# Patient Record
Sex: FEMALE | Race: WHITE | NOT HISPANIC OR LATINO | Employment: UNEMPLOYED | URBAN - METROPOLITAN AREA
[De-identification: names, ages, dates, MRNs, and addresses within clinical notes are randomized per-mention and may not be internally consistent; named-entity substitution may affect disease eponyms.]

---

## 2018-01-03 ENCOUNTER — TRANSCRIBE ORDERS (OUTPATIENT)
Dept: ADMINISTRATIVE | Facility: HOSPITAL | Age: 48
End: 2018-01-03

## 2018-01-03 DIAGNOSIS — E21.3 HYPERPARATHYROIDISM (HCC): Primary | ICD-10-CM

## 2018-01-29 ENCOUNTER — HOSPITAL ENCOUNTER (OUTPATIENT)
Dept: ULTRASOUND IMAGING | Facility: HOSPITAL | Age: 48
Discharge: HOME | End: 2018-01-29
Attending: OTOLARYNGOLOGY
Payer: COMMERCIAL

## 2018-01-29 ENCOUNTER — TRANSCRIBE ORDERS (OUTPATIENT)
Dept: ADMINISTRATIVE | Facility: HOSPITAL | Age: 48
End: 2018-01-29

## 2018-01-29 ENCOUNTER — APPOINTMENT (OUTPATIENT)
Dept: LAB | Facility: HOSPITAL | Age: 48
End: 2018-01-29
Payer: COMMERCIAL

## 2018-01-29 ENCOUNTER — HOSPITAL ENCOUNTER (OUTPATIENT)
Dept: NUCLEAR MEDICINE | Facility: HOSPITAL | Age: 48
Discharge: HOME | End: 2018-01-29
Attending: OTOLARYNGOLOGY
Payer: COMMERCIAL

## 2018-01-29 DIAGNOSIS — E21.3 HYPERPARATHYROIDISM (HCC): ICD-10-CM

## 2018-01-29 DIAGNOSIS — R53.83 FATIGUE, UNSPECIFIED TYPE: ICD-10-CM

## 2018-01-29 DIAGNOSIS — R53.81 MALAISE: ICD-10-CM

## 2018-01-29 DIAGNOSIS — R53.83 FATIGUE, UNSPECIFIED TYPE: Primary | ICD-10-CM

## 2018-01-29 LAB
BASOPHILS # BLD AUTO: 0.03 THOUSANDS/ΜL (ref 0–0.1)
BASOPHILS NFR BLD AUTO: 0 % (ref 0–1)
CORTIS SERPL-MCNC: 10.3 UG/DL
EOSINOPHIL # BLD AUTO: 0.18 THOUSAND/ΜL (ref 0–0.61)
EOSINOPHIL NFR BLD AUTO: 2 % (ref 0–6)
ERYTHROCYTE [DISTWIDTH] IN BLOOD BY AUTOMATED COUNT: 14 % (ref 11.6–15.1)
FSH SERPL-ACNC: 5.1 MIU/ML
HCT VFR BLD AUTO: 41.5 % (ref 34.8–46.1)
HGB BLD-MCNC: 13.8 G/DL (ref 11.5–15.4)
LH SERPL-ACNC: 3 MIU/ML
LYMPHOCYTES # BLD AUTO: 2.05 THOUSANDS/ΜL (ref 0.6–4.47)
LYMPHOCYTES NFR BLD AUTO: 24 % (ref 14–44)
MCH RBC QN AUTO: 30 PG (ref 26.8–34.3)
MCHC RBC AUTO-ENTMCNC: 33.3 G/DL (ref 31.4–37.4)
MCV RBC AUTO: 90 FL (ref 82–98)
MONOCYTES # BLD AUTO: 0.39 THOUSAND/ΜL (ref 0.17–1.22)
MONOCYTES NFR BLD AUTO: 5 % (ref 4–12)
NEUTROPHILS # BLD AUTO: 5.95 THOUSANDS/ΜL (ref 1.85–7.62)
NEUTS SEG NFR BLD AUTO: 69 % (ref 43–75)
PLATELET # BLD AUTO: 310 THOUSANDS/UL (ref 149–390)
PMV BLD AUTO: 9.1 FL (ref 8.9–12.7)
RBC # BLD AUTO: 4.6 MILLION/UL (ref 3.81–5.12)
T3 SERPL-MCNC: 1 NG/ML (ref 0.6–1.8)
T4 FREE SERPL-MCNC: 0.64 NG/DL (ref 0.76–1.46)
T4 SERPL-MCNC: 8.7 UG/DL (ref 4.7–13.3)
TSH SERPL DL<=0.05 MIU/L-ACNC: 2.1 UIU/ML (ref 0.36–3.74)
WBC # BLD AUTO: 8.6 THOUSAND/UL (ref 4.31–10.16)

## 2018-01-29 PROCEDURE — 82533 TOTAL CORTISOL: CPT

## 2018-01-29 PROCEDURE — A9500 TC99M SESTAMIBI: HCPCS

## 2018-01-29 PROCEDURE — 84305 ASSAY OF SOMATOMEDIN: CPT

## 2018-01-29 PROCEDURE — 78071 PARATHYRD PLANAR W/WO SUBTRJ: CPT

## 2018-01-29 PROCEDURE — 83001 ASSAY OF GONADOTROPIN (FSH): CPT

## 2018-01-29 PROCEDURE — 76536 US EXAM OF HEAD AND NECK: CPT

## 2018-01-29 PROCEDURE — 85025 COMPLETE CBC W/AUTO DIFF WBC: CPT

## 2018-01-29 PROCEDURE — 84443 ASSAY THYROID STIM HORMONE: CPT

## 2018-01-29 PROCEDURE — 84480 ASSAY TRIIODOTHYRONINE (T3): CPT

## 2018-01-29 PROCEDURE — 36415 COLL VENOUS BLD VENIPUNCTURE: CPT

## 2018-01-29 PROCEDURE — 83002 ASSAY OF GONADOTROPIN (LH): CPT

## 2018-01-29 PROCEDURE — 84439 ASSAY OF FREE THYROXINE: CPT

## 2018-01-30 LAB — IGF-I SERPL-MCNC: 123 NG/ML (ref 57–195)

## 2018-03-25 ENCOUNTER — OFFICE VISIT (OUTPATIENT)
Dept: URGENT CARE | Facility: CLINIC | Age: 48
End: 2018-03-25
Payer: COMMERCIAL

## 2018-03-25 VITALS
DIASTOLIC BLOOD PRESSURE: 100 MMHG | RESPIRATION RATE: 18 BRPM | SYSTOLIC BLOOD PRESSURE: 139 MMHG | OXYGEN SATURATION: 98 % | TEMPERATURE: 97.1 F | HEART RATE: 76 BPM

## 2018-03-25 DIAGNOSIS — J20.9 ACUTE BRONCHITIS, UNSPECIFIED ORGANISM: Primary | ICD-10-CM

## 2018-03-25 PROCEDURE — 99213 OFFICE O/P EST LOW 20 MIN: CPT | Performed by: NURSE PRACTITIONER

## 2018-03-25 RX ORDER — AMOXICILLIN AND CLAVULANATE POTASSIUM 875; 125 MG/1; MG/1
1 TABLET, FILM COATED ORAL 2 TIMES DAILY
Qty: 14 TABLET | Refills: 0 | Status: SHIPPED | OUTPATIENT
Start: 2018-03-25 | End: 2018-04-01

## 2018-03-25 NOTE — PATIENT INSTRUCTIONS
Acute Bronchitis   WHAT YOU NEED TO KNOW:   Acute bronchitis is swelling and irritation in the air passages of your lungs  This irritation may cause you to cough or have other breathing problems  Acute bronchitis often starts because of another illness, such as a cold or the flu  The illness spreads from your nose and throat to your windpipe and airways  Bronchitis is often called a chest cold  Acute bronchitis lasts about 3 to 6 weeks and is usually not a serious illness  Your cough can last for several weeks  DISCHARGE INSTRUCTIONS:   Return to the emergency department if:   · You cough up blood  · Your lips or fingernails turn blue  · You feel like you are not getting enough air when you breathe  Contact your healthcare provider if:   · You have a fever  · Your breathing problems do not go away or get worse  · Your cough does not get better within 4 weeks  · You have questions or concerns about your condition or care  Self-care:   · Get more rest   Rest helps your body to heal  Slowly start to do more each day  Rest when you feel it is needed  · Avoid irritants in the air  Avoid chemicals, fumes, and dust  Wear a face mask if you must work around dust or fumes  Stay inside on days when air pollution levels are high  If you have allergies, stay inside when pollen counts are high  Do not use aerosol products, such as spray-on deodorant, bug spray, and hair spray  · Do not smoke or be around others who smoke  Nicotine and other chemicals in cigarettes and cigars damages the cilia that move mucus out of your lungs  Ask your healthcare provider for information if you currently smoke and need help to quit  E-cigarettes or smokeless tobacco still contain nicotine  Talk to your healthcare provider before you use these products  · Drink liquids as directed  Liquids help keep your air passages moist and help you cough up mucus   You may need to drink more liquids when you have acute bronchitis  Ask how much liquid to drink each day and which liquids are best for you  · Use a humidifier or vaporizer  Use a cool mist humidifier or a vaporizer to increase air moisture in your home  This may make it easier for you to breathe and help decrease your cough  Decrease risk for acute bronchitis:   · Get the vaccinations you need  Ask your healthcare provider if you should get vaccinated against the flu or pneumonia  · Prevent the spread of germs  You can decrease your risk of acute bronchitis and other illnesses by doing the following:     [facility] your hands often with soap and water  Carry germ-killing hand lotion or gel with you  You can use the lotion or gel to clean your hands when soap and water are not available  ¨ Do not touch your eyes, nose, or mouth unless you have washed your hands first     ¨ Always cover your mouth when you cough to prevent the spread of germs  It is best to cough into a tissue or your shirt sleeve instead of into your hand  Ask those around you cover their mouths when they cough  ¨ Try to avoid people who have a cold or the flu  If you are sick, stay away from others as much as possible  Medicines: Your healthcare provider may  give you any of the following:  · Ibuprofen or acetaminophen  are medicines that help lower your fever  They are available without a doctor's order  Ask your healthcare provider which medicine is right for you  Ask how much to take and how often to take it  Follow directions  These medicines can cause stomach bleeding if not taken correctly  Ibuprofen can cause kidney damage  Do not take ibuprofen if you have kidney disease, an ulcer, or allergies to aspirin  Acetaminophen can cause liver damage  Do not take more than 4,000 milligrams in 24 hours  · Decongestants  help loosen mucus in your lungs and make it easier to cough up  This can help you breathe easier  · Cough suppressants  decrease your urge to cough   If your cough produces mucus, do not take a cough suppressant unless your healthcare provider tells you to  Your healthcare provider may suggest that you take a cough suppressant at night so you can rest     · Inhalers  may be given  Your healthcare provider may give you one or more inhalers to help you breathe easier and cough less  An inhaler gives your medicine to open your airways  Ask your healthcare provider to show you how to use your inhaler correctly  · Take your medicine as directed  Contact your healthcare provider if you think your medicine is not helping or if you have side effects  Tell him of her if you are allergic to any medicine  Keep a list of the medicines, vitamins, and herbs you take  Include the amounts, and when and why you take them  Bring the list or the pill bottles to follow-up visits  Carry your medicine list with you in case of an emergency  Follow up with your healthcare provider as directed:  Write down questions you have so you will remember to ask them during your follow-up visits  © 2017 [employer] Information is for End User's use only and may not be sold, redistributed or otherwise used for commercial purposes  All illustrations and images included in CareNotes® are the copyrighted property of [employer]  or Nehemiah Brown  The above information is an  only  It is not intended as medical advice for individual conditions or treatments  Talk to your doctor, nurse or pharmacist before following any medical regimen to see if it is safe and effective for you

## 2018-03-25 NOTE — PROGRESS NOTES
[id number]        NAME: Terri Mcleod is a 52 y o  female  : 1970    MRN: [id number]  DATE: 2018  TIME: 10:39 AM    Assessment and Plan   Acute bronchitis, unspecified organism [J20 9]  1  Acute bronchitis, unspecified organism  amoxicillin-clavulanate (AUGMENTIN) 875-125 mg per tablet         Patient Instructions       Follow up with PCP in 3-5 days  Proceed to  ER if symptoms worsen  Chief Complaint     Chief Complaint   Patient presents with    Cold Like Symptoms    Cough     x 3days  History of Present Illness       URI    Episode onset: 3 days  The problem has been gradually worsening  There has been no fever  Associated symptoms include congestion, coughing (dry, non productive), a plugged ear sensation, swollen glands and wheezing  She has tried decongestant for the symptoms  The treatment provided mild relief  H/O asthma  Review of Systems   Review of Systems   Constitutional: Positive for chills and fatigue  HENT: Positive for congestion and sinus pressure  Eyes: Negative  Respiratory: Positive for cough (dry, non productive) and wheezing  Gastrointestinal: Negative  Endocrine: Negative  Genitourinary: Negative  Neurological: Negative  Current Medications       Current Outpatient Prescriptions:     ALPRAZolam (XANAX) 0 5 mg tablet, Take 0 5 mg by mouth daily at bedtime as needed for anxiety  , Disp: , Rfl:     amoxicillin-clavulanate (AUGMENTIN) 875-125 mg per tablet, Take 1 tablet by mouth 2 (two) times a day for 7 days, Disp: 14 tablet, Rfl: 0    Azelastine-Fluticasone (DYMISTA NA), 2 puffs into each nostril 2 (two) times a day , Disp: , Rfl:     budesonide-formoterol (SYMBICORT) 160-4 5 mcg/act inhaler, Inhale 2 puffs 2 (two) times a day , Disp: , Rfl:     buPROPion (WELLBUTRIN) 100 mg tablet, Take 100 mg by mouth 2 (two) times a day , Disp: , Rfl:     Cyanocobalamin (B-12) 1000 MCG/ML KIT, Inject as directed every 30 (thirty) days  , Disp: , Rfl:     cyclobenzaprine (FLEXERIL) 10 mg tablet, Take 10 mg by mouth daily at bedtime  , Disp: , Rfl:     DULoxetine (CYMBALTA) 60 mg delayed release capsule, Take 60 mg by mouth daily  , Disp: , Rfl:     fexofenadine (ALLEGRA) 60 MG tablet, Take 60 mg by mouth daily  , Disp: , Rfl:     ISOtretinoin (ZENATANE) 40 MG capsule, Take 40 mg by mouth 2 (two) times a day, Disp: , Rfl:     losartan (COZAAR) 50 mg tablet, Take 50 mg by mouth 2 (two) times a day , Disp: , Rfl:     lubiprostone (AMITIZA) 24 mcg capsule, Take 24 mcg by mouth 2 (two) times a day with meals, Disp: , Rfl:     montelukast (SINGULAIR) 5 mg chewable tablet, Chew 5 mg 2 (two) times a day , Disp: , Rfl:     traMADol (ULTRAM) 50 mg tablet, Take 50 mg by mouth 2 (two) times a day , Disp: , Rfl:     UNKNOWN TO PATIENT, Allergy shots 2x/week, Disp: , Rfl:     Current Allergies     Allergies as of 2018 - Reviewed 2018   Allergen Reaction Noted    Codeine GI Intolerance and Chest Pain 2015            The following portions of the patient's history were reviewed and updated as appropriate: allergies, current medications, past family history, past medical history, past social history, past surgical history and problem list      Past Medical History:   Diagnosis Date    Anemia     recurrent    Asthma     allergy induced    Depression     Environmental allergies     Fibromyalgia     Hiatal hernia     History of transfusion     Hypertension     [facility]ogammaglobulinemia ([facility] [address] )     Hypoglycemia after GI (gastrointestinal) surgery     PONV (postoperative nausea and vomiting)     severe    Seasonal allergies        Past Surgical History:   Procedure Laterality Date    ABDOMINAL SURGERY      adhesions    ABDOMINOPLASTY      APPENDECTOMY       SECTION      CHOLECYSTECTOMY      COSMETIC SURGERY Bilateral     breast augmentation 2004    GASTRIC BYPASS      2009    HERNIA REPAIR      HYSTERECTOMY  KNEE ARTHROSCOPY Left     [place] CORRJ HALLUX VALGUS W/SESMDC W/RESCJ PROX PHAL Left 9/9/2016    Procedure: SURGICAL MODIFIED WATKINS BUNIONECTOMY FIRST MTPJ;  Surgeon: Andre Torre DPM;  [facility];  Service: Podiatry    [place] FUSION FOOT BONE,MIDTARSAL,1 JT Left 9/9/2016    Procedure: FRIST TMJ FUSION ;  Surgeon: Andre Torre DPM;  [facility];  Service: Podiatry   [address] (NOT 1ST) Left 9/9/2016    Procedure: FOOT SHORTENING OSTEOTOMIES 2ND AND 3RD METATARSAL WITH EXTENSER TENDON RELEASE 3RD AND 4TH TOE;  Surgeon: Andre Torre DPM;  [facility];  Service: Podiatry    [place] REMOVAL DEEP IMPLANT Left 12/7/2016    Procedure: REMOVAL SYMPTOMATIC  HARDWARE FIRST RAY,RELATED CONTRACTURE SECOND AND THIRD TOES WITH SUSPENSION FOURTH TOE;  Surgeon: Andre Torre DPM;  [facility];  Service: Podiatry    TONSILLECTOMY      WISDOM TOOTH EXTRACTION         No family history on file  Medications have been verified  Objective   /100 (BP Location: Left arm, Patient Position: Sitting)   Pulse 76   Temp (!) 97 1 °F (36 2 °C) (Tympanic)   Resp 18   SpO2 98%        Physical Exam     Physical Exam   Constitutional: She is oriented to person, place, and time  She appears well-developed and well-nourished  No distress  HENT:   Head: Normocephalic and atraumatic  Right Ear: External ear normal    Left Ear: External ear normal    Nose: Mucosal edema and rhinorrhea present  Mouth/Throat: Posterior oropharyngeal erythema present  No oropharyngeal exudate or posterior oropharyngeal edema  Eyes: Conjunctivae and EOM are normal  Pupils are equal, round, and reactive to light  Neck: Normal range of motion  Neck supple  Cardiovascular: Normal rate and regular rhythm  Pulmonary/Chest: Effort normal  She has wheezes (scattered wheezing throughout lung fields  )  Abdominal: Soft   Bowel sounds are normal    Lymphadenopathy:     She has cervical adenopathy  Neurological: She is alert and oriented to person, place, and time  She has normal reflexes  Skin: Skin is warm and dry  No rash noted  She is not diaphoretic  Psychiatric: She has a normal mood and affect  Nursing note and vitals reviewed

## 2018-03-28 LAB
BASOPHILS # BLD AUTO: 0 X3/UL (ref 0–0.3)
BASOPHILS # BLD AUTO: 0.4 % (ref 0–2)
DEPRECATED RDW RBC AUTO: 13.5 % (ref 11.5–14.5)
EOSINOPHIL # BLD AUTO: 0.2 X3/UL (ref 0–0.5)
EOSINOPHIL NFR BLD AUTO: 2.7 % (ref 0–5)
HCT VFR BLD AUTO: 39.9 % (ref 37–47)
HGB BLD-MCNC: 13 G/DL (ref 12–16)
LYMPHOCYTES # BLD AUTO: 1.9 X3/UL (ref 1.2–4.2)
LYMPHOCYTES NFR BLD AUTO: 24.4 % (ref 20.5–51.1)
MCH RBC QN AUTO: 29 PG (ref 26–34)
MCHC RBC AUTO-ENTMCNC: 32.7 G/DL (ref 31–36)
MCV RBC AUTO: 88.7 FL (ref 81–99)
MONOCYTES # BLD AUTO: 0.5 X3/UL (ref 0–1)
MONOCYTES NFR BLD AUTO: 6.3 % (ref 1.7–12)
NEUTROPHILS # BLD AUTO: 5.1 X3/UL (ref 1.4–6.5)
NEUTS SEG NFR BLD AUTO: 66.2 % (ref 42.2–75.2)
PLATELET # BLD AUTO: 303 X3/UL (ref 130–400)
PMV BLD AUTO: 7.5 FL (ref 8.6–11.7)
RBC # BLD AUTO: 4.5 X6/UL (ref 3.9–5.2)
WBC # BLD AUTO: 7.7 X3/UL (ref 4.8–10.8)

## 2018-05-08 LAB
BASOPHILS # BLD AUTO: 0 X3/UL (ref 0–0.3)
BASOPHILS # BLD AUTO: 0.4 % (ref 0–2)
DEPRECATED RDW RBC AUTO: 13.4 % (ref 11.5–14.5)
EOSINOPHIL # BLD AUTO: 0.3 X3/UL (ref 0–0.5)
EOSINOPHIL NFR BLD AUTO: 3.4 % (ref 0–5)
FERRITIN SERPL-MCNC: 36.4 NG/ML (ref 22–322)
HCT VFR BLD AUTO: 37.6 % (ref 37–47)
HGB BLD-MCNC: 12.2 G/DL (ref 12–16)
LYMPHOCYTES # BLD AUTO: 1.7 X3/UL (ref 1.2–4.2)
LYMPHOCYTES NFR BLD AUTO: 22 % (ref 20.5–51.1)
MCH RBC QN AUTO: 28.7 PG (ref 26–34)
MCHC RBC AUTO-ENTMCNC: 32.5 G/DL (ref 31–36)
MCV RBC AUTO: 88.3 FL (ref 81–99)
MONOCYTES # BLD AUTO: 0.4 X3/UL (ref 0–1)
MONOCYTES NFR BLD AUTO: 4.7 % (ref 1.7–12)
NEUTROPHILS # BLD AUTO: 5.3 X3/UL (ref 1.4–6.5)
NEUTS SEG NFR BLD AUTO: 69.5 % (ref 42.2–75.2)
PLATELET # BLD AUTO: 273 X3/UL (ref 130–400)
PMV BLD AUTO: 7.1 FL (ref 8.6–11.7)
RBC # BLD AUTO: 4.26 X6/UL (ref 3.9–5.2)
WBC # BLD AUTO: 7.7 X3/UL (ref 4.8–10.8)

## 2018-07-03 ENCOUNTER — TRANSCRIBE ORDERS (OUTPATIENT)
Dept: ADMINISTRATIVE | Facility: HOSPITAL | Age: 48
End: 2018-07-03

## 2018-07-03 ENCOUNTER — APPOINTMENT (OUTPATIENT)
Dept: LAB | Facility: HOSPITAL | Age: 48
End: 2018-07-03
Payer: COMMERCIAL

## 2018-07-03 DIAGNOSIS — D50.9 IRON DEFICIENCY ANEMIA, UNSPECIFIED IRON DEFICIENCY ANEMIA TYPE: ICD-10-CM

## 2018-07-03 DIAGNOSIS — E55.9 VITAMIN D DEFICIENCY: Primary | ICD-10-CM

## 2018-07-03 DIAGNOSIS — R53.83 OTHER FATIGUE: ICD-10-CM

## 2018-07-03 DIAGNOSIS — D80.1 HYPOGAMMAGLOBULINEMIA (HCC): ICD-10-CM

## 2018-07-03 DIAGNOSIS — E55.9 VITAMIN D DEFICIENCY: ICD-10-CM

## 2018-07-03 DIAGNOSIS — E21.1 HYPERPARATHYROIDISM , SECONDARY, NON-RENAL (HCC): ICD-10-CM

## 2018-07-03 DIAGNOSIS — D50.9 IRON DEFICIENCY ANEMIA, UNSPECIFIED IRON DEFICIENCY ANEMIA TYPE: Primary | ICD-10-CM

## 2018-07-03 LAB
BASOPHILS # BLD AUTO: 0 THOUSANDS/ΜL (ref 0–0.1)
BASOPHILS NFR BLD AUTO: 0 % (ref 0–2)
CALCIUM SERPL-MCNC: 9.4 MG/DL (ref 8.6–10.5)
EOSINOPHIL # BLD AUTO: 0.2 THOUSAND/ΜL (ref 0–0.61)
EOSINOPHIL NFR BLD AUTO: 3 % (ref 0–5)
ERYTHROCYTE [DISTWIDTH] IN BLOOD BY AUTOMATED COUNT: 13.6 % (ref 11.5–14.5)
FERRITIN SERPL-MCNC: 40 NG/ML (ref 8–388)
HCT VFR BLD AUTO: 39.9 % (ref 34.8–46.1)
HGB BLD-MCNC: 13.5 G/DL (ref 12–16)
LYMPHOCYTES # BLD AUTO: 1.6 THOUSANDS/ΜL (ref 0.6–4.47)
LYMPHOCYTES NFR BLD AUTO: 25 % (ref 21–51)
MCH RBC QN AUTO: 29.9 PG (ref 26–34)
MCHC RBC AUTO-ENTMCNC: 33.8 G/DL (ref 31–37)
MCV RBC AUTO: 89 FL (ref 81–99)
MONOCYTES # BLD AUTO: 0.5 THOUSAND/ΜL (ref 0.17–1.22)
MONOCYTES NFR BLD AUTO: 7 % (ref 2–12)
NEUTROPHILS # BLD AUTO: 4.2 THOUSANDS/ΜL (ref 1.4–6.5)
NEUTS SEG NFR BLD AUTO: 65 % (ref 42–75)
NRBC BLD AUTO-RTO: 0 /100 WBCS
PLATELET # BLD AUTO: 297 THOUSANDS/UL (ref 149–390)
PMV BLD AUTO: 7.4 FL (ref 8.6–11.7)
PTH-INTACT SERPL-MCNC: 100.7 PG/ML (ref 18.4–80.1)
RBC # BLD AUTO: 4.51 MILLION/UL (ref 3.9–5.2)
TSH SERPL DL<=0.05 MIU/L-ACNC: 1.77 UIU/ML (ref 0.45–5.33)
WBC # BLD AUTO: 6.5 THOUSAND/UL (ref 4.8–10.8)

## 2018-07-03 PROCEDURE — 82728 ASSAY OF FERRITIN: CPT

## 2018-07-03 PROCEDURE — 84443 ASSAY THYROID STIM HORMONE: CPT

## 2018-07-03 PROCEDURE — 83970 ASSAY OF PARATHORMONE: CPT

## 2018-07-03 PROCEDURE — 85025 COMPLETE CBC W/AUTO DIFF WBC: CPT

## 2018-07-03 PROCEDURE — 36415 COLL VENOUS BLD VENIPUNCTURE: CPT

## 2018-07-03 PROCEDURE — 82310 ASSAY OF CALCIUM: CPT

## 2018-07-05 LAB — 1,25(OH)2D3 SERPL-MCNC: 130 PG/ML (ref 19.9–79.3)

## 2018-07-09 ENCOUNTER — LAB (OUTPATIENT)
Dept: LAB | Facility: HOSPITAL | Age: 48
End: 2018-07-09
Payer: COMMERCIAL

## 2018-07-09 DIAGNOSIS — E55.9 VITAMIN D DEFICIENCY: ICD-10-CM

## 2018-07-09 LAB — 25(OH)D3 SERPL-MCNC: 28.6 NG/ML (ref 30–100)

## 2018-07-09 PROCEDURE — 82306 VITAMIN D 25 HYDROXY: CPT

## 2018-07-09 PROCEDURE — 36415 COLL VENOUS BLD VENIPUNCTURE: CPT

## 2018-08-25 ENCOUNTER — OFFICE VISIT (OUTPATIENT)
Dept: URGENT CARE | Facility: CLINIC | Age: 48
End: 2018-08-25
Payer: COMMERCIAL

## 2018-08-25 VITALS
HEART RATE: 89 BPM | SYSTOLIC BLOOD PRESSURE: 142 MMHG | OXYGEN SATURATION: 98 % | TEMPERATURE: 98 F | RESPIRATION RATE: 16 BRPM | DIASTOLIC BLOOD PRESSURE: 79 MMHG

## 2018-08-25 DIAGNOSIS — L03.211 CELLULITIS OF FACE: Primary | ICD-10-CM

## 2018-08-25 DIAGNOSIS — B00.9 HSV (HERPES SIMPLEX VIRUS) INFECTION: ICD-10-CM

## 2018-08-25 PROCEDURE — 99213 OFFICE O/P EST LOW 20 MIN: CPT | Performed by: PHYSICIAN ASSISTANT

## 2018-08-25 RX ORDER — TRAMADOL HYDROCHLORIDE 50 MG/1
TABLET ORAL
COMMUNITY
End: 2019-09-12 | Stop reason: SDUPTHER

## 2018-08-25 RX ORDER — CEPHALEXIN 500 MG/1
500 CAPSULE ORAL EVERY 8 HOURS SCHEDULED
Qty: 15 CAPSULE | Refills: 0 | Status: SHIPPED | OUTPATIENT
Start: 2018-08-25 | End: 2018-08-30

## 2018-08-25 RX ORDER — ALPRAZOLAM 2 MG/1
0.5 TABLET ORAL
COMMUNITY

## 2018-08-25 RX ORDER — FEXOFENADINE HCL 180 MG/1
TABLET ORAL
COMMUNITY
End: 2019-03-11 | Stop reason: SDUPTHER

## 2018-08-25 RX ORDER — DULOXETIN HYDROCHLORIDE 60 MG/1
CAPSULE, DELAYED RELEASE ORAL
COMMUNITY
End: 2018-08-25 | Stop reason: ALTCHOICE

## 2018-08-25 RX ORDER — VALACYCLOVIR HYDROCHLORIDE 1 G/1
1000 TABLET, FILM COATED ORAL 2 TIMES DAILY
Qty: 10 TABLET | Refills: 0 | Status: SHIPPED | OUTPATIENT
Start: 2018-08-25 | End: 2019-06-10

## 2018-08-25 RX ORDER — ALBUTEROL SULFATE 90 UG/1
1 AEROSOL, METERED RESPIRATORY (INHALATION) EVERY 6 HOURS PRN
COMMUNITY

## 2018-08-25 NOTE — PROGRESS NOTES
[facility]    NAME: Kathleen Singh is a 52 y o  female  : 1970    MRN: [id number]  DATE: 2018  TIME: 12:32 PM    Assessment and Plan   Cellulitis of face [L03 211]  1  Cellulitis of face  cephalexin (KEFLEX) 500 mg capsule   2  HSV (herpes simplex virus) infection  valACYclovir (VALTREX) 1,000 mg tablet       Patient Instructions     Patient Instructions   Start antibiotic  Take as directed  Can take Valtrex to help with cold sore  Follow up with PCP in the next week if not improving  Chief Complaint     Chief Complaint   Patient presents with    Mouth Lesions     following microblading on eyebrows and "procedure" on lips Monday  History of Present Illness   51-year-old female here after having micro bleeding done  She is concerned that they are getting infected  She developed a cold sore on her lip  Noticed that she has a lot of enlarged lymph nodes in her neck that are tender and painful  Review of Systems   Review of Systems   Constitutional: Negative for activity change, appetite change, chills, diaphoresis, fatigue, fever and unexpected weight change  HENT: Positive for facial swelling (Some swelling over micro bleed areas with slight erythema  )  Negative for congestion, dental problem, hearing loss, sinus pressure, sneezing, sore throat, tinnitus, trouble swallowing and voice change  Cold sore right lip   Eyes: Negative for photophobia, redness and visual disturbance  Respiratory: Negative for apnea, cough, chest tightness, shortness of breath, wheezing and stridor  Cardiovascular: Negative for chest pain, palpitations and leg swelling  Gastrointestinal: Negative for abdominal distention, abdominal pain, blood in stool, constipation, diarrhea, nausea and vomiting  Endocrine: Negative for cold intolerance, heat intolerance, polydipsia, polyphagia and polyuria     Genitourinary: Negative for difficulty urinating, dysuria, flank pain, frequency, hematuria and urgency  Musculoskeletal: Negative for arthralgias, back pain, gait problem, joint swelling, myalgias, neck pain and neck stiffness  Skin: Negative for pallor, rash and wound  Neurological: Negative for dizziness, tremors, seizures, speech difficulty, weakness and headaches  Hematological: Negative for adenopathy  Does not bruise/bleed easily  Psychiatric/Behavioral: Negative for agitation, confusion, dysphoric mood and sleep disturbance  The patient is not nervous/anxious  All other systems reviewed and are negative  Current Medications     Current Outpatient Prescriptions:     Botulinum Toxin Type A 200 units SOLR, Inject 155 units into face and neck IM every 90 days, Disp: , Rfl:     albuterol (PROVENTIL HFA,VENTOLIN HFA) 90 mcg/act inhaler, Inhale 1 puff every 6 (six) hours, Disp: , Rfl:     ALPRAZolam (XANAX) 0 5 mg tablet, Take 0 5 mg by mouth daily at bedtime as needed for anxiety  , Disp: , Rfl:     ALPRAZolam (XANAX) 2 MG tablet, Take by mouth, Disp: , Rfl:     Azelastine-Fluticasone (DYMISTA NA), 2 puffs into each nostril 2 (two) times a day , Disp: , Rfl:     budesonide-formoterol (SYMBICORT) 160-4 5 mcg/act inhaler, Inhale 2 puffs 2 (two) times a day , Disp: , Rfl:     buPROPion (WELLBUTRIN) 100 mg tablet, Take 100 mg by mouth 2 (two) times a day , Disp: , Rfl:     cephalexin (KEFLEX) 500 mg capsule, Take 1 capsule (500 mg total) by mouth every 8 (eight) hours for 5 days, Disp: 15 capsule, Rfl: 0    Cyanocobalamin (B-12) 1000 MCG/ML KIT, Inject as directed every 30 (thirty) days  , Disp: , Rfl:     cyanocobalamin ([employer] VITAMIN B-12) 1000 MCG tablet, Take by mouth, Disp: , Rfl:     cyclobenzaprine (FLEXERIL) 10 mg tablet, Take 10 mg by mouth daily at bedtime  , Disp: , Rfl:     DULoxetine (CYMBALTA) 60 mg delayed release capsule, Take 60 mg by mouth daily  , Disp: , Rfl:     fexofenadine (ALLEGRA ALLERGY) 180 MG tablet, Take by mouth, Disp: , Rfl:    fexofenadine (ALLEGRA) 60 MG tablet, Take 60 mg by mouth daily  , Disp: , Rfl:     ISOtretinoin (ZENATANE) 40 MG capsule, Take 40 mg by mouth 2 (two) times a day, Disp: , Rfl:     losartan (COZAAR) 50 mg tablet, Take 50 mg by mouth 2 (two) times a day , Disp: , Rfl:     lubiprostone (AMITIZA) 24 mcg capsule, Take 24 mcg by mouth 2 (two) times a day with meals, Disp: , Rfl:     montelukast (SINGULAIR) 5 mg chewable tablet, Chew 5 mg 2 (two) times a day , Disp: , Rfl:     traMADol (ULTRAM) 50 mg tablet, Take 50 mg by mouth 2 (two) times a day , Disp: , Rfl:     traMADol (ULTRAM) 50 mg tablet, Take by mouth, Disp: , Rfl:     UNKNOWN TO PATIENT, Allergy shots 2x/week, Disp: , Rfl:     valACYclovir (VALTREX) 1,000 mg tablet, Take 1 tablet (1,000 mg total) by mouth 2 (two) times a day for 5 days, Disp: 10 tablet, Rfl: 0    Current Allergies     Allergies as of 2018 - Reviewed 2018   Allergen Reaction Noted    Codeine GI Intolerance and Chest Pain 11/15/2012          The following portions of the patient's history were reviewed and updated as appropriate: allergies, current medications, past family history, past medical history, past social history, past surgical history and problem list    Past Medical History:   Diagnosis Date    Anemia     recurrent    Asthma     allergy induced    Depression     Environmental allergies     Fibromyalgia     Hiatal hernia     History of transfusion     Hypertension     [facility] ([facility] [address] [facility]     Hypoglycemia after GI (gastrointestinal) surgery     PONV (postoperative nausea and vomiting)     severe    Seasonal allergies      Past Surgical History:   Procedure Laterality Date    ABDOMINAL SURGERY      adhesions    ABDOMINOPLASTY      APPENDECTOMY       SECTION      CHOLECYSTECTOMY      COSMETIC SURGERY Bilateral     breast augmentation 2004    GASTRIC BYPASS      2009    HERNIA REPAIR      HYSTERECTOMY      KNEE ARTHROSCOPY Left     [place] CORRJ HALLUX VALGUS W/SESMDC W/RESCJ PROX PHAL Left 9/9/2016    Procedure: SURGICAL MODIFIED WATKINS BUNIONECTOMY FIRST MTPJ;  Surgeon: Alyssa Gaytan DPM;  [facility];  Service: Podiatry    [place] FUSION FOOT BONE,MIDTARSAL,1 JT Left 9/9/2016    Procedure: FRIST TMJ FUSION ;  Surgeon: Alyssa Gaytan DPM;  [facility];  Service: Podiatry   [address] (NOT 1ST) Left 9/9/2016    Procedure: FOOT SHORTENING OSTEOTOMIES 2ND AND 3RD METATARSAL WITH EXTENSER TENDON RELEASE 3RD AND 4TH TOE;  Surgeon: Alyssa Gaytan DPM;  [facility];  Service: Podiatry    [place] REMOVAL DEEP IMPLANT Left 12/7/2016    Procedure: REMOVAL SYMPTOMATIC  HARDWARE FIRST RAY,RELATED CONTRACTURE SECOND AND THIRD TOES WITH SUSPENSION FOURTH TOE;  Surgeon: Alyssa Gaytan DPM;  [facility];  Service: Podiatry    TONSILLECTOMY      WISDOM TOOTH EXTRACTION       No family history on file  Social History     Social History    Marital status: /Civil Union     Spouse name: N/A    Number of children: N/A    Years of education: N/A     Occupational History    Not on file  Social History Main Topics    Smoking status: Never Smoker    Smokeless tobacco: Not on file    Alcohol use No    Drug use: No    Sexual activity: Not on file     Other Topics Concern    Not on file     Social History Narrative    No narrative on file     Medications have been verified  Objective   /79   Pulse 89   Temp 98 °F (36 7 °C) (Tympanic)   Resp 16   SpO2 98%      Physical Exam   Physical Exam   Constitutional: She appears well-developed and well-nourished  No distress  HENT:   Head: Normocephalic  Right Ear: External ear normal    Left Ear: External ear normal    Nose: Nose normal    Mouth/Throat: Oropharynx is clear and moist  No oropharyngeal exudate  Neck: Normal range of motion  Neck supple     Tender anterior cervical lymph nodes   Cardiovascular: Normal rate, regular rhythm and normal heart sounds  No murmur heard  Pulmonary/Chest: Effort normal and breath sounds normal  No respiratory distress  She has no wheezes  She has no rales  Abdominal: Soft  Bowel sounds are normal  There is no tenderness  Musculoskeletal: Normal range of motion  Lymphadenopathy:     She has no cervical adenopathy  Skin: Skin is warm  No rash noted  Vitals reviewed

## 2018-08-25 NOTE — PATIENT INSTRUCTIONS
Start antibiotic  Take as directed  Can take Valtrex to help with cold sore  Follow up with PCP in the next week if not improving

## 2018-09-05 ENCOUNTER — TRANSCRIBE ORDERS (OUTPATIENT)
Dept: ADMINISTRATIVE | Facility: HOSPITAL | Age: 48
End: 2018-09-05

## 2018-09-05 ENCOUNTER — APPOINTMENT (OUTPATIENT)
Dept: LAB | Facility: HOSPITAL | Age: 48
End: 2018-09-05
Payer: COMMERCIAL

## 2018-09-05 DIAGNOSIS — M79.7 FIBROMYALGIA: Primary | ICD-10-CM

## 2018-09-05 DIAGNOSIS — M79.7 FIBROMYALGIA: ICD-10-CM

## 2018-09-05 DIAGNOSIS — M25.541 ARTHRALGIA OF RIGHT HAND: ICD-10-CM

## 2018-09-05 DIAGNOSIS — Z79.899 LONG TERM USE OF DRUG: ICD-10-CM

## 2018-09-05 DIAGNOSIS — E55.9 VITAMIN D DEFICIENCY: ICD-10-CM

## 2018-09-05 LAB
25(OH)D3 SERPL-MCNC: 21.5 NG/ML (ref 30–100)
ALBUMIN SERPL BCP-MCNC: 4.2 G/DL (ref 3.5–5.7)
ALP SERPL-CCNC: 85 U/L (ref 40–150)
ALT SERPL W P-5'-P-CCNC: 19 U/L (ref 7–52)
ANION GAP SERPL CALCULATED.3IONS-SCNC: 7 MMOL/L (ref 4–13)
AST SERPL W P-5'-P-CCNC: 15 U/L (ref 13–39)
BASOPHILS # BLD AUTO: 0 THOUSANDS/ΜL (ref 0–0.1)
BASOPHILS NFR BLD AUTO: 0 % (ref 0–2)
BILIRUB SERPL-MCNC: 0.7 MG/DL (ref 0.2–1)
BUN SERPL-MCNC: 10 MG/DL (ref 7–25)
CALCIUM SERPL-MCNC: 9.8 MG/DL (ref 8.6–10.5)
CHLORIDE SERPL-SCNC: 104 MMOL/L (ref 98–107)
CO2 SERPL-SCNC: 27 MMOL/L (ref 21–31)
CREAT SERPL-MCNC: 0.63 MG/DL (ref 0.6–1.2)
CRP SERPL QL: 9.7 MG/L
EOSINOPHIL # BLD AUTO: 0.1 THOUSAND/ΜL (ref 0–0.61)
EOSINOPHIL NFR BLD AUTO: 1 % (ref 0–5)
ERYTHROCYTE [DISTWIDTH] IN BLOOD BY AUTOMATED COUNT: 13.9 % (ref 11.5–14.5)
ERYTHROCYTE [SEDIMENTATION RATE] IN BLOOD: 5 MM/HOUR (ref 0–20)
GFR SERPL CREATININE-BSD FRML MDRD: 107 ML/MIN/1.73SQ M
GLUCOSE SERPL-MCNC: 67 MG/DL (ref 65–99)
HCT VFR BLD AUTO: 39.9 % (ref 34.8–46.1)
HGB BLD-MCNC: 13.2 G/DL (ref 12–16)
LYMPHOCYTES # BLD AUTO: 2.3 THOUSANDS/ΜL (ref 0.6–4.47)
LYMPHOCYTES NFR BLD AUTO: 19 % (ref 21–51)
MCH RBC QN AUTO: 30 PG (ref 26–34)
MCHC RBC AUTO-ENTMCNC: 33.1 G/DL (ref 31–37)
MCV RBC AUTO: 91 FL (ref 81–99)
MONOCYTES # BLD AUTO: 0.6 THOUSAND/ΜL (ref 0.17–1.22)
MONOCYTES NFR BLD AUTO: 5 % (ref 2–12)
NEUTROPHILS # BLD AUTO: 9.3 THOUSANDS/ΜL (ref 1.4–6.5)
NEUTS SEG NFR BLD AUTO: 76 % (ref 42–75)
NRBC BLD AUTO-RTO: 0 /100 WBCS
PLATELET # BLD AUTO: 335 THOUSANDS/UL (ref 149–390)
PMV BLD AUTO: 6.7 FL (ref 8.6–11.7)
POTASSIUM SERPL-SCNC: 4 MMOL/L (ref 3.5–5.5)
PROT SERPL-MCNC: 6.6 G/DL (ref 6.4–8.9)
RBC # BLD AUTO: 4.41 MILLION/UL (ref 3.9–5.2)
SODIUM SERPL-SCNC: 138 MMOL/L (ref 134–143)
WBC # BLD AUTO: 12.4 THOUSAND/UL (ref 4.8–10.8)

## 2018-09-05 PROCEDURE — 36415 COLL VENOUS BLD VENIPUNCTURE: CPT

## 2018-09-05 PROCEDURE — 85025 COMPLETE CBC W/AUTO DIFF WBC: CPT

## 2018-09-05 PROCEDURE — 82306 VITAMIN D 25 HYDROXY: CPT

## 2018-09-05 PROCEDURE — 86140 C-REACTIVE PROTEIN: CPT

## 2018-09-05 PROCEDURE — 86430 RHEUMATOID FACTOR TEST QUAL: CPT

## 2018-09-05 PROCEDURE — 86200 CCP ANTIBODY: CPT

## 2018-09-05 PROCEDURE — 85652 RBC SED RATE AUTOMATED: CPT

## 2018-09-05 PROCEDURE — 80053 COMPREHEN METABOLIC PANEL: CPT

## 2018-09-06 LAB — RHEUMATOID FACT SER QL LA: NEGATIVE

## 2018-09-07 ENCOUNTER — HOSPITAL ENCOUNTER (OUTPATIENT)
Dept: RADIOLOGY | Facility: HOSPITAL | Age: 48
Discharge: HOME | End: 2018-09-07
Payer: COMMERCIAL

## 2018-09-07 ENCOUNTER — APPOINTMENT (OUTPATIENT)
Dept: LAB | Facility: HOSPITAL | Age: 48
End: 2018-09-07
Payer: COMMERCIAL

## 2018-09-07 ENCOUNTER — TRANSCRIBE ORDERS (OUTPATIENT)
Dept: ADMINISTRATIVE | Facility: HOSPITAL | Age: 48
End: 2018-09-07

## 2018-09-07 DIAGNOSIS — R09.81 NASAL CONGESTION: ICD-10-CM

## 2018-09-07 DIAGNOSIS — D80.1 COMMON VARIABLE AGAMMAGLOBULINEMIA (HCC): ICD-10-CM

## 2018-09-07 DIAGNOSIS — R09.89 ABNORMAL CHEST SOUNDS: ICD-10-CM

## 2018-09-07 DIAGNOSIS — R09.89 ABNORMAL CHEST SOUNDS: Primary | ICD-10-CM

## 2018-09-07 LAB
CCP IGA+IGG SERPL IA-ACNC: 5 UNITS (ref 0–19)
IGA SERPL-MCNC: 133 MG/DL (ref 70–400)
IGG SERPL-MCNC: 541 MG/DL (ref 700–1600)
IGM SERPL-MCNC: 38 MG/DL (ref 40–230)
PTH-INTACT SERPL-MCNC: 91.9 PG/ML (ref 18.4–80.1)

## 2018-09-07 PROCEDURE — 84165 PROTEIN E-PHORESIS SERUM: CPT | Performed by: PATHOLOGY

## 2018-09-07 PROCEDURE — 36415 COLL VENOUS BLD VENIPUNCTURE: CPT

## 2018-09-07 PROCEDURE — 71046 X-RAY EXAM CHEST 2 VIEWS: CPT

## 2018-09-07 PROCEDURE — 82784 ASSAY IGA/IGD/IGG/IGM EACH: CPT

## 2018-09-07 PROCEDURE — 83970 ASSAY OF PARATHORMONE: CPT

## 2018-09-07 PROCEDURE — 82787 IGG 1 2 3 OR 4 EACH: CPT

## 2018-09-07 PROCEDURE — 84165 PROTEIN E-PHORESIS SERUM: CPT

## 2018-09-07 PROCEDURE — 70210 X-RAY EXAM OF SINUSES: CPT

## 2018-09-10 LAB
ALBUMIN SERPL ELPH-MCNC: 3.94 G/DL (ref 3.5–5)
ALBUMIN SERPL ELPH-MCNC: 62.5 % (ref 52–65)
ALPHA1 GLOB SERPL ELPH-MCNC: 0.35 G/DL (ref 0.1–0.4)
ALPHA1 GLOB SERPL ELPH-MCNC: 5.5 % (ref 2.5–5)
ALPHA2 GLOB SERPL ELPH-MCNC: 0.75 G/DL (ref 0.4–1.2)
ALPHA2 GLOB SERPL ELPH-MCNC: 11.9 % (ref 7–13)
BETA GLOB ABNORMAL SERPL ELPH-MCNC: 0.48 G/DL (ref 0.4–0.8)
BETA1 GLOB SERPL ELPH-MCNC: 7.6 % (ref 5–13)
BETA2 GLOB SERPL ELPH-MCNC: 4.1 % (ref 2–8)
BETA2+GAMMA GLOB SERPL ELPH-MCNC: 0.26 G/DL (ref 0.2–0.5)
GAMMA GLOB ABNORMAL SERPL ELPH-MCNC: 0.53 G/DL (ref 0.5–1.6)
GAMMA GLOB SERPL ELPH-MCNC: 8.4 % (ref 12–22)
IGG SERPL-MCNC: 513 MG/DL (ref 700–1600)
IGG/ALB SER: 1.67 {RATIO} (ref 1.1–1.8)
IGG1 SER-MCNC: 284 MG/DL (ref 248–810)
IGG2 SER-MCNC: 197 MG/DL (ref 130–555)
IGG3 SER-MCNC: 22 MG/DL (ref 15–102)
IGG4 SER-MCNC: 6 MG/DL (ref 2–96)
PROT PATTERN SERPL ELPH-IMP: ABNORMAL
PROT SERPL-MCNC: 6.3 G/DL (ref 6.4–8.2)

## 2018-09-27 ENCOUNTER — LAB REQUISITION (OUTPATIENT)
Dept: LAB | Facility: HOSPITAL | Age: 48
End: 2018-09-27
Payer: COMMERCIAL

## 2018-09-27 DIAGNOSIS — J31.0 CHRONIC RHINITIS: ICD-10-CM

## 2018-09-27 DIAGNOSIS — J01.00 ACUTE MAXILLARY SINUSITIS: ICD-10-CM

## 2018-09-27 PROCEDURE — 87205 SMEAR GRAM STAIN: CPT | Performed by: OTOLARYNGOLOGY

## 2018-09-27 PROCEDURE — 87070 CULTURE OTHR SPECIMN AEROBIC: CPT | Performed by: OTOLARYNGOLOGY

## 2018-09-30 LAB
BACTERIA WND AEROBE CULT: ABNORMAL
GRAM STN SPEC: ABNORMAL
GRAM STN SPEC: ABNORMAL

## 2018-10-03 ENCOUNTER — TRANSCRIBE ORDERS (OUTPATIENT)
Dept: ADMINISTRATIVE | Facility: HOSPITAL | Age: 48
End: 2018-10-03

## 2018-10-03 ENCOUNTER — APPOINTMENT (OUTPATIENT)
Dept: LAB | Facility: HOSPITAL | Age: 48
End: 2018-10-03
Payer: COMMERCIAL

## 2018-10-03 DIAGNOSIS — E21.3 HYPERPARATHYROIDISM, UNSPECIFIED (HCC): Primary | ICD-10-CM

## 2018-10-03 LAB
CALCIUM 5H P 500 MG CA PO UR-SCNC: 368 MG/PERIOD
PERIOD: 24 HOURS
SPECIMEN VOL UR: 4000 ML

## 2018-10-03 PROCEDURE — 82340 ASSAY OF CALCIUM IN URINE: CPT | Performed by: INTERNAL MEDICINE

## 2018-10-17 ENCOUNTER — TRANSCRIBE ORDERS (OUTPATIENT)
Dept: ADMINISTRATIVE | Facility: HOSPITAL | Age: 48
End: 2018-10-17

## 2018-10-17 DIAGNOSIS — R52 PAIN: Primary | ICD-10-CM

## 2018-10-19 ENCOUNTER — HOSPITAL ENCOUNTER (OUTPATIENT)
Dept: ULTRASOUND IMAGING | Facility: HOSPITAL | Age: 48
Discharge: HOME | End: 2018-10-19
Attending: SPECIALIST
Payer: COMMERCIAL

## 2018-10-19 DIAGNOSIS — R52 PAIN: ICD-10-CM

## 2018-10-19 PROCEDURE — 76642 ULTRASOUND BREAST LIMITED: CPT

## 2018-11-27 ENCOUNTER — TRANSCRIBE ORDERS (OUTPATIENT)
Dept: ADMINISTRATIVE | Facility: HOSPITAL | Age: 48
End: 2018-11-27

## 2018-11-27 DIAGNOSIS — N64.4 PAINFUL BREASTS: Primary | ICD-10-CM

## 2018-11-30 ENCOUNTER — TRANSCRIBE ORDERS (OUTPATIENT)
Dept: ADMINISTRATIVE | Facility: HOSPITAL | Age: 48
End: 2018-11-30

## 2018-11-30 ENCOUNTER — APPOINTMENT (OUTPATIENT)
Dept: LAB | Facility: HOSPITAL | Age: 48
End: 2018-11-30
Payer: COMMERCIAL

## 2018-11-30 DIAGNOSIS — I10 HYPERTENSION, UNSPECIFIED TYPE: Primary | ICD-10-CM

## 2018-11-30 DIAGNOSIS — E78.5 HYPERLIPIDEMIA, UNSPECIFIED HYPERLIPIDEMIA TYPE: ICD-10-CM

## 2018-11-30 DIAGNOSIS — R73.9 HYPERGLYCEMIA: ICD-10-CM

## 2018-11-30 DIAGNOSIS — M79.7 FIBROMYALGIA: ICD-10-CM

## 2018-11-30 DIAGNOSIS — I10 HYPERTENSION, UNSPECIFIED TYPE: ICD-10-CM

## 2018-11-30 LAB
25(OH)D3 SERPL-MCNC: 37.2 NG/ML (ref 30–100)
ALBUMIN SERPL BCP-MCNC: 4.1 G/DL (ref 3.5–5.7)
ALP SERPL-CCNC: 91 U/L (ref 40–150)
ALT SERPL W P-5'-P-CCNC: 12 U/L (ref 7–52)
ANION GAP SERPL CALCULATED.3IONS-SCNC: 7 MMOL/L (ref 4–13)
AST SERPL W P-5'-P-CCNC: 15 U/L (ref 13–39)
BACTERIA UR QL AUTO: ABNORMAL /HPF
BASOPHILS # BLD AUTO: 0 THOUSANDS/ΜL (ref 0–0.1)
BASOPHILS NFR BLD AUTO: 1 % (ref 0–2)
BILIRUB SERPL-MCNC: 0.6 MG/DL (ref 0.2–1)
BILIRUB UR QL STRIP: NEGATIVE
BUN SERPL-MCNC: 10 MG/DL (ref 7–25)
CALCIUM SERPL-MCNC: 9.2 MG/DL (ref 8.6–10.5)
CHLORIDE SERPL-SCNC: 102 MMOL/L (ref 98–107)
CHOLEST SERPL-MCNC: 247 MG/DL (ref 0–200)
CK SERPL-CCNC: 95 U/L (ref 30–192)
CLARITY UR: CLEAR
CO2 SERPL-SCNC: 28 MMOL/L (ref 21–31)
COLOR UR: YELLOW
CORTIS AM PEAK SERPL-MCNC: 8.3 UG/DL (ref 4.2–22.4)
CREAT SERPL-MCNC: 0.77 MG/DL (ref 0.6–1.2)
EOSINOPHIL # BLD AUTO: 0.3 THOUSAND/ΜL (ref 0–0.61)
EOSINOPHIL NFR BLD AUTO: 4 % (ref 0–5)
ERYTHROCYTE [DISTWIDTH] IN BLOOD BY AUTOMATED COUNT: 13.5 % (ref 11.5–14.5)
ERYTHROCYTE [SEDIMENTATION RATE] IN BLOOD: 8 MM/HOUR (ref 0–20)
EST. AVERAGE GLUCOSE BLD GHB EST-MCNC: 111 MG/DL
GFR SERPL CREATININE-BSD FRML MDRD: 92 ML/MIN/1.73SQ M
GLUCOSE P FAST SERPL-MCNC: 89 MG/DL (ref 65–99)
GLUCOSE UR STRIP-MCNC: NEGATIVE MG/DL
HBA1C MFR BLD: 5.5 % (ref 4.2–6.3)
HCT VFR BLD AUTO: 41.4 % (ref 34.8–46.1)
HDLC SERPL-MCNC: 91 MG/DL (ref 40–60)
HGB BLD-MCNC: 13.5 G/DL (ref 12–16)
HGB UR QL STRIP.AUTO: ABNORMAL
INSULIN SERPL-ACNC: 2.9 MU/L (ref 3–25)
KETONES UR STRIP-MCNC: NEGATIVE MG/DL
LDLC SERPL CALC-MCNC: 132 MG/DL (ref 75–193)
LEUKOCYTE ESTERASE UR QL STRIP: NEGATIVE
LYMPHOCYTES # BLD AUTO: 1.8 THOUSANDS/ΜL (ref 0.6–4.47)
LYMPHOCYTES NFR BLD AUTO: 23 % (ref 21–51)
MAGNESIUM SERPL-MCNC: 2 MG/DL (ref 1.9–2.7)
MCH RBC QN AUTO: 29.2 PG (ref 26–34)
MCHC RBC AUTO-ENTMCNC: 32.7 G/DL (ref 31–37)
MCV RBC AUTO: 89 FL (ref 81–99)
MONOCYTES # BLD AUTO: 0.4 THOUSAND/ΜL (ref 0.17–1.22)
MONOCYTES NFR BLD AUTO: 6 % (ref 2–12)
NEUTROPHILS # BLD AUTO: 5.1 THOUSANDS/ΜL (ref 1.4–6.5)
NEUTS SEG NFR BLD AUTO: 67 % (ref 42–75)
NITRITE UR QL STRIP: NEGATIVE
NON-SQ EPI CELLS URNS QL MICRO: ABNORMAL /HPF
NONHDLC SERPL-MCNC: 156 MG/DL
NRBC BLD AUTO-RTO: 0 /100 WBCS
PH UR STRIP.AUTO: 7 [PH] (ref 5–8)
PLATELET # BLD AUTO: 316 THOUSANDS/UL (ref 149–390)
PMV BLD AUTO: 7.2 FL (ref 8.6–11.7)
POTASSIUM SERPL-SCNC: 4.2 MMOL/L (ref 3.5–5.5)
PROT SERPL-MCNC: 6.8 G/DL (ref 6.4–8.9)
PROT UR STRIP-MCNC: NEGATIVE MG/DL
RBC # BLD AUTO: 4.63 MILLION/UL (ref 3.9–5.2)
RBC #/AREA URNS AUTO: ABNORMAL /HPF
RHEUMATOID FACT SER QL LA: NEGATIVE
SODIUM SERPL-SCNC: 137 MMOL/L (ref 134–143)
SP GR UR STRIP.AUTO: 1.02 (ref 1–1.03)
T4 FREE SERPL-MCNC: 0.67 NG/DL (ref 0.76–1.46)
TRIGL SERPL-MCNC: 118 MG/DL (ref 44–166)
TSH SERPL DL<=0.05 MIU/L-ACNC: 3.52 UIU/ML (ref 0.45–5.33)
UROBILINOGEN UR QL STRIP.AUTO: 0.2 E.U./DL
WBC # BLD AUTO: 7.7 THOUSAND/UL (ref 4.8–10.8)
WBC #/AREA URNS AUTO: ABNORMAL /HPF

## 2018-11-30 PROCEDURE — 82533 TOTAL CORTISOL: CPT

## 2018-11-30 PROCEDURE — 36415 COLL VENOUS BLD VENIPUNCTURE: CPT

## 2018-11-30 PROCEDURE — 82306 VITAMIN D 25 HYDROXY: CPT

## 2018-11-30 PROCEDURE — 84443 ASSAY THYROID STIM HORMONE: CPT

## 2018-11-30 PROCEDURE — 80061 LIPID PANEL: CPT

## 2018-11-30 PROCEDURE — 86618 LYME DISEASE ANTIBODY: CPT

## 2018-11-30 PROCEDURE — 86038 ANTINUCLEAR ANTIBODIES: CPT

## 2018-11-30 PROCEDURE — 85652 RBC SED RATE AUTOMATED: CPT

## 2018-11-30 PROCEDURE — 80053 COMPREHEN METABOLIC PANEL: CPT

## 2018-11-30 PROCEDURE — 86430 RHEUMATOID FACTOR TEST QUAL: CPT

## 2018-11-30 PROCEDURE — 83525 ASSAY OF INSULIN: CPT

## 2018-11-30 PROCEDURE — 83036 HEMOGLOBIN GLYCOSYLATED A1C: CPT

## 2018-11-30 PROCEDURE — 83735 ASSAY OF MAGNESIUM: CPT

## 2018-11-30 PROCEDURE — 81001 URINALYSIS AUTO W/SCOPE: CPT

## 2018-11-30 PROCEDURE — 84439 ASSAY OF FREE THYROXINE: CPT

## 2018-11-30 PROCEDURE — 82550 ASSAY OF CK (CPK): CPT

## 2018-11-30 PROCEDURE — 85025 COMPLETE CBC W/AUTO DIFF WBC: CPT

## 2018-12-02 LAB
B BURGDOR IGG SER IA-ACNC: 0.13
B BURGDOR IGM SER IA-ACNC: 0.27

## 2018-12-03 LAB — RYE IGE QN: NEGATIVE

## 2018-12-13 ENCOUNTER — OFFICE VISIT (OUTPATIENT)
Dept: URGENT CARE | Facility: CLINIC | Age: 48
End: 2018-12-13
Payer: COMMERCIAL

## 2018-12-13 ENCOUNTER — APPOINTMENT (OUTPATIENT)
Dept: RADIOLOGY | Facility: CLINIC | Age: 48
End: 2018-12-13
Payer: COMMERCIAL

## 2018-12-13 VITALS
SYSTOLIC BLOOD PRESSURE: 156 MMHG | DIASTOLIC BLOOD PRESSURE: 90 MMHG | BODY MASS INDEX: 31.28 KG/M2 | OXYGEN SATURATION: 100 % | HEIGHT: 62 IN | WEIGHT: 170 LBS | RESPIRATION RATE: 16 BRPM | HEART RATE: 84 BPM

## 2018-12-13 DIAGNOSIS — M25.571 ACUTE RIGHT ANKLE PAIN: ICD-10-CM

## 2018-12-13 DIAGNOSIS — M25.562 ACUTE PAIN OF LEFT KNEE: ICD-10-CM

## 2018-12-13 DIAGNOSIS — S93.401A SPRAIN OF RIGHT ANKLE, UNSPECIFIED LIGAMENT, INITIAL ENCOUNTER: Primary | ICD-10-CM

## 2018-12-13 DIAGNOSIS — S83.92XA SPRAIN OF LEFT KNEE, UNSPECIFIED LIGAMENT, INITIAL ENCOUNTER: ICD-10-CM

## 2018-12-13 PROCEDURE — 73564 X-RAY EXAM KNEE 4 OR MORE: CPT

## 2018-12-13 PROCEDURE — 99213 OFFICE O/P EST LOW 20 MIN: CPT | Performed by: PHYSICIAN ASSISTANT

## 2018-12-13 PROCEDURE — 73610 X-RAY EXAM OF ANKLE: CPT

## 2018-12-13 RX ORDER — FEXOFENADINE HYDROCHLORIDE 60 MG/1
TABLET, FILM COATED ORAL
COMMUNITY
End: 2019-09-12 | Stop reason: ALTCHOICE

## 2018-12-13 RX ORDER — BUPROPION HYDROCHLORIDE 150 MG/1
TABLET ORAL
COMMUNITY
End: 2019-03-11 | Stop reason: SDUPTHER

## 2018-12-13 RX ORDER — BUDESONIDE AND FORMOTEROL FUMARATE DIHYDRATE 160; 4.5 UG/1; UG/1
2 AEROSOL RESPIRATORY (INHALATION) 2 TIMES DAILY
COMMUNITY
Start: 2018-10-11

## 2018-12-13 RX ORDER — GABAPENTIN 100 MG/1
CAPSULE ORAL DAILY
COMMUNITY
End: 2019-09-12 | Stop reason: ALTCHOICE

## 2018-12-13 RX ORDER — TOPIRAMATE 100 MG/1
TABLET, FILM COATED ORAL
COMMUNITY
End: 2019-10-05 | Stop reason: ALTCHOICE

## 2018-12-13 RX ORDER — METOPROLOL TARTRATE 100 MG/1
TABLET ORAL
COMMUNITY
End: 2019-09-12 | Stop reason: ALTCHOICE

## 2018-12-13 RX ORDER — IPRATROPIUM BROMIDE 42 UG/1
1-2 SPRAY, METERED NASAL 4 TIMES DAILY PRN
COMMUNITY
End: 2019-10-05 | Stop reason: ALTCHOICE

## 2018-12-13 NOTE — PROGRESS NOTES
[facility]        NAME: Zachary Pimentel is a 50 y o  female  : 1970    MRN: [id number]  DATE: 2018  TIME: 5:10 PM    Assessment and Plan   Sprain of right ankle, unspecified ligament, initial encounter [S93 401A]  1  Sprain of right ankle, unspecified ligament, initial encounter     2  Sprain of left knee, unspecified ligament, initial encounter     3  Acute right ankle pain  XR ankle 3+ vw right   4  Acute pain of left knee  XR knee 4+ vw left injury         Patient Instructions     The extremity elevated apply ice 20 minutes at a time every 2 hours do not apply ice to bear skin to prevent frostbite  May take an anti-inflammatory of your choice with food if he develops any stomach distress  The anti-inflammatory immediately  There is no improvement over the next 5 days follow up with Orthopedics  Follow up with PCP in 3-5 days  Proceed to  ER if symptoms worsen  Chief Complaint     Chief Complaint   Patient presents with    Ankle Pain     right    Knee Pain     left    Fall     today         History of Present Illness       Patient rolled her ankle earlier today which then caused her to fall landing on her left knee  She is currently having right ankle and left knee pain  Review of Systems   Review of Systems   Constitutional: Negative for fever  HENT: Negative for sore throat  Eyes: Negative for redness  Respiratory: Negative for cough  Cardiovascular: Negative for chest pain  Gastrointestinal: Negative for abdominal pain  Musculoskeletal: Positive for arthralgias (Right ankle and left knee)  Negative for neck pain  Skin: Negative for rash  Neurological: Negative for dizziness and headaches  Hematological: Negative for adenopathy           Current Medications       Current Outpatient Prescriptions:     albuterol (PROVENTIL HFA,VENTOLIN HFA) 90 mcg/act inhaler, Inhale 1 puff every 6 (six) hours, Disp: , Rfl:     ALPRAZolam (XANAX) 2 MG tablet, Take by mouth, Disp: , Rfl:     budesonide-formoterol (SYMBICORT) 160-4 5 mcg/act inhaler, Inhale 2 puffs, Disp: , Rfl:     buPROPion (WELLBUTRIN XL) 150 mg 24 hr tablet, 100mg per day , 2 am and 1 pm, Disp: , Rfl:     celecoxib (CELEBREX) 200 mg capsule, Daily, Disp: , Rfl:     cholecalciferol (VITAMIN D3) 22811 units capsule, Take 10,000 Units by mouth, Disp: , Rfl:     Cyanocobalamin (B-12) 1000 MCG/ML KIT, Inject as directed every 30 (thirty) days  , Disp: , Rfl:     cyanocobalamin (CVS VITAMIN B-12) 1000 MCG tablet, Take by mouth, Disp: , Rfl:     cyclobenzaprine (FLEXERIL) 10 mg tablet, Take 10 mg by mouth daily at bedtime  , Disp: , Rfl:     DULoxetine (CYMBALTA) 60 mg delayed release capsule, Take 60 mg by mouth daily  , Disp: , Rfl:     fexofenadine (ALLEGRA ALLERGY) 180 MG tablet, Take by mouth, Disp: , Rfl:     fluticasone-salmeterol (ADVAIR DISKUS) 250-50 mcg/dose inhaler, Every 12 hours, Disp: , Rfl:     gabapentin (NEURONTIN) 100 mg capsule, Daily, Disp: , Rfl:     ipratropium (ATROVENT) 0 06 % nasal spray, 1-2 sprays into each nostril 4 (four) times a day as needed, Disp: , Rfl:     ISOtretinoin (ZENATANE) 40 MG capsule, Take 40 mg by mouth 2 (two) times a day, Disp: , Rfl:     Levonorg-Eth Estrad Triphasic (TRIPHASIL, 21, PO), Triphasil (21), Disp: , Rfl:     losartan (COZAAR) 50 mg tablet, Take 50 mg by mouth 2 (two) times a day , Disp: , Rfl:     lubiprostone (AMITIZA) 24 mcg capsule, Take 24 mcg by mouth 2 (two) times a day with meals, Disp: , Rfl:     metoprolol tartrate (LOPRESSOR) 100 mg tablet, , Disp: , Rfl:     montelukast (SINGULAIR) 5 mg chewable tablet, Chew 5 mg 2 (two) times a day , Disp: , Rfl:     topiramate (TOPAMAX) 100 mg tablet, , Disp: , Rfl:     traMADol (ULTRAM) 50 mg tablet, Take by mouth, Disp: , Rfl:     ALPRAZolam (XANAX) 0 5 mg tablet, Take 0 5 mg by mouth daily at bedtime as needed for anxiety  , Disp: , Rfl:     Azelastine-Fluticasone (DYMISTA NA), 2 puffs into each nostril 2 (two) times a day , Disp: , Rfl:     Botulinum Toxin Type A 200 units SOLR, Inject 155 units into face and neck IM every 90 days, Disp: , Rfl:     budesonide-formoterol (SYMBICORT) 160-4 5 mcg/act inhaler, Inhale 2 puffs 2 (two) times a day , Disp: , Rfl:     buPROPion (WELLBUTRIN) 100 mg tablet, Take 100 mg by mouth 2 (two) times a day , Disp: , Rfl:     fexofenadine (ALLEGRA ALLERGY) 180 MG tablet, Take by mouth, Disp: , Rfl:     fexofenadine (ALLEGRA) 60 MG tablet, Take 60 mg by mouth daily  , Disp: , Rfl:     traMADol (ULTRAM) 50 mg tablet, Take 50 mg by mouth 2 (two) times a day , Disp: , Rfl:     UNKNOWN TO PATIENT, Allergy shots 2x/week, Disp: , Rfl:     valACYclovir (VALTREX) 1,000 mg tablet, Take 1 tablet (1,000 mg total) by mouth 2 (two) times a day for 5 days, Disp: 10 tablet, Rfl: 0    Current Allergies     Allergies as of 12/13/2018 - Reviewed 12/13/2018   Allergen Reaction Noted    Codeine GI Intolerance and Chest Pain 11/15/2012    Hydrocodone Abdominal Pain     Pregabalin Dizziness     Acetaminophen-codeine  10/09/2018    Amoxicillin  10/09/2018            The following portions of the patient's history were reviewed and updated as appropriate: allergies, current medications, past family history, past medical history, past social history, past surgical history and problem list      Past Medical History:   Diagnosis Date    Allergic     Anemia     recurrent    Anxiety     Asthma     allergy induced    Depression     Environmental allergies     Fibromyalgia     GERD (gastroesophageal reflux disease)     Hiatal hernia     History of transfusion     Hypertension     Hypogammaglobulinemia ([facility] [address] )     Hypoglycemia after GI (gastrointestinal) surgery     PONV (postoperative nausea and vomiting)     severe    Seasonal allergies        Past Surgical History:   Procedure Laterality Date    ABDOMINAL SURGERY      adhesions    ABDOMINOPLASTY      APPENDECTOMY   SECTION      CHOLECYSTECTOMY      COSMETIC SURGERY Bilateral     breast augmentation 2004    GASTRIC BYPASS      2009    HERNIA REPAIR      HYSTERECTOMY      KNEE ARTHROSCOPY Left     FL CORRJ HALLUX VALGUS W/SESMDC W/RESCJ PROX PHAL Left 2016    Procedure: SURGICAL MODIFIED WATKINS BUNIONECTOMY FIRST MTPJ;  Surgeon: Sherif Ruvalcaba DPM;  [facility];  Service: Podiatry    FL FUSION FOOT BONE,MIDTARSAL,1 JT Left 2016    Procedure: FRIST TMJ FUSION ;  Surgeon: Sherif Ruvalcaba DPM;  [facility];  Service: Podiatry    [address] [place] (NOT 1ST) Left 2016    Procedure: FOOT SHORTENING OSTEOTOMIES 2ND AND 3RD METATARSAL WITH EXTENSER TENDON RELEASE 3RD AND 4TH TOE;  Surgeon: Sherif Ruvalcaba DPM;  [facility];  Service: Podiatry    FL REMOVAL DEEP IMPLANT Left 2016    Procedure: REMOVAL SYMPTOMATIC  HARDWARE FIRST RAY,RELATED CONTRACTURE SECOND AND THIRD TOES WITH SUSPENSION FOURTH TOE;  Surgeon: Sherif Ruvalcaba DPM;  [facility];  Service: Podiatry    TONSILLECTOMY      WISDOM TOOTH EXTRACTION         History reviewed  No pertinent family history  Medications have been verified  Objective   /90   Pulse 84   Resp 16   Ht 5' 2" (1 575 m)   Wt 77 1 kg (170 lb)   SpO2 100%   BMI 31 09 kg/m²          Physical Exam     Physical Exam   Constitutional: She is oriented to person, place, and time  She appears well-developed and well-nourished  HENT:   Head: Normocephalic and atraumatic  Neck: Normal range of motion  Cardiovascular: Normal rate and regular rhythm  Musculoskeletal:   Swelling over the right lateral malleolus with tenderness of the distal fibula  He has decreased range of motion secondary to pain  Neurovascular status is intact  Right knee has a small abrasion over the patellar tendon  There is tenderness of the proximal tibia  No swelling or bruising    Neurovascular status is intact  Neurological: She is alert and oriented to person, place, and time  Skin: Skin is warm and dry  No rash noted  Psychiatric: She has a normal mood and affect  Her behavior is normal  Judgment and thought content normal    Nursing note and vitals reviewed  Right ankle x-ray viewed by me and independently reviewed by me contemporaneously as no acute fracture bony abnormality  Left knee x-ray viewed by me and independently reviewed by me contemporaneously as no acute fracture or bony abnormality  Splint application  Date/Time: 12/13/2018 5:08 PM  Performed by: Bree Bean by: Dipak Hernandez     Consent:     Consent obtained:  Verbal    Consent given by:  Patient  Pre-procedure details:     Sensation:  Normal    Skin color:  Pink  Procedure details:     Laterality:  Right    Location:  Ankle    Ankle:  R ankle    Strapping: no      Splint type: air cast     Supplies used: Air cast   Post-procedure details:     Pain:  Unchanged    Sensation:  Normal    Skin color:  Pink    Patient tolerance of procedure:   Tolerated well, no immediate complications

## 2018-12-13 NOTE — PATIENT INSTRUCTIONS
Ankle Sprain   AMBULATORY CARE:   An ankle sprain  happens when 1 or more ligaments in your ankle joint stretch or tear  Ligaments are tough tissues that connect bones  Ligaments support your joints and keep your bones in place  Common symptoms include the following:   · Trouble moving your ankle or foot    · Pain when you touch or put weight on your ankle    · Bruised, swollen, or misshapen ankle  Seek care immediately if:   · You have severe pain in your ankle  · Your foot or toes are cold or numb  · Your ankle becomes more weak or unstable (wobbly)  · You are unable to put any weight on your ankle or foot  · Your swelling has increased or returned  Contact your healthcare provider if:   · Your pain does not go away, even after treatment  · You have questions or concerns about your condition or care  Treatment:   · Medicines      ¨ NSAIDs , such as ibuprofen, help decrease swelling, pain, and fever  This medicine is available with or without a doctor's order  NSAIDs can cause stomach bleeding or kidney problems in certain people  If you take blood thinner medicine, always ask your healthcare provider if NSAIDs are safe for you  Always read the medicine label and follow directions  ¨ Acetaminophen  decreases pain  It is available without a doctor's order  Ask how much to take and how often to take it  Follow directions  Acetaminophen can cause liver damage if not taken correctly  ¨ Prescription pain medicine  may be given  Ask how to take this medicine safely  · Surgery  may be needed to repair or replace a torn ligament if your sprain does not heal with other treatments  Your healthcare provider may use screws to attach the bones in your ankle together  The screws may help support your ankle and make it stable  Ask your healthcare provider for more information about surgery to treat your ankle sprain    Self care:   · Use support devices,  such as a brace, cast, or splint, may be needed to limit your movement and protect your joint  You may need to use crutches to decrease your pain as you move around  · Go to physical therapy as directed  A physical therapist teaches you exercises to help improve movement and strength, and to decrease pain  · Rest  your ankle so that it can heal  Return to normal activities as directed  · Apply ice on your ankle for 15 to 20 minutes every hour or as directed  Use an ice pack, or put crushed ice in a plastic bag  Cover it with a towel  Ice helps prevent tissue damage and decreases swelling and pain  · Compress  your ankle  Ask if you should wrap an elastic bandage around your injured ligament  An elastic bandage provides support and helps decrease swelling and movement so your joint can heal  Wear as long as directed  · Elevate  your ankle above the level of your heart as often as you can  This will help decrease swelling and pain  Prop your ankle on pillows or blankets to keep it elevated comfortably  Prevent another ankle sprain:   · Let your ankle heal   Find out how long your ligament needs to heal  Do not do any physical activity until your healthcare provider says it is okay  If you start activity too soon, you may develop a more serious injury  · Always warm up and stretch  before you exercise or play sports  · Use the right equipment  Always wear shoes that fit well and are made for the activity that you are doing  You may also need ankle supports, elbow and knee pads, or braces  Follow up with your healthcare provider as directed:  Write down your questions so you remember to ask them during your visits  © 2017 [address] [employer] Information is for End User's use only and may not be sold, redistributed or otherwise used for commercial purposes  All illustrations and images included in CareNotes® are the copyrighted property of [employer]  or [employer]hem[employer] Brown    The above information is an  only  It is not intended as medical advice for individual conditions or treatments  Talk to your doctor, nurse or pharmacist before following any medical regimen to see if it is safe and effective for you

## 2018-12-21 ENCOUNTER — LAB REQUISITION (OUTPATIENT)
Dept: LAB | Facility: HOSPITAL | Age: 48
End: 2018-12-21
Payer: COMMERCIAL

## 2018-12-21 DIAGNOSIS — J01.80 OTHER ACUTE SINUSITIS: ICD-10-CM

## 2018-12-21 PROCEDURE — 87070 CULTURE OTHR SPECIMN AEROBIC: CPT | Performed by: SPECIALIST

## 2018-12-21 PROCEDURE — 87205 SMEAR GRAM STAIN: CPT | Performed by: SPECIALIST

## 2018-12-23 LAB
BACTERIA WND AEROBE CULT: ABNORMAL
GRAM STN SPEC: ABNORMAL
GRAM STN SPEC: ABNORMAL

## 2018-12-28 ENCOUNTER — APPOINTMENT (OUTPATIENT)
Dept: LAB | Facility: HOSPITAL | Age: 48
End: 2018-12-28
Payer: COMMERCIAL

## 2018-12-28 ENCOUNTER — TRANSCRIBE ORDERS (OUTPATIENT)
Dept: ADMINISTRATIVE | Facility: HOSPITAL | Age: 48
End: 2018-12-28

## 2018-12-28 DIAGNOSIS — E55.9 VITAMIN D DEFICIENCY: ICD-10-CM

## 2018-12-28 DIAGNOSIS — E21.3 HYPERPARATHYROIDISM, UNSPECIFIED (HCC): ICD-10-CM

## 2018-12-28 DIAGNOSIS — E21.3 HYPERPARATHYROIDISM, UNSPECIFIED (HCC): Primary | ICD-10-CM

## 2018-12-28 LAB
25(OH)D3 SERPL-MCNC: 41.7 NG/ML (ref 30–100)
CALCIUM SERPL-MCNC: 9.5 MG/DL (ref 8.6–10.5)
PTH-INTACT SERPL-MCNC: 62.7 PG/ML (ref 18.4–80.1)

## 2018-12-28 PROCEDURE — 36415 COLL VENOUS BLD VENIPUNCTURE: CPT

## 2018-12-28 PROCEDURE — 82310 ASSAY OF CALCIUM: CPT

## 2018-12-28 PROCEDURE — 82306 VITAMIN D 25 HYDROXY: CPT

## 2018-12-28 PROCEDURE — 83970 ASSAY OF PARATHORMONE: CPT

## 2018-12-29 ENCOUNTER — TRANSCRIBE ORDERS (OUTPATIENT)
Dept: ADMINISTRATIVE | Facility: HOSPITAL | Age: 48
End: 2018-12-29

## 2018-12-29 ENCOUNTER — APPOINTMENT (OUTPATIENT)
Dept: LAB | Facility: HOSPITAL | Age: 48
End: 2018-12-29
Payer: COMMERCIAL

## 2018-12-29 DIAGNOSIS — E21.3 HYPERPARATHYROIDISM, UNSPECIFIED (HCC): Primary | ICD-10-CM

## 2018-12-29 PROCEDURE — 82340 ASSAY OF CALCIUM IN URINE: CPT | Performed by: INTERNAL MEDICINE

## 2018-12-30 LAB
CALCIUM 5H P 500 MG CA PO UR-SCNC: 344.4 MG/PERIOD
PERIOD: 24 HOURS
SPECIMEN VOL UR: 2100 ML

## 2019-01-26 ENCOUNTER — TRANSCRIBE ORDERS (OUTPATIENT)
Dept: ADMINISTRATIVE | Facility: HOSPITAL | Age: 49
End: 2019-01-26

## 2019-01-26 ENCOUNTER — APPOINTMENT (OUTPATIENT)
Dept: LAB | Facility: HOSPITAL | Age: 49
End: 2019-01-26
Payer: COMMERCIAL

## 2019-01-26 DIAGNOSIS — E63.8 INADEQUATE DIETARY INTAKE OF PROTEIN: ICD-10-CM

## 2019-01-26 DIAGNOSIS — E55.9 VITAMIN D DEFICIENCY: ICD-10-CM

## 2019-01-26 DIAGNOSIS — K91.2 POSTSURGICAL MALABSORPTION: ICD-10-CM

## 2019-01-26 DIAGNOSIS — Z98.84 BARIATRIC SURGERY STATUS: ICD-10-CM

## 2019-01-26 DIAGNOSIS — Z09 RADIOTHERAPY FOLLOW-UP EXAMINATION: ICD-10-CM

## 2019-01-26 DIAGNOSIS — E46 PROTEIN-CALORIE MALNUTRITION, UNSPECIFIED SEVERITY (HCC): ICD-10-CM

## 2019-01-26 DIAGNOSIS — Z09 RADIOTHERAPY FOLLOW-UP EXAMINATION: Primary | ICD-10-CM

## 2019-01-26 LAB
25(OH)D3 SERPL-MCNC: 52.6 NG/ML (ref 30–100)
ALBUMIN SERPL BCP-MCNC: 4.2 G/DL (ref 3.5–5.7)
ALP SERPL-CCNC: 66 U/L (ref 40–150)
ALT SERPL W P-5'-P-CCNC: 10 U/L (ref 7–52)
ANION GAP SERPL CALCULATED.3IONS-SCNC: 10 MMOL/L (ref 4–13)
AST SERPL W P-5'-P-CCNC: 15 U/L (ref 13–39)
BASOPHILS # BLD AUTO: 0 THOUSANDS/ΜL (ref 0–0.1)
BASOPHILS NFR BLD AUTO: 1 % (ref 0–2)
BILIRUB SERPL-MCNC: 0.6 MG/DL (ref 0.2–1)
BUN SERPL-MCNC: 10 MG/DL (ref 7–25)
CALCIUM SERPL-MCNC: 9.4 MG/DL (ref 8.6–10.5)
CHLORIDE SERPL-SCNC: 100 MMOL/L (ref 98–107)
CO2 SERPL-SCNC: 27 MMOL/L (ref 21–31)
CREAT SERPL-MCNC: 0.7 MG/DL (ref 0.6–1.2)
EOSINOPHIL # BLD AUTO: 0.3 THOUSAND/ΜL (ref 0–0.61)
EOSINOPHIL NFR BLD AUTO: 5 % (ref 0–5)
ERYTHROCYTE [DISTWIDTH] IN BLOOD BY AUTOMATED COUNT: 14.1 % (ref 11.5–14.5)
FERRITIN SERPL-MCNC: 26 NG/ML (ref 8–388)
FOLATE SERPL-MCNC: >20 NG/ML (ref 3.1–17.5)
GFR SERPL CREATININE-BSD FRML MDRD: 103 ML/MIN/1.73SQ M
GLUCOSE SERPL-MCNC: 82 MG/DL (ref 65–99)
HCT VFR BLD AUTO: 40.4 % (ref 34.8–46.1)
HGB BLD-MCNC: 13.2 G/DL (ref 12–16)
IRON SERPL-MCNC: 84 UG/DL (ref 50–170)
LYMPHOCYTES # BLD AUTO: 1.9 THOUSANDS/ΜL (ref 0.6–4.47)
LYMPHOCYTES NFR BLD AUTO: 26 % (ref 21–51)
MCH RBC QN AUTO: 29.1 PG (ref 26–34)
MCHC RBC AUTO-ENTMCNC: 32.7 G/DL (ref 31–37)
MCV RBC AUTO: 89 FL (ref 81–99)
MONOCYTES # BLD AUTO: 0.5 THOUSAND/ΜL (ref 0.17–1.22)
MONOCYTES NFR BLD AUTO: 7 % (ref 2–12)
NEUTROPHILS # BLD AUTO: 4.5 THOUSANDS/ΜL (ref 1.4–6.5)
NEUTS SEG NFR BLD AUTO: 62 % (ref 42–75)
NRBC BLD AUTO-RTO: 0 /100 WBCS
PLATELET # BLD AUTO: 299 THOUSANDS/UL (ref 149–390)
PMV BLD AUTO: 8 FL (ref 8.6–11.7)
POTASSIUM SERPL-SCNC: 3.9 MMOL/L (ref 3.5–5.5)
PROT SERPL-MCNC: 6.5 G/DL (ref 6.4–8.9)
PTH-INTACT SERPL-MCNC: 48.3 PG/ML (ref 18.4–80.1)
RBC # BLD AUTO: 4.55 MILLION/UL (ref 3.9–5.2)
SODIUM SERPL-SCNC: 137 MMOL/L (ref 134–143)
VIT B12 SERPL-MCNC: 524 PG/ML (ref 100–900)
WBC # BLD AUTO: 7.2 THOUSAND/UL (ref 4.8–10.8)

## 2019-01-26 PROCEDURE — 85025 COMPLETE CBC W/AUTO DIFF WBC: CPT

## 2019-01-26 PROCEDURE — 80053 COMPREHEN METABOLIC PANEL: CPT

## 2019-01-26 PROCEDURE — 84630 ASSAY OF ZINC: CPT

## 2019-01-26 PROCEDURE — 82728 ASSAY OF FERRITIN: CPT

## 2019-01-26 PROCEDURE — 83970 ASSAY OF PARATHORMONE: CPT

## 2019-01-26 PROCEDURE — 36415 COLL VENOUS BLD VENIPUNCTURE: CPT

## 2019-01-26 PROCEDURE — 84425 ASSAY OF VITAMIN B-1: CPT

## 2019-01-26 PROCEDURE — 82746 ASSAY OF FOLIC ACID SERUM: CPT

## 2019-01-26 PROCEDURE — 83540 ASSAY OF IRON: CPT

## 2019-01-26 PROCEDURE — 82306 VITAMIN D 25 HYDROXY: CPT

## 2019-01-26 PROCEDURE — 84590 ASSAY OF VITAMIN A: CPT

## 2019-01-26 PROCEDURE — 82607 VITAMIN B-12: CPT

## 2019-01-29 LAB
VIT B1 BLD-SCNC: 155 NMOL/L (ref 66.5–200)
ZINC SERPL-MCNC: 66 UG/DL (ref 56–134)

## 2019-01-31 LAB — VIT A SERPL-MCNC: 55.9 UG/DL (ref 20.1–62)

## 2019-02-13 ENCOUNTER — TRANSCRIBE ORDERS (OUTPATIENT)
Dept: ADMINISTRATIVE | Facility: HOSPITAL | Age: 49
End: 2019-02-13

## 2019-02-13 DIAGNOSIS — Z12.31 VISIT FOR SCREENING MAMMOGRAM: Primary | ICD-10-CM

## 2019-03-06 ENCOUNTER — TRANSCRIBE ORDERS (OUTPATIENT)
Dept: ADMINISTRATIVE | Facility: HOSPITAL | Age: 49
End: 2019-03-06

## 2019-03-06 ENCOUNTER — APPOINTMENT (OUTPATIENT)
Dept: LAB | Facility: HOSPITAL | Age: 49
End: 2019-03-06
Payer: COMMERCIAL

## 2019-03-06 DIAGNOSIS — M25.50 ARTHRALGIA, UNSPECIFIED JOINT: Primary | ICD-10-CM

## 2019-03-06 DIAGNOSIS — R52 PAIN: ICD-10-CM

## 2019-03-06 DIAGNOSIS — M25.50 ARTHRALGIA, UNSPECIFIED JOINT: ICD-10-CM

## 2019-03-06 DIAGNOSIS — Z12.39 SCREENING BREAST EXAMINATION: ICD-10-CM

## 2019-03-06 LAB — CRP SERPL QL: 6.8 MG/L

## 2019-03-06 PROCEDURE — 86140 C-REACTIVE PROTEIN: CPT

## 2019-03-06 PROCEDURE — 86200 CCP ANTIBODY: CPT

## 2019-03-06 PROCEDURE — 86430 RHEUMATOID FACTOR TEST QUAL: CPT

## 2019-03-06 PROCEDURE — 36415 COLL VENOUS BLD VENIPUNCTURE: CPT

## 2019-03-06 PROCEDURE — 86038 ANTINUCLEAR ANTIBODIES: CPT

## 2019-03-07 LAB
CCP IGA+IGG SERPL IA-ACNC: 4 UNITS (ref 0–19)
RHEUMATOID FACT SER QL LA: NEGATIVE

## 2019-03-08 LAB — RYE IGE QN: NEGATIVE

## 2019-03-11 ENCOUNTER — APPOINTMENT (OUTPATIENT)
Dept: RADIOLOGY | Facility: CLINIC | Age: 49
End: 2019-03-11
Payer: COMMERCIAL

## 2019-03-11 ENCOUNTER — OFFICE VISIT (OUTPATIENT)
Dept: URGENT CARE | Facility: CLINIC | Age: 49
End: 2019-03-11
Payer: COMMERCIAL

## 2019-03-11 VITALS
WEIGHT: 170 LBS | DIASTOLIC BLOOD PRESSURE: 88 MMHG | RESPIRATION RATE: 16 BRPM | HEIGHT: 62 IN | OXYGEN SATURATION: 99 % | BODY MASS INDEX: 31.28 KG/M2 | SYSTOLIC BLOOD PRESSURE: 128 MMHG | HEART RATE: 84 BPM | TEMPERATURE: 98.6 F

## 2019-03-11 DIAGNOSIS — S63.502A SPRAIN OF LEFT WRIST, INITIAL ENCOUNTER: Primary | ICD-10-CM

## 2019-03-11 DIAGNOSIS — M25.532 LEFT WRIST PAIN: ICD-10-CM

## 2019-03-11 DIAGNOSIS — M25.531 RIGHT WRIST PAIN: ICD-10-CM

## 2019-03-11 PROCEDURE — 73110 X-RAY EXAM OF WRIST: CPT

## 2019-03-11 PROCEDURE — 29125 APPL SHORT ARM SPLINT STATIC: CPT | Performed by: PHYSICIAN ASSISTANT

## 2019-03-11 PROCEDURE — 99213 OFFICE O/P EST LOW 20 MIN: CPT | Performed by: PHYSICIAN ASSISTANT

## 2019-03-11 NOTE — PROGRESS NOTES
[facility]        NAME: Jerry Brasher is a 50 y o  female  : 1970    MRN: [id number]  DATE: 2019  TIME: 5:37 PM    Assessment and Plan   Sprain of left wrist, initial encounter [S63 502A]  1  Sprain of left wrist, initial encounter     2  Left wrist pain  XR wrist 3+ vw left    Ambulatory referral to Hand Surgery    CANCELED: XR wrist 3+ vw right         Patient Instructions     Apply face several times a day 20 minutes at a time 48 hours from the time of injury  If numbness and tingling lasts longer than 20 for 48 hours follow up with Orthopedics  Follow up with PCP in 3-5 days  Proceed to  ER if symptoms worsen  Chief Complaint     Chief Complaint   Patient presents with    Fall     today    Wrist Pain     left         History of Present Illness       Patient's right ankle gave out which caused her to fall to the ground in the process she injured her left wrist and hand  She is complaining of pain in her left wrist and hand with some numbness fingers  She does have some swelling in the hand but no open wounds  Review of Systems   Review of Systems   Constitutional: Negative for fever  HENT: Negative for sore throat  Eyes: Negative for redness  Respiratory: Negative for cough  Gastrointestinal: Negative for nausea and vomiting  Musculoskeletal: Positive for arthralgias (Left hand and wrist)  Skin: Negative for rash  Neurological: Positive for numbness  Negative for dizziness and weakness           Current Medications       Current Outpatient Medications:     albuterol (PROVENTIL HFA,VENTOLIN HFA) 90 mcg/act inhaler, Inhale 1 puff every 6 (six) hours, Disp: , Rfl:     ALPRAZolam (XANAX) 2 MG tablet, Take by mouth, Disp: , Rfl:     Azelastine-Fluticasone (DYMISTA NA), 2 puffs into each nostril 2 (two) times a day , Disp: , Rfl:     Botulinum Toxin Type A 200 units SOLR, Inject 155 units into face and neck IM every 90 days, Disp: , Rfl:    budesonide-formoterol (SYMBICORT) 160-4 5 mcg/act inhaler, Inhale 2 puffs, Disp: , Rfl:     buPROPion (WELLBUTRIN) 100 mg tablet, Take 100 mg by mouth 2 (two) times a day , Disp: , Rfl:     celecoxib (CELEBREX) 200 mg capsule, Daily, Disp: , Rfl:     cholecalciferol (VITAMIN D3) 62833 units capsule, Take 10,000 Units by mouth, Disp: , Rfl:     Cyanocobalamin (B-12) 1000 MCG/ML KIT, Inject as directed every 30 (thirty) days  , Disp: , Rfl:     cyanocobalamin (CVS VITAMIN B-12) 1000 MCG tablet, Take by mouth, Disp: , Rfl:     cyclobenzaprine (FLEXERIL) 10 mg tablet, Take 10 mg by mouth daily at bedtime  , Disp: , Rfl:     DULoxetine (CYMBALTA) 60 mg delayed release capsule, Take 60 mg by mouth daily  , Disp: , Rfl:     fexofenadine (ALLEGRA ALLERGY) 180 MG tablet, Take by mouth, Disp: , Rfl:     fexofenadine (ALLEGRA) 60 MG tablet, Take 60 mg by mouth daily  , Disp: , Rfl:     fluticasone-salmeterol (ADVAIR DISKUS) 250-50 mcg/dose inhaler, Every 12 hours, Disp: , Rfl:     gabapentin (NEURONTIN) 100 mg capsule, Daily, Disp: , Rfl:     ipratropium (ATROVENT) 0 06 % nasal spray, 1-2 sprays into each nostril 4 (four) times a day as needed, Disp: , Rfl:     ISOtretinoin (ZENATANE) 40 MG capsule, Take 40 mg by mouth 2 (two) times a day, Disp: , Rfl:     Levonorg-Eth Estrad Triphasic (TRIPHASIL, 21, PO), Triphasil (21), Disp: , Rfl:     losartan (COZAAR) 50 mg tablet, Take 50 mg by mouth 2 (two) times a day , Disp: , Rfl:     lubiprostone (AMITIZA) 24 mcg capsule, Take 24 mcg by mouth 2 (two) times a day with meals, Disp: , Rfl:     metoprolol tartrate (LOPRESSOR) 100 mg tablet, , Disp: , Rfl:     montelukast (SINGULAIR) 5 mg chewable tablet, Chew 5 mg 2 (two) times a day , Disp: , Rfl:     topiramate (TOPAMAX) 100 mg tablet, , Disp: , Rfl:     traMADol (ULTRAM) 50 mg tablet, Take 50 mg by mouth 2 (two) times a day , Disp: , Rfl:     traMADol (ULTRAM) 50 mg tablet, Take by mouth, Disp: , Rfl:    valACYclovir (VALTREX) 1,000 mg tablet, Take 1 tablet (1,000 mg total) by mouth 2 (two) times a day for 5 days, Disp: 10 tablet, Rfl: 0    Current Allergies     Allergies as of 2019 - Reviewed 2019   Allergen Reaction Noted    Codeine GI Intolerance and Chest Pain 11/15/2012    Hydrocodone Abdominal Pain and Swelling 2019    Pregabalin Dizziness     Acetaminophen-codeine  10/09/2018    Amoxicillin  10/09/2018            The following portions of the patient's history were reviewed and updated as appropriate: allergies, current medications, past family history, past medical history, past social history, past surgical history and problem list      Past Medical History:   Diagnosis Date    Allergic     Anemia     recurrent    Anxiety     Asthma     allergy induced    Depression     Environmental allergies     Fibromyalgia     GERD (gastroesophageal reflux disease)     Hiatal hernia     History of transfusion     Hypertension     Hypogammaglobulinemia ([address] )     Hypoglycemia after GI (gastrointestinal) surgery     PONV (postoperative nausea and vomiting)     severe    Seasonal allergies        Past Surgical History:   Procedure Laterality Date    ABDOMINAL SURGERY      adhesions    ABDOMINOPLASTY      APPENDECTOMY       SECTION      CHOLECYSTECTOMY      COSMETIC SURGERY Bilateral     breast augmentation 2004    GASTRIC BYPASS      2009    HERNIA REPAIR      HYSTERECTOMY      KNEE ARTHROSCOPY Left     OK CORRJ HALLUX VALGUS W/SESMDC W/RESCJ PROX PHAL Left 2016    Procedure: SURGICAL MODIFIED WATKINS BUNIONECTOMY FIRST MTPJ;  Surgeon: Janay Dyer DPM;  [facility];  Service: Podiatry    [place] FUSION FOOT BONE,MIDTARSAL,1 JT Left 2016    Procedure: FRIST TMJ FUSION ;  Surgeon: Janay Dyer DPM;  [facility];  Service: Podiatry    OK OSTEOTOMY METATARSAL (NOT 1ST) Left 2016    Procedure: FOOT SHORTENING OSTEOTOMIES 2ND AND 3RD METATARSAL WITH EXTENSER TENDON RELEASE 3RD AND 4TH TOE;  Surgeon: Shahnaz Whittington DPM;  [facility];  Service: Podiatry    AL REMOVAL DEEP IMPLANT Left 12/7/2016    Procedure: REMOVAL SYMPTOMATIC  HARDWARE FIRST RAY,RELATED CONTRACTURE SECOND AND THIRD TOES WITH SUSPENSION FOURTH TOE;  Surgeon: Shahnaz Whittington DPM;  [facility];  Service: Podiatry    TONSILLECTOMY      WISDOM TOOTH EXTRACTION         History reviewed  No pertinent family history  Medications have been verified  Objective   /88   Pulse 84   Temp 98 6 °F (37 °C)   Resp 16   Ht 5' 2" (1 575 m)   Wt 77 1 kg (170 lb)   SpO2 99%   BMI 31 09 kg/m²          Physical Exam     Physical Exam   Constitutional: She is oriented to person, place, and time  She appears well-developed and well-nourished  HENT:   Head: Normocephalic and atraumatic  Neck: Normal range of motion  Cardiovascular: Normal rate and regular rhythm  Pulmonary/Chest: Effort normal    Musculoskeletal:        Hands:  Neurological: She is alert and oriented to person, place, and time  Skin: Skin is warm and dry  Nursing note and vitals reviewed  Left wrist x-ray viewed by me and independently reviewed by me contemporaneously as no acute bony abnormality  Splint application  Date/Time: 3/11/2019 5:34 PM  Performed by: Taco Zayas PA-C  Authorized by: Taco Zayas PA-C     Consent:     Consent obtained:  Verbal    Consent given by:  Patient  Pre-procedure details:     Sensation:  Normal  Procedure details:     Laterality:  Left    Location:  Wrist    Wrist:  L wrist    Strapping: no      Splint type:  Thumb spica    Supplies:  Cotton padding, elastic bandage and Ortho-Glass  Post-procedure details:     Pain:  Unchanged    Sensation:  Normal    Skin color:  Pink    Patient tolerance of procedure:   Tolerated well, no immediate complications

## 2019-03-11 NOTE — PATIENT INSTRUCTIONS
Wrist Sprain   AMBULATORY CARE:   A wrist sprain  happens when one or more ligaments in your wrist stretch or tear  Ligaments are tough tissues that connect bones and keep them in place, and support your joints  A fall onto your outstretched hand can cause a wrist sprain  An injury that causes your wrist to twist can also cause a sprain  Common symptoms include the following:   · Bruising or changes in skin color    · Pain and stiffness     · Popping sound in your wrist when you move it    · Swelling and tenderness  Seek care immediately if:   · You have severe pain or swelling  · Your injured wrist is red or has red streaks spreading from the injured area  · You have new trouble moving your hands, fingers, or wrist     · Your wrist, hand, or fingers feel cold or numb  · Your fingernails turn blue or gray  Contact your healthcare provider if:   · Your symptoms get worse  · Your sprain does not get better within 2 weeks  · You have questions or concerns about your condition or care  Treatment for a wrist sprain  depends on how severe your sprain is  You may need any of the following:  · NSAIDs , such as ibuprofen, help decrease swelling, pain, and fever  NSAIDs can cause stomach bleeding or kidney problems in certain people  If you take blood thinner medicine, always ask your healthcare provider if NSAIDs are safe for you  Always read the medicine label and follow directions  · Acetaminophen  decreases pain and fever  It is available without a doctor's order  Ask how much to take and how often to take it  Follow directions  Read the labels of all other medicines you are using to see if they also contain acetaminophen, or ask your doctor or pharmacist  Acetaminophen can cause liver damage if not taken correctly  Do not use more than 4 grams (4,000 milligrams) total of acetaminophen in one day  · A splint or cast  helps support your wrist and prevent more damage   Have your child wear his or her splint as directed  Ask for instructions on how your child should bathe while wearing a splint or cast     · Surgery  may be needed if you have a severe sprain  Arthroscopy may be done to examine the inside of your wrist joint and repair ligament damage  Arthroscopy uses a scope that is inserted through a small incision  You may need open surgery to reconnect torn ligaments to the bone  · Physical therapy  may be recommended  A physical therapist teaches you exercises to help improve movement and strength, and to decrease pain  Care for a wrist sprain:   · Rest  your wrist for at least 48 hours  Avoid activities that cause pain  · Ice  your wrist for 15 to 20 minutes every hour or as directed  Use an ice pack, or put crushed ice in a plastic bag  Cover it with a towel before you put it on your wrist  Ice helps prevent tissue damage and decreases swelling and pain  · Compress  your wrist with an elastic bandage  This will help decrease swelling, support your wrist, and help it heal  Wear your wrist wrap as directed  The elastic bandage should be snug but not tight  · Elevate  your wrist above the level of your heart as often as you can  This will help decrease swelling and pain  Prop your wrist on pillows or blankets to keep it elevated comfortably  Follow up with your healthcare provider as directed:  Write down your questions so you remember to ask them during your visits  © 2017 [employer] Information is for End User's use only and may not be sold, redistributed or otherwise used for commercial purposes  All illustrations and images included in CareNotes® are the copyrighted property of [employer]  or Nehemiah Brown  The above information is an  only  It is not intended as medical advice for individual conditions or treatments   Talk to your doctor, nurse or pharmacist before following any medical regimen to see if it is safe and effective for you

## 2019-03-27 ENCOUNTER — HOSPITAL ENCOUNTER (OUTPATIENT)
Dept: MAMMOGRAPHY | Facility: HOSPITAL | Age: 49
Discharge: HOME | End: 2019-03-27
Payer: COMMERCIAL

## 2019-03-27 VITALS — HEIGHT: 62 IN | WEIGHT: 175 LBS | BODY MASS INDEX: 32.2 KG/M2

## 2019-03-27 DIAGNOSIS — R52 PAIN: ICD-10-CM

## 2019-03-27 DIAGNOSIS — Z12.39 SCREENING BREAST EXAMINATION: ICD-10-CM

## 2019-03-27 PROCEDURE — 77067 SCR MAMMO BI INCL CAD: CPT

## 2019-03-27 PROCEDURE — 77063 BREAST TOMOSYNTHESIS BI: CPT

## 2019-05-10 ENCOUNTER — APPOINTMENT (OUTPATIENT)
Dept: LAB | Facility: HOSPITAL | Age: 49
End: 2019-05-10
Payer: COMMERCIAL

## 2019-05-10 ENCOUNTER — TRANSCRIBE ORDERS (OUTPATIENT)
Dept: LAB | Facility: HOSPITAL | Age: 49
End: 2019-05-10

## 2019-05-10 DIAGNOSIS — M79.7 SCAPULOHUMERAL FIBROSITIS: ICD-10-CM

## 2019-05-10 DIAGNOSIS — D50.9 IRON DEFICIENCY ANEMIA, UNSPECIFIED IRON DEFICIENCY ANEMIA TYPE: Primary | ICD-10-CM

## 2019-05-10 DIAGNOSIS — E55.9 VITAMIN D DEFICIENCY: ICD-10-CM

## 2019-05-10 DIAGNOSIS — M79.7 SCAPULOHUMERAL FIBROSITIS: Primary | ICD-10-CM

## 2019-05-10 DIAGNOSIS — D50.9 IRON DEFICIENCY ANEMIA, UNSPECIFIED IRON DEFICIENCY ANEMIA TYPE: ICD-10-CM

## 2019-05-10 LAB
25(OH)D3 SERPL-MCNC: 59 NG/ML (ref 30–100)
ALBUMIN SERPL BCP-MCNC: 4 G/DL (ref 3.5–5.7)
ALP SERPL-CCNC: 69 U/L (ref 40–150)
ALT SERPL W P-5'-P-CCNC: 8 U/L (ref 7–52)
ANION GAP SERPL CALCULATED.3IONS-SCNC: 8 MMOL/L (ref 4–13)
AST SERPL W P-5'-P-CCNC: 16 U/L (ref 13–39)
BASOPHILS # BLD AUTO: 0 THOUSANDS/ΜL (ref 0–0.1)
BASOPHILS NFR BLD AUTO: 1 % (ref 0–2)
BILIRUB SERPL-MCNC: 0.5 MG/DL (ref 0.2–1)
BUN SERPL-MCNC: 8 MG/DL (ref 7–25)
CALCIUM SERPL-MCNC: 9.1 MG/DL (ref 8.6–10.5)
CHLORIDE SERPL-SCNC: 103 MMOL/L (ref 98–107)
CO2 SERPL-SCNC: 27 MMOL/L (ref 21–31)
CREAT SERPL-MCNC: 0.6 MG/DL (ref 0.6–1.2)
CRP SERPL QL: 10.7 MG/L
EOSINOPHIL # BLD AUTO: 0.1 THOUSAND/ΜL (ref 0–0.61)
EOSINOPHIL NFR BLD AUTO: 1 % (ref 0–5)
ERYTHROCYTE [DISTWIDTH] IN BLOOD BY AUTOMATED COUNT: 14 % (ref 11.5–14.5)
ERYTHROCYTE [SEDIMENTATION RATE] IN BLOOD: 12 MM/HOUR (ref 0–20)
FERRITIN SERPL-MCNC: 10 NG/ML (ref 8–388)
GFR SERPL CREATININE-BSD FRML MDRD: 108 ML/MIN/1.73SQ M
GLUCOSE SERPL-MCNC: 80 MG/DL (ref 65–99)
HCT VFR BLD AUTO: 38.4 % (ref 42–47)
HGB BLD-MCNC: 12.8 G/DL (ref 12–16)
LYMPHOCYTES # BLD AUTO: 1.4 THOUSANDS/ΜL (ref 0.6–4.47)
LYMPHOCYTES NFR BLD AUTO: 21 % (ref 21–51)
MCH RBC QN AUTO: 29.6 PG (ref 26–34)
MCHC RBC AUTO-ENTMCNC: 33.3 G/DL (ref 31–37)
MCV RBC AUTO: 89 FL (ref 81–99)
MONOCYTES # BLD AUTO: 0.4 THOUSAND/ΜL (ref 0.17–1.22)
MONOCYTES NFR BLD AUTO: 6 % (ref 2–12)
NEUTROPHILS # BLD AUTO: 4.9 THOUSANDS/ΜL (ref 1.4–6.5)
NEUTS SEG NFR BLD AUTO: 71 % (ref 42–75)
PLATELET # BLD AUTO: 315 THOUSANDS/UL (ref 149–390)
PMV BLD AUTO: 7.7 FL (ref 8.6–11.7)
POTASSIUM SERPL-SCNC: 4.1 MMOL/L (ref 3.5–5.5)
PROT SERPL-MCNC: 6.7 G/DL (ref 6.4–8.9)
RBC # BLD AUTO: 4.33 MILLION/UL (ref 3.9–5.2)
SODIUM SERPL-SCNC: 138 MMOL/L (ref 134–143)
WBC # BLD AUTO: 6.9 THOUSAND/UL (ref 4.8–10.8)

## 2019-05-10 PROCEDURE — 85652 RBC SED RATE AUTOMATED: CPT

## 2019-05-10 PROCEDURE — 86140 C-REACTIVE PROTEIN: CPT

## 2019-05-10 PROCEDURE — 82728 ASSAY OF FERRITIN: CPT

## 2019-05-10 PROCEDURE — 36415 COLL VENOUS BLD VENIPUNCTURE: CPT

## 2019-05-10 PROCEDURE — 85025 COMPLETE CBC W/AUTO DIFF WBC: CPT

## 2019-05-10 PROCEDURE — 80053 COMPREHEN METABOLIC PANEL: CPT

## 2019-05-10 PROCEDURE — 82306 VITAMIN D 25 HYDROXY: CPT

## 2019-06-05 ENCOUNTER — HOSPITAL ENCOUNTER (EMERGENCY)
Facility: HOSPITAL | Age: 49
Discharge: HOME | End: 2019-06-05
Payer: COMMERCIAL

## 2019-06-05 VITALS
WEIGHT: 180 LBS | OXYGEN SATURATION: 98 % | SYSTOLIC BLOOD PRESSURE: 112 MMHG | TEMPERATURE: 98.2 F | BODY MASS INDEX: 33.13 KG/M2 | RESPIRATION RATE: 16 BRPM | HEART RATE: 87 BPM | HEIGHT: 62 IN | DIASTOLIC BLOOD PRESSURE: 75 MMHG

## 2019-06-05 DIAGNOSIS — G43.101 MIGRAINE WITH AURA AND WITH STATUS MIGRAINOSUS, NOT INTRACTABLE: Primary | ICD-10-CM

## 2019-06-05 PROCEDURE — 99283 EMERGENCY DEPT VISIT LOW MDM: CPT

## 2019-06-05 PROCEDURE — 96374 THER/PROPH/DIAG INJ IV PUSH: CPT

## 2019-06-05 PROCEDURE — 96361 HYDRATE IV INFUSION ADD-ON: CPT

## 2019-06-05 PROCEDURE — 96375 TX/PRO/DX INJ NEW DRUG ADDON: CPT

## 2019-06-05 RX ORDER — SUMATRIPTAN 100 MG/1
100 TABLET, FILM COATED ORAL 2 TIMES DAILY PRN
COMMUNITY

## 2019-06-05 RX ORDER — DEXAMETHASONE SODIUM PHOSPHATE 4 MG/ML
10 INJECTION, SOLUTION INTRA-ARTICULAR; INTRALESIONAL; INTRAMUSCULAR; INTRAVENOUS; SOFT TISSUE ONCE
Status: COMPLETED | OUTPATIENT
Start: 2019-06-05 | End: 2019-06-05

## 2019-06-05 RX ORDER — KETOROLAC TROMETHAMINE 30 MG/ML
30 INJECTION, SOLUTION INTRAMUSCULAR; INTRAVENOUS ONCE
Status: COMPLETED | OUTPATIENT
Start: 2019-06-05 | End: 2019-06-05

## 2019-06-05 RX ORDER — METOCLOPRAMIDE HYDROCHLORIDE 5 MG/ML
10 INJECTION INTRAMUSCULAR; INTRAVENOUS ONCE
Status: COMPLETED | OUTPATIENT
Start: 2019-06-05 | End: 2019-06-05

## 2019-06-05 RX ORDER — DIPHENHYDRAMINE HYDROCHLORIDE 50 MG/ML
25 INJECTION INTRAMUSCULAR; INTRAVENOUS ONCE
Status: COMPLETED | OUTPATIENT
Start: 2019-06-05 | End: 2019-06-05

## 2019-06-05 RX ADMIN — DEXAMETHASONE SODIUM PHOSPHATE 10 MG: 4 INJECTION, SOLUTION INTRAMUSCULAR; INTRAVENOUS at 11:41

## 2019-06-05 RX ADMIN — METOCLOPRAMIDE 10 MG: 5 INJECTION, SOLUTION INTRAMUSCULAR; INTRAVENOUS at 11:32

## 2019-06-05 RX ADMIN — KETOROLAC TROMETHAMINE 30 MG: 30 INJECTION, SOLUTION INTRAMUSCULAR; INTRAVENOUS at 11:31

## 2019-06-05 RX ADMIN — SODIUM CHLORIDE 1000 ML: 0.9 INJECTION, SOLUTION INTRAVENOUS at 11:30

## 2019-06-05 RX ADMIN — DIPHENHYDRAMINE HYDROCHLORIDE 25 MG: 50 INJECTION, SOLUTION INTRAMUSCULAR; INTRAVENOUS at 11:36

## 2019-06-10 ENCOUNTER — HOSPITAL ENCOUNTER (EMERGENCY)
Facility: HOSPITAL | Age: 49
Discharge: HOME | End: 2019-06-10
Attending: EMERGENCY MEDICINE | Admitting: EMERGENCY MEDICINE
Payer: COMMERCIAL

## 2019-06-10 VITALS
DIASTOLIC BLOOD PRESSURE: 80 MMHG | BODY MASS INDEX: 33.1 KG/M2 | OXYGEN SATURATION: 100 % | WEIGHT: 179.9 LBS | SYSTOLIC BLOOD PRESSURE: 138 MMHG | RESPIRATION RATE: 18 BRPM | HEIGHT: 62 IN | HEART RATE: 80 BPM | TEMPERATURE: 97.8 F

## 2019-06-10 DIAGNOSIS — R31.29 MICROSCOPIC HEMATURIA: ICD-10-CM

## 2019-06-10 DIAGNOSIS — T50.905A ADVERSE EFFECT OF DRUG/MEDICINAL, INITIAL ENCOUNTER: ICD-10-CM

## 2019-06-10 DIAGNOSIS — G43.111 INTRACTABLE MIGRAINE WITH AURA WITH STATUS MIGRAINOSUS: Primary | ICD-10-CM

## 2019-06-10 LAB
ALBUMIN SERPL BCP-MCNC: 4.2 G/DL (ref 3.5–5.7)
ALP SERPL-CCNC: 78 U/L (ref 40–150)
ALT SERPL W P-5'-P-CCNC: 14 U/L (ref 7–52)
ANION GAP SERPL CALCULATED.3IONS-SCNC: 5 MMOL/L (ref 4–13)
AST SERPL W P-5'-P-CCNC: 20 U/L (ref 13–39)
BACTERIA UR QL AUTO: NORMAL /HPF
BASOPHILS # BLD AUTO: 0.1 THOUSANDS/ΜL (ref 0–0.1)
BASOPHILS NFR BLD AUTO: 1 % (ref 0–2)
BILIRUB SERPL-MCNC: 0.6 MG/DL (ref 0.2–1)
BILIRUB UR QL STRIP: NEGATIVE
BUN SERPL-MCNC: 9 MG/DL (ref 7–25)
CALCIUM SERPL-MCNC: 9 MG/DL (ref 8.6–10.5)
CHLORIDE SERPL-SCNC: 105 MMOL/L (ref 98–107)
CLARITY UR: CLEAR
CO2 SERPL-SCNC: 27 MMOL/L (ref 21–31)
COLOR UR: YELLOW
CREAT SERPL-MCNC: 0.64 MG/DL (ref 0.6–1.2)
EOSINOPHIL # BLD AUTO: 0.4 THOUSAND/ΜL (ref 0–0.61)
EOSINOPHIL NFR BLD AUTO: 4 % (ref 0–5)
ERYTHROCYTE [DISTWIDTH] IN BLOOD BY AUTOMATED COUNT: 15.9 % (ref 11.5–14.5)
GFR SERPL CREATININE-BSD FRML MDRD: 106 ML/MIN/1.73SQ M
GLUCOSE SERPL-MCNC: 87 MG/DL (ref 65–99)
GLUCOSE UR STRIP-MCNC: NEGATIVE MG/DL
HCT VFR BLD AUTO: 41.9 % (ref 42–47)
HGB BLD-MCNC: 14.2 G/DL (ref 12–16)
HGB UR QL STRIP.AUTO: ABNORMAL
KETONES UR STRIP-MCNC: NEGATIVE MG/DL
LEUKOCYTE ESTERASE UR QL STRIP: NEGATIVE
LIPASE SERPL-CCNC: 25 U/L (ref 11–82)
LYMPHOCYTES # BLD AUTO: 2.4 THOUSANDS/ΜL (ref 0.6–4.47)
LYMPHOCYTES NFR BLD AUTO: 21 % (ref 21–51)
MCH RBC QN AUTO: 30.4 PG (ref 26–34)
MCHC RBC AUTO-ENTMCNC: 34 G/DL (ref 31–37)
MCV RBC AUTO: 90 FL (ref 81–99)
MONOCYTES # BLD AUTO: 0.8 THOUSAND/ΜL (ref 0.17–1.22)
MONOCYTES NFR BLD AUTO: 8 % (ref 2–12)
NEUTROPHILS # BLD AUTO: 7.4 THOUSANDS/ΜL (ref 1.4–6.5)
NEUTS SEG NFR BLD AUTO: 67 % (ref 42–75)
NITRITE UR QL STRIP: NEGATIVE
NON-SQ EPI CELLS URNS QL MICRO: NORMAL /HPF
PH UR STRIP.AUTO: 8 [PH]
PLATELET # BLD AUTO: 330 THOUSANDS/UL (ref 149–390)
PMV BLD AUTO: 7.1 FL (ref 8.6–11.7)
POTASSIUM SERPL-SCNC: 4.2 MMOL/L (ref 3.5–5.5)
PROT SERPL-MCNC: 6.7 G/DL (ref 6.4–8.9)
PROT UR STRIP-MCNC: NEGATIVE MG/DL
RBC # BLD AUTO: 4.68 MILLION/UL (ref 3.9–5.2)
RBC #/AREA URNS AUTO: NORMAL /HPF
SODIUM SERPL-SCNC: 137 MMOL/L (ref 134–143)
SP GR UR STRIP.AUTO: 1.01 (ref 1–1.03)
UROBILINOGEN UR QL STRIP.AUTO: 0.2 E.U./DL
WBC # BLD AUTO: 11 THOUSAND/UL (ref 4.8–10.8)
WBC #/AREA URNS AUTO: NORMAL /HPF

## 2019-06-10 PROCEDURE — 80053 COMPREHEN METABOLIC PANEL: CPT | Performed by: PHYSICIAN ASSISTANT

## 2019-06-10 PROCEDURE — 96375 TX/PRO/DX INJ NEW DRUG ADDON: CPT

## 2019-06-10 PROCEDURE — 85025 COMPLETE CBC W/AUTO DIFF WBC: CPT | Performed by: PHYSICIAN ASSISTANT

## 2019-06-10 PROCEDURE — 96374 THER/PROPH/DIAG INJ IV PUSH: CPT

## 2019-06-10 PROCEDURE — 96361 HYDRATE IV INFUSION ADD-ON: CPT

## 2019-06-10 PROCEDURE — 99284 EMERGENCY DEPT VISIT MOD MDM: CPT

## 2019-06-10 PROCEDURE — 81001 URINALYSIS AUTO W/SCOPE: CPT | Performed by: PHYSICIAN ASSISTANT

## 2019-06-10 PROCEDURE — 36415 COLL VENOUS BLD VENIPUNCTURE: CPT | Performed by: PHYSICIAN ASSISTANT

## 2019-06-10 PROCEDURE — 83690 ASSAY OF LIPASE: CPT | Performed by: PHYSICIAN ASSISTANT

## 2019-06-10 RX ORDER — DIPHENHYDRAMINE HYDROCHLORIDE 50 MG/ML
25 INJECTION INTRAMUSCULAR; INTRAVENOUS ONCE
Status: COMPLETED | OUTPATIENT
Start: 2019-06-10 | End: 2019-06-10

## 2019-06-10 RX ORDER — KETOROLAC TROMETHAMINE 30 MG/ML
30 INJECTION, SOLUTION INTRAMUSCULAR; INTRAVENOUS ONCE
Status: COMPLETED | OUTPATIENT
Start: 2019-06-10 | End: 2019-06-10

## 2019-06-10 RX ORDER — DEXAMETHASONE SODIUM PHOSPHATE 4 MG/ML
10 INJECTION, SOLUTION INTRA-ARTICULAR; INTRALESIONAL; INTRAMUSCULAR; INTRAVENOUS; SOFT TISSUE ONCE
Status: COMPLETED | OUTPATIENT
Start: 2019-06-10 | End: 2019-06-10

## 2019-06-10 RX ORDER — METOCLOPRAMIDE HYDROCHLORIDE 5 MG/ML
10 INJECTION INTRAMUSCULAR; INTRAVENOUS ONCE
Status: COMPLETED | OUTPATIENT
Start: 2019-06-10 | End: 2019-06-10

## 2019-06-10 RX ADMIN — DIPHENHYDRAMINE HYDROCHLORIDE 25 MG: 50 INJECTION, SOLUTION INTRAMUSCULAR; INTRAVENOUS at 10:52

## 2019-06-10 RX ADMIN — SODIUM CHLORIDE 1000 ML: 0.9 INJECTION, SOLUTION INTRAVENOUS at 11:05

## 2019-06-10 RX ADMIN — KETOROLAC TROMETHAMINE 30 MG: 30 INJECTION, SOLUTION INTRAMUSCULAR; INTRAVENOUS at 10:53

## 2019-06-10 RX ADMIN — DEXAMETHASONE SODIUM PHOSPHATE 10 MG: 4 INJECTION, SOLUTION INTRA-ARTICULAR; INTRALESIONAL; INTRAMUSCULAR; INTRAVENOUS; SOFT TISSUE at 10:55

## 2019-06-10 RX ADMIN — METOCLOPRAMIDE 10 MG: 5 INJECTION, SOLUTION INTRAMUSCULAR; INTRAVENOUS at 10:54

## 2019-06-19 ENCOUNTER — APPOINTMENT (OUTPATIENT)
Dept: LAB | Facility: HOSPITAL | Age: 49
End: 2019-06-19
Attending: PHYSICIAN ASSISTANT
Payer: COMMERCIAL

## 2019-06-19 ENCOUNTER — TRANSCRIBE ORDERS (OUTPATIENT)
Dept: ADMINISTRATIVE | Facility: HOSPITAL | Age: 49
End: 2019-06-19

## 2019-06-19 DIAGNOSIS — R11.0 NAUSEA: ICD-10-CM

## 2019-06-19 DIAGNOSIS — D50.9 IRON DEFICIENCY ANEMIA, UNSPECIFIED IRON DEFICIENCY ANEMIA TYPE: ICD-10-CM

## 2019-06-19 DIAGNOSIS — R19.7 DIARRHEA, UNSPECIFIED TYPE: ICD-10-CM

## 2019-06-19 DIAGNOSIS — R14.0 GASEOUS ABDOMINAL DISTENTION: ICD-10-CM

## 2019-06-19 DIAGNOSIS — R14.0 ABDOMINAL DISTENSION (GASEOUS): ICD-10-CM

## 2019-06-19 DIAGNOSIS — R11.0 NAUSEA: Primary | ICD-10-CM

## 2019-06-19 LAB
C DIFF TOX GENS STL QL NAA+PROBE: NORMAL
CAMPYLOBACTER DNA SPEC NAA+PROBE: NORMAL
SALMONELLA DNA SPEC QL NAA+PROBE: NORMAL
SHIGA TOXIN STX GENE SPEC NAA+PROBE: NORMAL
SHIGELLA DNA SPEC QL NAA+PROBE: NORMAL

## 2019-06-19 PROCEDURE — 87505 NFCT AGENT DETECTION GI: CPT

## 2019-06-22 ENCOUNTER — HOSPITAL ENCOUNTER (OUTPATIENT)
Dept: CT IMAGING | Facility: HOSPITAL | Age: 49
Discharge: HOME | End: 2019-06-22
Attending: PHYSICIAN ASSISTANT
Payer: COMMERCIAL

## 2019-06-22 DIAGNOSIS — R19.7 DIARRHEA, UNSPECIFIED TYPE: ICD-10-CM

## 2019-06-22 DIAGNOSIS — R11.0 NAUSEA: ICD-10-CM

## 2019-06-22 DIAGNOSIS — R14.0 ABDOMINAL DISTENSION (GASEOUS): ICD-10-CM

## 2019-06-22 DIAGNOSIS — D50.9 IRON DEFICIENCY ANEMIA, UNSPECIFIED IRON DEFICIENCY ANEMIA TYPE: ICD-10-CM

## 2019-06-22 PROCEDURE — 74177 CT ABD & PELVIS W/CONTRAST: CPT

## 2019-06-22 RX ADMIN — IOHEXOL 100 ML: 350 INJECTION, SOLUTION INTRAVENOUS at 10:58

## 2019-08-12 ENCOUNTER — TRANSCRIBE ORDERS (OUTPATIENT)
Dept: ADMINISTRATIVE | Facility: HOSPITAL | Age: 49
End: 2019-08-12

## 2019-08-12 DIAGNOSIS — M65.9 SAPHO SYNDROME (HCC): Primary | ICD-10-CM

## 2019-08-12 DIAGNOSIS — L40.3 SAPHO SYNDROME (HCC): Primary | ICD-10-CM

## 2019-08-12 DIAGNOSIS — M86.9 SAPHO SYNDROME (HCC): Primary | ICD-10-CM

## 2019-08-12 DIAGNOSIS — L70.9 SAPHO SYNDROME (HCC): Primary | ICD-10-CM

## 2019-08-12 DIAGNOSIS — M85.80 SAPHO SYNDROME (HCC): Primary | ICD-10-CM

## 2019-08-17 ENCOUNTER — TRANSCRIBE ORDERS (OUTPATIENT)
Dept: LAB | Facility: HOSPITAL | Age: 49
End: 2019-08-17

## 2019-08-17 ENCOUNTER — APPOINTMENT (OUTPATIENT)
Dept: LAB | Facility: HOSPITAL | Age: 49
End: 2019-08-17
Payer: COMMERCIAL

## 2019-08-17 DIAGNOSIS — E55.9 VITAMIN D DEFICIENCY: ICD-10-CM

## 2019-08-17 DIAGNOSIS — N25.81 SECONDARY HYPERPARATHYROIDISM OF RENAL ORIGIN (HCC): ICD-10-CM

## 2019-08-17 DIAGNOSIS — E78.5 HYPERLIPIDEMIA, UNSPECIFIED HYPERLIPIDEMIA TYPE: ICD-10-CM

## 2019-08-17 DIAGNOSIS — E55.9 VITAMIN D DEFICIENCY: Primary | ICD-10-CM

## 2019-08-17 DIAGNOSIS — D50.9 IRON DEFICIENCY ANEMIA, UNSPECIFIED IRON DEFICIENCY ANEMIA TYPE: ICD-10-CM

## 2019-08-17 DIAGNOSIS — M25.50 PAIN IN JOINT, MULTIPLE SITES: ICD-10-CM

## 2019-08-17 DIAGNOSIS — R10.84 ABDOMINAL PAIN, GENERALIZED: ICD-10-CM

## 2019-08-17 DIAGNOSIS — I10 ESSENTIAL HYPERTENSION, MALIGNANT: ICD-10-CM

## 2019-08-17 LAB
25(OH)D3 SERPL-MCNC: 40.7 NG/ML (ref 30–100)
ALBUMIN SERPL BCP-MCNC: 4.2 G/DL (ref 3.5–5.7)
ALP SERPL-CCNC: 78 U/L (ref 40–150)
ALT SERPL W P-5'-P-CCNC: 9 U/L (ref 7–52)
ANION GAP SERPL CALCULATED.3IONS-SCNC: 7 MMOL/L (ref 4–13)
AST SERPL W P-5'-P-CCNC: 14 U/L (ref 13–39)
BACTERIA UR QL AUTO: ABNORMAL /HPF
BASOPHILS # BLD AUTO: 0 THOUSANDS/ΜL (ref 0–0.1)
BASOPHILS NFR BLD AUTO: 1 % (ref 0–2)
BILIRUB SERPL-MCNC: 0.6 MG/DL (ref 0.2–1)
BILIRUB UR QL STRIP: NEGATIVE
BUN SERPL-MCNC: 8 MG/DL (ref 7–25)
CALCIUM SERPL-MCNC: 9.2 MG/DL (ref 8.6–10.5)
CHLORIDE SERPL-SCNC: 104 MMOL/L (ref 98–107)
CHOLEST SERPL-MCNC: 171 MG/DL (ref 0–200)
CLARITY UR: CLEAR
CO2 SERPL-SCNC: 26 MMOL/L (ref 21–31)
COLOR UR: YELLOW
CREAT SERPL-MCNC: 0.79 MG/DL (ref 0.6–1.2)
CRP SERPL QL: 6.8 MG/L
EOSINOPHIL # BLD AUTO: 0.2 THOUSAND/ΜL (ref 0–0.61)
EOSINOPHIL NFR BLD AUTO: 3 % (ref 0–5)
ERYTHROCYTE [DISTWIDTH] IN BLOOD BY AUTOMATED COUNT: 14.5 % (ref 11.5–14.5)
ERYTHROCYTE [SEDIMENTATION RATE] IN BLOOD: 8 MM/HOUR (ref 0–20)
GFR SERPL CREATININE-BSD FRML MDRD: 89 ML/MIN/1.73SQ M
GLUCOSE P FAST SERPL-MCNC: 87 MG/DL (ref 65–99)
GLUCOSE UR STRIP-MCNC: NEGATIVE MG/DL
HCT VFR BLD AUTO: 42.5 % (ref 42–47)
HDLC SERPL-MCNC: 65 MG/DL (ref 40–60)
HGB BLD-MCNC: 13.9 G/DL (ref 12–16)
HGB UR QL STRIP.AUTO: ABNORMAL
KETONES UR STRIP-MCNC: NEGATIVE MG/DL
LDLC SERPL CALC-MCNC: 78 MG/DL (ref 0–100)
LEUKOCYTE ESTERASE UR QL STRIP: NEGATIVE
LYMPHOCYTES # BLD AUTO: 2.1 THOUSANDS/ΜL (ref 0.6–4.47)
LYMPHOCYTES NFR BLD AUTO: 29 % (ref 21–51)
MAGNESIUM SERPL-MCNC: 2 MG/DL (ref 1.9–2.7)
MCH RBC QN AUTO: 30.2 PG (ref 26–34)
MCHC RBC AUTO-ENTMCNC: 32.7 G/DL (ref 31–37)
MCV RBC AUTO: 92 FL (ref 81–99)
MONOCYTES # BLD AUTO: 0.4 THOUSAND/ΜL (ref 0.17–1.22)
MONOCYTES NFR BLD AUTO: 6 % (ref 2–12)
NEUTROPHILS # BLD AUTO: 4.6 THOUSANDS/ΜL (ref 1.4–6.5)
NEUTS SEG NFR BLD AUTO: 62 % (ref 42–75)
NITRITE UR QL STRIP: NEGATIVE
NON-SQ EPI CELLS URNS QL MICRO: ABNORMAL /HPF
NONHDLC SERPL-MCNC: 106 MG/DL
PH UR STRIP.AUTO: 6 [PH]
PHOSPHATE SERPL-MCNC: 2.9 MG/DL (ref 3–5.5)
PLATELET # BLD AUTO: 290 THOUSANDS/UL (ref 149–390)
PMV BLD AUTO: 7.6 FL (ref 8.6–11.7)
POTASSIUM SERPL-SCNC: 4.3 MMOL/L (ref 3.5–5.5)
PROT SERPL-MCNC: 6.4 G/DL (ref 6.4–8.9)
PROT UR STRIP-MCNC: NEGATIVE MG/DL
PTH-INTACT SERPL-MCNC: 88.5 PG/ML (ref 18.4–80.1)
RBC # BLD AUTO: 4.6 MILLION/UL (ref 3.9–5.2)
RBC #/AREA URNS AUTO: ABNORMAL /HPF
SODIUM SERPL-SCNC: 137 MMOL/L (ref 134–143)
SP GR UR STRIP.AUTO: >=1.03 (ref 1–1.03)
TRIGL SERPL-MCNC: 140 MG/DL (ref 44–166)
TSH SERPL DL<=0.05 MIU/L-ACNC: 2.73 UIU/ML (ref 0.45–5.33)
UROBILINOGEN UR QL STRIP.AUTO: 0.2 E.U./DL
VIT B12 SERPL-MCNC: 345 PG/ML (ref 100–900)
WBC # BLD AUTO: 7.4 THOUSAND/UL (ref 4.8–10.8)
WBC #/AREA URNS AUTO: ABNORMAL /HPF

## 2019-08-17 PROCEDURE — 84100 ASSAY OF PHOSPHORUS: CPT

## 2019-08-17 PROCEDURE — 83735 ASSAY OF MAGNESIUM: CPT

## 2019-08-17 PROCEDURE — 85025 COMPLETE CBC W/AUTO DIFF WBC: CPT

## 2019-08-17 PROCEDURE — 80053 COMPREHEN METABOLIC PANEL: CPT

## 2019-08-17 PROCEDURE — 83970 ASSAY OF PARATHORMONE: CPT

## 2019-08-17 PROCEDURE — 83516 IMMUNOASSAY NONANTIBODY: CPT

## 2019-08-17 PROCEDURE — 86140 C-REACTIVE PROTEIN: CPT

## 2019-08-17 PROCEDURE — 82784 ASSAY IGA/IGD/IGG/IGM EACH: CPT

## 2019-08-17 PROCEDURE — 81001 URINALYSIS AUTO W/SCOPE: CPT

## 2019-08-17 PROCEDURE — 84165 PROTEIN E-PHORESIS SERUM: CPT | Performed by: PATHOLOGY

## 2019-08-17 PROCEDURE — 36415 COLL VENOUS BLD VENIPUNCTURE: CPT

## 2019-08-17 PROCEDURE — 80061 LIPID PANEL: CPT

## 2019-08-17 PROCEDURE — 82607 VITAMIN B-12: CPT

## 2019-08-17 PROCEDURE — 86038 ANTINUCLEAR ANTIBODIES: CPT

## 2019-08-17 PROCEDURE — 86255 FLUORESCENT ANTIBODY SCREEN: CPT

## 2019-08-17 PROCEDURE — 82306 VITAMIN D 25 HYDROXY: CPT

## 2019-08-17 PROCEDURE — 85652 RBC SED RATE AUTOMATED: CPT

## 2019-08-17 PROCEDURE — 84443 ASSAY THYROID STIM HORMONE: CPT

## 2019-08-17 PROCEDURE — 84165 PROTEIN E-PHORESIS SERUM: CPT

## 2019-08-17 PROCEDURE — 86618 LYME DISEASE ANTIBODY: CPT

## 2019-08-19 LAB
ALBUMIN SERPL ELPH-MCNC: 4.35 G/DL (ref 3.5–5)
ALBUMIN SERPL ELPH-MCNC: 65.9 % (ref 52–65)
ALPHA1 GLOB SERPL ELPH-MCNC: 0.32 G/DL (ref 0.1–0.4)
ALPHA1 GLOB SERPL ELPH-MCNC: 4.8 % (ref 2.5–5)
ALPHA2 GLOB SERPL ELPH-MCNC: 0.75 G/DL (ref 0.4–1.2)
ALPHA2 GLOB SERPL ELPH-MCNC: 11.3 % (ref 7–13)
BETA GLOB ABNORMAL SERPL ELPH-MCNC: 0.44 G/DL (ref 0.4–0.8)
BETA1 GLOB SERPL ELPH-MCNC: 6.7 % (ref 5–13)
BETA2 GLOB SERPL ELPH-MCNC: 3.8 % (ref 2–8)
BETA2+GAMMA GLOB SERPL ELPH-MCNC: 0.25 G/DL (ref 0.2–0.5)
ENDOMYSIUM IGA SER QL: NEGATIVE
GAMMA GLOB ABNORMAL SERPL ELPH-MCNC: 0.5 G/DL (ref 0.5–1.6)
GAMMA GLOB SERPL ELPH-MCNC: 7.5 % (ref 12–22)
GLIADIN PEPTIDE IGA SER-ACNC: 3 UNITS (ref 0–19)
GLIADIN PEPTIDE IGG SER-ACNC: 2 UNITS (ref 0–19)
IGA SERPL-MCNC: 125 MG/DL (ref 87–352)
IGG/ALB SER: 1.93 {RATIO} (ref 1.1–1.8)
PROT PATTERN SERPL ELPH-IMP: ABNORMAL
PROT SERPL-MCNC: 6.6 G/DL (ref 6.4–8.2)
RYE IGE QN: NEGATIVE
TTG IGA SER-ACNC: <2 U/ML (ref 0–3)
TTG IGG SER-ACNC: <2 U/ML (ref 0–5)

## 2019-08-21 ENCOUNTER — HOSPITAL ENCOUNTER (OUTPATIENT)
Dept: BONE DENSITY | Facility: HOSPITAL | Age: 49
Discharge: HOME | End: 2019-08-21
Payer: COMMERCIAL

## 2019-08-21 DIAGNOSIS — M65.9 SAPHO SYNDROME (HCC): ICD-10-CM

## 2019-08-21 DIAGNOSIS — M85.80 SAPHO SYNDROME (HCC): ICD-10-CM

## 2019-08-21 DIAGNOSIS — L70.9 SAPHO SYNDROME (HCC): ICD-10-CM

## 2019-08-21 DIAGNOSIS — M86.9 SAPHO SYNDROME (HCC): ICD-10-CM

## 2019-08-21 DIAGNOSIS — L40.3 SAPHO SYNDROME (HCC): ICD-10-CM

## 2019-08-21 LAB — MISCELLANEOUS LAB TEST RESULT: NORMAL

## 2019-08-21 PROCEDURE — 77080 DXA BONE DENSITY AXIAL: CPT

## 2019-09-12 ENCOUNTER — PATIENT MESSAGE (OUTPATIENT)
Dept: BARIATRICS | Facility: CLINIC | Age: 49
End: 2019-09-12

## 2019-09-12 ENCOUNTER — OFFICE VISIT (OUTPATIENT)
Dept: BARIATRICS | Facility: CLINIC | Age: 49
End: 2019-09-12
Payer: COMMERCIAL

## 2019-09-12 VITALS
WEIGHT: 179 LBS | HEIGHT: 61 IN | HEART RATE: 94 BPM | BODY MASS INDEX: 33.79 KG/M2 | TEMPERATURE: 98.3 F | DIASTOLIC BLOOD PRESSURE: 80 MMHG | SYSTOLIC BLOOD PRESSURE: 140 MMHG

## 2019-09-12 DIAGNOSIS — K90.2 POSTOPERATIVE BLIND LOOP SYNDROME: Primary | ICD-10-CM

## 2019-09-12 PROCEDURE — 99203 OFFICE O/P NEW LOW 30 MIN: CPT | Performed by: SURGERY

## 2019-09-12 RX ORDER — FAMOTIDINE 40 MG/1
40 TABLET, FILM COATED ORAL DAILY
COMMUNITY
End: 2019-10-05 | Stop reason: ALTCHOICE

## 2019-09-12 NOTE — PROGRESS NOTES
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  Per pt, she has struggled with severe midline upper abdominal pain, nausea, reflux, and diarrhea ever since her bypass in 2006  At times, she also has episodes of a "twisting" sensation in her midline abdomen  She has had daily episodes of diarrhea and associated electrolyte abnormalities  Pt had EGD with Dr Tay Stinson on 8/19/19 showing Eveline-en-Y gastric bypass with a very long blind loop  Pt was then referred to this Office today to discuss surgical revision options  Of note, pt has PMH of hypoparathyroidism, hypogammaglobinemia, PSH of hiatal hernia repair (2016-Dr Alfredo Robert, [facility]), appendectomy, cholecystectomy, and multiple lysis of adhesions for her chronic abdominal pain  Pt explains that the blind loop was visualized during her hiatal hernia repair in 2016, but it was deemed to not be long enough for intervention at that time  Review of Systems   Constitutional: Negative for chills, diaphoresis, fatigue and fever  HENT: Negative for congestion, postnasal drip, rhinorrhea, sinus pressure, sinus pain, sore throat and trouble swallowing  Eyes: Negative for visual disturbance  Respiratory: Negative for cough, chest tightness, shortness of breath, wheezing and stridor  Cardiovascular: Negative for chest pain, palpitations and leg swelling  Gastrointestinal: Positive for abdominal pain, diarrhea and nausea  Negative for abdominal distention, constipation and vomiting         +reflux   Endocrine: Negative for polyphagia  Genitourinary: Negative for flank pain and urgency     Musculoskeletal: Negative for arthralgias, back pain, gait problem, joint swelling, myalgias, neck pain and neck stiffness  Skin: Negative for color change, pallor, rash and wound  Allergic/Immunologic: Negative for environmental allergies, food allergies and immunocompromised state  Neurological: Negative for dizziness, facial asymmetry, light-headedness and headaches  Psychiatric/Behavioral: Negative for agitation, behavioral problems and confusion         Historical Information   Past Medical History:   Diagnosis Date    Allergic     Anemia     recurrent    Anxiety     Asthma     allergy induced    Depression     Environmental allergies     Fibromyalgia     GERD (gastroesophageal reflux disease)     Hiatal hernia     History of transfusion     [facility]parathyroidism ([facility]     Hypertension     Hypogammaglobulinemia ([facility]     Hypogammaglobulinemia ([facility]     Hypoglycemia after GI (gastrointestinal) surgery     Immune deficiency disorder (HCC)     IgG deficiency    Iron (Fe) deficiency anemia     PONV (postoperative nausea and vomiting)     severe    Seasonal allergies      Past Surgical History:   Procedure Laterality Date    ABDOMINAL SURGERY      adhesions    ABDOMINOPLASTY      APPENDECTOMY      AUGMENTATION MAMMAPLASTY  2003     SECTION      CHOLECYSTECTOMY      COSMETIC SURGERY Bilateral     breast augmentation 2004    GASTRIC BYPASS      2009    HERNIA REPAIR      HYSTERECTOMY  2015    KNEE ARTHROSCOPY Left     LYMPH NODE BIOPSY  200-    benign    DE CORRJ HALLUX VALGUS W/SESMDC W/RESCJ PROX PHAL Left 2016    Procedure: SURGICAL MODIFIED WATKINS BUNIONECTOMY FIRST MTPJ;  Surgeon: Hank Anne DPM;  [facility];  Service: Podiatry    DE FUSION FOOT BONE,MIDTARSAL,1 JT Left 2016    Procedure: FRIST TMJ FUSION ;  Surgeon: Hank Anne DPM;  [facility];  Service: Podiatry    DE OSTEOTOMY METATARSAL (NOT 1ST) Left 2016    Procedure: FOOT SHORTENING OSTEOTOMIES 2ND AND 3RD METATARSAL WITH EXTENSER TENDON RELEASE 3RD AND 4TH TOE; Surgeon: Jamil Martinez DPM;  [facility];  Service: Podiatry    DC REMOVAL DEEP IMPLANT Left 12/7/2016    Procedure: REMOVAL SYMPTOMATIC  HARDWARE FIRST RAY,RELATED CONTRACTURE SECOND AND THIRD TOES WITH SUSPENSION FOURTH TOE;  Surgeon: Jamil Martinez DPM;  [facility];  Service: Podiatry    SALPINGOOPHORECTOMY Left 2016    with removal of cervix    TONSILLECTOMY      WISDOM TOOTH EXTRACTION       Social History   Social History     Substance and Sexual Activity   Alcohol Use Yes    Binge frequency: Less than monthly    Comment: rarely     Social History     Substance and Sexual Activity   Drug Use No     Social History     Tobacco Use   Smoking Status Never Smoker   Smokeless Tobacco Never Used     Family History:   Family History   Problem Relation Age of Onset    Lymphoma Mother     COPD Mother     Arthritis Mother     Heart disease Father     Breast cancer Maternal Aunt     Breast cancer Cousin        Meds/Allergies   PTA meds:   Cannot display prior to admission medications because the patient has not been admitted in this contact  Allergies   Allergen Reactions    Codeine GI Intolerance and Chest Pain     ABDOMINAL PAIN    Hydrocodone Abdominal Pain and Swelling    Pregabalin Dizziness    Acetaminophen-Codeine     Amoxicillin        Objective     Current Vitals:   Blood Pressure: 140/80 (09/12/19 1431)  Pulse: 94 (09/12/19 1431)  Temperature: 98 3 °F (36 8 °C) (09/12/19 1431)  Temp Source: Tympanic (09/12/19 1431)  Height: 5' 1" (154 9 cm) (09/12/19 1431)  Weight - Scale: 81 2 kg (179 lb) (09/12/19 1431)  bowel movement    Invasive Devices     None                 Physical Exam   Constitutional: She is oriented to person, place, and time  She appears well-developed and well-nourished  No distress  HENT:   Head: Normocephalic and atraumatic  Eyes: Pupils are equal, round, and reactive to light  Neck: Normal range of motion  Neck supple     Cardiovascular: Normal rate, regular rhythm and normal heart sounds  Exam reveals no gallop and no friction rub  No murmur heard  Pulmonary/Chest: Effort normal and breath sounds normal  No respiratory distress  She has no wheezes  She has no rales  She exhibits no tenderness  Abdominal: Soft  Bowel sounds are normal  She exhibits no distension and no mass  There is no tenderness  There is no rebound and no guarding  Musculoskeletal: Normal range of motion  Neurological: She is alert and oriented to person, place, and time  Skin: Skin is warm and dry  No rash noted  She is not diaphoretic  No erythema  Psychiatric: She has a normal mood and affect  Her behavior is normal  Judgment and thought content normal        Lab Results: CBC: No results found for: WBC, HGB, HCT, MCV, PLT, ADJUSTEDWBC, MCH, MCHC, RDW, MPV, NRBC, CMP: No results found for: SODIUM, K, CL, CO2, ANIONGAP, BUN, CREATININE, GLUCOSE, CALCIUM, AST, ALT, ALKPHOS, PROT, BILITOT, EGFR, Coagulation: No results found for: PT, INR, APTT, Urinalysis: No results found for: Mearl Viviane, SPECGRAV, PHUR, LEUKOCYTESUR, NITRITE, PROTEINUA, GLUCOSEU, KETONESU, BILIRUBINUR, BLOODU, Amylase: No results found for: AMYLASE, Lipase: No results found for: LIPASE  Imaging:     FL UPPER GI UGI    (Results Pending)       ASSESSMENT  50 y o  female,  s/p Eveline-en-Y gastric bypass in 2006 [facility]- Dr Summer Coats), who now p/w w/ finding of a long blind loop on EGD    PLAN  --Schedule pt for UGI, then follow-up in Office after imaging is obtained  --Rifaximin 550mg TID for 14 days   --Will discuss planning for resection of blind loop on next visit  --Had extensive discussion with pt regarding that, upon visualization of her bypass during her future surgery, she may require more extensive bypass revision, lysis of adhesions, or repair of any torsed segments of bowel   Risks, benefits, and alternatives of all of the potential procedures were thoroughly explained, and pt expressed understanding        Escobar Lucero MD  PGY-2, General Surgery

## 2019-09-13 DIAGNOSIS — K90.2 BLIND LOOP SYNDROME: Primary | ICD-10-CM

## 2019-09-18 ENCOUNTER — HOSPITAL ENCOUNTER (OUTPATIENT)
Dept: RADIOLOGY | Facility: HOSPITAL | Age: 49
Discharge: HOME | End: 2019-09-18
Payer: COMMERCIAL

## 2019-09-18 DIAGNOSIS — K90.2 POSTOPERATIVE BLIND LOOP SYNDROME: ICD-10-CM

## 2019-09-18 PROCEDURE — 74240 X-RAY XM UPR GI TRC 1CNTRST: CPT

## 2019-09-20 RX ORDER — AMOXICILLIN AND CLAVULANATE POTASSIUM 875; 125 MG/1; MG/1
1 TABLET, FILM COATED ORAL EVERY 12 HOURS SCHEDULED
Status: CANCELLED | OUTPATIENT
Start: 2019-09-20

## 2019-10-03 ENCOUNTER — TRANSCRIBE ORDERS (OUTPATIENT)
Dept: ADMINISTRATIVE | Facility: HOSPITAL | Age: 49
End: 2019-10-03

## 2019-10-03 ENCOUNTER — APPOINTMENT (OUTPATIENT)
Dept: LAB | Facility: HOSPITAL | Age: 49
End: 2019-10-03
Payer: COMMERCIAL

## 2019-10-03 ENCOUNTER — HOSPITAL ENCOUNTER (OUTPATIENT)
Dept: RADIOLOGY | Facility: HOSPITAL | Age: 49
Discharge: HOME | End: 2019-10-03
Payer: COMMERCIAL

## 2019-10-03 DIAGNOSIS — I10 ESSENTIAL HYPERTENSION: Primary | ICD-10-CM

## 2019-10-03 DIAGNOSIS — I10 ESSENTIAL HYPERTENSION: ICD-10-CM

## 2019-10-03 DIAGNOSIS — R22.9 LOCALIZED SUPERFICIAL SWELLING, MASS, OR LUMP: Primary | ICD-10-CM

## 2019-10-03 DIAGNOSIS — E55.9 AVITAMINOSIS D: ICD-10-CM

## 2019-10-03 DIAGNOSIS — E21.1 SECONDARY HYPERPARATHYROIDISM, NON-RENAL (HCC): Primary | ICD-10-CM

## 2019-10-03 DIAGNOSIS — E21.1 SECONDARY HYPERPARATHYROIDISM, NON-RENAL (HCC): ICD-10-CM

## 2019-10-03 LAB
25(OH)D3 SERPL-MCNC: 58.8 NG/ML (ref 30–100)
ANION GAP SERPL CALCULATED.3IONS-SCNC: 9 MMOL/L (ref 4–13)
BUN SERPL-MCNC: 8 MG/DL (ref 7–25)
CALCIUM SERPL-MCNC: 9.4 MG/DL (ref 8.6–10.5)
CHLORIDE SERPL-SCNC: 103 MMOL/L (ref 98–107)
CO2 SERPL-SCNC: 27 MMOL/L (ref 21–31)
CREAT SERPL-MCNC: 0.63 MG/DL (ref 0.6–1.2)
GFR SERPL CREATININE-BSD FRML MDRD: 106 ML/MIN/1.73SQ M
GLUCOSE SERPL-MCNC: 82 MG/DL (ref 65–99)
POTASSIUM SERPL-SCNC: 4.1 MMOL/L (ref 3.5–5.5)
PTH-INTACT SERPL-MCNC: 70.3 PG/ML (ref 18.4–80.1)
SODIUM SERPL-SCNC: 139 MMOL/L (ref 134–143)

## 2019-10-03 PROCEDURE — 80048 BASIC METABOLIC PNL TOTAL CA: CPT

## 2019-10-03 PROCEDURE — 36415 COLL VENOUS BLD VENIPUNCTURE: CPT

## 2019-10-03 PROCEDURE — 83970 ASSAY OF PARATHORMONE: CPT

## 2019-10-03 PROCEDURE — 82306 VITAMIN D 25 HYDROXY: CPT

## 2019-10-03 PROCEDURE — 71046 X-RAY EXAM CHEST 2 VIEWS: CPT

## 2019-10-04 ENCOUNTER — TRANSCRIBE ORDERS (OUTPATIENT)
Dept: ADMINISTRATIVE | Facility: HOSPITAL | Age: 49
End: 2019-10-04

## 2019-10-04 ENCOUNTER — HOSPITAL ENCOUNTER (OUTPATIENT)
Dept: ULTRASOUND IMAGING | Facility: HOSPITAL | Age: 49
Discharge: HOME | End: 2019-10-04
Payer: COMMERCIAL

## 2019-10-04 ENCOUNTER — APPOINTMENT (OUTPATIENT)
Dept: LAB | Facility: HOSPITAL | Age: 49
End: 2019-10-04
Payer: COMMERCIAL

## 2019-10-04 DIAGNOSIS — R10.13 EPIGASTRIC PAIN: ICD-10-CM

## 2019-10-04 DIAGNOSIS — R22.9 LOCALIZED SUPERFICIAL SWELLING, MASS, OR LUMP: ICD-10-CM

## 2019-10-04 DIAGNOSIS — R10.13 EPIGASTRIC PAIN: Primary | ICD-10-CM

## 2019-10-04 PROCEDURE — 76642 ULTRASOUND BREAST LIMITED: CPT

## 2019-10-04 PROCEDURE — 82705 FATS/LIPIDS FECES QUAL: CPT

## 2019-10-05 ENCOUNTER — APPOINTMENT (EMERGENCY)
Dept: CT IMAGING | Facility: HOSPITAL | Age: 49
End: 2019-10-05
Payer: COMMERCIAL

## 2019-10-05 ENCOUNTER — HOSPITAL ENCOUNTER (EMERGENCY)
Facility: HOSPITAL | Age: 49
Discharge: HOME | End: 2019-10-05
Payer: COMMERCIAL

## 2019-10-05 ENCOUNTER — APPOINTMENT (EMERGENCY)
Dept: RADIOLOGY | Facility: HOSPITAL | Age: 49
End: 2019-10-05
Payer: COMMERCIAL

## 2019-10-05 VITALS
BODY MASS INDEX: 31.28 KG/M2 | RESPIRATION RATE: 14 BRPM | HEIGHT: 62 IN | HEART RATE: 80 BPM | OXYGEN SATURATION: 98 % | TEMPERATURE: 98.6 F | DIASTOLIC BLOOD PRESSURE: 86 MMHG | WEIGHT: 170 LBS | SYSTOLIC BLOOD PRESSURE: 139 MMHG

## 2019-10-05 DIAGNOSIS — R42 DIZZINESS: Primary | ICD-10-CM

## 2019-10-05 LAB
ALBUMIN SERPL BCP-MCNC: 4.6 G/DL (ref 3.5–5.7)
ALP SERPL-CCNC: 77 U/L (ref 40–150)
ALT SERPL W P-5'-P-CCNC: 8 U/L (ref 7–52)
ANION GAP SERPL CALCULATED.3IONS-SCNC: 10 MMOL/L (ref 4–13)
APTT PPP: 37 SECONDS (ref 23–37)
AST SERPL W P-5'-P-CCNC: 12 U/L (ref 13–39)
BASOPHILS # BLD AUTO: 0.1 THOUSANDS/ΜL (ref 0–0.1)
BASOPHILS NFR BLD AUTO: 1 % (ref 0–2)
BILIRUB SERPL-MCNC: 0.7 MG/DL (ref 0.2–1)
BUN SERPL-MCNC: 7 MG/DL (ref 7–25)
CALCIUM SERPL-MCNC: 10.2 MG/DL (ref 8.6–10.5)
CHLORIDE SERPL-SCNC: 102 MMOL/L (ref 98–107)
CO2 SERPL-SCNC: 27 MMOL/L (ref 21–31)
CREAT SERPL-MCNC: 0.73 MG/DL (ref 0.6–1.2)
EOSINOPHIL # BLD AUTO: 0.1 THOUSAND/ΜL (ref 0–0.61)
EOSINOPHIL NFR BLD AUTO: 2 % (ref 0–5)
ERYTHROCYTE [DISTWIDTH] IN BLOOD BY AUTOMATED COUNT: 12.5 % (ref 11.5–14.5)
GFR SERPL CREATININE-BSD FRML MDRD: 98 ML/MIN/1.73SQ M
GLUCOSE SERPL-MCNC: 85 MG/DL (ref 65–140)
GLUCOSE SERPL-MCNC: 90 MG/DL (ref 65–99)
HCG SERPL QL: NEGATIVE
HCT VFR BLD AUTO: 42.6 % (ref 42–47)
HGB BLD-MCNC: 14.3 G/DL (ref 12–16)
INR PPP: 0.99 (ref 0.9–1.5)
LYMPHOCYTES # BLD AUTO: 2.4 THOUSANDS/ΜL (ref 0.6–4.47)
LYMPHOCYTES NFR BLD AUTO: 28 % (ref 21–51)
MCH RBC QN AUTO: 30.8 PG (ref 26–34)
MCHC RBC AUTO-ENTMCNC: 33.6 G/DL (ref 31–37)
MCV RBC AUTO: 92 FL (ref 81–99)
MONOCYTES # BLD AUTO: 0.6 THOUSAND/ΜL (ref 0.17–1.22)
MONOCYTES NFR BLD AUTO: 7 % (ref 2–12)
NEUTROPHILS # BLD AUTO: 5.4 THOUSANDS/ΜL (ref 1.4–6.5)
NEUTS SEG NFR BLD AUTO: 63 % (ref 42–75)
PLATELET # BLD AUTO: 298 THOUSANDS/UL (ref 149–390)
PMV BLD AUTO: 7.8 FL (ref 8.6–11.7)
POTASSIUM SERPL-SCNC: 3.9 MMOL/L (ref 3.5–5.5)
PROT SERPL-MCNC: 7.3 G/DL (ref 6.4–8.9)
PROTHROMBIN TIME: 11.5 SECONDS (ref 10.2–13)
RBC # BLD AUTO: 4.65 MILLION/UL (ref 3.9–5.2)
SODIUM SERPL-SCNC: 139 MMOL/L (ref 134–143)
TROPONIN I SERPL-MCNC: <0.03 NG/ML
WBC # BLD AUTO: 8.5 THOUSAND/UL (ref 4.8–10.8)

## 2019-10-05 PROCEDURE — 85730 THROMBOPLASTIN TIME PARTIAL: CPT

## 2019-10-05 PROCEDURE — 80053 COMPREHEN METABOLIC PANEL: CPT

## 2019-10-05 PROCEDURE — 85610 PROTHROMBIN TIME: CPT

## 2019-10-05 PROCEDURE — 84484 ASSAY OF TROPONIN QUANT: CPT

## 2019-10-05 PROCEDURE — 36415 COLL VENOUS BLD VENIPUNCTURE: CPT

## 2019-10-05 PROCEDURE — 84703 CHORIONIC GONADOTROPIN ASSAY: CPT

## 2019-10-05 PROCEDURE — 82948 REAGENT STRIP/BLOOD GLUCOSE: CPT

## 2019-10-05 PROCEDURE — 93005 ELECTROCARDIOGRAM TRACING: CPT

## 2019-10-05 PROCEDURE — 96375 TX/PRO/DX INJ NEW DRUG ADDON: CPT

## 2019-10-05 PROCEDURE — 70450 CT HEAD/BRAIN W/O DYE: CPT

## 2019-10-05 PROCEDURE — 85025 COMPLETE CBC W/AUTO DIFF WBC: CPT

## 2019-10-05 PROCEDURE — 99285 EMERGENCY DEPT VISIT HI MDM: CPT

## 2019-10-05 PROCEDURE — 96374 THER/PROPH/DIAG INJ IV PUSH: CPT

## 2019-10-05 PROCEDURE — 71045 X-RAY EXAM CHEST 1 VIEW: CPT

## 2019-10-05 RX ORDER — ONDANSETRON 2 MG/ML
4 INJECTION INTRAMUSCULAR; INTRAVENOUS ONCE
Status: COMPLETED | OUTPATIENT
Start: 2019-10-05 | End: 2019-10-05

## 2019-10-05 RX ORDER — MECLIZINE HCL 12.5 MG/1
25 TABLET ORAL ONCE
Status: COMPLETED | OUTPATIENT
Start: 2019-10-05 | End: 2019-10-05

## 2019-10-05 RX ORDER — MECLIZINE HYDROCHLORIDE 25 MG/1
25 TABLET ORAL 3 TIMES DAILY PRN
Qty: 12 TABLET | Refills: 0 | Status: SHIPPED | OUTPATIENT
Start: 2019-10-05

## 2019-10-05 RX ORDER — LORAZEPAM 2 MG/ML
0.5 INJECTION INTRAMUSCULAR ONCE
Status: COMPLETED | OUTPATIENT
Start: 2019-10-05 | End: 2019-10-05

## 2019-10-05 RX ADMIN — ONDANSETRON 4 MG: 2 INJECTION INTRAMUSCULAR; INTRAVENOUS at 15:08

## 2019-10-05 RX ADMIN — MECLIZINE 25 MG: 12.5 TABLET ORAL at 15:07

## 2019-10-05 RX ADMIN — LORAZEPAM 0.5 MG: 2 INJECTION INTRAMUSCULAR; INTRAVENOUS at 15:07

## 2019-10-05 NOTE — ED PROVIDER NOTES
History  Chief Complaint   Patient presents with    Dizziness     pt had sudden onset dizziness just PTA while driving " My vision went sideways for a few seconds"denies passing out  Kane Almonte is a 51-year-old female who came to the emergency department due to sudden onset dizziness while driving today  Patient denies loss of consciousness  She denies headache, vomiting or chest pain  Patient denies fever or chills  History provided by:  Patient   used: No    Dizziness   Quality:  Lightheadedness  Severity:  Mild  Onset quality:  Sudden  Duration: Today  Timing:  Constant  Progression:  Improving  Chronicity:  New  Context: not when bending over, not with bowel movement, not with ear pain, not with eye movement, not with head movement, not with inactivity, not with loss of consciousness, not with medication, not with physical activity, not when standing up and not when urinating    Relieved by:  Nothing  Worsened by:  Nothing  Ineffective treatments:  None tried  Associated symptoms: no blood in stool, no chest pain, no diarrhea, no headaches, no hearing loss, no nausea, no palpitations, no shortness of breath, no syncope, no tinnitus, no vision changes, no vomiting and no weakness        Prior to Admission Medications   Prescriptions Last Dose Informant Patient Reported? Taking? ALPRAZolam (XANAX) 2 MG tablet 10/4/2019 at Unknown time Self Yes Yes   Sig: Take by mouth   Azelastine-Fluticasone (DYMISTA NA) 10/5/2019 at Unknown time Self Yes Yes   Si puffs into each nostril 2 (two) times a day  Botulinum Toxin Type A 200 units SOLR Past Month at Unknown time Self Yes Yes   Sig: Inject 155 units into face and neck IM every 90 days   Cyanocobalamin (B-12) 1000 MCG/ML KIT Past Month at Unknown time Self Yes Yes   Sig: Inject as directed every 30 (thirty) days     Potassium (POTASSIMIN PO) 10/5/2019 at Unknown time Self Yes Yes   Sig: Take by mouth   SUMAtriptan (IMITREX) 100 mg tablet More than a month at Unknown time Self Yes No   Sig: Take 100 mg by mouth 2 (two) times a day as needed for migraine   albuterol (PROVENTIL HFA,VENTOLIN HFA) 90 mcg/act inhaler More than a month at Unknown time Self Yes No   Sig: Inhale 1 puff every 6 (six) hours   buPROPion (WELLBUTRIN) 100 mg tablet 10/5/2019 at Unknown time Self Yes Yes   Sig: Take 300 mg by mouth daily    budesonide-formoterol (SYMBICORT) 160-4 5 mcg/act inhaler 10/5/2019 at Unknown time Self Yes Yes   Sig: Inhale 2 puffs   celecoxib (CELEBREX) 200 mg capsule More than a month at Unknown time Self Yes No   Sig: Daily   cholecalciferol (VITAMIN D3) 15463 units capsule 10/5/2019 at Unknown time Self Yes Yes   Sig: Take 10,000 Units by mouth   cyclobenzaprine (FLEXERIL) 10 mg tablet 10/4/2019 at Unknown time Self Yes Yes   Sig: Take 10 mg by mouth daily at bedtime  losartan (COZAAR) 50 mg tablet 10/5/2019 at Unknown time Self Yes Yes   Sig: Take 50 mg by mouth 2 (two) times a day  montelukast (SINGULAIR) 5 mg chewable tablet 10/5/2019 at Unknown time Self Yes Yes   Sig: Chew 5 mg 2 (two) times a day  potassium-sodium phosphateS (K-PHOS,PHOSPHA 250) 155-852-130 mg tablet 10/5/2019 at Unknown time Self Yes Yes   Sig: Take 1 tablet by mouth 2 (two) times a day   traMADol (ULTRAM) 50 mg tablet 10/4/2019 at Unknown time Self Yes Yes   Sig: Take 50 mg by mouth 2 (two) times a day        Facility-Administered Medications: None       Past Medical History:   Diagnosis Date    Allergic     Anemia     recurrent    Anxiety     Asthma     allergy induced    Depression     Environmental allergies     Fibromyalgia     GERD (gastroesophageal reflux disease)     Hiatal hernia     History of transfusion     [facility] [address]     Hypertension     Hypogammaglobulinemia ([facility] [address]     Hypogammaglobulinemia ([facility] [address] [facility]     Hypoglycemia after GI (gastrointestinal) surgery     Immune deficiency disorder (HCC)     IgG deficiency    Iron (Fe) deficiency anemia     PONV (postoperative nausea and vomiting)     severe    Seasonal allergies        Past Surgical History:   Procedure Laterality Date    ABDOMINAL SURGERY      adhesions    ABDOMINOPLASTY      APPENDECTOMY      AUGMENTATION MAMMAPLASTY  2003    BREAST IMPLANT REMOVAL Bilateral 2019     SECTION      CHOLECYSTECTOMY      COSMETIC SURGERY Bilateral     breast augmentation 2004    GASTRIC BYPASS      2009    HERNIA REPAIR      HYSTERECTOMY  2015    KNEE ARTHROSCOPY Left     LYMPH NODE BIOPSY  200-    benign    DE CORRJ HALLUX VALGUS W/SESMDC W/RESCJ PROX PHAL Left 2016    Procedure: SURGICAL MODIFIED WATKINS BUNIONECTOMY FIRST MTPJ;  Surgeon: Rudy Ledezma DPM;  [facility];  Service: Podiatry    DE FUSION FOOT BONE,MIDTARSAL,1 JT Left 2016    Procedure: FRIST TMJ FUSION ;  Surgeon: Rudy Ledezma DPM;  [facility];  Service: [facility] [address] [facility]NOT [facility]) Left 2016    Procedure: FOOT SHORTENING OSTEOTOMIES 2ND AND 3RD METATARSAL WITH EXTENSER TENDON RELEASE 3RD AND 4TH TOE;  Surgeon: Rudy Ledezma DPM;  [facility];  Service: Podiatry    DE REMOVAL DEEP IMPLANT Left 2016    Procedure: REMOVAL SYMPTOMATIC  HARDWARE FIRST RAY,RELATED CONTRACTURE SECOND AND THIRD TOES WITH SUSPENSION FOURTH TOE;  Surgeon: Rudy Ledezma DPM;  [facility];  Service: Podiatry    SALPINGOOPHORECTOMY Left     with removal of cervix    TONSILLECTOMY      WISDOM TOOTH EXTRACTION         Family History   Problem Relation Age of Onset    Lymphoma Mother     COPD Mother     Arthritis Mother     Heart disease Father     Breast cancer Maternal Aunt     Breast cancer Cousin      I have reviewed and agree with the history as documented      Social History     Tobacco Use    Smoking status: Never Smoker    Smokeless tobacco: Never Used   Substance Use Topics    Alcohol use: Yes     Binge frequency: Less than monthly     Comment: rarely    Drug use: No        Review of Systems   Constitutional: Negative  HENT: Negative for hearing loss and tinnitus  Eyes: Negative  Respiratory: Negative for shortness of breath  Cardiovascular: Negative for chest pain, palpitations and syncope  Gastrointestinal: Negative for blood in stool, diarrhea, nausea and vomiting  Endocrine: Negative  Genitourinary: Negative  Musculoskeletal: Negative  Skin: Negative  Allergic/Immunologic: Negative  Neurological: Positive for dizziness  Negative for weakness and headaches  Hematological: Negative  Psychiatric/Behavioral: Negative  Physical Exam  Physical Exam   Constitutional: She is oriented to person, place, and time  She appears well-developed and well-nourished  No distress  HENT:   Head: Normocephalic and atraumatic  Right Ear: External ear normal    Left Ear: External ear normal    Nose: Nose normal    Mouth/Throat: Oropharynx is clear and moist  No oropharyngeal exudate  Eyes: Pupils are equal, round, and reactive to light  Conjunctivae and EOM are normal  Right eye exhibits no discharge  Left eye exhibits no discharge  No scleral icterus  Neck: Normal range of motion  Neck supple  No tracheal deviation present  No thyromegaly present  Cardiovascular: Normal rate and regular rhythm  Pulmonary/Chest: Effort normal and breath sounds normal  No respiratory distress  Abdominal: Soft  Bowel sounds are normal  She exhibits no distension  There is no tenderness  Musculoskeletal: Normal range of motion  She exhibits no edema, tenderness or deformity  Lymphadenopathy:     She has no cervical adenopathy  Neurological: She is alert and oriented to person, place, and time  No cranial nerve deficit or sensory deficit  She exhibits normal muscle tone  Coordination normal    Skin: Skin is warm and dry  No rash noted  She is not diaphoretic  No erythema  No pallor  Psychiatric: Her behavior is normal  Judgment and thought content normal  Her mood appears anxious  Nursing note and vitals reviewed        Vital Signs  ED Triage Vitals   Temperature Pulse Respirations Blood Pressure SpO2   10/05/19 1420 10/05/19 1420 10/05/19 1420 10/05/19 1420 10/05/19 1420   99 4 °F (37 4 °C) 85 18 152/86 100 %      Temp Source Heart Rate Source Patient Position - Orthostatic VS BP Location FiO2 (%)   10/05/19 1420 10/05/19 1420 10/05/19 1500 10/05/19 1500 --   Temporal Monitor Lying Left arm       Pain Score       10/05/19 1420       4           Vitals:    10/05/19 1420 10/05/19 1500 10/05/19 1600   BP: 152/86 150/96 143/82   Pulse: 85 78 82   Patient Position - Orthostatic VS:  Lying Lying         Visual Acuity      ED Medications  Medications   meclizine (ANTIVERT) tablet 25 mg (25 mg Oral Given 10/5/19 1507)   LORazepam (ATIVAN) 2 mg/mL injection 0 5 mg (0 5 mg Intravenous Given 10/5/19 1507)   ondansetron (ZOFRAN) injection 4 mg (4 mg Intravenous Given 10/5/19 1508)       Diagnostic Studies  Results Reviewed     Procedure Component Value Units Date/Time    Comprehensive metabolic panel [[id number]]  (Abnormal) Collected:  10/05/19 1447    Lab Status:  Final result Specimen:  Blood from Arm, Left Updated:  10/05/19 1532     Sodium 139 mmol/L      Potassium 3 9 mmol/L      Chloride 102 mmol/L      CO2 27 mmol/L      ANION GAP 10 mmol/L      BUN 7 mg/dL      Creatinine 0 73 mg/dL      Glucose 90 mg/dL      Calcium 10 2 mg/dL      AST 12 U/L      ALT 8 U/L      Alkaline Phosphatase 77 U/L      Total Protein 7 3 g/dL      Albumin 4 6 g/dL      Total Bilirubin 0 70 mg/dL      eGFR 98 ml/min/1 73sq m     Narrative:       [place] guidelines for Chronic Kidney Disease (CKD):     Stage 1 with normal or high GFR (GFR > 90 mL/min/1 73 square meters)    Stage 2 Mild CKD (GFR = 60-89 mL/min/1 73 square meters)    Stage 3A Moderate CKD (GFR = 45-59 mL/min/1 73 square meters)    Stage 3B Moderate CKD (GFR = 30-44 mL/min/1 73 square meters)    Stage 4 Severe CKD (GFR = 15-29 mL/min/1 73 square meters)    Stage 5 End Stage CKD (GFR <15 mL/min/1 73 square meters)  Note: GFR calculation is accurate only with a steady state creatinine    hCG, qualitative pregnancy [[id number]]  (Normal) Collected:  10/05/19 1447    Lab Status:  Final result Specimen:  Blood from Arm, Left Updated:  10/05/19 1532     Preg, Serum Negative    Troponin I [[id number]]  (Normal) Collected:  10/05/19 1447    Lab Status:  Final result Specimen:  Blood from Arm, Left Updated:  10/05/19 1532     Troponin I <0 03 ng/mL     Troponin I [[id number]]     Lab Status:  No result Specimen:  Blood     Protime-INR [[id number]]  (Normal) Collected:  10/05/19 1447    Lab Status:  Final result Specimen:  Blood from Arm, Left Updated:  10/05/19 1512     Protime 11 5 seconds      INR 0 99    APTT [[id number]]  (Normal) Collected:  10/05/19 1447    Lab Status:  Final result Specimen:  Blood from Arm, Left Updated:  10/05/19 1512     PTT 37 seconds     CBC and differential [[id number]]  (Abnormal) Collected:  10/05/19 1447    Lab Status:  Final result Specimen:  Blood from Arm, Left Updated:  10/05/19 1503     WBC 8 50 Thousand/uL      RBC 4 65 Million/uL      Hemoglobin 14 3 g/dL      Hematocrit 42 6 %      MCV 92 fL      MCH 30 8 pg      MCHC 33 6 g/dL      RDW 12 5 %      MPV 7 8 fL      Platelets 490 Thousands/uL      Neutrophils Relative 63 %      Lymphocytes Relative 28 %      Monocytes Relative 7 %      Eosinophils Relative 2 %      Basophils Relative 1 %      Neutrophils Absolute 5 40 Thousands/µL      Lymphocytes Absolute 2 40 Thousands/µL      Monocytes Absolute 0 60 Thousand/µL      Eosinophils Absolute 0 10 Thousand/µL      Basophils Absolute 0 10 Thousands/µL     Fingerstick Glucose (POCT) [[id number]]  (Normal) Collected:  10/05/19 1437    Lab Status:  Final result Updated:  10/05/19 1439     POC Glucose 85 mg/dl XR chest 1 view   ED Interpretation by Melody Hernández MD (10/05 7734)   No acute disease      CT head without contrast   Final Result by Zaida Holt MD (10/05 1040)      No acute intracranial abnormality  Workstation performed: [id number]                    Procedures  ECG 12 Lead Documentation Only  Date/Time: 10/5/2019 2:32 PM  Performed by: Melody Hernández MD  Authorized by: Melody Hernández MD     Indications / Diagnosis:   dizziness  ECG reviewed by me, the ED Provider: yes    Patient location:  ED  Previous ECG:     Previous ECG:  Unavailable    Comparison to cardiac monitor: Yes    Interpretation:     Interpretation: normal    Rate:     ECG rate:   83 beats per minute    ECG rate assessment: normal    Rhythm:     Rhythm: sinus rhythm    Ectopy:     Ectopy: none    QRS:     QRS axis:  Normal    QRS intervals:  Normal  Conduction:     Conduction: normal    ST segments:     ST segments:  Normal  T waves:     T waves: normal             ED Course  ED Course as of Oct 05 1621   Sat Oct 05, 2019   1619  Patient is resting comfortably on the stretcher  Mother is at the bedside  I discussed with the patient her mother the results of her blood work, EKG, chest x-ray and CT scan head without contrast   Patient states he feels better                                    MDM  Number of Diagnoses or Management Options  Dizziness: new and requires workup     Amount and/or Complexity of Data Reviewed  Clinical lab tests: ordered and reviewed  Tests in the radiology section of CPT®: ordered and reviewed  Tests in the medicine section of CPT®: ordered and reviewed  Decide to obtain previous medical records or to obtain history from someone other than the patient: yes  Obtain history from someone other than the patient: yes  Review and summarize past medical records: yes  Independent visualization of images, tracings, or specimens: yes    Risk of Complications, Morbidity, and/or Mortality  Presenting problems: moderate  Diagnostic procedures: moderate  Management options: moderate    Patient Progress  Patient progress: improved      Disposition  Final diagnoses:   Dizziness     Time reflects when diagnosis was documented in both MDM as applicable and the Disposition within this note     Time User Action Codes Description Comment    10/5/2019  4:20 PM Anderson Villa [R42] Dizziness       ED Disposition     ED Disposition Condition Date/Time Comment    Discharge Stable Sat Oct 5, 2019  4:20 PM Arleen Hawkins discharge to home/self care  Follow-up Information     Follow up With Specialties Details Why [facility], MD Internal Medicine   [address] [place] [id number]            Patient's Medications   Discharge Prescriptions    MECLIZINE (ANTIVERT) 25 MG TABLET    Take 1 tablet (25 mg total) by mouth 3 (three) times a day as needed for dizziness for up to 12 doses       Start Date: 10/5/2019 End Date: --       Order Dose: 25 mg       Quantity: 12 tablet    Refills: 0     No discharge procedures on file      ED Provider  Electronically Signed by           Lisa Shaw MD  10/05/19 4150

## 2019-10-07 LAB
ATRIAL RATE: 82 BPM
FAT STL QL: NORMAL
NEUTRAL FAT STL QL: NORMAL
P AXIS: 47 DEGREES
PR INTERVAL: 140 MS
QRS AXIS: 2 DEGREES
QRSD INTERVAL: 86 MS
QT INTERVAL: 400 MS
QTC INTERVAL: 467 MS
T WAVE AXIS: 66 DEGREES
VENTRICULAR RATE: 82 BPM

## 2019-10-07 PROCEDURE — 93010 ELECTROCARDIOGRAM REPORT: CPT | Performed by: INTERNAL MEDICINE

## 2019-10-09 ENCOUNTER — OFFICE VISIT (OUTPATIENT)
Dept: BARIATRICS | Facility: CLINIC | Age: 49
End: 2019-10-09
Payer: COMMERCIAL

## 2019-10-09 VITALS
HEIGHT: 61 IN | WEIGHT: 178 LBS | HEART RATE: 93 BPM | TEMPERATURE: 98.7 F | BODY MASS INDEX: 33.61 KG/M2 | DIASTOLIC BLOOD PRESSURE: 78 MMHG | SYSTOLIC BLOOD PRESSURE: 122 MMHG

## 2019-10-09 DIAGNOSIS — R10.9 ABDOMINAL PAIN: Primary | ICD-10-CM

## 2019-10-09 DIAGNOSIS — Z98.84 BARIATRIC SURGERY STATUS: ICD-10-CM

## 2019-10-09 DIAGNOSIS — R11.2 NAUSEA AND VOMITING, INTRACTABILITY OF VOMITING NOT SPECIFIED, UNSPECIFIED VOMITING TYPE: Primary | ICD-10-CM

## 2019-10-09 PROCEDURE — 99214 OFFICE O/P EST MOD 30 MIN: CPT | Performed by: SURGERY

## 2019-10-09 NOTE — PROGRESS NOTES
Jair Carmona 50 y o  female MRN: [id number]  Unit/Bed#:  Encounter: [id number]      HPI: Jair Carmona is a 50 y o  female s/p Eveline-en-Y gastric bypass in 2006 [facility]- Dr Trung Rene), who now p/w w/ finding of a long blind loop on EGD  Per pt, she has struggled with severe midline upper abdominal pain, nausea, reflux, and diarrhea ever since her bypass in 2006  At times, she also has episodes of a "twisting" sensation in her midline abdomen  She has had daily episodes of diarrhea and associated electrolyte abnormalities  Pt had EGD with Dr Destinee Stanford on 8/19/19 showing Eveline-en-Y gastric bypass with a very long blind loop  Pt was then referred to this Office today to discuss surgical revision options  Of note, pt has PMH of hypoparathyroidism, hypogammaglobinemia, PSH of hiatal hernia repair (2016-Dr Porfirio Lewis, [facility] ([place], [place])), appendectomy, cholecystectomy, and multiple lysis of adhesions for her chronic abdominal pain  Pt explains that the blind loop was visualized during her hiatal hernia repair in 2016, but it was deemed to not be long enough for intervention at that time  Since that time the patient has had resolution in her symptoms of diarrhea  She never and taking the antibiotics were prescribed due to insurance issues  She states she is still has persistent epigastric abdominal pain is overall unchanged  She is no longer taking her omeprazole  She underwent an upper GI that demonstrated normal anatomy status post Eveline-en-Y gastric bypass  Review of Systems   Constitutional: Negative  HENT: Negative  Eyes: Negative  Respiratory: Negative  Cardiovascular: Negative  Gastrointestinal: Negative  Complains of persistent epigastric abdominal pain that feels like a twisting  Reports associated nausea without vomiting  Endocrine: Negative  Genitourinary: Negative  Musculoskeletal: Negative  Skin: Negative  Allergic/Immunologic: Negative  Neurological: Negative  Hematological: Negative  Psychiatric/Behavioral: Negative  All other systems reviewed and are negative        Historical Information   Past Medical History:   Diagnosis Date    Allergic     Anemia     recurrent    Anxiety     Asthma     allergy induced    Depression     Environmental allergies     Fibromyalgia     GERD (gastroesophageal reflux disease)     Hiatal hernia     History of transfusion     [facility]par[facility] ([facility] [address]     Hypertension     Hypogammaglobulinemia ([facility] [address] [facility]     Hypogammaglobulinemia ([facility] [address] [facility]     Hypoglycemia after GI (gastrointestinal) surgery     Immune deficiency disorder (HCC)     IgG deficiency    Iron (Fe) deficiency anemia     PONV (postoperative nausea and vomiting)     severe    Seasonal allergies      Past Surgical History:   Procedure Laterality Date    ABDOMINAL SURGERY      adhesions    ABDOMINOPLASTY      APPENDECTOMY      AUGMENTATION MAMMAPLASTY  2003    BREAST IMPLANT REMOVAL Bilateral 2019     SECTION      CHOLECYSTECTOMY      COSMETIC SURGERY Bilateral     breast augmentation 2004    GASTRIC BYPASS      2009    HERNIA REPAIR      HYSTERECTOMY  2015    KNEE ARTHROSCOPY Left     LYMPH NODE BIOPSY  200-    benign    ND CORRJ HALLUX VALGUS W/SESMDC W/RESCJ PROX PHAL Left 2016    Procedure: SURGICAL MODIFIED WATKINS BUNIONECTOMY FIRST MTPJ;  Surgeon: Malika Ledezma DPM;  [facility];  Service: Podiatry    ND FUSION FOOT BONE,MIDTARSAL,1 JT Left 2016    Procedure: FRIST TMJ FUSION ;  Surgeon: Malika Ledezma DPM;  [facility];  Service: Podiatry    [address] (NOT [address]) Left 2016    Procedure: FOOT SHORTENING OSTEOTOMIES 2ND AND 3RD METATARSAL WITH [address] AND 4TH TOE;  Surgeon: Malika Ledezma DPM;  [facility];  Service: Podiatry    ND REMOVAL DEEP IMPLANT Left 2016    Procedure: REMOVAL SYMPTOMATIC  HARDWARE FIRST RAY,RELATED CONTRACTURE SECOND AND THIRD TOES WITH SUSPENSION FOURTH TOE;  Surgeon: Malika Ledezma DPM;  [facility];  Service: Podiatry    SALPINGOOPHORECTOMY Left 2016    with removal of cervix    TONSILLECTOMY      WISDOM TOOTH EXTRACTION       Social History   Social History     Substance and Sexual Activity   Alcohol Use Yes    Binge frequency: Less than monthly    Comment: rarely     Social History     Substance and Sexual Activity   Drug Use No     Social History     Tobacco Use   Smoking Status Never Smoker   Smokeless Tobacco Never Used     Family History: non-contributory    Meds/Allergies   all medications and allergies reviewed  Allergies   Allergen Reactions    Codeine GI Intolerance and Chest Pain     ABDOMINAL PAIN    Hydrocodone Abdominal Pain and Swelling    Pregabalin Dizziness    Acetaminophen-Codeine     Amoxicillin        Objective     Current Vitals:   Blood Pressure: 122/78 (10/09/19 1044)  Pulse: 93 (10/09/19 1044)  Temperature: 98 7 °F (37 1 °C) (10/09/19 1044)  Temp Source: Tympanic (10/09/19 1044)  Height: 5' 1" (154 9 cm) (10/09/19 1044)  Weight - Scale: 80 7 kg (178 lb) (10/09/19 1044)    [unfilled]    Invasive Devices     None                 Physical Exam   Constitutional: She is oriented to person, place, and time  She appears well-developed and well-nourished  Eyes: Pupils are equal, round, and reactive to light  Conjunctivae and EOM are normal    Neck: Normal range of motion  Neck supple  Cardiovascular: Normal rate, regular rhythm and normal heart sounds  Pulmonary/Chest: Effort normal and breath sounds normal    Abdominal:   Abdomen soft, nondistended, tender in epigastrium without peritoneal signs  Multiple laparoscopic scars well healed  Musculoskeletal: Normal range of motion  Neurological: She is alert and oriented to person, place, and time  Skin: Skin is warm and dry  Psychiatric: She has a normal mood and affect   Her behavior is normal  Judgment and thought content normal        Imaging:   LIMITED UPPER GI SERIES     INDICATION:  Status post gastric bypass      COMPARISON:  None     IMAGES:  13     FLUOROSCOPY TIME:  0 6 minutes     FINDINGS:     A limited single contrast upper GI study was performed with both thick and thin barium      There is a normal postoperative appearance of the gastric pouch      There is no evidence of leak      Contrast passes freely from the stomach through the gastrojejunostomy site without evidence of obstruction      The visualized distal esophagus is unremarkable      IMPRESSION:     No evidence of leak  No evidence of long blind-ending loop as demonstrated on previous EGD        Code Status: Full Code      Assessment/Plan:  50 y o  female,  s/p Eveline-en-Y gastric bypass in 2006 [facility]- Dr Arely Castro), and subsequent hiatal hernia repair  who now p/w  persistent epigastric abdominal pain with finding of a long blind loop on EGD    Upper GI reviewed  There is 1 image that is suggestive of a long blind loop consistent with EGD findings  Previous CT scans and upper GI is reviewed  A 1 previous upper GI study demonstrates concern for intussusception  An additional CT scan demonstrates dilated loops of bowel without definitive evidence of obstruction  The patient is obtain her operative report from his [facility]   This report was reviewed  The anastomosis was performed with a 21 EEA stapler  Extensive discussion was had with the patient regarding her options for revisional surgery  Discussion included the increased risk of revisional surgery  In addition, the patient was made aware that even if we are successfully able to fix anatomical abnormality, there is a chance that her symptoms may persist     A plan to have the patient scheduled for revision of her Eveline-en-Y gastric bypass with resection of her blind loop      The patient will meet with a registered dietitian  prior to her surgery  Examination and consultation performed with Dr Clement Reynolds

## 2019-11-11 ENCOUNTER — CLINICAL SUPPORT (OUTPATIENT)
Dept: BARIATRICS | Facility: CLINIC | Age: 49
End: 2019-11-11

## 2019-11-11 VITALS — HEIGHT: 61 IN | BODY MASS INDEX: 33.06 KG/M2 | WEIGHT: 175.1 LBS

## 2019-11-11 DIAGNOSIS — Z98.84 S/P GASTRIC BYPASS: ICD-10-CM

## 2019-11-11 DIAGNOSIS — K90.2 POSTOPERATIVE BLIND LOOP SYNDROME: ICD-10-CM

## 2019-11-11 DIAGNOSIS — K90.2 BLIND LOOP SYNDROME: ICD-10-CM

## 2019-11-11 DIAGNOSIS — Z86.39 HISTORY OF NON ANEMIC VITAMIN B12 DEFICIENCY: ICD-10-CM

## 2019-11-11 DIAGNOSIS — R11.2 NAUSEA AND VOMITING, INTRACTABILITY OF VOMITING NOT SPECIFIED, UNSPECIFIED VOMITING TYPE: Primary | ICD-10-CM

## 2019-11-11 DIAGNOSIS — Z98.84 BARIATRIC SURGERY STATUS: Primary | ICD-10-CM

## 2019-11-11 PROCEDURE — RECHECK

## 2019-11-11 NOTE — PROGRESS NOTES
Bariatric Follow Up Nutrition Note    Preop  None- revision due to anatomical issues     Type of surgery  Gastric bypass: laparoscopic  Surgery Date:    13 years  post-op  Surgeon: Dr Pati Hughes  50 y o   female   Ht 5' 1" (1 549 m)   Wt 79 4 kg (175 lb 1 6 oz)   BMI 33 08 kg/m²     No calculations performed for this visit    Weight on Day of Weight Loss Surgery: 237#  Weight in (lb) to have BMI = 25: 132 4#  Pre-Op Excess Wt: 104 6#  Post-Op Wt Loss: 62#/ 60% EBWL in 13 year(s)  Ajy 120 pounds one year post op     Review of History and Medications   Past Medical History:   Diagnosis Date    Allergic     Anemia     recurrent    Anxiety     Asthma     allergy induced    Depression     Environmental allergies     Fibromyalgia     GERD (gastroesophageal reflux disease)     Hiatal hernia     History of transfusion     Hyperparathyroidism ([address]     Hypertension     Hypogammaglobulinemia ([address]     Hypogammaglobulinemia ([address]     Hypoglycemia after GI (gastrointestinal) surgery     Immune deficiency disorder (HCC)     IgG deficiency    Iron (Fe) deficiency anemia     PONV (postoperative nausea and vomiting)     severe    Seasonal allergies      Past Surgical History:   Procedure Laterality Date    ABDOMINAL SURGERY      adhesions    ABDOMINOPLASTY      APPENDECTOMY      AUGMENTATION MAMMAPLASTY  2003    BREAST IMPLANT REMOVAL Bilateral 2019     SECTION      CHOLECYSTECTOMY      COSMETIC SURGERY Bilateral     breast augmentation 2004    GASTRIC BYPASS      2009    HERNIA REPAIR      HYSTERECTOMY  2015    KNEE ARTHROSCOPY Left     LYMPH NODE BIOPSY  -    benign    ID CORRJ HALLUX VALGUS W/SESMDC W/RESCJ PROX PHAL Left 2016    Procedure: SURGICAL MODIFIED WATKINS BUNIONECTOMY FIRST MTPJ;  Surgeon: Harriett Boothe DPM;  [facility];  Service: Podiatry    ID FUSION FOOT BONE,MIDTARSAL,1 JT Left 9/9/2016    Procedure: FRIST TMJ FUSION ;  Surgeon: Elisa Johnston DPM;  [facility];  Service: Podiatry    MI OSTEOTOMY METATARSAL (NOT 1ST) Left 9/9/2016    Procedure: FOOT SHORTENING OSTEOTOMIES 2ND AND 3RD METATARSAL WITH EXTENSER TENDON RELEASE 3RD AND 4TH TOE;  Surgeon: Elisa Johnston DPM;  [facility];  Service: Podiatry    MI REMOVAL DEEP IMPLANT Left 12/7/2016    Procedure: REMOVAL SYMPTOMATIC  HARDWARE FIRST RAY,RELATED CONTRACTURE SECOND AND THIRD TOES WITH SUSPENSION FOURTH TOE;  Surgeon: Elisa Johnston DPM;  [facility];  Service: Podiatry    SALPINGOOPHORECTOMY Left 2016    with removal of cervix    TONSILLECTOMY      WISDOM TOOTH EXTRACTION       Social History     Socioeconomic History    Marital status: /Civil Union     Spouse name: Not on file    Number of children: Not on file    Years of education: Not on file    Highest education level: Not on file   Occupational History    Not on file   Social Needs    Financial resource strain: Not on file    Food insecurity:     Worry: Not on file     Inability: Not on file    Transportation needs:     Medical: Not on file     Non-medical: Not on file   Tobacco Use    Smoking status: Never Smoker    Smokeless tobacco: Never Used   Substance and Sexual Activity    Alcohol use: Yes     Binge frequency: Less than monthly     Comment: rarely    Drug use: No    Sexual activity: Not on file   Lifestyle    Physical activity:     Days per week: Not on file     Minutes per session: Not on file    Stress: Not on file   Relationships    Social connections:     Talks on phone: Not on file     Gets together: Not on file     Attends Mandaen service: Not on file     Active member of club or organization: Not on file     Attends meetings of clubs or organizations: Not on file     Relationship status: Not on file    Intimate partner violence:     Fear of current or ex partner: Not on file     Emotionally abused: Not on file     Physically abused: Not on file     Forced sexual activity: Not on file   Other Topics Concern    Not on file   Social History Narrative    Consumes on average 3 cups of coffee per day       Current Outpatient Medications:     albuterol (PROVENTIL HFA,VENTOLIN HFA) 90 mcg/act inhaler, Inhale 1 puff every 6 (six) hours, Disp: , Rfl:     ALPRAZolam (XANAX) 2 MG tablet, Take by mouth, Disp: , Rfl:     Azelastine-Fluticasone (DYMISTA NA), 2 puffs into each nostril 2 (two) times a day , Disp: , Rfl:     Botulinum Toxin Type A 200 units SOLR, Inject 155 units into face and neck IM every 90 days, Disp: , Rfl:     budesonide-formoterol (SYMBICORT) 160-4 5 mcg/act inhaler, Inhale 2 puffs, Disp: , Rfl:     buPROPion (WELLBUTRIN) 100 mg tablet, Take 300 mg by mouth daily , Disp: , Rfl:     celecoxib (CELEBREX) 200 mg capsule, Daily, Disp: , Rfl:     cholecalciferol (VITAMIN D3) 73397 units capsule, Take 10,000 Units by mouth, Disp: , Rfl:     Cyanocobalamin (B-12) 1000 MCG/ML KIT, Inject as directed every 30 (thirty) days  , Disp: , Rfl:     cyclobenzaprine (FLEXERIL) 10 mg tablet, Take 10 mg by mouth daily at bedtime  , Disp: , Rfl:     losartan (COZAAR) 50 mg tablet, Take 50 mg by mouth 2 (two) times a day , Disp: , Rfl:     meclizine (ANTIVERT) 25 mg tablet, Take 1 tablet (25 mg total) by mouth 3 (three) times a day as needed for dizziness for up to 12 doses, Disp: 12 tablet, Rfl: 0    montelukast (SINGULAIR) 5 mg chewable tablet, Chew 5 mg 2 (two) times a day , Disp: , Rfl:     Potassium (POTASSIMIN PO), Take by mouth, Disp: , Rfl:     potassium-sodium phosphateS (K-PHOS,PHOSPHA 250) 155-852-130 mg tablet, Take 1 tablet by mouth 2 (two) times a day, Disp: , Rfl:     SUMAtriptan (IMITREX) 100 mg tablet, Take 100 mg by mouth 2 (two) times a day as needed for migraine, Disp: , Rfl:     traMADol (ULTRAM) 50 mg tablet, Take 50 mg by mouth 2 (two) times a day , Disp: , Rfl:     Food Intake and Lifestyle Assessment   Food Intake Assessment completed via 24 hour recall  6:30 am   Breakfast: 10 am - banana - [employer] yogurt   Snack: one pear   Lunch: 12/1 salad with cheese, sometimes chicken , mostly vegetables   Snack: 3:30 pm fruit   Dinner: 4:30/ 5 00 pm   Chicken marsala with pasta and mushrooms - usually salad   Snack: popcorn - cheddar popcorn - 1 5 cups  Gives some to the dog and cat   Beverage intake: coffee/tea and diet decaf tea , water   2 16 ounces cups of coffee with cream and splenda   Diet texture/stage: regular  Protein supplement: not any more   Estimated protein intake per day: 60 grams   Estimated fluid intake per day: 6 8 ounces of diet iced tea, 1 16 ounces of water   Meals eaten away from home: rarely   Typical meal pattern: 3 meals per day and 2-3 snacks per day  Eating Behaviors: Appropriate diet advancement, Appropriate portion sizes, Does not drink with meals and waits 30-minutes after meal before resuming drinking and Frequent snacking/ grazing    Food allergies or intolerances: dry cooked foods , or steak   Cultural or Yazidism considerations: [place], [place] or [place]     Physical Assessment  Nutrition Related Findings  Dumping, Diarrhea, Nausea, Dizziness, Return of Hunger and Numbness & Tingling    Physical Activity  Types of exercise: Swimming  Current physical limitations: severe pain from fibromyalgia     Psychosocial Assessment   Support systems: spouse  Socioeconomic factors: lives with  and daughter     Nutrition Diagnosis  Diagnosis: Altered nutrition related lab value (NC2 2)  Related to: Altered GI function  As Evidenced by: vitamin B12 level below 400, symptoms of tingling in hands and extremeties      Interventions and Teaching   Patient educated on post-op nutrition guidelines  Patient educated and handouts provided    Surgical changes to stomach / GI  Capacity of post-surgery stomach  Diet progression  Adequate hydration  Sugar and fat restriction to decrease "dumping syndrome"  Fat restriction to decrease steatorrhea  Expected weight loss  Weight loss plateaus/ possibility of weight regain  Exercise  Suggestions for pre-op diet  Nutrition considerations after surgery  Protein supplements  Meal planning and preparation  Appropriate carbohydrate, protein, and fat intake, and food/fluid choices to maximize safe weight loss, nutrient intake, and tolerance   Dietary and lifestyle changes  Possible problems with poor eating habits  Intuitive eating  Techniques for self monitoring and keeping daily food journal  Potential for food intolerance after surgery, and ways to deal with them including: lactose intolerance, nausea, reflux, vomiting, diarrhea, food intolerance, appetite changes, gas  Vitamin / Mineral supplementation of Multivitamin with minerals, Calcium, Vitamin B12, Iron, Fat Soluble vitamins and Vitamin D  Patient was told by endocrinology to get her calcium from food, long standing history of hyperparathryoidism, takes an maintenance dose of 10 000 iu of vitamin D3 per day , had dxa showing osteopenia ( cannot tolerate weight bearing activity due to pain )  Takes monthly B12 shots but levels will dip below 400, has some symptoms of numbness in fingers and tingling in hands - ordered MMA level and copper level to further assess     Patient was taking a gummy multivitamin and mineral but recently purchased a bariatric vitamin and mineral ( unsure of which one, made by [facility])  Provided with samples of calcium citrate chews, recommended she take with meals, h/o kidney stones   Reviewed s/s of hypoglycemia and treatment   Reviewed secondary hyperparathyroidism and vitamin D/calcium supplements to prevent further bone loss  Patient unable to do weight bearing activity due to fibromyalgia, but does walk and goes up and down stairs       Education provided to: spouse and patient   Barriers to learning: No barriers identified    Readiness to change: action    Comprehension: demonstrated understanding and verbalizes understanding     Expected Compliance: good    Evaluation/Monitoring   Tolerance of nutrition prescription Lab values    Goals  Complete lession plans 1-6, Eat 3 meals per day and include snacks to prevent hypoglycemia   Continue B12 shots monthly as prescribed by PCP ( injects her self) continue to be followed by hematology for IV iron, continue to follow with endocrinology concerning secondary hyperparathyroism  Take a bariatric vitamin /mineral, continue with 10 000 IU of vitamin D3 per day, take 1200 to 1500 mg of calcium citrate per day, will include one glass of low fat milk ( lactose free) or soy milk or almond milk fortified with calcium and vitamin D  Get ordered blood work completed to further evaluate vitamin B12 status and copper      Time Spent:   1 Hour

## 2019-11-13 ENCOUNTER — APPOINTMENT (OUTPATIENT)
Dept: LAB | Facility: HOSPITAL | Age: 49
End: 2019-11-13
Payer: COMMERCIAL

## 2019-11-13 DIAGNOSIS — Z86.39 HISTORY OF NON ANEMIC VITAMIN B12 DEFICIENCY: ICD-10-CM

## 2019-11-13 DIAGNOSIS — K90.2 POSTOPERATIVE BLIND LOOP SYNDROME: ICD-10-CM

## 2019-11-13 DIAGNOSIS — Z98.84 BARIATRIC SURGERY STATUS: ICD-10-CM

## 2019-11-13 DIAGNOSIS — Z98.84 S/P GASTRIC BYPASS: ICD-10-CM

## 2019-11-13 DIAGNOSIS — K90.2 BLIND LOOP SYNDROME: ICD-10-CM

## 2019-11-13 LAB — VIT B12 SERPL-MCNC: 369 PG/ML (ref 100–900)

## 2019-11-13 PROCEDURE — 82525 ASSAY OF COPPER: CPT

## 2019-11-13 PROCEDURE — 82607 VITAMIN B-12: CPT

## 2019-11-13 PROCEDURE — 36415 COLL VENOUS BLD VENIPUNCTURE: CPT

## 2019-11-13 PROCEDURE — 83918 ORGANIC ACIDS TOTAL QUANT: CPT

## 2019-11-15 LAB — COPPER SERPL-MCNC: 150 UG/DL (ref 72–166)

## 2019-11-16 LAB
METHYLMALONATE SERPL-SCNC: 120 NMOL/L (ref 0–378)
SL AMB DISCLAIMER: NORMAL

## 2019-11-20 DIAGNOSIS — Z98.84 BARIATRIC SURGERY STATUS: ICD-10-CM

## 2019-11-20 DIAGNOSIS — E53.8 LOW VITAMIN B12 LEVEL: Primary | ICD-10-CM

## 2019-11-20 DIAGNOSIS — K91.2 POSTSURGICAL MALABSORPTION: ICD-10-CM

## 2019-12-19 ENCOUNTER — CONSULT (OUTPATIENT)
Dept: CARDIOLOGY CLINIC | Facility: CLINIC | Age: 49
End: 2019-12-19
Payer: COMMERCIAL

## 2019-12-19 VITALS
HEIGHT: 62 IN | WEIGHT: 178 LBS | HEART RATE: 78 BPM | BODY MASS INDEX: 32.76 KG/M2 | SYSTOLIC BLOOD PRESSURE: 108 MMHG | DIASTOLIC BLOOD PRESSURE: 70 MMHG

## 2019-12-19 DIAGNOSIS — R11.2 NAUSEA AND VOMITING, INTRACTABILITY OF VOMITING NOT SPECIFIED, UNSPECIFIED VOMITING TYPE: ICD-10-CM

## 2019-12-19 DIAGNOSIS — I10 BENIGN ESSENTIAL HTN: ICD-10-CM

## 2019-12-19 DIAGNOSIS — Z01.810 PREPROCEDURAL CARDIOVASCULAR EXAMINATION: Primary | ICD-10-CM

## 2019-12-19 PROCEDURE — 93000 ELECTROCARDIOGRAM COMPLETE: CPT | Performed by: INTERNAL MEDICINE

## 2019-12-19 PROCEDURE — 99243 OFF/OP CNSLTJ NEW/EST LOW 30: CPT | Performed by: INTERNAL MEDICINE

## 2019-12-19 NOTE — PROGRESS NOTES
Patient ID: Gigi Reed is a 52 y o  female  Plan:      Preprocedural cardiovascular examination  OK to proceed with surgery from cardiac perspective at reasonable risk  Benign essential HTN  Well controlled  Follow up Plan:  My only concern regarding forthcoming surgery is her immuno globulinemia and whether that confers extra precautions from an infectious disease perspective  Return as needed  HPI:  The patient is seen today in consultation from Danny Tyson regarding preop evaluation  She is to have a revision from prior bariatric surgery in the near term  There is no history of chest pain or chest pressure  No history of diabetes  There is a history of well controlled hypertension  No recent change in exertional capacity  Results for orders placed or performed in visit on 19   POCT ECG    Impression    Normal sinus rhythm  Within normal limits                 Past Surgical History:   Procedure Laterality Date    ABDOMINAL SURGERY      adhesions    ABDOMINOPLASTY      APPENDECTOMY      AUGMENTATION MAMMAPLASTY  2003    BREAST IMPLANT REMOVAL Bilateral 2019     SECTION      CHOLECYSTECTOMY      COSMETIC SURGERY Bilateral     breast augmentation 2004    GASTRIC BYPASS      2009    HERNIA REPAIR      HYSTERECTOMY  2015    KNEE ARTHROSCOPY Left     LYMPH NODE BIOPSY  200-    benign    ND CORRJ HALLUX VALGUS W/SESMDC W/RESCJ PROX PHAL Left 2016    Procedure: SURGICAL MODIFIED WATKINS BUNIONECTOMY FIRST MTPJ;  Surgeon: Radhika Florence DPM;  [facility];  Service: Podiatry    ND FUSION FOOT BONE,MIDTARSAL,1 JT Left 2016    Procedure: FRIST TMJ FUSION ;  Surgeon: Radhika Florence DPM;  [facility];  Service: Podiatry    [address] (NOT 1ST) Left 2016    Procedure: FOOT SHORTENING OSTEOTOMIES 2ND AND 3RD METATARSAL WITH EXTENSER TENDON RELEASE 3RD AND 4TH TOE;  Surgeon: Radhika Florence DPM; [facility];  Service: [address] IMPLANT Left 12/7/2016    Procedure: REMOVAL SYMPTOMATIC  HARDWARE FIRST RAY,RELATED CONTRACTURE SECOND AND THIRD TOES WITH SUSPENSION FOURTH TOE;  Surgeon: Rosalva Palafox DPM;  [facility];  Service: Podiatry    SALPINGOOPHORECTOMY Left 2016    with removal of cervix    TONSILLECTOMY      WISDOM TOOTH EXTRACTION       CMP:   Lab Results   Component Value Date    K 3 9 10/05/2019     10/05/2019    CO2 27 10/05/2019    BUN 7 10/05/2019    CREATININE 0 73 10/05/2019    EGFR 98 10/05/2019       Lipid Profile:   Lab Results   Component Value Date    TRIG 140 08/17/2019    HDL 65 (H) 08/17/2019         Review of Systems   10  point ROS  was otherwise non pertinent or negative except as per HPI or as below  Gait: Normal         Objective:     /70   Pulse 78   Ht 5' 2" (1 575 m)   Wt 80 7 kg (178 lb)   BMI 32 56 kg/m²     PHYSICAL EXAM:    General:  Normal appearance in no distress  Eyes:  Anicteric  Oral mucosa:  Moist   Neck:  No JVD  Carotid upstrokes are brisk without bruits  No masses  Chest:  Clear to auscultation and percussion  Cardiac:  Normal PMI  Normal S1 and S2  No murmur gallop or rub  Abdomen:  Soft and nontender  No palpable organomegaly or aortic enlargement  Extremities:  No peripheral edema  Musculoskeletal:  Symmetric  Vascular:  Femoral pulses are brisk without bruits  Popliteal pulses are intact bilaterally  Pedal pulses are intact  Neuro:  Grossly symmetric  Psych:  Alert and oriented x3          Current Outpatient Medications:     albuterol (PROVENTIL HFA,VENTOLIN HFA) 90 mcg/act inhaler, Inhale 1 puff every 6 (six) hours, Disp: , Rfl:     ALPRAZolam (XANAX) 2 MG tablet, Take by mouth, Disp: , Rfl:     Azelastine-Fluticasone (DYMISTA NA), 2 puffs into each nostril 2 (two) times a day , Disp: , Rfl:     Botulinum Toxin Type A 200 units SOLR, Inject 155 units into face and neck IM every 90 days, Disp: , Rfl:     budesonide-formoterol (SYMBICORT) 160-4 5 mcg/act inhaler, Inhale 2 puffs, Disp: , Rfl:     buPROPion (WELLBUTRIN) 100 mg tablet, Take 300 mg by mouth daily , Disp: , Rfl:     celecoxib (CELEBREX) 200 mg capsule, Daily , Disp: , Rfl:     cholecalciferol (VITAMIN D3) 20212 units capsule, Take 10,000 Units by mouth, Disp: , Rfl:     Cyanocobalamin (B-12) 1000 MCG/ML KIT, Inject as directed every 30 (thirty) days  , Disp: , Rfl:     cyclobenzaprine (FLEXERIL) 10 mg tablet, Take 10 mg by mouth 3 (three) times a day as needed , Disp: , Rfl:     Immune Globulin, Human, (PRIVIGEN IV), Infuse into a venous catheter every 30 (thirty) days, Disp: , Rfl:     losartan (COZAAR) 50 mg tablet, Take 50 mg by mouth 2 (two) times a day , Disp: , Rfl:     meclizine (ANTIVERT) 25 mg tablet, Take 1 tablet (25 mg total) by mouth 3 (three) times a day as needed for dizziness for up to 12 doses, Disp: 12 tablet, Rfl: 0    montelukast (SINGULAIR) 5 mg chewable tablet, Chew 5 mg 2 (two) times a day , Disp: , Rfl:     SUMAtriptan (IMITREX) 100 mg tablet, Take 100 mg by mouth 2 (two) times a day as needed for migraine, Disp: , Rfl:     traMADol (ULTRAM) 50 mg tablet, Take 50 mg by mouth 2 (two) times a day , Disp: , Rfl:     Potassium (POTASSIMIN PO), Take by mouth, Disp: , Rfl:     potassium-sodium phosphateS (K-PHOS,PHOSPHA 250) 155-852-130 mg tablet, Take 1 tablet by mouth 2 (two) times a day, Disp: , Rfl:   Allergies   Allergen Reactions    Codeine GI Intolerance and Chest Pain     ABDOMINAL PAIN    Hydrocodone Abdominal Pain and Swelling    Pregabalin Dizziness    Acetaminophen-Codeine     Amoxicillin      Past Medical History:   Diagnosis Date    Allergic     Anemia     recurrent    Anxiety     Asthma     allergy induced    Depression     Environmental allergies     Fibromyalgia     GERD (gastroesophageal reflux disease)     Hiatal hernia     History of transfusion     [address] [address]     Hypertension     [facility]     [facility]ulinemia [facility]     Hypoglycemia after GI (gastrointestinal) surgery     Immune deficiency disorder (HCC)     IgG deficiency    Iron (Fe) deficiency anemia     PONV (postoperative nausea and vomiting)     severe    Seasonal allergies            Social History     Tobacco Use   Smoking Status Never Smoker   Smokeless Tobacco Never Used

## 2019-12-24 PROBLEM — R10.13 EPIGASTRIC PAIN: Status: ACTIVE | Noted: 2019-12-24

## 2019-12-24 PROBLEM — R11.2 NAUSEA AND VOMITING: Status: ACTIVE | Noted: 2019-12-24

## 2019-12-24 PROBLEM — Z98.84 STATUS POST BARIATRIC SURGERY: Status: ACTIVE | Noted: 2019-12-24

## 2019-12-24 PROBLEM — K90.2: Status: ACTIVE | Noted: 2019-12-24

## 2019-12-24 PROBLEM — K21.9 GASTROESOPHAGEAL REFLUX DISEASE: Status: ACTIVE | Noted: 2019-12-24

## 2020-01-14 RX ORDER — FEXOFENADINE HCL 180 MG/1
180 TABLET ORAL DAILY
COMMUNITY

## 2020-01-14 NOTE — PRE-PROCEDURE INSTRUCTIONS
Pre-Surgery Instructions:   Medication Instructions    albuterol (PROVENTIL HFA,VENTOLIN HFA) 90 mcg/act inhaler Patient was instructed by Physician and understands   ALPRAZolam Mandie Fam) 2 MG tablet Patient was instructed by Physician and understands   Azelastine-Fluticasone (DYMISTA NA) Patient was instructed by Physician and understands   Botulinum Toxin Type A 200 units SOLR Patient was instructed by Physician and understands   budesonide-formoterol (SYMBICORT) 160-4 5 mcg/act inhaler Patient was instructed by Physician and understands   buPROPion (WELLBUTRIN) 100 mg tablet Patient was instructed by Physician and understands   celecoxib (CELEBREX) 200 mg capsule Patient was instructed by Physician and understands   cholecalciferol (VITAMIN D3) 53816 units capsule Patient was instructed by Physician and understands   Cyanocobalamin (B-12) 1000 MCG/ML KIT Patient was instructed by Physician and understands   Cyanocobalamin (VITAMIN B-12 PO) Patient was instructed by Physician and understands   cyclobenzaprine (FLEXERIL) 10 mg tablet Patient was instructed by Physician and understands   Erenumab-aooe (AIMOVIG) 70 MG/ML SOAJ Patient was instructed by Physician and understands   fexofenadine (ALLEGRA) 180 MG tablet Patient was instructed by Physician and understands   Immune Globulin, Human, (PRIVIGEN IV) Patient was instructed by Physician and understands   losartan (COZAAR) 50 mg tablet Patient was instructed by Physician and understands   meclizine (ANTIVERT) 25 mg tablet Patient was instructed by Physician and understands   montelukast (SINGULAIR) 5 mg chewable tablet Patient was instructed by Physician and understands   SUMAtriptan (IMITREX) 100 mg tablet Patient was instructed by Physician and understands   traMADol (ULTRAM) 50 mg tablet Patient was instructed by Physician and understands      Pt instructed to use inhaler day of surgery and take wellbutrin with a small sip of water and xanax and singulair if needed  [facility] preop instructions reviewed with pt  Washing instructions reviewed  Pt does not have soap or nutritional drinks at this time but has an appt with Dr Nichole Lanes on 1/16

## 2020-01-16 ENCOUNTER — TELEPHONE (OUTPATIENT)
Dept: BARIATRICS | Facility: CLINIC | Age: 50
End: 2020-01-16

## 2020-01-16 ENCOUNTER — OFFICE VISIT (OUTPATIENT)
Dept: BARIATRICS | Facility: CLINIC | Age: 50
End: 2020-01-16
Payer: COMMERCIAL

## 2020-01-16 VITALS
DIASTOLIC BLOOD PRESSURE: 74 MMHG | WEIGHT: 173.5 LBS | HEIGHT: 62 IN | HEART RATE: 82 BPM | TEMPERATURE: 97.7 F | BODY MASS INDEX: 31.93 KG/M2 | SYSTOLIC BLOOD PRESSURE: 122 MMHG

## 2020-01-16 DIAGNOSIS — K90.2 BLIND LOOP SYNDROME: ICD-10-CM

## 2020-01-16 DIAGNOSIS — Z98.84 BARIATRIC SURGERY STATUS: Primary | ICD-10-CM

## 2020-01-16 DIAGNOSIS — R11.2 NAUSEA AND VOMITING, INTRACTABILITY OF VOMITING NOT SPECIFIED, UNSPECIFIED VOMITING TYPE: ICD-10-CM

## 2020-01-16 DIAGNOSIS — Z98.84 S/P GASTRIC BYPASS: ICD-10-CM

## 2020-01-16 DIAGNOSIS — R10.13 EPIGASTRIC PAIN: ICD-10-CM

## 2020-01-16 PROCEDURE — 99214 OFFICE O/P EST MOD 30 MIN: CPT | Performed by: SURGERY

## 2020-01-16 RX ORDER — HEPARIN SODIUM 5000 [USP'U]/ML
5000 INJECTION, SOLUTION INTRAVENOUS; SUBCUTANEOUS
Status: CANCELLED | OUTPATIENT
Start: 2020-02-11 | End: 2020-02-12

## 2020-01-16 RX ORDER — GABAPENTIN 300 MG/1
600 CAPSULE ORAL ONCE
Status: CANCELLED | OUTPATIENT
Start: 2020-02-11 | End: 2020-01-16

## 2020-01-16 RX ORDER — SCOLOPAMINE TRANSDERMAL SYSTEM 1 MG/1
1 PATCH, EXTENDED RELEASE TRANSDERMAL ONCE
Status: CANCELLED | OUTPATIENT
Start: 2020-02-11 | End: 2020-01-16

## 2020-01-16 RX ORDER — LEVOFLOXACIN 5 MG/ML
750 INJECTION, SOLUTION INTRAVENOUS ONCE
Status: CANCELLED | OUTPATIENT
Start: 2020-02-11 | End: 2020-01-16

## 2020-01-16 RX ORDER — CELECOXIB 200 MG/1
200 CAPSULE ORAL ONCE
Status: CANCELLED | OUTPATIENT
Start: 2020-02-11 | End: 2020-01-16

## 2020-01-16 NOTE — TELEPHONE ENCOUNTER
Call to patient for pre op call  Patient in to the office today and having surgery date changed  Will not have surgery on 1/21/2020

## 2020-01-16 NOTE — H&P (VIEW-ONLY)
BARIATRIC HISTORY AND PHYSICAL - BARIATRIC SURGERY  Ketty Pritchard 52 y o  female MRN: [id number]  Unit/Bed#:  Encounter: [id number]      HPI:  Ketty Pritchard is a 52 y o  female who presents to review her preoperative workup and see if she is a good candidate to undergo a bariatric procedure  Review of Systems   Constitutional: Negative  HENT: Negative  Eyes: Negative  Respiratory: Negative  Cardiovascular: Negative  Gastrointestinal: Negative  Endocrine: Negative  Genitourinary: Negative  Musculoskeletal: Negative  Skin: Negative  Neurological: Negative  Hematological: Negative  Psychiatric/Behavioral: Negative          Historical Information   Past Medical History:   Diagnosis Date    Anemia     recurrent    Anxiety     Asthma     allergy induced    Contact lens overwear of both eyes     Depression     Diarrhea     Environmental allergies     Fibromyalgia     Fibromyalgia, primary     GERD (gastroesophageal reflux disease)     History of transfusion     Hypertension     Hypogammaglobulinemia (HCC)     Hypoglycemia after GI (gastrointestinal) surgery     Immune deficiency disorder (HCC)     IgG deficiency    Iron (Fe) deficiency anemia     Kidney stone     Lumbar herniated disc     Migraine     Motion sickness     PONV (postoperative nausea and vomiting)     severe    Seasonal allergies     Wears glasses      Past Surgical History:   Procedure Laterality Date    ABDOMINAL SURGERY      adhesions    ABDOMINOPLASTY      APPENDECTOMY      AUGMENTATION MAMMAPLASTY  2003    BREAST IMPLANT REMOVAL Bilateral 2019    BREAST SURGERY      implant removal     SECTION      CHOLECYSTECTOMY      COSMETIC SURGERY Bilateral     breast augmentation 2004    GASTRIC BYPASS      2009    HIATAL HERNIA REPAIR      HYSTERECTOMY  2015    KNEE ARTHROSCOPY Left     LYMPH NODE BIOPSY  200-    benign    NERVE BLOCK      AL CORRJ HALLUX VALGUS W/SESMDC W/RESCJ PROX PHAL Left 9/9/2016    Procedure: SURGICAL MODIFIED WATKINS BUNIONECTOMY FIRST MTPJ;  Surgeon: Humble Jorge DPM;  [facility];  Service: Podiatry    IN FUSION FOOT BONE,MIDTARSAL,1 JT Left 9/9/2016    Procedure: FRIST TMJ FUSION ;  Surgeon: Humble Jorge DPM;  [facility];  Service: Podiatry    IN OSTEOTOMY METATARSAL (NOT 1ST) Left 9/9/2016    Procedure: FOOT SHORTENING OSTEOTOMIES 2ND AND 3RD METATARSAL WITH EXTENSER TENDON RELEASE 3RD AND 4TH TOE;  Surgeon: Humble Jorge DPM;  [facility];  Service: Podiatry    IN REMOVAL DEEP IMPLANT Left 12/7/2016    Procedure: REMOVAL SYMPTOMATIC  HARDWARE FIRST RAY,RELATED CONTRACTURE SECOND AND THIRD TOES WITH SUSPENSION FOURTH TOE;  Surgeon: Humble Jorge DPM;  [facility];  Service: Podiatry    SALPINGOOPHORECTOMY Left 2016    with removal of cervix    TONSILLECTOMY      WISDOM TOOTH EXTRACTION       Social History   Social History     Substance and Sexual Activity   Alcohol Use Yes    Frequency: Monthly or less    Binge frequency: Less than monthly    Comment: rarely     Social History     Substance and Sexual Activity   Drug Use No     Social History     Tobacco Use   Smoking Status Never Smoker   Smokeless Tobacco Never Used     Family History: non-contributory    Meds/Allergies   all medications and allergies reviewed  Allergies   Allergen Reactions    Codeine GI Intolerance and Chest Pain     ABDOMINAL PAIN    Hydrocodone Abdominal Pain and Swelling    Pregabalin Dizziness    Acetaminophen-Codeine Abdominal Pain    Amoxicillin Rash       Objective     Current Vitals:   Blood Pressure: 122/74 (01/16/20 1343)  Pulse: 82 (01/16/20 1343)  Temperature: 97 7 °F (36 5 °C) (01/16/20 1343)  Temp Source: Tympanic (01/16/20 1343)  Height: 5' 2" (157 5 cm) (01/16/20 1343)  Weight - Scale: 78 7 kg (173 lb 8 oz) (01/16/20 1343)  Body mass index is 31 73 kg/m²      [unfilled]    Invasive Devices     None                 Physical Exam   Constitutional: She is oriented to person, place, and time  She appears well-developed  HENT:   Head: Normocephalic and atraumatic  Eyes: Conjunctivae and EOM are normal    Neck: Normal range of motion  Neck supple  Cardiovascular: Normal rate, regular rhythm, normal heart sounds and intact distal pulses  Pulmonary/Chest: Effort normal and breath sounds normal    Abdominal: Soft  Bowel sounds are normal    Musculoskeletal: Normal range of motion  Neurological: She is alert and oriented to person, place, and time  She has normal reflexes  Skin: Skin is warm and dry  Psychiatric: She has a normal mood and affect  Lab Results: I have personally reviewed pertinent lab results  Imaging: I have personally reviewed pertinent reports  EKG, Pathology, and Other Studies: I have personally reviewed pertinent reports  Code Status: [unfilled]  Advance Directive and Living Will:      Power of :    POLST:      Assessment/Plan:      The patient presented to review the preoperative workup and see if bariatric surgery is appropriate and indicated following the extensive preoperative workup and the enrollment in our weight loss program    Preoperative workup was complete  Results were reviewed with the patient including the blood work results and the endoscopy findings and the biopsy results  We also reviewed the cardiology evaluation  I believe that the patient will be a good candidate for laparoscopic KENYA-EN-Y GASTRIC BYPASS REVISION for long candy cane and chronic epigastric pain, regurgitation, N/V  PATIENT S/P LRYGB AT AN OUTSIDE INSTITUTION AND S/P REVISION BY DR Maynor Gaffney AND [address] RODRIGUEZ      Patient will need to start the 2 week liquid diet prior to surgery  Risks and benefits explained one more time to the patient  Alternatives to surgery and alternative forms of surgery were also explained   Post-surgical commitment and after care programs were explained  We also discussed that the DaVinci robot may be available to us to use on the day of the patient procedure  and that the procedure may be performed robotically  I discussed in details the advantages and disadvantages of this approach including the potential decrease in postoperative pain because of the remote center technology  I also mentioned the lack of strong evidence for  the use of robot in bariatric patients and the potential disadvantage to patients because of the prolonged operative time  Consent was signed  Questions were answered and concerns were addressed  Patient wishes to proceed  As per  [facility] guidelines, I had a discussion with the patient regarding his CODE STATUS in the perioperative period and the patient is level 1 or FULL CODE STATUS

## 2020-01-16 NOTE — PROGRESS NOTES
BARIATRIC HISTORY AND PHYSICAL - BARIATRIC SURGERY  Maren Carbone 52 y o  female MRN: [id number]  Unit/Bed#:  Encounter: [id number]      HPI:  Maren Carbone is a 52 y o  female who presents to review her preoperative workup and see if she is a good candidate to undergo a bariatric procedure  Review of Systems   Constitutional: Negative  HENT: Negative  Eyes: Negative  Respiratory: Negative  Cardiovascular: Negative  Gastrointestinal: Negative  Endocrine: Negative  Genitourinary: Negative  Musculoskeletal: Negative  Skin: Negative  Neurological: Negative  Hematological: Negative  Psychiatric/Behavioral: Negative          Historical Information   Past Medical History:   Diagnosis Date    Anemia     recurrent    Anxiety     Asthma     allergy induced    Contact lens overwear of both eyes     Depression     Diarrhea     Environmental allergies     Fibromyalgia     Fibromyalgia, primary     GERD (gastroesophageal reflux disease)     History of transfusion     Hypertension     Hypogammaglobulinemia (HCC)     Hypoglycemia after GI (gastrointestinal) surgery     Immune deficiency disorder (HCC)     IgG deficiency    Iron (Fe) deficiency anemia     Kidney stone     Lumbar herniated disc     Migraine     Motion sickness     PONV (postoperative nausea and vomiting)     severe    Seasonal allergies     Wears glasses      Past Surgical History:   Procedure Laterality Date    ABDOMINAL SURGERY      adhesions    ABDOMINOPLASTY      APPENDECTOMY      AUGMENTATION MAMMAPLASTY  2003    BREAST IMPLANT REMOVAL Bilateral 2019    BREAST SURGERY      implant removal     SECTION      CHOLECYSTECTOMY      COSMETIC SURGERY Bilateral     breast augmentation 2004    GASTRIC BYPASS      2009    HIATAL HERNIA REPAIR      HYSTERECTOMY  2015    KNEE ARTHROSCOPY Left     LYMPH NODE BIOPSY  200-    benign    NERVE BLOCK      ND CORRJ HALLUX VALGUS W/SESMDC W/RESCJ PROX PHAL Left 9/9/2016    Procedure: SURGICAL MODIFIED WATKINS BUNIONECTOMY FIRST MTPJ;  Surgeon: Racheal Gallardo DPM;  [facility];  Service: Podiatry    [place] FUSION FOOT BONE,MIDTARSAL,1 JT Left 9/9/2016    Procedure: FRIST TMJ FUSION ;  Surgeon: Racheal Gallardo DPM;  [facility];  Service: Podiatry    [place] OSTEOTOMY METATARSAL (NOT 1ST) Left 9/9/2016    Procedure: FOOT SHORTENING OSTEOTOMIES 2ND AND 3RD METATARSAL WITH EXTENSER TENDON RELEASE 3RD AND 4TH TOE;  Surgeon: Racheal Gallardo DPM;  [facility];  Service: Podiatry    [place] REMOVAL DEEP IMPLANT Left 12/7/2016    Procedure: REMOVAL SYMPTOMATIC  HARDWARE FIRST RAY,RELATED CONTRACTURE SECOND AND THIRD TOES WITH SUSPENSION FOURTH TOE;  Surgeon: Racheal Gallardo DPM;  [facility];  Service: Podiatry    SALPINGOOPHORECTOMY Left 2016    with removal of cervix    TONSILLECTOMY      WISDOM TOOTH EXTRACTION       Social History   Social History     Substance and Sexual Activity   Alcohol Use Yes    Frequency: Monthly or less    Binge frequency: Less than monthly    Comment: rarely     Social History     Substance and Sexual Activity   Drug Use No     Social History     Tobacco Use   Smoking Status Never Smoker   Smokeless Tobacco Never Used     Family History: non-contributory    Meds/Allergies   all medications and allergies reviewed  Allergies   Allergen Reactions    Codeine GI Intolerance and Chest Pain     ABDOMINAL PAIN    Hydrocodone Abdominal Pain and Swelling    Pregabalin Dizziness    Acetaminophen-Codeine Abdominal Pain    Amoxicillin Rash       Objective     Current Vitals:   Blood Pressure: 122/74 (01/16/20 1343)  Pulse: 82 (01/16/20 1343)  Temperature: 97 7 °F (36 5 °C) (01/16/20 1343)  Temp Source: Tympanic (01/16/20 1343)  Height: 5' 2" (157 5 cm) (01/16/20 1343)  Weight - Scale: 78 7 kg (173 lb 8 oz) (01/16/20 1343)  Body mass index is 31 73 kg/m²      [unfilled]    Invasive Devices     None                 Physical Exam   Constitutional: She is oriented to person, place, and time  She appears well-developed  HENT:   Head: Normocephalic and atraumatic  Eyes: Conjunctivae and EOM are normal    Neck: Normal range of motion  Neck supple  Cardiovascular: Normal rate, regular rhythm, normal heart sounds and intact distal pulses  Pulmonary/Chest: Effort normal and breath sounds normal    Abdominal: Soft  Bowel sounds are normal    Musculoskeletal: Normal range of motion  Neurological: She is alert and oriented to person, place, and time  She has normal reflexes  Skin: Skin is warm and dry  Psychiatric: She has a normal mood and affect  Lab Results: I have personally reviewed pertinent lab results  Imaging: I have personally reviewed pertinent reports  EKG, Pathology, and Other Studies: I have personally reviewed pertinent reports  Code Status: [unfilled]  Advance Directive and Living Will:      Power of :    POLST:      Assessment/Plan:      The patient presented to review the preoperative workup and see if bariatric surgery is appropriate and indicated following the extensive preoperative workup and the enrollment in our weight loss program    Preoperative workup was complete  Results were reviewed with the patient including the blood work results and the endoscopy findings and the biopsy results  We also reviewed the cardiology evaluation  I believe that the patient will be a good candidate for laparoscopic KENYA-EN-Y GASTRIC BYPASS REVISION for long candy cane and chronic epigastric pain, regurgitation, N/V  PATIENT S/P LRYGB AT AN OUTSIDE INSTITUTION AND S/P REVISION BY DR Alessio Welsh AND [id number] [facility] [address] RODRIGUEZ      Patient will need to start the 2 week liquid diet prior to surgery  Risks and benefits explained one more time to the patient  Alternatives to surgery and alternative forms of surgery were also explained   Post-surgical commitment and after care programs were explained  We also discussed that the DaVinci robot may be available to us to use on the day of the patient procedure  and that the procedure may be performed robotically  I discussed in details the advantages and disadvantages of this approach including the potential decrease in postoperative pain because of the remote center technology  I also mentioned the lack of strong evidence for  the use of robot in bariatric patients and the potential disadvantage to patients because of the prolonged operative time  Consent was signed  Questions were answered and concerns were addressed  Patient wishes to proceed  As per  [facility] guidelines, I had a discussion with the patient regarding his CODE STATUS in the perioperative period and the patient is level 1 or FULL CODE STATUS

## 2020-01-17 RX ORDER — OXYCODONE HYDROCHLORIDE 5 MG/1
5 TABLET ORAL EVERY 4 HOURS PRN
Qty: 10 TABLET | Refills: 0 | Status: SHIPPED | OUTPATIENT
Start: 2020-01-17 | End: 2022-06-09 | Stop reason: ALTCHOICE

## 2020-01-17 RX ORDER — OMEPRAZOLE 20 MG/1
20 CAPSULE, DELAYED RELEASE ORAL DAILY
Qty: 90 CAPSULE | Refills: 1 | Status: SHIPPED | OUTPATIENT
Start: 2020-01-17 | End: 2020-06-01

## 2020-02-07 ENCOUNTER — TELEPHONE (OUTPATIENT)
Dept: BARIATRICS | Facility: CLINIC | Age: 50
End: 2020-02-07

## 2020-02-07 PROCEDURE — 87147 CULTURE TYPE IMMUNOLOGIC: CPT | Performed by: SPECIALIST

## 2020-02-07 PROCEDURE — 87205 SMEAR GRAM STAIN: CPT | Performed by: SPECIALIST

## 2020-02-07 PROCEDURE — 87070 CULTURE OTHR SPECIMN AEROBIC: CPT | Performed by: SPECIALIST

## 2020-02-07 PROCEDURE — 87186 SC STD MICRODIL/AGAR DIL: CPT | Performed by: SPECIALIST

## 2020-02-10 ENCOUNTER — ANESTHESIA EVENT (OUTPATIENT)
Dept: PERIOP | Facility: HOSPITAL | Age: 50
DRG: 327 | End: 2020-02-10
Payer: COMMERCIAL

## 2020-02-11 ENCOUNTER — ANESTHESIA (OUTPATIENT)
Dept: PERIOP | Facility: HOSPITAL | Age: 50
DRG: 327 | End: 2020-02-11
Payer: COMMERCIAL

## 2020-02-11 ENCOUNTER — HOSPITAL ENCOUNTER (INPATIENT)
Facility: HOSPITAL | Age: 50
LOS: 1 days | Discharge: HOME | DRG: 327 | End: 2020-02-12
Attending: SURGERY | Admitting: SURGERY
Payer: COMMERCIAL

## 2020-02-11 DIAGNOSIS — K90.2 POSTOPERATIVE BLIND LOOP SYNDROME: ICD-10-CM

## 2020-02-11 DIAGNOSIS — Z98.84 STATUS POST BARIATRIC SURGERY: ICD-10-CM

## 2020-02-11 DIAGNOSIS — R10.13 EPIGASTRIC PAIN: ICD-10-CM

## 2020-02-11 DIAGNOSIS — R11.2 NAUSEA AND VOMITING: ICD-10-CM

## 2020-02-11 DIAGNOSIS — K21.9 GASTROESOPHAGEAL REFLUX DISEASE: ICD-10-CM

## 2020-02-11 PROCEDURE — 43659 UNLISTED LAPS PX STOMACH: CPT | Performed by: SURGERY

## 2020-02-11 PROCEDURE — 8E0W4CZ ROBOTIC ASSISTED PROCEDURE OF TRUNK REGION, PERCUTANEOUS ENDOSCOPIC APPROACH: ICD-10-PCS | Performed by: SURGERY

## 2020-02-11 PROCEDURE — C9290 INJ, BUPIVACAINE LIPOSOME: HCPCS | Performed by: SURGERY

## 2020-02-11 PROCEDURE — 0DB84ZZ EXCISION OF SMALL INTESTINE, PERCUTANEOUS ENDOSCOPIC APPROACH: ICD-10-PCS | Performed by: SURGERY

## 2020-02-11 PROCEDURE — 0DJ08ZZ INSPECTION OF UPPER INTESTINAL TRACT, VIA NATURAL OR ARTIFICIAL OPENING ENDOSCOPIC: ICD-10-PCS | Performed by: SURGERY

## 2020-02-11 PROCEDURE — 88307 TISSUE EXAM BY PATHOLOGIST: CPT | Performed by: PATHOLOGY

## 2020-02-11 PROCEDURE — C1781 MESH (IMPLANTABLE): HCPCS | Performed by: SURGERY

## 2020-02-11 PROCEDURE — 0BUT4JZ SUPPLEMENT DIAPHRAGM WITH SYNTHETIC SUBSTITUTE, PERCUTANEOUS ENDOSCOPIC APPROACH: ICD-10-PCS | Performed by: SURGERY

## 2020-02-11 PROCEDURE — 0DB64ZZ EXCISION OF STOMACH, PERCUTANEOUS ENDOSCOPIC APPROACH: ICD-10-PCS | Performed by: SURGERY

## 2020-02-11 DEVICE — MESH BIO-A MESH GORE: Type: IMPLANTABLE DEVICE | Site: ABDOMEN | Status: FUNCTIONAL

## 2020-02-11 RX ORDER — ONDANSETRON 2 MG/ML
4 INJECTION INTRAMUSCULAR; INTRAVENOUS ONCE AS NEEDED
Status: DISCONTINUED | OUTPATIENT
Start: 2020-02-11 | End: 2020-02-11 | Stop reason: HOSPADM

## 2020-02-11 RX ORDER — GABAPENTIN 300 MG/1
600 CAPSULE ORAL ONCE
Status: COMPLETED | OUTPATIENT
Start: 2020-02-11 | End: 2020-02-11

## 2020-02-11 RX ORDER — ACETAMINOPHEN 325 MG/1
975 TABLET ORAL EVERY 8 HOURS SCHEDULED
Status: DISCONTINUED | OUTPATIENT
Start: 2020-02-11 | End: 2020-02-12 | Stop reason: HOSPADM

## 2020-02-11 RX ORDER — LORATADINE 10 MG/1
10 TABLET ORAL DAILY
Status: DISCONTINUED | OUTPATIENT
Start: 2020-02-12 | End: 2020-02-12 | Stop reason: HOSPADM

## 2020-02-11 RX ORDER — METOCLOPRAMIDE HYDROCHLORIDE 5 MG/ML
10 INJECTION INTRAMUSCULAR; INTRAVENOUS EVERY 6 HOURS PRN
Status: DISCONTINUED | OUTPATIENT
Start: 2020-02-11 | End: 2020-02-12 | Stop reason: HOSPADM

## 2020-02-11 RX ORDER — LEVOFLOXACIN 5 MG/ML
750 INJECTION, SOLUTION INTRAVENOUS ONCE
Status: COMPLETED | OUTPATIENT
Start: 2020-02-11 | End: 2020-02-11

## 2020-02-11 RX ORDER — OXYCODONE HCL 5 MG/5 ML
10 SOLUTION, ORAL ORAL EVERY 4 HOURS PRN
Status: DISCONTINUED | OUTPATIENT
Start: 2020-02-11 | End: 2020-02-12 | Stop reason: HOSPADM

## 2020-02-11 RX ORDER — DEXAMETHASONE SODIUM PHOSPHATE 4 MG/ML
INJECTION, SOLUTION INTRA-ARTICULAR; INTRALESIONAL; INTRAMUSCULAR; INTRAVENOUS; SOFT TISSUE AS NEEDED
Status: DISCONTINUED | OUTPATIENT
Start: 2020-02-11 | End: 2020-02-11 | Stop reason: SURG

## 2020-02-11 RX ORDER — ALBUTEROL SULFATE 90 UG/1
1 AEROSOL, METERED RESPIRATORY (INHALATION) EVERY 6 HOURS PRN
Status: DISCONTINUED | OUTPATIENT
Start: 2020-02-11 | End: 2020-02-12 | Stop reason: HOSPADM

## 2020-02-11 RX ORDER — ROCURONIUM BROMIDE 10 MG/ML
INJECTION, SOLUTION INTRAVENOUS AS NEEDED
Status: DISCONTINUED | OUTPATIENT
Start: 2020-02-11 | End: 2020-02-11 | Stop reason: SURG

## 2020-02-11 RX ORDER — LIDOCAINE HYDROCHLORIDE 20 MG/ML
INJECTION, SOLUTION EPIDURAL; INFILTRATION; INTRACAUDAL; PERINEURAL AS NEEDED
Status: DISCONTINUED | OUTPATIENT
Start: 2020-02-11 | End: 2020-02-11 | Stop reason: SURG

## 2020-02-11 RX ORDER — FENTANYL CITRATE 50 UG/ML
INJECTION, SOLUTION INTRAMUSCULAR; INTRAVENOUS AS NEEDED
Status: DISCONTINUED | OUTPATIENT
Start: 2020-02-11 | End: 2020-02-11 | Stop reason: SURG

## 2020-02-11 RX ORDER — SCOLOPAMINE TRANSDERMAL SYSTEM 1 MG/1
1 PATCH, EXTENDED RELEASE TRANSDERMAL ONCE
Status: DISCONTINUED | OUTPATIENT
Start: 2020-02-11 | End: 2020-02-12 | Stop reason: HOSPADM

## 2020-02-11 RX ORDER — BUPROPION HYDROCHLORIDE 100 MG/1
100 TABLET ORAL 2 TIMES DAILY
Status: DISCONTINUED | OUTPATIENT
Start: 2020-02-11 | End: 2020-02-12 | Stop reason: HOSPADM

## 2020-02-11 RX ORDER — ONDANSETRON 2 MG/ML
4 INJECTION INTRAMUSCULAR; INTRAVENOUS EVERY 6 HOURS PRN
Status: DISCONTINUED | OUTPATIENT
Start: 2020-02-11 | End: 2020-02-12 | Stop reason: HOSPADM

## 2020-02-11 RX ORDER — DIPHENHYDRAMINE HYDROCHLORIDE 50 MG/ML
INJECTION INTRAMUSCULAR; INTRAVENOUS AS NEEDED
Status: DISCONTINUED | OUTPATIENT
Start: 2020-02-11 | End: 2020-02-11 | Stop reason: SURG

## 2020-02-11 RX ORDER — SODIUM CHLORIDE 9 MG/ML
125 INJECTION, SOLUTION INTRAVENOUS CONTINUOUS
Status: DISCONTINUED | OUTPATIENT
Start: 2020-02-11 | End: 2020-02-11 | Stop reason: ALTCHOICE

## 2020-02-11 RX ORDER — ACETAMINOPHEN 160 MG/5ML
975 SUSPENSION, ORAL (FINAL DOSE FORM) ORAL EVERY 8 HOURS SCHEDULED
Status: DISCONTINUED | OUTPATIENT
Start: 2020-02-11 | End: 2020-02-12 | Stop reason: HOSPADM

## 2020-02-11 RX ORDER — MAGNESIUM HYDROXIDE 1200 MG/15ML
LIQUID ORAL AS NEEDED
Status: DISCONTINUED | OUTPATIENT
Start: 2020-02-11 | End: 2020-02-11 | Stop reason: HOSPADM

## 2020-02-11 RX ORDER — MIDAZOLAM HYDROCHLORIDE 2 MG/2ML
INJECTION, SOLUTION INTRAMUSCULAR; INTRAVENOUS AS NEEDED
Status: DISCONTINUED | OUTPATIENT
Start: 2020-02-11 | End: 2020-02-11 | Stop reason: SURG

## 2020-02-11 RX ORDER — GABAPENTIN 300 MG/1
300 CAPSULE ORAL EVERY 8 HOURS SCHEDULED
Status: DISCONTINUED | OUTPATIENT
Start: 2020-02-11 | End: 2020-02-12 | Stop reason: HOSPADM

## 2020-02-11 RX ORDER — LOSARTAN POTASSIUM 50 MG/1
50 TABLET ORAL 2 TIMES DAILY
Status: DISCONTINUED | OUTPATIENT
Start: 2020-02-11 | End: 2020-02-12 | Stop reason: HOSPADM

## 2020-02-11 RX ORDER — SIMETHICONE 80 MG
80 TABLET,CHEWABLE ORAL 4 TIMES DAILY PRN
Status: DISCONTINUED | OUTPATIENT
Start: 2020-02-11 | End: 2020-02-12 | Stop reason: HOSPADM

## 2020-02-11 RX ORDER — MONTELUKAST SODIUM 10 MG/1
5 TABLET ORAL 2 TIMES DAILY
Status: DISCONTINUED | OUTPATIENT
Start: 2020-02-11 | End: 2020-02-12 | Stop reason: HOSPADM

## 2020-02-11 RX ORDER — OXYCODONE HCL 5 MG/5 ML
5 SOLUTION, ORAL ORAL EVERY 4 HOURS PRN
Status: DISCONTINUED | OUTPATIENT
Start: 2020-02-11 | End: 2020-02-12 | Stop reason: HOSPADM

## 2020-02-11 RX ORDER — PROPOFOL 10 MG/ML
INJECTION, EMULSION INTRAVENOUS AS NEEDED
Status: DISCONTINUED | OUTPATIENT
Start: 2020-02-11 | End: 2020-02-11 | Stop reason: SURG

## 2020-02-11 RX ORDER — FENTANYL CITRATE/PF 50 MCG/ML
25 SYRINGE (ML) INJECTION
Status: DISCONTINUED | OUTPATIENT
Start: 2020-02-11 | End: 2020-02-11 | Stop reason: HOSPADM

## 2020-02-11 RX ORDER — ALPRAZOLAM 0.5 MG/1
0.5 TABLET ORAL
Status: DISCONTINUED | OUTPATIENT
Start: 2020-02-11 | End: 2020-02-12 | Stop reason: HOSPADM

## 2020-02-11 RX ORDER — BUDESONIDE AND FORMOTEROL FUMARATE DIHYDRATE 160; 4.5 UG/1; UG/1
2 AEROSOL RESPIRATORY (INHALATION) 2 TIMES DAILY
Status: DISCONTINUED | OUTPATIENT
Start: 2020-02-11 | End: 2020-02-12 | Stop reason: HOSPADM

## 2020-02-11 RX ORDER — SULFAMETHOXAZOLE AND TRIMETHOPRIM 400; 80 MG/1; MG/1
2 TABLET ORAL EVERY 12 HOURS SCHEDULED
Status: DISCONTINUED | OUTPATIENT
Start: 2020-02-11 | End: 2020-02-12 | Stop reason: HOSPADM

## 2020-02-11 RX ORDER — CELECOXIB 200 MG/1
200 CAPSULE ORAL ONCE
Status: COMPLETED | OUTPATIENT
Start: 2020-02-11 | End: 2020-02-11

## 2020-02-11 RX ORDER — SODIUM CHLORIDE, SODIUM LACTATE, POTASSIUM CHLORIDE, CALCIUM CHLORIDE 600; 310; 30; 20 MG/100ML; MG/100ML; MG/100ML; MG/100ML
100 INJECTION, SOLUTION INTRAVENOUS CONTINUOUS
Status: DISCONTINUED | OUTPATIENT
Start: 2020-02-11 | End: 2020-02-12 | Stop reason: HOSPADM

## 2020-02-11 RX ORDER — HEPARIN SODIUM 5000 [USP'U]/ML
5000 INJECTION, SOLUTION INTRAVENOUS; SUBCUTANEOUS
Status: COMPLETED | OUTPATIENT
Start: 2020-02-11 | End: 2020-02-11

## 2020-02-11 RX ORDER — ONDANSETRON 2 MG/ML
INJECTION INTRAMUSCULAR; INTRAVENOUS AS NEEDED
Status: DISCONTINUED | OUTPATIENT
Start: 2020-02-11 | End: 2020-02-11 | Stop reason: SURG

## 2020-02-11 RX ORDER — MORPHINE SULFATE 4 MG/ML
4 INJECTION, SOLUTION INTRAMUSCULAR; INTRAVENOUS EVERY 4 HOURS PRN
Status: DISCONTINUED | OUTPATIENT
Start: 2020-02-11 | End: 2020-02-12 | Stop reason: HOSPADM

## 2020-02-11 RX ORDER — PROMETHAZINE HYDROCHLORIDE 25 MG/ML
25 INJECTION, SOLUTION INTRAMUSCULAR; INTRAVENOUS EVERY 6 HOURS PRN
Status: DISCONTINUED | OUTPATIENT
Start: 2020-02-11 | End: 2020-02-12 | Stop reason: HOSPADM

## 2020-02-11 RX ORDER — BUPIVACAINE HYDROCHLORIDE AND EPINEPHRINE 5; 5 MG/ML; UG/ML
INJECTION, SOLUTION PERINEURAL AS NEEDED
Status: DISCONTINUED | OUTPATIENT
Start: 2020-02-11 | End: 2020-02-11 | Stop reason: HOSPADM

## 2020-02-11 RX ADMIN — FENTANYL CITRATE 50 MCG: 50 INJECTION, SOLUTION INTRAMUSCULAR; INTRAVENOUS at 13:29

## 2020-02-11 RX ADMIN — FENTANYL CITRATE 50 MCG: 50 INJECTION, SOLUTION INTRAMUSCULAR; INTRAVENOUS at 14:00

## 2020-02-11 RX ADMIN — METOCLOPRAMIDE 10 MG: 5 INJECTION, SOLUTION INTRAMUSCULAR; INTRAVENOUS at 17:43

## 2020-02-11 RX ADMIN — MORPHINE SULFATE 2 MG: 2 INJECTION, SOLUTION INTRAMUSCULAR; INTRAVENOUS at 17:43

## 2020-02-11 RX ADMIN — OXYCODONE HYDROCHLORIDE 5 MG: 5 SOLUTION ORAL at 20:06

## 2020-02-11 RX ADMIN — DEXAMETHASONE SODIUM PHOSPHATE 4 MG: 4 INJECTION, SOLUTION INTRAMUSCULAR; INTRAVENOUS at 12:47

## 2020-02-11 RX ADMIN — ALPRAZOLAM 0.5 MG: 0.5 TABLET ORAL at 21:27

## 2020-02-11 RX ADMIN — GABAPENTIN 300 MG: 300 CAPSULE ORAL at 20:07

## 2020-02-11 RX ADMIN — MONTELUKAST SODIUM 5 MG: 10 TABLET, COATED ORAL at 20:07

## 2020-02-11 RX ADMIN — SODIUM CHLORIDE: 0.9 INJECTION, SOLUTION INTRAVENOUS at 13:17

## 2020-02-11 RX ADMIN — BUDESONIDE AND FORMOTEROL FUMARATE DIHYDRATE 2 PUFF: 160; 4.5 AEROSOL RESPIRATORY (INHALATION) at 20:10

## 2020-02-11 RX ADMIN — METRONIDAZOLE 500 MG: 500 INJECTION, SOLUTION INTRAVENOUS at 12:55

## 2020-02-11 RX ADMIN — ROCURONIUM BROMIDE 50 MG: 50 INJECTION, SOLUTION INTRAVENOUS at 12:38

## 2020-02-11 RX ADMIN — SODIUM CHLORIDE 125 ML/HR: 0.9 INJECTION, SOLUTION INTRAVENOUS at 10:38

## 2020-02-11 RX ADMIN — ROCURONIUM BROMIDE 20 MG: 50 INJECTION, SOLUTION INTRAVENOUS at 13:28

## 2020-02-11 RX ADMIN — HEPARIN SODIUM 5000 UNITS: 5000 INJECTION INTRAVENOUS; SUBCUTANEOUS at 11:20

## 2020-02-11 RX ADMIN — SUGAMMADEX 200 MG: 100 INJECTION, SOLUTION INTRAVENOUS at 14:55

## 2020-02-11 RX ADMIN — SODIUM CHLORIDE: 0.9 INJECTION, SOLUTION INTRAVENOUS at 14:08

## 2020-02-11 RX ADMIN — SODIUM CHLORIDE, SODIUM LACTATE, POTASSIUM CHLORIDE, AND CALCIUM CHLORIDE 100 ML/HR: .6; .31; .03; .02 INJECTION, SOLUTION INTRAVENOUS at 16:40

## 2020-02-11 RX ADMIN — FENTANYL CITRATE 50 MCG: 50 INJECTION, SOLUTION INTRAMUSCULAR; INTRAVENOUS at 14:28

## 2020-02-11 RX ADMIN — FENTANYL CITRATE 50 MCG: 50 INJECTION, SOLUTION INTRAMUSCULAR; INTRAVENOUS at 13:41

## 2020-02-11 RX ADMIN — LEVOFLOXACIN: 5 INJECTION, SOLUTION INTRAVENOUS at 12:28

## 2020-02-11 RX ADMIN — FENTANYL CITRATE 25 MCG: 50 INJECTION, SOLUTION INTRAMUSCULAR; INTRAVENOUS at 16:02

## 2020-02-11 RX ADMIN — ONDANSETRON 4 MG: 2 INJECTION INTRAMUSCULAR; INTRAVENOUS at 14:37

## 2020-02-11 RX ADMIN — SULFAMETHOXAZOLE AND TRIMETHOPRIM 2 TABLET: 400; 80 TABLET ORAL at 20:10

## 2020-02-11 RX ADMIN — ROCURONIUM BROMIDE 10 MG: 50 INJECTION, SOLUTION INTRAVENOUS at 14:16

## 2020-02-11 RX ADMIN — LIDOCAINE HYDROCHLORIDE 50 MG: 20 INJECTION, SOLUTION EPIDURAL; INFILTRATION; INTRACAUDAL; PERINEURAL at 12:38

## 2020-02-11 RX ADMIN — FENTANYL CITRATE 50 MCG: 50 INJECTION, SOLUTION INTRAMUSCULAR; INTRAVENOUS at 12:33

## 2020-02-11 RX ADMIN — FENTANYL CITRATE 50 MCG: 50 INJECTION, SOLUTION INTRAMUSCULAR; INTRAVENOUS at 13:01

## 2020-02-11 RX ADMIN — GABAPENTIN 600 MG: 300 CAPSULE ORAL at 10:28

## 2020-02-11 RX ADMIN — DIPHENHYDRAMINE HYDROCHLORIDE 12.5 MG: 50 INJECTION, SOLUTION INTRAMUSCULAR; INTRAVENOUS at 13:58

## 2020-02-11 RX ADMIN — SCOPALAMINE 1 PATCH: 1 PATCH, EXTENDED RELEASE TRANSDERMAL at 10:28

## 2020-02-11 RX ADMIN — FAMOTIDINE 20 MG: 10 INJECTION, SOLUTION INTRAVENOUS at 17:42

## 2020-02-11 RX ADMIN — BUPROPION HYDROCHLORIDE 100 MG: 100 TABLET, FILM COATED ORAL at 20:10

## 2020-02-11 RX ADMIN — MIDAZOLAM 2 MG: 1 INJECTION INTRAMUSCULAR; INTRAVENOUS at 12:33

## 2020-02-11 RX ADMIN — CELECOXIB 200 MG: 200 CAPSULE ORAL at 10:28

## 2020-02-11 RX ADMIN — PROPOFOL 200 MG: 10 INJECTION, EMULSION INTRAVENOUS at 12:38

## 2020-02-11 RX ADMIN — ONDANSETRON 4 MG: 2 INJECTION INTRAMUSCULAR; INTRAVENOUS at 12:48

## 2020-02-11 RX ADMIN — ACETAMINOPHEN 975 MG: 325 TABLET ORAL at 20:07

## 2020-02-11 RX ADMIN — ONDANSETRON 4 MG: 2 INJECTION INTRAMUSCULAR; INTRAVENOUS at 20:11

## 2020-02-11 RX ADMIN — FENTANYL CITRATE 25 MCG: 50 INJECTION, SOLUTION INTRAMUSCULAR; INTRAVENOUS at 16:15

## 2020-02-11 NOTE — INTERVAL H&P NOTE
H&P reviewed  After examining the patient I find no changes in the patients condition since the H&P had been written      Vitals:    02/11/20 0939   BP: 135/85   Pulse: (!) 110   Resp: 16   Temp: 98 2 °F (36 8 °C)   SpO2: 99%

## 2020-02-11 NOTE — OP NOTE
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]     * Nausea and vomiting [R11 2]     * Epigastric pain [R10 13]     * Status post bariatric surgery []    Procedure(s) (LRB):  ROBOTIC REVISION OF KENYA-EN-Y GASTRIC BYPASS, INTRAOP EGD (N/A)    Specimen(s):  * No specimens in log *    Estimated Blood Loss:   20 ml    Drains:  Urethral Catheter Double-lumen; Latex 16 Fr  (Active)   Number of days: 0       Anesthesia Type:   General    Operative Indications:  Gastroesophageal reflux disease [K21 9]  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]  Nausea and vomiting [R11 2]  Epigastric pain [R10 13]  Status post bariatric surgery [Z]      Operative Findings:  Recurrent paraesophageal hernia  Elongated blind end of the [place] limb ( long candy-cane)  Dilated non excluded gastric fundus    Complications:   None    Procedure and Technique:  Robotic repair of  paraesophageal hernia with bio a mesh  Robotic partial gastrectomy  Robotic small-bowel resection   I was present for the entire procedure    Patient Disposition:  hemodynamically stable       No qualified resident available to assist  Assistance was necessary for traction counter traction and assistance with stapling and intraop endoscopy  Description of the procedure: The patient was brought to the operating room and placed in a supine position  The patient received a dose of IV antibiotics and a dose of subcutaneous Lovenox, prior to the procedure   The patient was induced under general endotracheal anesthesia  The abdominal wall was prepped and draped under sterile conditions in the usual fashion  The procedure was started by obtaining access to the abdominal cavity using a Veress needle to the left side of the midline around 6 inches from the xiphoid process the abdominal cavity was insufflated with CO2 to a pressure of 15 mmHg  After that, the abdomen was entered with an 8 mm trocar using an Optiview trocar under direct visualization  At that point, two 8-mm trocar were placed on the right side of the abdominal wall, under direct visualization, and two 8- mm and 12-mm trocars were placed on the left side of the abdominal wall, also under direct visualization  The patient was then placed in a reverse Trendelenburg position  A Gold retractor was placed through a small stab incision below the xiphoid and was used to retract the left lobe of the liver in a medial fashion  An OG tube was placed to decompress the stomach and then removed immediately  Robot was then docked  The angle of His was then dissected using blunt dissection and the vessel sealer  The phrenoesophageal ligament was also dissected anteriorly and  a paraesophageal hernia was identified  The decision was then made to repair the hernia posteriorly  Right maryana and left maryana were dissected away from the hernia sac and skeletonized all the way down to the confluence  In order to dissect the left maryana the gastric pouch had to be  from the gastric remnant using sharp dissection  This part of the dissection was very tedious and took a significant amount of time because of the recurrent nature of the paraesophageal hernia and the severe inflammatory reaction and adhesions  Esophagus was kept out of harm's way during the dissection  Both vagi were also identified and preserved  Dissection was carried all the way up to the level of the pulmonary vein  to obtain more length of the esophagus   The dissection was completed circumferentially around the esophagus  We had at least 5 cm of the lower esophagus in an intra-abdominal location by the end of the dissection  Hernia sac was completely dissected and excised  Right maryana and left maryana were then approximated using 0 Ethibond sutures placed in an interrupted fashion  The bio a mesh was trimmed to accommodate the esophagus was placed around the esophagus and secured to the crura with simple 0 Ethibond suture  I then turned my attention to the Eveline limb there was evidence of very long blind end of the Eveline limb the mesentery of the blind end was taken down with the vessel sealer and then the small bowel hanging part was transected with a single fire of the white stapler  A 36 [place] bougie was placed and the gastric pouch through the anastomosis and the large non excluded fundus of the gastric pouch which was  from the gastric remnant was also transected along the bougie and the staple line of the small bowel and the gastric pouch was imbricated using a running 2 0 V lock suture while the bougie was in place  A clamp was placed around the small bowel and an upper endoscopy was performed there was no evidence of bleeding staple line was intact no evidence of bubble and the anastomosis was patent  We then removed the [facility] liver retractor  All the trocars were removed under direct visualization, and the skin edges were approximated using 4-0 Monocryl in an interrupted, inverted fashion  Histoacryl was then applied to the skin incisions  The patient was extubated and transferred to the PACU in stable condition       SIGNATURE: Ayo Kramer MD  DATE: February 11, 2020  TIME: 2:50 PM

## 2020-02-11 NOTE — ANESTHESIA PREPROCEDURE EVALUATION
Review of Systems/Medical History  Patient summary reviewed  Chart reviewed  History of anesthetic complications (Severe) PONV    Cardiovascular  EKG reviewed, Hypertension controlled,    Pulmonary  Asthma , well controlled/ stable ,        GI/Hepatic    GERD , Bariatric surgery,        Negative  ROS        Endo/Other  Negative endo/other ROS      GYN       Hematology  Anemia ,    Comment: IgG deficiency Musculoskeletal       Neurology    Headaches, Fibromyalgia   Psychology   Anxiety, Depression ,              Physical Exam    Airway    Mallampati score: I  TM Distance: >3 FB  Neck ROM: full     Dental   No notable dental hx     Cardiovascular  Rhythm: regular, Rate: normal, Cardiovascular exam normal    Pulmonary  Breath sounds clear to auscultation,     Other Findings        Anesthesia Plan  ASA Score- 2     Anesthesia Type- general with ASA Monitors  Additional Monitors:   Airway Plan:         Plan Factors-    Induction- intravenous  Postoperative Plan-     Informed Consent- Anesthetic plan and risks discussed with patient

## 2020-02-12 ENCOUNTER — APPOINTMENT (INPATIENT)
Dept: RADIOLOGY | Facility: HOSPITAL | Age: 50
DRG: 327 | End: 2020-02-12
Payer: COMMERCIAL

## 2020-02-12 VITALS
HEIGHT: 62 IN | WEIGHT: 168.21 LBS | RESPIRATION RATE: 18 BRPM | HEART RATE: 71 BPM | BODY MASS INDEX: 30.95 KG/M2 | TEMPERATURE: 97.8 F | OXYGEN SATURATION: 98 % | DIASTOLIC BLOOD PRESSURE: 82 MMHG | SYSTOLIC BLOOD PRESSURE: 122 MMHG

## 2020-02-12 LAB
ANION GAP SERPL CALCULATED.3IONS-SCNC: 10 MMOL/L (ref 4–13)
BUN SERPL-MCNC: 10 MG/DL (ref 5–25)
CALCIUM SERPL-MCNC: 8.2 MG/DL (ref 8.3–10.1)
CHLORIDE SERPL-SCNC: 106 MMOL/L (ref 100–108)
CO2 SERPL-SCNC: 23 MMOL/L (ref 21–32)
CREAT SERPL-MCNC: 0.74 MG/DL (ref 0.6–1.3)
ERYTHROCYTE [DISTWIDTH] IN BLOOD BY AUTOMATED COUNT: 12.5 % (ref 11.6–15.1)
GFR SERPL CREATININE-BSD FRML MDRD: 95 ML/MIN/1.73SQ M
GLUCOSE SERPL-MCNC: 95 MG/DL (ref 65–140)
HCT VFR BLD AUTO: 38 % (ref 34.8–46.1)
HGB BLD-MCNC: 12.2 G/DL (ref 11.5–15.4)
MCH RBC QN AUTO: 30.7 PG (ref 26.8–34.3)
MCHC RBC AUTO-ENTMCNC: 32.1 G/DL (ref 31.4–37.4)
MCV RBC AUTO: 96 FL (ref 82–98)
PLATELET # BLD AUTO: 243 THOUSANDS/UL (ref 149–390)
PMV BLD AUTO: 9.3 FL (ref 8.9–12.7)
POTASSIUM SERPL-SCNC: 4.1 MMOL/L (ref 3.5–5.3)
RBC # BLD AUTO: 3.97 MILLION/UL (ref 3.81–5.12)
SODIUM SERPL-SCNC: 139 MMOL/L (ref 136–145)
WBC # BLD AUTO: 11.5 THOUSAND/UL (ref 4.31–10.16)

## 2020-02-12 PROCEDURE — 85027 COMPLETE CBC AUTOMATED: CPT | Performed by: SURGERY

## 2020-02-12 PROCEDURE — 99024 POSTOP FOLLOW-UP VISIT: CPT | Performed by: SURGERY

## 2020-02-12 PROCEDURE — NC001 PR NO CHARGE: Performed by: SURGERY

## 2020-02-12 PROCEDURE — 80048 BASIC METABOLIC PNL TOTAL CA: CPT | Performed by: SURGERY

## 2020-02-12 PROCEDURE — 74240 X-RAY XM UPR GI TRC 1CNTRST: CPT

## 2020-02-12 RX ORDER — ACETAMINOPHEN 325 MG/1
975 TABLET ORAL EVERY 8 HOURS SCHEDULED
Qty: 30 TABLET | Refills: 0 | Status: SHIPPED | OUTPATIENT
Start: 2020-02-12

## 2020-02-12 RX ADMIN — SULFAMETHOXAZOLE AND TRIMETHOPRIM 2 TABLET: 400; 80 TABLET ORAL at 09:35

## 2020-02-12 RX ADMIN — IOHEXOL 100 ML: 350 INJECTION, SOLUTION INTRAVENOUS at 08:16

## 2020-02-12 RX ADMIN — LOSARTAN POTASSIUM 50 MG: 50 TABLET, FILM COATED ORAL at 09:33

## 2020-02-12 RX ADMIN — OXYCODONE HYDROCHLORIDE 10 MG: 5 SOLUTION ORAL at 11:41

## 2020-02-12 RX ADMIN — SODIUM CHLORIDE, SODIUM LACTATE, POTASSIUM CHLORIDE, AND CALCIUM CHLORIDE 100 ML/HR: .6; .31; .03; .02 INJECTION, SOLUTION INTRAVENOUS at 01:50

## 2020-02-12 RX ADMIN — BUDESONIDE AND FORMOTEROL FUMARATE DIHYDRATE 2 PUFF: 160; 4.5 AEROSOL RESPIRATORY (INHALATION) at 09:33

## 2020-02-12 RX ADMIN — FAMOTIDINE 20 MG: 10 INJECTION, SOLUTION INTRAVENOUS at 09:30

## 2020-02-12 RX ADMIN — GABAPENTIN 300 MG: 300 CAPSULE ORAL at 06:07

## 2020-02-12 RX ADMIN — BUPROPION HYDROCHLORIDE 100 MG: 100 TABLET, FILM COATED ORAL at 09:34

## 2020-02-12 RX ADMIN — ACETAMINOPHEN 975 MG: 325 TABLET ORAL at 06:07

## 2020-02-12 RX ADMIN — MONTELUKAST SODIUM 5 MG: 10 TABLET, COATED ORAL at 09:33

## 2020-02-12 RX ADMIN — LORATADINE 10 MG: 10 TABLET ORAL at 09:34

## 2020-02-12 NOTE — UTILIZATION REVIEW
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 ( long candy-cane)  Dilated non excluded gastric fundus    POD#1 Progress Note:   2/11:     Continue post  Op care  Encourage ambulation, incentive  Spirometry  Continue pain control, anti emetics as needed       Admission Orders: Date/Time/Statement: Admission Orders (From admission, onward)     Ordered        02/11/20 1518  Inpatient Admission  Once                   Orders Placed This Encounter   Procedures    Inpatient Admission     Standing Status:   Standing     Number of Occurrences:   1     Order Specific Question:   Admitting Physician     Answer:   JITENDRA Marrufo [[id number]]     Order Specific Question:   Level of Care     Answer:   Med Surg [16]     Order Specific Question:   Bed Type     Answer:   Bariatric [1]     Order Specific Question:   Estimated length of stay     Answer:   Inpatient Only Surgery     Vital Signs: /82 (BP Location: Right arm)   Pulse 71   Temp 97 8 °F (36 6 °C) (Temporal)   Resp 18   Ht 5' 2" (1 575 m)   Wt 76 3 kg (168 lb 3 4 oz)   SpO2 98%   BMI 30 77 kg/m²      Diet:   Bariatric  Cl liq  diet    Mobility:    OOB  As  cris    DVT Prophylaxis:   SCD'S    Medications/Pain Control:   Scheduled Medications:    Medications:  acetaminophen 975 mg Oral Q8H [address]   Or      acetaminophen 975 mg Oral Q8H [address]   ALPRAZolam 0 5 mg Oral HS   budesonide-formoterol 2 puff Inhalation BID   buPROPion 100 mg Oral BID   famotidine 20 mg Intravenous BID   gabapentin 300 mg Oral Q8H [address]   loratadine 10 mg Oral Daily   losartan 50 mg Oral BID   montelukast 5 mg Oral BID   scopolamine 1 patch Transdermal Once   sulfamethoxazole-trimethoprim 2 tablet Oral Q12H [address]     Continuous IV Infusions:    lactated ringers 100 mL/hr Intravenous Continuous     PRN Meds:    albuterol 1 puff Inhalation Q6H PRN   metoclopramide 10 mg Intravenous Q6H PRN   morphine injection 4 mg Intravenous Q4H PRN   morphine injection 2 mg Intravenous Q4H PRN   ondansetron 4 mg Intravenous Q6H PRN   X1   2/11   oxyCODONE 10 mg Oral Q4H PRN   oxyCODONE 5 mg Oral Q4H PRN   phenol 2 spray Mouth/Throat Q2H PRN   promethazine 25 mg Intravenous Q6H PRN   simethicone 80 mg Oral 4x Daily PRN         Network Utilization Review Department  [email or web address]  ATTENTION: Please call with any questions or concerns to [phone] and carefully listen to the prompts so that you are directed to the right person  All voicemails are confidential   Cristal Vega all requests for admission clinical reviews, approved or denied determinations and any other requests to dedicated fax number below belonging to the campus where the patient is receiving treatment   List of dedicated fax numbers for the Facilities:  [address] DENIALS (Administrative/Medical Necessity) [phone]   [address] (Maternity/NICU/Pediatrics) [phone]   Patrick Mullins [phone]   Ludin Caller [phone]   Neli Zayas [phone]   Richa Plata [phone]   [address] [facility] [address] [place] [address] [phone]   [facility]  [phone]   [address] [facility] [address]  [address] [address] [address] [facility] [phone]

## 2020-02-12 NOTE — PROGRESS NOTES
Progress Note - Bariatric Surgery   Lakia Rust 52 y o  female MRN: [id number]  Unit/Bed#: E5 -01 Encounter: [id number]      Subjective/Objective     Subjective:  s/p robotic assisted laparoscopic revision of Eveline-en-Y gastric bypass with paraesophageal hernia repair   No nausea or vomiting, pain adequately controlled, ambulating without assistance, voiding well, using incentive spirometer  Denies fevers, chills, sweats, SOB, CP, calf pain  Objective:    /82 (BP Location: Right arm)   Pulse 71   Temp 97 8 °F (36 6 °C) (Temporal)   Resp 18   Ht 5' 2" (1 575 m)   Wt 76 3 kg (168 lb 3 4 oz)   SpO2 98%   BMI 30 77 kg/m²       Intake/Output Summary (Last 24 hours) at 2/12/2020 1545  Last data filed at 2/12/2020 0745  Gross per 24 hour   Intake 4366 67 ml   Output 1125 ml   Net 3241 67 ml       Invasive Devices     Peripheral Intravenous Line            Peripheral IV 02/11/20 Left Hand less than 1 day                ROS: 10-point system completed  All negative except see HPI  Physical Exam    General Appearance:    Alert, cooperative, no distress, appears stated age   Head:    Normocephalic, without obvious abnormality, atraumatic   Lungs:     Respirations unlabored   Heart:    Regular rate and rhythm   Abdomen:     Soft, appropriate tenderness,  no masses, no organomegaly,   non distended   Extremities:   Extremities normal, atraumatic, no cyanosis or edema   Neurologic:  Incision:  Psych:   Normal strength and sensation    Clean, dry, and intact    Normal mood and affect       Lab, Imaging and other studies:  I have personally reviewed pertinent lab results    , CBC:   Lab Results   Component Value Date    WBC 11 50 (H) 02/12/2020    HGB 12 2 02/12/2020    HCT 38 0 02/12/2020    MCV 96 02/12/2020     02/12/2020    MCH 30 7 02/12/2020    MCHC 32 1 02/12/2020    RDW 12 5 02/12/2020    MPV 9 3 02/12/2020   , CMP:   Lab Results   Component Value Date    SODIUM 139 02/12/2020    K 4 1 02/12/2020     02/12/2020    CO2 23 02/12/2020    BUN 10 02/12/2020    CREATININE 0 74 02/12/2020    CALCIUM 8 2 (L) 02/12/2020    EGFR 95 02/12/2020        VTE Mechanical Prophylaxis: sequential compression device    Assessment/Plan  s/p robotic assisted laparoscopic revision of Eveline-en-Y gastric bypass with paraesophageal hernia repair with stable post op course  Patient afebrile and hemodynamically stable  Encourage PO fluids, ambulation, and incentive spirometry  Upper GI series reviewed  Will advance to liquid diet  Plan of care was discussed with patient and patient's nurse  Care plan discussed with Dr Jazzy Smith: Continue bariatric clear liquid diet, ambulation, incentive spirometry and if patient continues to improve clinically will discharge to home this afternoon

## 2020-02-12 NOTE — PLAN OF CARE
Problem: PAIN - ADULT  Goal: Verbalizes/displays adequate comfort level or baseline comfort level  Description  Interventions:  - Encourage patient to monitor pain and request assistance  - Assess pain using appropriate pain scale  - Administer analgesics based on type and severity of pain and evaluate response  - Implement non-pharmacological measures as appropriate and evaluate response  - Consider cultural and social influences on pain and pain management  - Notify physician/advanced practitioner if interventions unsuccessful or patient reports new pain  Outcome: Progressing     Problem: INFECTION - ADULT  Goal: Absence or prevention of progression during hospitalization  Description  INTERVENTIONS:  - Assess and monitor for signs and symptoms of infection  - Monitor lab/diagnostic results  - Monitor all insertion sites, i e  indwelling lines, tubes, and drains  - Monitor endotracheal if appropriate and nasal secretions for changes in amount and color  - [place] appropriate cooling/warming therapies per order  - Administer medications as ordered  - Instruct and encourage patient and family to use good hand hygiene technique  - Identify and instruct in appropriate isolation precautions for identified infection/condition  Outcome: Progressing  Goal: Absence of fever/infection during neutropenic period  Description  INTERVENTIONS:  - Monitor WBC    Outcome: Progressing     Problem: SAFETY ADULT  Goal: Patient will remain free of falls  Description  INTERVENTIONS:  - Assess patient frequently for physical needs  -  Identify cognitive and physical deficits and behaviors that affect risk of falls    -  [place] fall precautions as indicated by assessment   - Educate patient/family on patient safety including physical limitations  - Instruct patient to call for assistance with activity based on assessment  - Modify environment to reduce risk of injury  - Consider OT/PT consult to assist with strengthening/mobility  Outcome: Progressing  Goal: Maintain or return to baseline ADL function  Description  INTERVENTIONS:  -  Assess patient's ability to carry out ADLs; assess patient's baseline for ADL function and identify physical deficits which impact ability to perform ADLs (bathing, care of mouth/teeth, toileting, grooming, dressing, etc )  - Assess/evaluate cause of self-care deficits   - Assess range of motion  - Assess patient's mobility; develop plan if impaired  - Assess patient's need for assistive devices and provide as appropriate  - Encourage maximum independence but intervene and supervise when necessary  - Involve family in performance of ADLs  - Assess for home care needs following discharge   - Consider OT consult to assist with ADL evaluation and planning for discharge  - Provide patient education as appropriate  Outcome: Progressing  Goal: Maintain or return mobility status to optimal level  Description  INTERVENTIONS:  - Assess patient's baseline mobility status (ambulation, transfers, stairs, etc )    - Identify cognitive and physical deficits and behaviors that affect mobility  - Identify mobility aids required to assist with transfers and/or ambulation (gait belt, sit-to-stand, lift, walker, cane, etc )  - [place] fall precautions as indicated by assessment  - Record patient progress and toleration of activity level on Mobility SBAR; progress patient to next Phase/Stage  - Instruct patient to call for assistance with activity based on assessment  - Consider rehabilitation consult to assist with strengthening/weightbearing, etc   Outcome: Progressing     Problem: DISCHARGE PLANNING  Goal: Discharge to home or other facility with appropriate resources  Description  INTERVENTIONS:  - Identify barriers to discharge w/patient and caregiver  - Arrange for needed discharge resources and transportation as appropriate  - Identify discharge learning needs (meds, wound care, etc )  - Arrange for interpretive services to assist at discharge as needed  - Refer to Case Management Department for coordinating discharge planning if the patient needs post-hospital services based on physician/advanced practitioner order or complex needs related to functional status, cognitive ability, or social support system  Outcome: Progressing     Problem: Knowledge Deficit  Goal: Patient/family/caregiver demonstrates understanding of disease process, treatment plan, medications, and discharge instructions  Description  Complete learning assessment and assess knowledge base    Interventions:  - Provide teaching at level of understanding  - Provide teaching via preferred learning methods  Outcome: Progressing

## 2020-02-12 NOTE — DISCHARGE INSTR - AVS FIRST PAGE
[facility] pain medications [facility] [address] [facility] [place] [facility]   Take Tylenol as instructed  Stay hydrated and follow your discharge diet progression   Mild nausea is ok as long as you can drink fluids  Take your omeprazole daily  Crush or cut your pills and open capsules, mix with liquid to drink    Follow up with Dr Nuris Urbano and your PCP within the next week

## 2020-02-12 NOTE — DISCHARGE SUMMARY
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]     * Nausea and vomiting [R11 2]     * Epigastric pain [R10 13]     * Status post bariatric surgery [Z98 84]    Procedures Performed:  Procedure(s):  ROBOTIC REVISION OF KENYA-EN-Y GASTRIC BYPASS, INTRAOP EGD    Surgeon: Samina Ellis MD    See H & P for full details of admission and Operative Note for full details of operations performed  Patient tolerated surgery well without complications  In the morning postoperative Day 1, the patient had mild nausea and abdominal pain  Tolerated a clear liquid diet without vomiting  Able to ambulate and voiding independently  Patient was deemed ready for discharge home  Patient was seen and examined prior to discharge  Provisions for Follow-Up Care:  See After Visit Summary/Discharge Instructions for information related to follow-up care and home orders  Disposition: Home, in stable condition  Planned Readmission: No    Discharge Medications:  See After Visit Summary/Discharge Instructions for reconciled discharge medications provided to patient and family  Post Operative instructions: Reviewed with patient and/or family      Signature:   Viviane Hinojosa MD  Date: 2/12/2020 Time: 10:29 AM

## 2020-02-13 ENCOUNTER — TELEPHONE (OUTPATIENT)
Dept: MEDSURG UNIT | Facility: HOSPITAL | Age: 50
End: 2020-02-13

## 2020-02-19 ENCOUNTER — OFFICE VISIT (OUTPATIENT)
Dept: BARIATRICS | Facility: CLINIC | Age: 50
End: 2020-02-19

## 2020-02-19 VITALS
SYSTOLIC BLOOD PRESSURE: 112 MMHG | DIASTOLIC BLOOD PRESSURE: 78 MMHG | HEART RATE: 90 BPM | TEMPERATURE: 97 F | WEIGHT: 166.5 LBS | HEIGHT: 62 IN | BODY MASS INDEX: 30.64 KG/M2

## 2020-02-19 DIAGNOSIS — E66.9 OBESITY, CLASS I, BMI 30-34.9: Primary | ICD-10-CM

## 2020-02-19 DIAGNOSIS — Z98.84 STATUS POST BARIATRIC SURGERY: Primary | ICD-10-CM

## 2020-02-19 PROBLEM — E66.811 OBESITY, CLASS I, BMI 30-34.9: Status: ACTIVE | Noted: 2020-02-19

## 2020-02-19 PROCEDURE — RECHECK: Performed by: DIETITIAN, REGISTERED

## 2020-02-19 PROCEDURE — 99024 POSTOP FOLLOW-UP VISIT: CPT | Performed by: SURGERY

## 2020-02-19 NOTE — PROGRESS NOTES
Weight Management Nutrition Class     Diagnosis: Obesity    Bariatric Surgeon: Dr Hima Holliday    Surgery: RNY Laproscopic revision    Class: first post op note    Topics discussed today include:     fluid goals post op, protein goals post op, constipation, chew food well, diet progression, protein supplems, vitamin/mineral supplements, calcium supplements, additional vitamin B12, iron supplements and fat soluble vitamins     Patient was able to verbalize basic diet (protein, fluid, vitamin and mineral) recommendations and possible nutrition-related complications   Yes

## 2020-02-19 NOTE — PROGRESS NOTES
POST OP UP VISIT - BARIATRIC SURGERY  Gigi Reed 52 y o  female MRN: [id number]  Unit/Bed#:  Encounter: [id number]      HPI:  Gigi Reed is a 52 y o  female status post robotic gastric bypass revision  Here for first postop appointment        Review of Systems    Historical Information   Past Medical History:   Diagnosis Date    Anemia     recurrent    Anxiety     Asthma     allergy induced    Contact lens overwear of both eyes     Depression     Diarrhea     Environmental allergies     Fibromyalgia     Fibromyalgia, primary     GERD (gastroesophageal reflux disease)     History of transfusion         Hypertension     Hypogammaglobulinemia (HCC)     Hypoglycemia after GI (gastrointestinal) surgery     Immune deficiency disorder (HCC)     IgG deficiency    Iron (Fe) deficiency anemia     Kidney stone     Lumbar herniated disc     Migraine     Motion sickness     PONV (postoperative nausea and vomiting)     severe    Seasonal allergies     Wears glasses      Past Surgical History:   Procedure Laterality Date    ABDOMINAL SURGERY      adhesions    ABDOMINOPLASTY      APPENDECTOMY      AUGMENTATION MAMMAPLASTY  2003    BREAST IMPLANT REMOVAL Bilateral 2019    BREAST SURGERY      implant removal     SECTION      CHOLECYSTECTOMY      COSMETIC SURGERY Bilateral     breast augmentation 2004    GASTRIC BYPASS      2009    GASTRIC BYPASS LAPAROSCOPIC N/A 2020    Procedure: ROBOTIC REVISION OF KENYA-EN-Y GASTRIC BYPASS, INTRAOP EGD;  Surgeon: Oriana Celis MD;  Location[facility];  Service: [address] [place] [address]      HYSTERECTOMY  2015    KNEE ARTHROSCOPY Left     LYMPH NODE BIOPSY  200-    benign    NERVE BLOCK      LA [place] W/SESMDC W/RESCJ PROX PHAL Left 2016    Procedure: SURGICAL MODIFIED WATKINS BUNIONECTOMY FIRST MTPJ;  Surgeon: Radhika Florence DPM;  Location: [facility];  Service: Podiatry    LA FUSION FOOT BONE,MIDTARSAL,1 JT Left 9/9/2016    Procedure: FRIST TMJ FUSION ;  Surgeon: India Corbett DPM;  [facility];  Service: Podiatry    NV OSTEOTOMY METATARSAL (NOT 1ST) Left 9/9/2016    Procedure: FOOT SHORTENING OSTEOTOMIES 2ND AND 3RD METATARSAL WITH EXTENSER TENDON RELEASE 3RD AND 4TH TOE;  Surgeon: India Corbett DPM;  [facility];  Service: Podiatry    NV REMOVAL DEEP IMPLANT Left 12/7/2016    Procedure: REMOVAL SYMPTOMATIC  HARDWARE FIRST RAY,RELATED CONTRACTURE SECOND AND THIRD TOES WITH SUSPENSION FOURTH TOE;  Surgeon: India Corbett DPM;  [facility];  Service: Podiatry    SALPINGOOPHORECTOMY Left 2016    with removal of cervix    TONSILLECTOMY      WISDOM TOOTH EXTRACTION       Social History   Social History     Substance and Sexual Activity   Alcohol Use Yes    Frequency: Monthly or less    Binge frequency: Less than monthly    Comment: rarely     Social History     Substance and Sexual Activity   Drug Use No     Social History     Tobacco Use   Smoking Status Never Smoker   Smokeless Tobacco Never Used     Family History: non-contributory    Meds/Allergies   all medications and allergies reviewed  Allergies   Allergen Reactions    Codeine GI Intolerance and Chest Pain     ABDOMINAL PAIN    Hydrocodone Abdominal Pain and Swelling    Pregabalin Dizziness    Acetaminophen-Codeine Abdominal Pain    Amoxicillin Rash       Objective       Current Vitals:   Blood Pressure: 112/78 (02/19/20 0857)  Pulse: 90 (02/19/20 0857)  Temperature: (!) 97 °F (36 1 °C) (02/19/20 0857)  Temp Source: Tympanic (02/19/20 0857)  Height: 5' 2" (157 5 cm) (02/19/20 0857)  Weight - Scale: 75 5 kg (166 lb 8 oz) (02/19/20 0857)      Invasive Devices     None                 Physical Exam   Constitutional: She is oriented to person, place, and time  She appears well-developed  No distress  HENT:   Head: Normocephalic and atraumatic     Right Ear: External ear normal    Left Ear: External ear normal    Eyes: Conjunctivae are normal  Right eye exhibits no discharge  Left eye exhibits no discharge  No scleral icterus  Abdominal: Soft  Bowel sounds are normal  She exhibits no distension and no mass  There is no tenderness  There is no rebound and no guarding  Abdomen is obese  Benign to examination  Incisions are healing well with no evidence of infection  Neurological: She is alert and oriented to person, place, and time  Skin: She is not diaphoretic  Psychiatric: She has a normal mood and affect  Her behavior is normal  Judgment and thought content normal    Vitals reviewed  Pathology, and Other Studies: I have personally reviewed pertinent reports  A  Stomach and small bowel, revision of Eveline-en-Y gastric bypass:  - Porion of stomach with no pathologic abnormality   - Portion of small intestine with no pathologic abnormality  Assessment/PLAN:    52 y o  female status post robotic Eveline-en-Y revision with paraesophageal hernia repair, partial gastrectomy and small bowel resection  Doing well post op  No major issues  Increase physical activity as tolerated and as instructed  Advance diet as instructed by our dietitians today and as indicated in the binder  Follow up in one month as scheduled                Marjorie Milton MD  2/19/2020  9:40 AM

## 2020-04-24 DIAGNOSIS — L65.9 HAIR LOSS: ICD-10-CM

## 2020-04-24 DIAGNOSIS — R11.2 NAUSEA AND VOMITING, INTRACTABILITY OF VOMITING NOT SPECIFIED, UNSPECIFIED VOMITING TYPE: ICD-10-CM

## 2020-04-24 DIAGNOSIS — Z98.84 STATUS POST BARIATRIC SURGERY: Primary | ICD-10-CM

## 2020-04-24 DIAGNOSIS — Z98.84 S/P GASTRIC BYPASS: ICD-10-CM

## 2020-04-24 DIAGNOSIS — K91.2 POSTSURGICAL MALABSORPTION: ICD-10-CM

## 2020-04-24 DIAGNOSIS — R10.13 EPIGASTRIC PAIN: ICD-10-CM

## 2020-04-24 DIAGNOSIS — Z86.39 HISTORY OF NON ANEMIC VITAMIN B12 DEFICIENCY: ICD-10-CM

## 2020-04-24 DIAGNOSIS — K90.2 BLIND LOOP SYNDROME: ICD-10-CM

## 2020-04-24 DIAGNOSIS — R10.9 ABDOMINAL PAIN: ICD-10-CM

## 2020-04-24 DIAGNOSIS — R42 DIZZINESS: ICD-10-CM

## 2020-04-24 DIAGNOSIS — M25.50 JOINT PAIN: ICD-10-CM

## 2020-04-24 DIAGNOSIS — E53.8 LOW VITAMIN B12 LEVEL: ICD-10-CM

## 2020-04-24 DIAGNOSIS — K90.2 POSTOPERATIVE BLIND LOOP SYNDROME: ICD-10-CM

## 2020-04-28 DIAGNOSIS — K91.2 POSTSURGICAL MALABSORPTION: ICD-10-CM

## 2020-04-28 DIAGNOSIS — E66.9 OBESITY, CLASS I, BMI 30-34.9: ICD-10-CM

## 2020-04-28 DIAGNOSIS — R11.2 NAUSEA AND VOMITING: ICD-10-CM

## 2020-04-28 DIAGNOSIS — K90.2 POSTOPERATIVE BLIND LOOP SYNDROME: ICD-10-CM

## 2020-04-28 DIAGNOSIS — I10 BENIGN ESSENTIAL HTN: ICD-10-CM

## 2020-04-28 DIAGNOSIS — R42 DIZZINESS: ICD-10-CM

## 2020-04-28 DIAGNOSIS — Z98.84 STATUS POST BARIATRIC SURGERY: ICD-10-CM

## 2020-04-28 DIAGNOSIS — R10.13 EPIGASTRIC PAIN: ICD-10-CM

## 2020-04-28 DIAGNOSIS — K21.9 GASTROESOPHAGEAL REFLUX DISEASE: Primary | ICD-10-CM

## 2020-05-13 ENCOUNTER — APPOINTMENT (OUTPATIENT)
Dept: LAB | Facility: HOSPITAL | Age: 50
End: 2020-05-13
Payer: COMMERCIAL

## 2020-05-13 ENCOUNTER — TRANSCRIBE ORDERS (OUTPATIENT)
Dept: LAB | Facility: HOSPITAL | Age: 50
End: 2020-05-13

## 2020-05-13 DIAGNOSIS — Z98.84 STATUS POST BARIATRIC SURGERY: ICD-10-CM

## 2020-05-13 DIAGNOSIS — R42 DIZZINESS: ICD-10-CM

## 2020-05-13 DIAGNOSIS — R11.2 NAUSEA AND VOMITING: ICD-10-CM

## 2020-05-13 DIAGNOSIS — K91.2 POSTSURGICAL MALABSORPTION: ICD-10-CM

## 2020-05-13 DIAGNOSIS — R10.9 ABDOMINAL PAIN: ICD-10-CM

## 2020-05-13 DIAGNOSIS — Z98.84 S/P GASTRIC BYPASS: ICD-10-CM

## 2020-05-13 DIAGNOSIS — K90.2 POSTOPERATIVE BLIND LOOP SYNDROME: ICD-10-CM

## 2020-05-13 DIAGNOSIS — I10 BENIGN ESSENTIAL HTN: ICD-10-CM

## 2020-05-13 DIAGNOSIS — E53.8 LOW VITAMIN B12 LEVEL: ICD-10-CM

## 2020-05-13 DIAGNOSIS — K21.9 GASTROESOPHAGEAL REFLUX DISEASE: ICD-10-CM

## 2020-05-13 DIAGNOSIS — E66.9 OBESITY, CLASS I, BMI 30-34.9: ICD-10-CM

## 2020-05-13 DIAGNOSIS — M25.50 JOINT PAIN: ICD-10-CM

## 2020-05-13 DIAGNOSIS — K90.2 BLIND LOOP SYNDROME: ICD-10-CM

## 2020-05-13 DIAGNOSIS — L65.9 HAIR LOSS: ICD-10-CM

## 2020-05-13 DIAGNOSIS — Z98.84 BARIATRIC SURGERY STATUS: ICD-10-CM

## 2020-05-13 DIAGNOSIS — R10.13 EPIGASTRIC PAIN: ICD-10-CM

## 2020-05-13 DIAGNOSIS — Z86.39 HISTORY OF NON ANEMIC VITAMIN B12 DEFICIENCY: ICD-10-CM

## 2020-05-13 DIAGNOSIS — R11.2 NAUSEA AND VOMITING, INTRACTABILITY OF VOMITING NOT SPECIFIED, UNSPECIFIED VOMITING TYPE: ICD-10-CM

## 2020-05-13 LAB
25(OH)D3 SERPL-MCNC: 104.2 NG/ML (ref 30–100)
ANION GAP SERPL CALCULATED.3IONS-SCNC: 8 MMOL/L (ref 4–13)
BUN SERPL-MCNC: 13 MG/DL (ref 7–25)
CALCIUM SERPL-MCNC: 9.4 MG/DL (ref 8.6–10.5)
CHLORIDE SERPL-SCNC: 103 MMOL/L (ref 98–107)
CO2 SERPL-SCNC: 29 MMOL/L (ref 21–31)
CREAT SERPL-MCNC: 0.72 MG/DL (ref 0.6–1.2)
FERRITIN SERPL-MCNC: 208 NG/ML (ref 8–388)
FOLATE SERPL-MCNC: >20 NG/ML (ref 3.1–17.5)
GFR SERPL CREATININE-BSD FRML MDRD: 99 ML/MIN/1.73SQ M
GLUCOSE P FAST SERPL-MCNC: 90 MG/DL (ref 65–99)
IRON SATN MFR SERPL: 29 %
IRON SERPL-MCNC: 113 UG/DL (ref 50–170)
POTASSIUM SERPL-SCNC: 4.3 MMOL/L (ref 3.5–5.5)
PTH-INTACT SERPL-MCNC: 76.5 PG/ML (ref 18.4–80.1)
SODIUM SERPL-SCNC: 140 MMOL/L (ref 134–143)
TIBC SERPL-MCNC: 388 UG/DL (ref 250–450)
VIT B12 SERPL-MCNC: 1304 PG/ML (ref 100–900)

## 2020-05-13 PROCEDURE — 80048 BASIC METABOLIC PNL TOTAL CA: CPT

## 2020-05-13 PROCEDURE — 82728 ASSAY OF FERRITIN: CPT

## 2020-05-13 PROCEDURE — 84630 ASSAY OF ZINC: CPT

## 2020-05-13 PROCEDURE — 84425 ASSAY OF VITAMIN B-1: CPT

## 2020-05-13 PROCEDURE — 82746 ASSAY OF FOLIC ACID SERUM: CPT

## 2020-05-13 PROCEDURE — 83970 ASSAY OF PARATHORMONE: CPT

## 2020-05-13 PROCEDURE — 83550 IRON BINDING TEST: CPT

## 2020-05-13 PROCEDURE — 83918 ORGANIC ACIDS TOTAL QUANT: CPT

## 2020-05-13 PROCEDURE — 36415 COLL VENOUS BLD VENIPUNCTURE: CPT

## 2020-05-13 PROCEDURE — 83540 ASSAY OF IRON: CPT

## 2020-05-13 PROCEDURE — 84590 ASSAY OF VITAMIN A: CPT

## 2020-05-13 PROCEDURE — 82607 VITAMIN B-12: CPT

## 2020-05-13 PROCEDURE — 82306 VITAMIN D 25 HYDROXY: CPT

## 2020-05-15 LAB
METHYLMALONATE SERPL-SCNC: 100 NMOL/L (ref 0–378)
SL AMB DISCLAIMER: NORMAL
VIT B1 BLD-SCNC: 250.4 NMOL/L (ref 66.5–200)
ZINC SERPL-MCNC: 91 UG/DL (ref 56–134)

## 2020-05-16 LAB — VIT A SERPL-MCNC: 35.9 UG/DL (ref 20.1–62)

## 2020-05-28 DIAGNOSIS — K63.89 BACTERIAL OVERGROWTH SYNDROME: Primary | ICD-10-CM

## 2020-05-28 RX ORDER — CIPROFLOXACIN 250 MG/1
250 TABLET, FILM COATED ORAL EVERY 12 HOURS SCHEDULED
Qty: 28 TABLET | Refills: 0 | Status: SHIPPED | OUTPATIENT
Start: 2020-05-28 | End: 2020-06-11

## 2020-06-01 DIAGNOSIS — Z98.84 BARIATRIC SURGERY STATUS: ICD-10-CM

## 2020-06-01 RX ORDER — OMEPRAZOLE 20 MG/1
CAPSULE, DELAYED RELEASE ORAL
Qty: 90 CAPSULE | Refills: 0 | Status: SHIPPED | OUTPATIENT
Start: 2020-06-01 | End: 2020-06-01 | Stop reason: SDUPTHER

## 2020-06-02 RX ORDER — OMEPRAZOLE 20 MG/1
20 CAPSULE, DELAYED RELEASE ORAL DAILY
Qty: 90 CAPSULE | Refills: 0 | Status: SHIPPED | OUTPATIENT
Start: 2020-06-02 | End: 2020-07-08

## 2020-07-01 ENCOUNTER — OFFICE VISIT (OUTPATIENT)
Dept: URGENT CARE | Facility: CLINIC | Age: 50
End: 2020-07-01
Payer: COMMERCIAL

## 2020-07-01 VITALS
OXYGEN SATURATION: 98 % | RESPIRATION RATE: 18 BRPM | SYSTOLIC BLOOD PRESSURE: 143 MMHG | DIASTOLIC BLOOD PRESSURE: 84 MMHG | TEMPERATURE: 98.5 F | HEART RATE: 102 BPM

## 2020-07-01 DIAGNOSIS — J01.90 ACUTE SINUSITIS, RECURRENCE NOT SPECIFIED, UNSPECIFIED LOCATION: Primary | ICD-10-CM

## 2020-07-01 PROCEDURE — 99213 OFFICE O/P EST LOW 20 MIN: CPT | Performed by: PHYSICIAN ASSISTANT

## 2020-07-01 RX ORDER — AMOXICILLIN 875 MG/1
875 TABLET, COATED ORAL 2 TIMES DAILY
Qty: 20 TABLET | Refills: 0 | Status: SHIPPED | OUTPATIENT
Start: 2020-07-01 | End: 2020-07-11

## 2020-07-01 RX ORDER — PREDNISONE 10 MG/1
TABLET ORAL
Qty: 26 TABLET | Refills: 0 | Status: SHIPPED | OUTPATIENT
Start: 2020-07-01 | End: 2022-06-09 | Stop reason: ALTCHOICE

## 2020-07-01 NOTE — PROGRESS NOTES
[facility]    NAME: Tracey Schmidt is a 52 y o  female  : 1970    MRN: [id number]  DATE: 2020  TIME: 11:11 AM    Assessment and Plan   Acute sinusitis, recurrence not specified, unspecified location [J01 90]  1  Acute sinusitis, recurrence not specified, unspecified location  amoxicillin (AMOXIL) 875 mg tablet    predniSONE 10 mg tablet       Patient Instructions     Patient Instructions   I have prescribed an antibiotic for the infection  Please take the antibiotic as prescribed and finish the entire prescription  I recommend that the patient takes an over the counter probiotic or eats yogurt with live cultures in it [place]) to keep good bacteria in the gut and help prevent diarrhea  Wash hands frequently to prevent the spread of infection  Can use over the counter cough and cold medications to help with symptoms  Ibuprofen and/or tylenol as needed for pain or fever  If not improving over the next 7-10 days, follow up with PCP  Chief Complaint     Chief Complaint   Patient presents with    Sinus Problem     Pt c/o sinus ncongestion for three days  History of Present Illness   80-year-old female here with complaint of sinus congestion sinus pressure for the last week  No fever or chills  Has postnasal drip and a slight cough with sore throat  Has a taking her allergy medications with minimal relief  Review of Systems   Review of Systems   Constitutional: Negative for appetite change, chills and fever  HENT: Positive for congestion, postnasal drip, sinus pressure and sore throat  Negative for ear discharge, ear pain, facial swelling and sneezing  Respiratory: Negative for cough, shortness of breath and wheezing  Neurological: Positive for headaches         Current Medications     Current Outpatient Medications:     acetaminophen (TYLENOL) 325 mg tablet, Take 3 tablets (975 mg total) by mouth every 8 (eight) hours, Disp: 30 tablet, Rfl: 0   albuterol (PROVENTIL HFA,VENTOLIN HFA) 90 mcg/act inhaler, Inhale 1 puff every 6 (six) hours as needed , Disp: , Rfl:     ALPRAZolam (XANAX) 2 MG tablet, Take 0 5 mg by mouth daily at bedtime , Disp: , Rfl:     amoxicillin (AMOXIL) 875 mg tablet, Take 1 tablet (875 mg total) by mouth 2 (two) times a day for 10 days, Disp: 20 tablet, Rfl: 0    Azelastine-Fluticasone (DYMISTA NA), 2 sprays into each nostril 2 (two) times a day , Disp: , Rfl:     Botulinum Toxin Type A 200 units SOLR, Inject 155 units into face and neck IM every 90 days, Disp: , Rfl:     budesonide-formoterol (SYMBICORT) 160-4 5 mcg/act inhaler, Inhale 2 puffs 2 (two) times a day , Disp: , Rfl:     buPROPion (WELLBUTRIN) 100 mg tablet, Take 100 mg by mouth 2 (two) times a day , Disp: , Rfl:     celecoxib (CELEBREX) 200 mg capsule, Take 200 mg by mouth 2 (two) times a day as needed , Disp: , Rfl:     cholecalciferol (VITAMIN D3) 29008 units capsule, Take 5,000 Units by mouth 2 (two) times a day , Disp: , Rfl:     Cyanocobalamin (B-12) 1000 MCG/ML KIT, Inject as directed every 30 (thirty) days  , Disp: , Rfl:     Cyanocobalamin (VITAMIN B-12 PO), Place 100 mg under the tongue daily , Disp: , Rfl:     cyclobenzaprine (FLEXERIL) 10 mg tablet, Take 10 mg by mouth 3 (three) times a day as needed , Disp: , Rfl:     Erenumab-aooe (AIMOVIG) 70 MG/ML SOAJ, Inject under the skin every 28 days, Disp: , Rfl:     fexofenadine (ALLEGRA) 180 MG tablet, Take 180 mg by mouth daily, Disp: , Rfl:     hyoscyamine (LEVSIN/SL) 0 125 mg SL tablet, Take 0 125 mg by mouth every 4 (four) hours as needed for cramping, Disp: , Rfl:     Immune Globulin, Human, (PRIVIGEN IV), Infuse into a venous catheter every 30 (thirty) days, Disp: , Rfl:     losartan (COZAAR) 50 mg tablet, Take 50 mg by mouth 2 (two) times a day , Disp: , Rfl:     meclizine (ANTIVERT) 25 mg tablet, Take 1 tablet (25 mg total) by mouth 3 (three) times a day as needed for dizziness for up to 12 doses, Disp: 12 tablet, Rfl: 0    montelukast (SINGULAIR) 5 mg chewable tablet, Chew 5 mg 2 (two) times a day , Disp: , Rfl:     omeprazole (PriLOSEC) 20 mg delayed release capsule, Take 1 capsule (20 mg total) by mouth daily, Disp: 90 capsule, Rfl: 0    oxyCODONE (ROXICODONE) 5 mg immediate release tablet, Take 1 tablet (5 mg total) by mouth every 4 (four) hours as needed for moderate pain For continuing therapyMax Daily Amount: 30 mg (Patient not taking: Reported on 2/19/2020), Disp: 10 tablet, Rfl: 0    predniSONE 10 mg tablet, Take 3 tabs BID X 2 days, 2 tabs BID X 2 days, 1 tab BID X 2 days, 1 tab daily X 2 days, Disp: 26 tablet, Rfl: 0    SUMAtriptan (IMITREX) 100 mg tablet, Take 100 mg by mouth 2 (two) times a day as needed for migraine, Disp: , Rfl:     traMADol (ULTRAM) 50 mg tablet, Take 50 mg by mouth 2 (two) times a day , Disp: , Rfl:     Current Allergies     Allergies as of 07/01/2020 - Reviewed 07/01/2020   Allergen Reaction Noted    Codeine GI Intolerance and Chest Pain 11/15/2012    Hydrocodone Abdominal Pain and Swelling 03/06/2019    Pregabalin Dizziness     Acetaminophen-codeine Abdominal Pain 10/09/2018    Amoxicillin Rash 10/09/2018          The following portions of the patient's history were reviewed and updated as appropriate: allergies, current medications, past family history, past medical history, past social history, past surgical history and problem list    Past Medical History:   Diagnosis Date    Anemia     recurrent    Anxiety     Asthma     allergy induced    Contact lens overwear of both eyes     Depression     Diarrhea     Environmental allergies     Fibromyalgia     Fibromyalgia, primary     GERD (gastroesophageal reflux disease)     History of transfusion     2008    Hypertension     Hypogammaglobulinemia ([facility] [address] )     Hypoglycemia after GI (gastrointestinal) surgery     Immune deficiency disorder (HCC)     IgG deficiency    Iron (Fe) deficiency anemia  Kidney stone     Lumbar herniated disc     Migraine     Motion sickness     PONV (postoperative nausea and vomiting)     severe    Seasonal allergies     Wears glasses      Past Surgical History:   Procedure Laterality Date    ABDOMINAL SURGERY      adhesions    ABDOMINOPLASTY      APPENDECTOMY      AUGMENTATION MAMMAPLASTY  2003    BREAST IMPLANT REMOVAL Bilateral 2019    BREAST SURGERY      implant removal     SECTION      CHOLECYSTECTOMY      COSMETIC SURGERY Bilateral     breast augmentation 2004    GASTRIC BYPASS      2009    GASTRIC BYPASS LAPAROSCOPIC N/A 2020    Procedure: ROBOTIC REVISION OF KENYA-EN-Y GASTRIC BYPASS, INTRAOP EGD;  Surgeon: Jefferson Garcia MD;  [facility];  Service: [address] [place], [place] [address]      HYSTERECTOMY  2015    KNEE ARTHROSCOPY Left     LYMPH NODE BIOPSY      benign    NERVE BLOCK      SC CORRJ HALLUX VALGUS W/SESMDC W/RESCJ PROX PHAL Left 2016    Procedure: SURGICAL MODIFIED WATKINS BUNIONECTOMY FIRST MTPJ;  Surgeon: Neha Mccall DPM;  [facility];  Service: Podiatry    SC FUSION FOOT BONE,MIDTARSAL,1 JT Left 2016    Procedure: FRIST TMJ FUSION ;  Surgeon: Neha Mccall DPM;  [facility];  Service: [facility] [address] (NOT [address]) Left 2016    Procedure: FOOT SHORTENING OSTEOTOMIES 2ND AND 3RD METATARSAL WITH EXTENSER TENDON RELEASE 3RD AND 4TH TOE;  Surgeon: Neha Mccall DPM;  [facility];  Service: Podiatry    SC REMOVAL DEEP IMPLANT Left 2016    Procedure: REMOVAL SYMPTOMATIC  HARDWARE FIRST RAY,RELATED CONTRACTURE SECOND AND THIRD TOES WITH SUSPENSION FOURTH TOE;  Surgeon: Neha Mccall DPM;  [facility];  Service: Podiatry    SALPINGOOPHORECTOMY Left 2016    with removal of cervix    TONSILLECTOMY      WISDOM TOOTH EXTRACTION       Family History   Problem Relation Age of Onset    Lymphoma Mother     COPD Mother  Arthritis Mother     Heart disease Father     Breast cancer Maternal Aunt     Breast cancer Cousin      Social History     Socioeconomic History    Marital status: /Civil Union     Spouse name: Not on file    Number of children: Not on file    Years of education: Not on file    Highest education level: Not on file   Occupational History    Not on file   Social Needs    Financial resource strain: Not on file    Food insecurity:     Worry: Not on file     Inability: Not on file    Transportation needs:     Medical: Not on file     Non-medical: Not on file   Tobacco Use    Smoking status: Never Smoker    Smokeless tobacco: Never Used   Substance and Sexual Activity    Alcohol use: Yes     Frequency: Monthly or less     Binge frequency: Less than monthly     Comment: rarely    Drug use: No    Sexual activity: Not on file   Lifestyle    Physical activity:     Days per week: Not on file     Minutes per session: Not on file    Stress: Not on file   Relationships    Social connections:     Talks on phone: Not on file     Gets together: Not on file     Attends [employer] service: Not on file     Active member of club or organization: Not on file     Attends meetings of clubs or organizations: Not on file     Relationship status: Not on file    Intimate partner violence:     Fear of current or ex partner: Not on file     Emotionally abused: Not on file     Physically abused: Not on file     Forced sexual activity: Not on file   Other Topics Concern    Not on file   Social History Narrative    Consumes on average 3 cups of coffee per day     Medications have been verified  Objective   /84   Pulse 102   Temp 98 5 °F (36 9 °C)   Resp 18   SpO2 98%      Physical Exam   Physical Exam   Constitutional: She appears well-developed and well-nourished  No distress  HENT:   Head: Normocephalic and atraumatic     Right Ear: Tympanic membrane normal    Left Ear: Tympanic membrane normal  Nose: Mucosal edema present  No rhinorrhea  Right sinus exhibits maxillary sinus tenderness and frontal sinus tenderness  Left sinus exhibits maxillary sinus tenderness and frontal sinus tenderness  Mouth/Throat: Posterior oropharyngeal erythema present  No oropharyngeal exudate or posterior oropharyngeal edema  Eyes: Conjunctivae are normal    Neck: Normal range of motion  Cardiovascular: Normal rate, regular rhythm and normal heart sounds  No murmur heard  Pulmonary/Chest: Effort normal and breath sounds normal  No respiratory distress  Nursing note and vitals reviewed

## 2020-07-07 DIAGNOSIS — Z98.84 BARIATRIC SURGERY STATUS: ICD-10-CM

## 2020-07-08 RX ORDER — OMEPRAZOLE 20 MG/1
20 CAPSULE, DELAYED RELEASE ORAL DAILY
Qty: 90 CAPSULE | Refills: 0 | Status: SHIPPED | OUTPATIENT
Start: 2020-07-08 | End: 2020-09-08

## 2020-07-08 RX ORDER — OMEPRAZOLE 20 MG/1
CAPSULE, DELAYED RELEASE ORAL
Qty: 90 CAPSULE | Refills: 0 | Status: SHIPPED | OUTPATIENT
Start: 2020-07-08 | End: 2020-07-08

## 2020-08-03 ENCOUNTER — TELEPHONE (OUTPATIENT)
Dept: BARIATRICS | Facility: CLINIC | Age: 50
End: 2020-08-03

## 2020-08-03 NOTE — TELEPHONE ENCOUNTER
Spoke w/pt she said she will call to schedule appointment was offered virtual appointment and pt stated will think about it     ----- Message from Isidoro Hackett RN sent at 2020  4:04 AM EDT -----  Regardin month f/u appointment  Please call patient to schedule 6 month f/u appointment  Patient had stated that she would like to wait until after her blood work to schedule which was around 20  Thank you

## 2020-08-12 ENCOUNTER — TRANSCRIBE ORDERS (OUTPATIENT)
Dept: NON INVASIVE DIAGNOSTICS | Facility: CLINIC | Age: 50
End: 2020-08-12

## 2020-08-12 DIAGNOSIS — R00.0 TACHYCARDIA: Primary | ICD-10-CM

## 2020-08-13 ENCOUNTER — HOSPITAL ENCOUNTER (OUTPATIENT)
Dept: NON INVASIVE DIAGNOSTICS | Facility: CLINIC | Age: 50
Discharge: HOME | End: 2020-08-13
Payer: COMMERCIAL

## 2020-08-13 DIAGNOSIS — R00.0 TACHYCARDIA: ICD-10-CM

## 2020-08-13 PROCEDURE — 93226 XTRNL ECG REC<48 HR SCAN A/R: CPT

## 2020-08-13 PROCEDURE — 93225 XTRNL ECG REC<48 HRS REC: CPT

## 2020-08-17 PROCEDURE — 93227 XTRNL ECG REC<48 HR R&I: CPT | Performed by: INTERNAL MEDICINE

## 2020-08-19 ENCOUNTER — TELEPHONE (OUTPATIENT)
Dept: HEMATOLOGY ONCOLOGY | Facility: CLINIC | Age: 50
End: 2020-08-19

## 2020-08-19 NOTE — TELEPHONE ENCOUNTER
New Patient Encounter    New Patient Intake Form   Patient Details:  Yee Gee  1970  [id number]    Background Information:  [address] starts by opening a telephone encounter and gathering the following information   Who is calling to schedule? If not self, relationship to patient? adelina / Damion Booth   Referring Provider Dr Lary Smith   What is the diagnosis? hypogammaglobulinemia      Is this diagnosis confirmed? Yes   When was the diagnosis? Is there a confirmed diagnosis from a biopsy/tissue reviewed by pathology? Were outside slides requested? No   Is patient aware of diagnosis? Yes   Is there a personal history and what kind? Is there a family history and what kind? Reason for visit? History Of   Have you had any imaging or labs done? If so: when, where? yes  [facility]   Are records in [email or web address]? yes   If patient has a prior history of breast cancer were old records obtained? NA   Was the patient told to bring a disk? no   Does the patient smoke or Vape? no   If yes, how many packs or cartridges per day? Scheduling Information:   Preferred [place]:  [place]     Are there any dates/time the patient cannot be seen? Miscellaneous: pt transferring from [facility], records in care everywhere   After completing the above information, please route to Financial Counselor and the appropriate Nurse Navigator for review

## 2020-09-07 DIAGNOSIS — Z98.84 BARIATRIC SURGERY STATUS: ICD-10-CM

## 2020-09-08 RX ORDER — OMEPRAZOLE 20 MG/1
CAPSULE, DELAYED RELEASE ORAL
Qty: 90 CAPSULE | Refills: 0 | Status: SHIPPED | OUTPATIENT
Start: 2020-09-08 | End: 2020-11-23

## 2020-09-15 ENCOUNTER — TELEPHONE (OUTPATIENT)
Dept: HEMATOLOGY ONCOLOGY | Facility: CLINIC | Age: 50
End: 2020-09-15

## 2020-09-16 ENCOUNTER — APPOINTMENT (OUTPATIENT)
Dept: LAB | Facility: CLINIC | Age: 50
End: 2020-09-16
Payer: COMMERCIAL

## 2020-09-16 ENCOUNTER — CONSULT (OUTPATIENT)
Dept: HEMATOLOGY ONCOLOGY | Facility: CLINIC | Age: 50
End: 2020-09-16
Payer: COMMERCIAL

## 2020-09-16 VITALS
RESPIRATION RATE: 16 BRPM | WEIGHT: 165.6 LBS | TEMPERATURE: 97.7 F | BODY MASS INDEX: 30.47 KG/M2 | OXYGEN SATURATION: 99 % | HEART RATE: 92 BPM | SYSTOLIC BLOOD PRESSURE: 126 MMHG | DIASTOLIC BLOOD PRESSURE: 80 MMHG | HEIGHT: 62 IN

## 2020-09-16 DIAGNOSIS — Z98.84 H/O GASTRIC BYPASS: ICD-10-CM

## 2020-09-16 DIAGNOSIS — D80.1 HYPOGAMMAGLOBULINEMIA (HCC): Primary | ICD-10-CM

## 2020-09-16 DIAGNOSIS — D80.1 HYPOGAMMAGLOBULINEMIA (HCC): ICD-10-CM

## 2020-09-16 LAB
ALBUMIN SERPL BCP-MCNC: 4.2 G/DL (ref 3.5–5)
ALP SERPL-CCNC: 95 U/L (ref 46–116)
ALT SERPL W P-5'-P-CCNC: 21 U/L (ref 12–78)
ANION GAP SERPL CALCULATED.3IONS-SCNC: 5 MMOL/L (ref 4–13)
AST SERPL W P-5'-P-CCNC: 17 U/L (ref 5–45)
BASOPHILS # BLD AUTO: 0.04 THOUSANDS/ΜL (ref 0–0.1)
BASOPHILS NFR BLD AUTO: 1 % (ref 0–1)
BILIRUB SERPL-MCNC: 0.78 MG/DL (ref 0.2–1)
BUN SERPL-MCNC: 10 MG/DL (ref 5–25)
CALCIUM SERPL-MCNC: 9.7 MG/DL (ref 8.3–10.1)
CHLORIDE SERPL-SCNC: 106 MMOL/L (ref 100–108)
CO2 SERPL-SCNC: 27 MMOL/L (ref 21–32)
CREAT SERPL-MCNC: 0.72 MG/DL (ref 0.6–1.3)
CRP SERPL QL: 5.5 MG/L
EOSINOPHIL # BLD AUTO: 0.1 THOUSAND/ΜL (ref 0–0.61)
EOSINOPHIL NFR BLD AUTO: 1 % (ref 0–6)
ERYTHROCYTE [DISTWIDTH] IN BLOOD BY AUTOMATED COUNT: 13.2 % (ref 11.6–15.1)
ERYTHROCYTE [SEDIMENTATION RATE] IN BLOOD: 19 MM/HOUR (ref 0–19)
FERRITIN SERPL-MCNC: 184 NG/ML (ref 8–388)
GFR SERPL CREATININE-BSD FRML MDRD: 99 ML/MIN/1.73SQ M
GLUCOSE P FAST SERPL-MCNC: 88 MG/DL (ref 65–99)
HCT VFR BLD AUTO: 41.7 % (ref 34.8–46.1)
HGB BLD-MCNC: 13.9 G/DL (ref 11.5–15.4)
IGA SERPL-MCNC: 139 MG/DL (ref 70–400)
IGG SERPL-MCNC: 637 MG/DL (ref 700–1600)
IGM SERPL-MCNC: 43 MG/DL (ref 40–230)
IMM GRANULOCYTES # BLD AUTO: 0.02 THOUSAND/UL (ref 0–0.2)
IMM GRANULOCYTES NFR BLD AUTO: 0 % (ref 0–2)
IRON SATN MFR SERPL: 25 %
IRON SERPL-MCNC: 102 UG/DL (ref 50–170)
LDH SERPL-CCNC: 131 U/L (ref 81–234)
LYMPHOCYTES # BLD AUTO: 2 THOUSANDS/ΜL (ref 0.6–4.47)
LYMPHOCYTES NFR BLD AUTO: 25 % (ref 14–44)
MCH RBC QN AUTO: 30.5 PG (ref 26.8–34.3)
MCHC RBC AUTO-ENTMCNC: 33.3 G/DL (ref 31.4–37.4)
MCV RBC AUTO: 91 FL (ref 82–98)
MONOCYTES # BLD AUTO: 0.43 THOUSAND/ΜL (ref 0.17–1.22)
MONOCYTES NFR BLD AUTO: 5 % (ref 4–12)
NEUTROPHILS # BLD AUTO: 5.42 THOUSANDS/ΜL (ref 1.85–7.62)
NEUTS SEG NFR BLD AUTO: 68 % (ref 43–75)
NRBC BLD AUTO-RTO: 0 /100 WBCS
PLATELET # BLD AUTO: 317 THOUSANDS/UL (ref 149–390)
PMV BLD AUTO: 9.8 FL (ref 8.9–12.7)
POTASSIUM SERPL-SCNC: 4.2 MMOL/L (ref 3.5–5.3)
PROT SERPL-MCNC: 7.5 G/DL (ref 6.4–8.2)
RBC # BLD AUTO: 4.56 MILLION/UL (ref 3.81–5.12)
SODIUM SERPL-SCNC: 138 MMOL/L (ref 136–145)
TIBC SERPL-MCNC: 401 UG/DL (ref 250–450)
TSH SERPL DL<=0.05 MIU/L-ACNC: 2.62 UIU/ML (ref 0.36–3.74)
VIT B12 SERPL-MCNC: 3241 PG/ML (ref 100–900)
WBC # BLD AUTO: 8.01 THOUSAND/UL (ref 4.31–10.16)

## 2020-09-16 PROCEDURE — 82728 ASSAY OF FERRITIN: CPT

## 2020-09-16 PROCEDURE — 82607 VITAMIN B-12: CPT

## 2020-09-16 PROCEDURE — 83540 ASSAY OF IRON: CPT

## 2020-09-16 PROCEDURE — 99244 OFF/OP CNSLTJ NEW/EST MOD 40: CPT | Performed by: INTERNAL MEDICINE

## 2020-09-16 PROCEDURE — 82784 ASSAY IGA/IGD/IGG/IGM EACH: CPT

## 2020-09-16 PROCEDURE — 84166 PROTEIN E-PHORESIS/URINE/CSF: CPT

## 2020-09-16 PROCEDURE — 84165 PROTEIN E-PHORESIS SERUM: CPT

## 2020-09-16 PROCEDURE — 86140 C-REACTIVE PROTEIN: CPT

## 2020-09-16 PROCEDURE — 83883 ASSAY NEPHELOMETRY NOT SPEC: CPT

## 2020-09-16 PROCEDURE — 36415 COLL VENOUS BLD VENIPUNCTURE: CPT

## 2020-09-16 PROCEDURE — 83550 IRON BINDING TEST: CPT

## 2020-09-16 PROCEDURE — 84443 ASSAY THYROID STIM HORMONE: CPT

## 2020-09-16 PROCEDURE — 83615 LACTATE (LD) (LDH) ENZYME: CPT

## 2020-09-16 PROCEDURE — 85025 COMPLETE CBC W/AUTO DIFF WBC: CPT

## 2020-09-16 PROCEDURE — 84166 PROTEIN E-PHORESIS/URINE/CSF: CPT | Performed by: PATHOLOGY

## 2020-09-16 PROCEDURE — 85652 RBC SED RATE AUTOMATED: CPT

## 2020-09-16 PROCEDURE — 84165 PROTEIN E-PHORESIS SERUM: CPT | Performed by: PATHOLOGY

## 2020-09-16 PROCEDURE — 80053 COMPREHEN METABOLIC PANEL: CPT

## 2020-09-16 PROCEDURE — 82525 ASSAY OF COPPER: CPT

## 2020-09-16 PROCEDURE — 82232 ASSAY OF BETA-2 PROTEIN: CPT

## 2020-09-16 RX ORDER — SODIUM CHLORIDE 9 MG/ML
20 INJECTION, SOLUTION INTRAVENOUS ONCE
Status: CANCELLED | OUTPATIENT
Start: 2020-10-07

## 2020-09-16 RX ORDER — DIPHENHYDRAMINE HCL 25 MG
25 TABLET ORAL ONCE
Status: CANCELLED | OUTPATIENT
Start: 2020-10-07

## 2020-09-16 RX ORDER — ACETAMINOPHEN 325 MG/1
650 TABLET ORAL ONCE
Status: CANCELLED | OUTPATIENT
Start: 2020-10-07

## 2020-09-16 NOTE — PROGRESS NOTES
Hematology/Oncology Outpatient Follow-up  Maru River 52 y o  female 1970 [id number]    Date:  9/16/2020    Assessment and Plan:  1  [facility]  The patient seems to have acquired hypogammaglobulinemia with frequent/recurrence upper airway infections and chronic sinusitis which responded nicely to IVIG treatment  The patient was told that we will be able to get her on the IVIG since she seems to be symptomatic with the chronic sinusitis at this point in time  The usual dose of the IVIG is around 400-600 milligram/kilogram on every 3-4 week basis  We will get the information from the previous dosage of the IVIG and get started in the near future to prevent further symptoms  I did also advise her to follow up with her ENT team for management of her chronic sinusitis  - CBC and differential; Future  - Comprehensive metabolic panel; Future  - C-reactive protein; Future  - Sedimentation rate, automated; Future  - LD,Blood; Future  - IgG, IgA, IgM; Future  - Immunoglobulin free LT chains blood; Future  - Protein, Total and Protein Electrophoresis with Immunofixation; Future  - [place]/Lambda Light Chains Free With Ratio,Urine; Future  - IMMUNOFIXATION (RAMOS) AND PROTEIN ELECTROPHORESIS, RANDOM URINE; Future  - Beta 2 microglobulin, serum; Future  - Iron Panel (Includes Ferritin, Iron Sat%, Iron, and TIBC); Future  - Ferritin; Future  - Vitamin B12; Future  - TSH, 3rd generation with Free T4 reflex; Future    2  H/O gastric bypass  We will check her iron level and copper level before her next visit since she had a history of gastric bypass surgery  - Copper Level;  Future      HPI:  This is a 51-year-old female with multiple comorbid conditions including morbid obesity slightest post gastric bypass surgery in 2009, anemia due to iron deficiency, cholecystectomy, hysterectomy, hypertension, gastroesophageal reflux disease, depression, asthma, chronic sinusitis, etc     The patient was apparently found to have hypogammaglobinemia on multiple occasions with frequent/ recurrent infections mainly in the upper respiratory airway with chronic sinusitis  The patient was evaluated by the Hematology service from [facility] and was started on IV Ig around December of 2019 on a monthly basis  She received then 3 sessions of IV Ig which improved her chronic sinusitis and other infectious process, according to the patient, significantly  However, due to the COVID-19 pandemic she stop doing the IVIG and decided to transfer her care to our service since she lives close by  Her immunoglobulin levels from 07/17/2019 before she had any IVIG treatment showed IgG of 554, IgA of 116 and IgM of 39 mg/dL  CBC on 08/06/2020 was entirely normal   B12 also above normal 1336  Interval history:  The patient at this point in time seems to have the recurrence of her chronic sinusitis with the upper respiratory or airway symptoms  She denies bleeding from any sites  ROS: Review of Systems   Constitutional: Positive for fatigue  Negative for activity change, appetite change, chills, diaphoresis, fever and unexpected weight change  HENT: Positive for postnasal drip, sinus pressure, sinus pain and sore throat  Negative for congestion, dental problem, ear discharge, ear pain, facial swelling, hearing loss, mouth sores, nosebleeds, tinnitus and trouble swallowing  Eyes: Negative for discharge, redness, itching and visual disturbance  Respiratory: Positive for shortness of breath  Negative for cough, chest tightness and wheezing  Cardiovascular: Negative for chest pain, palpitations and leg swelling  Gastrointestinal: Positive for diarrhea and nausea  Negative for abdominal distention, abdominal pain, anal bleeding, blood in stool, constipation and vomiting  Genitourinary: Negative for difficulty urinating, dysuria, flank pain, frequency, hematuria and urgency  Musculoskeletal: Positive for arthralgias  Negative for back pain, gait problem, joint swelling, myalgias and neck pain  Skin: Negative for color change, pallor, rash and wound  Neurological: Positive for dizziness, numbness and headaches  Negative for syncope, speech difficulty, weakness and light-headedness  Hematological: Negative for adenopathy  Does not bruise/bleed easily  Psychiatric/Behavioral: Positive for sleep disturbance  Negative for agitation, behavioral problems and confusion         Past Medical History:   Diagnosis Date    Anemia     recurrent    Anxiety     Asthma     allergy induced    Contact lens overwear of both eyes     Depression     Diarrhea     Environmental allergies     Fibromyalgia     Fibromyalgia, primary     GERD (gastroesophageal reflux disease)     History of transfusion         Hypertension     [facility] )     Hypoglycemia after GI (gastrointestinal) surgery     Immune deficiency disorder (HCC)     IgG deficiency    Iron (Fe) deficiency anemia     Kidney stone     Lumbar herniated disc     Migraine     Motion sickness     PONV (postoperative nausea and vomiting)     severe    Seasonal allergies     Wears glasses        Past Surgical History:   Procedure Laterality Date    ABDOMINAL SURGERY      adhesions    ABDOMINOPLASTY      APPENDECTOMY      AUGMENTATION MAMMAPLASTY  2003    BREAST IMPLANT REMOVAL Bilateral 2019    BREAST SURGERY      implant removal     SECTION      CHOLECYSTECTOMY      COSMETIC SURGERY Bilateral     breast augmentation 2004    GASTRIC BYPASS      2009    GASTRIC BYPASS LAPAROSCOPIC N/A 2020    Procedure: ROBOTIC REVISION OF KENYA-EN-Y GASTRIC BYPASS, INTRAOP EGD;  Surgeon: Traci Rangel MD;  [facility];  Service: Bariatrics    HIATAL HERNIA REPAIR      HYSTERECTOMY      KNEE ARTHROSCOPY Left     LYMPH NODE BIOPSY  -    benign    NERVE BLOCK      NY CORRJ HALLUX VALGUS W/SESMDC W/RESCJ PROX PHAL Left 9/9/2016    Procedure: SURGICAL MODIFIED WATKINS BUNIONECTOMY FIRST MTPJ;  Surgeon: Rachana Oden DPM;  [facility];  Service: Podiatry    GA FUSION FOOT BONE,MIDTARSAL,1 JT Left 9/9/2016    Procedure: FRIST TMJ FUSION ;  Surgeon: Rachana Oden DPM;  [facility];  Service: Podiatry   [address] (NOT 1ST) Left 9/9/2016    Procedure: FOOT SHORTENING OSTEOTOMIES 2ND AND 3RD METATARSAL WITH EXTENSER TENDON RELEASE 3RD AND 4TH TOE;  Surgeon: Rachana Oden DPM;  [facility];  Service: Podiatry    GA REMOVAL DEEP IMPLANT Left 12/7/2016    Procedure: REMOVAL SYMPTOMATIC  HARDWARE FIRST RAY,RELATED CONTRACTURE SECOND AND THIRD TOES WITH SUSPENSION FOURTH TOE;  Surgeon: Rachana Oden DPM;  [facility];  Service: Podiatry    SALPINGOOPHORECTOMY Left 2016    with removal of cervix    TONSILLECTOMY      WISDOM TOOTH EXTRACTION         Social History     Socioeconomic History    Marital status: /Civil Union     Spouse name: None    Number of children: None    Years of education: None    Highest education level: None   Occupational History    None   Social Needs    Financial resource strain: None    Food insecurity     Worry: None     Inability: None    Transportation needs     Medical: None     Non-medical: None   Tobacco Use    Smoking status: Never Smoker    Smokeless tobacco: Never Used   Substance and Sexual Activity    Alcohol use: Yes     Frequency: Monthly or less     Binge frequency: Less than monthly     Comment: rarely    Drug use: No    Sexual activity: None   Lifestyle    Physical activity     Days per week: None     Minutes per session: None    Stress: None   Relationships    Social connections     Talks on phone: None     Gets together: None     Attends Shinto service: None     Active member of club or organization: None     Attends meetings of clubs or organizations: None     Relationship status: None    Intimate partner violence     Fear of current or ex partner: None     Emotionally abused: None     Physically abused: None     Forced sexual activity: None   Other Topics Concern    None   Social History Narrative    Consumes on average 3 cups of coffee per day       Family History   Problem Relation Age of Onset    Lymphoma Mother     COPD Mother     Arthritis Mother     Heart disease Father     Breast cancer Maternal Aunt     Breast cancer Cousin        Allergies   Allergen Reactions    Codeine GI Intolerance and Chest Pain     ABDOMINAL PAIN    Hydrocodone Abdominal Pain and Swelling    Pregabalin Dizziness    Acetaminophen-Codeine Abdominal Pain    Amoxicillin Rash         Current Outpatient Medications:     acetaminophen (TYLENOL) 325 mg tablet, Take 3 tablets (975 mg total) by mouth every 8 (eight) hours, Disp: 30 tablet, Rfl: 0    ALPRAZolam (XANAX) 2 MG tablet, Take 0 5 mg by mouth daily at bedtime , Disp: , Rfl:     Azelastine-Fluticasone (DYMISTA NA), 2 sprays into each nostril 2 (two) times a day , Disp: , Rfl:     Botulinum Toxin Type A 200 units SOLR, Inject 155 units into face and neck IM every 90 days, Disp: , Rfl:     budesonide-formoterol (SYMBICORT) 160-4 5 mcg/act inhaler, Inhale 2 puffs 2 (two) times a day , Disp: , Rfl:     buPROPion (WELLBUTRIN) 100 mg tablet, Take 100 mg by mouth 2 (two) times a day , Disp: , Rfl:     cholecalciferol (VITAMIN D3) 78395 units capsule, Take 5,000 Units by mouth 2 (two) times a day , Disp: , Rfl:     Cyanocobalamin (B-12) 1000 MCG/ML KIT, Inject as directed every 30 (thirty) days  , Disp: , Rfl:     Cyanocobalamin (VITAMIN B-12 PO), Place 100 mg under the tongue daily , Disp: , Rfl:     Erenumab-aooe (AIMOVIG) 70 MG/ML SOAJ, Inject under the skin every 28 days, Disp: , Rfl:     fexofenadine (ALLEGRA) 180 MG tablet, Take 180 mg by mouth daily, Disp: , Rfl:     hyoscyamine (LEVSIN/SL) 0 125 mg SL tablet, Take 0 125 mg by mouth every 4 (four) hours as needed for cramping, Disp: , Rfl:     Immune Globulin, Human, (PRIVIGEN IV), Infuse into a venous catheter every 30 (thirty) days, Disp: , Rfl:     losartan (COZAAR) 50 mg tablet, Take 50 mg by mouth 2 (two) times a day , Disp: , Rfl:     montelukast (SINGULAIR) 5 mg chewable tablet, Chew 5 mg 2 (two) times a day , Disp: , Rfl:     omeprazole (PriLOSEC) 20 mg delayed release capsule, Take 1 capsule by mouth daily, Disp: 90 capsule, Rfl: 0    traMADol (ULTRAM) 50 mg tablet, Take 50 mg by mouth 2 (two) times a day , Disp: , Rfl:     albuterol (PROVENTIL HFA,VENTOLIN HFA) 90 mcg/act inhaler, Inhale 1 puff every 6 (six) hours as needed , Disp: , Rfl:     celecoxib (CELEBREX) 200 mg capsule, Take 200 mg by mouth 2 (two) times a day as needed , Disp: , Rfl:     cyclobenzaprine (FLEXERIL) 10 mg tablet, Take 10 mg by mouth 3 (three) times a day as needed , Disp: , Rfl:     meclizine (ANTIVERT) 25 mg tablet, Take 1 tablet (25 mg total) by mouth 3 (three) times a day as needed for dizziness for up to 12 doses (Patient not taking: Reported on 9/16/2020), Disp: 12 tablet, Rfl: 0    oxyCODONE (ROXICODONE) 5 mg immediate release tablet, Take 1 tablet (5 mg total) by mouth every 4 (four) hours as needed for moderate pain For continuing therapyMax Daily Amount: 30 mg (Patient not taking: Reported on 2/19/2020), Disp: 10 tablet, Rfl: 0    predniSONE 10 mg tablet, Take 3 tabs BID X 2 days, 2 tabs BID X 2 days, 1 tab BID X 2 days, 1 tab daily X 2 days (Patient not taking: Reported on 9/16/2020), Disp: 26 tablet, Rfl: 0    SUMAtriptan (IMITREX) 100 mg tablet, Take 100 mg by mouth 2 (two) times a day as needed for migraine, Disp: , Rfl:       Physical Exam:  /80 (BP Location: Left arm, Patient Position: Sitting, Cuff Size: Adult)   Pulse 92   Temp 97 7 °F (36 5 °C) (Tympanic)   Resp 16   Ht 5' 2" (1 575 m)   Wt 75 1 kg (165 lb 9 6 oz)   SpO2 99%   BMI 30 29 kg/m²     Physical Exam  Constitutional: General: She is not in acute distress  Appearance: She is well-developed  She is not diaphoretic  HENT:      Head: Normocephalic and atraumatic  Eyes:      General: No scleral icterus  Right eye: No discharge  Left eye: No discharge  Conjunctiva/sclera: Conjunctivae normal       Pupils: Pupils are equal, round, and reactive to light  Neck:      Musculoskeletal: Normal range of motion and neck supple  Thyroid: No thyromegaly  Vascular: No JVD  Trachea: No tracheal deviation  Cardiovascular:      Rate and Rhythm: Normal rate and regular rhythm  Heart sounds: Normal heart sounds  No murmur  No friction rub  Pulmonary:      Effort: Pulmonary effort is normal  No respiratory distress  Breath sounds: Normal breath sounds  No stridor  No wheezing or rales  Chest:      Chest wall: No tenderness  Abdominal:      General: Bowel sounds are normal  There is no distension  Palpations: Abdomen is soft  There is no hepatomegaly, splenomegaly or mass  Tenderness: There is no abdominal tenderness  There is no guarding or rebound  Musculoskeletal: Normal range of motion  General: No tenderness or deformity  Lymphadenopathy:      Cervical: No cervical adenopathy  Skin:     General: Skin is warm and dry  Coloration: Skin is not pale  Findings: No erythema or rash  Neurological:      Mental Status: She is alert and oriented to person, place, and time  Cranial Nerves: No cranial nerve deficit  Coordination: Coordination normal       Deep Tendon Reflexes: Reflexes are normal and symmetric  Psychiatric:         Behavior: Behavior normal          Thought Content:  Thought content normal          Judgment: Judgment normal            Labs:  Lab Results   Component Value Date    WBC 11 50 (H) 02/12/2020    HGB 12 2 02/12/2020    HCT 38 0 02/12/2020    MCV 96 02/12/2020     02/12/2020     Lab Results   Component Value Date    K 4 3 05/13/2020     05/13/2020    CO2 29 05/13/2020    BUN 13 05/13/2020    CREATININE 0 72 05/13/2020    GLUF 90 05/13/2020    CALCIUM 9 4 05/13/2020    AST 12 (L) 10/05/2019    ALT 8 10/05/2019    ALKPHOS 77 10/05/2019    EGFR 99 05/13/2020       Patient voiced understanding and agreement in the above discussion  Aware to contact our office with questions/symptoms in the interim

## 2020-09-17 ENCOUNTER — TELEPHONE (OUTPATIENT)
Dept: HEMATOLOGY ONCOLOGY | Facility: CLINIC | Age: 50
End: 2020-09-17

## 2020-09-17 LAB
ALBUMIN SERPL ELPH-MCNC: 4.49 G/DL (ref 3.5–5)
ALBUMIN SERPL ELPH-MCNC: 62.4 % (ref 52–65)
ALBUMIN UR ELPH-MCNC: 100 %
ALPHA1 GLOB MFR UR ELPH: 0 %
ALPHA1 GLOB SERPL ELPH-MCNC: 0.37 G/DL (ref 0.1–0.4)
ALPHA1 GLOB SERPL ELPH-MCNC: 5.2 % (ref 2.5–5)
ALPHA2 GLOB MFR UR ELPH: 0 %
ALPHA2 GLOB SERPL ELPH-MCNC: 0.89 G/DL (ref 0.4–1.2)
ALPHA2 GLOB SERPL ELPH-MCNC: 12.3 % (ref 7–13)
B-GLOBULIN MFR UR ELPH: 0 %
BETA GLOB ABNORMAL SERPL ELPH-MCNC: 0.49 G/DL (ref 0.4–0.8)
BETA1 GLOB SERPL ELPH-MCNC: 6.8 % (ref 5–13)
BETA2 GLOB SERPL ELPH-MCNC: 4.3 % (ref 2–8)
BETA2+GAMMA GLOB SERPL ELPH-MCNC: 0.31 G/DL (ref 0.2–0.5)
GAMMA GLOB ABNORMAL SERPL ELPH-MCNC: 0.65 G/DL (ref 0.5–1.6)
GAMMA GLOB MFR UR ELPH: 0 %
GAMMA GLOB SERPL ELPH-MCNC: 9 % (ref 12–22)
IGG/ALB SER: 1.66 {RATIO} (ref 1.1–1.8)
KAPPA LC FREE SER-MCNC: 13.5 MG/L (ref 3.3–19.4)
KAPPA LC FREE/LAMBDA FREE SER: 1.05 {RATIO} (ref 0.26–1.65)
LAMBDA LC FREE SERPL-MCNC: 12.8 MG/L (ref 5.7–26.3)
PROT PATTERN SERPL ELPH-IMP: ABNORMAL
PROT PATTERN UR ELPH-IMP: NORMAL
PROT SERPL-MCNC: 7.2 G/DL (ref 6.4–8.2)
PROT UR-MCNC: 6 MG/DL

## 2020-09-17 NOTE — TELEPHONE ENCOUNTER
Called pt with IVIG apt at [facility]  Apts: Wednesday 10/07/2020 at 9:00 am  Wednesday 11/04/2020 at 9:00 am  Wednesday 12/02/2020 at 9:00 am    Pt confirmed apts

## 2020-09-18 LAB
B2 MICROGLOB SERPL-MCNC: 1.4 MG/L (ref 0.6–2.4)
KAPPA LC UR-MCNC: 2.19 MG/L (ref 0.63–113.79)
KAPPA LC/LAMBDA UR: >3.42 {RATIO} (ref 1.03–31.76)
LAMBDA LC UR-MCNC: <0.64 MG/L (ref 0.47–11.77)

## 2020-09-19 LAB — COPPER SERPL-MCNC: 183 UG/DL (ref 72–166)

## 2020-09-28 ENCOUNTER — TELEPHONE (OUTPATIENT)
Dept: HEMATOLOGY ONCOLOGY | Facility: CLINIC | Age: 50
End: 2020-09-28

## 2020-09-28 NOTE — TELEPHONE ENCOUNTER
Please review 9/16/20 labs with Mora President, PA/Dr Sheila Santoyo for interpretation of labs  Patient does have a Rheumatologist     10/7/20 scheduled IVIG infusion    10/22/20 flolow up appointment

## 2020-10-07 ENCOUNTER — HOSPITAL ENCOUNTER (OUTPATIENT)
Dept: SURGERY | Facility: HOSPITAL | Age: 50
Discharge: HOME | End: 2020-10-07
Attending: INTERNAL MEDICINE
Payer: COMMERCIAL

## 2020-10-07 VITALS
SYSTOLIC BLOOD PRESSURE: 145 MMHG | HEART RATE: 74 BPM | TEMPERATURE: 97.4 F | DIASTOLIC BLOOD PRESSURE: 77 MMHG | RESPIRATION RATE: 16 BRPM

## 2020-10-07 DIAGNOSIS — D80.1 HYPOGAMMAGLOBULINEMIA (HCC): ICD-10-CM

## 2020-10-07 PROCEDURE — 96366 THER/PROPH/DIAG IV INF ADDON: CPT

## 2020-10-07 PROCEDURE — 96365 THER/PROPH/DIAG IV INF INIT: CPT

## 2020-10-07 RX ORDER — SODIUM CHLORIDE 9 MG/ML
500 INJECTION, SOLUTION INTRAVENOUS CONTINUOUS
Status: DISPENSED | OUTPATIENT
Start: 2020-10-07 | End: 2020-10-07

## 2020-10-07 RX ORDER — ACETAMINOPHEN 325 MG/1
650 TABLET ORAL ONCE
Status: CANCELLED | OUTPATIENT
Start: 2020-11-04

## 2020-10-07 RX ORDER — HUMAN IMMUNOGLOBULIN G 40 G/400ML
40 LIQUID INTRAVENOUS ONCE
Status: COMPLETED | OUTPATIENT
Start: 2020-10-07 | End: 2020-10-07

## 2020-10-07 RX ORDER — DIPHENHYDRAMINE HCL 25 MG
25 TABLET ORAL ONCE
Status: COMPLETED | OUTPATIENT
Start: 2020-10-07 | End: 2020-10-07

## 2020-10-07 RX ORDER — DIPHENHYDRAMINE HCL 25 MG
25 TABLET ORAL ONCE
Status: CANCELLED | OUTPATIENT
Start: 2020-11-04

## 2020-10-07 RX ORDER — ACETAMINOPHEN 325 MG/1
650 TABLET ORAL ONCE
Status: COMPLETED | OUTPATIENT
Start: 2020-10-07 | End: 2020-10-07

## 2020-10-07 RX ORDER — SODIUM CHLORIDE 9 MG/ML
20 INJECTION, SOLUTION INTRAVENOUS ONCE
Status: CANCELLED | OUTPATIENT
Start: 2020-11-04

## 2020-10-07 RX ADMIN — ACETAMINOPHEN 650 MG: 325 TABLET ORAL at 09:50

## 2020-10-07 RX ADMIN — DIPHENHYDRAMINE HCL 25 MG: 25 TABLET ORAL at 09:50

## 2020-10-07 RX ADMIN — HUMAN IMMUNOGLOBULIN G 40 G: 40 LIQUID INTRAVENOUS at 10:35

## 2020-10-07 RX ADMIN — SODIUM CHLORIDE 500 ML/HR: 0.9 INJECTION, SOLUTION INTRAVENOUS at 09:45

## 2020-10-07 RX ADMIN — FAMOTIDINE 20 MG: 10 INJECTION INTRAVENOUS at 09:52

## 2020-10-13 ENCOUNTER — TELEPHONE (OUTPATIENT)
Dept: HEMATOLOGY ONCOLOGY | Facility: CLINIC | Age: 50
End: 2020-10-13

## 2020-10-23 ENCOUNTER — TRANSCRIBE ORDERS (OUTPATIENT)
Dept: CARDIOLOGY CLINIC | Facility: CLINIC | Age: 50
End: 2020-10-23

## 2020-10-23 DIAGNOSIS — R00.0 TACHYCARDIA, UNSPECIFIED: Primary | ICD-10-CM

## 2020-10-26 ENCOUNTER — DOCUMENTATION (OUTPATIENT)
Dept: HEMATOLOGY ONCOLOGY | Facility: CLINIC | Age: 50
End: 2020-10-26

## 2020-10-27 ENCOUNTER — OFFICE VISIT (OUTPATIENT)
Dept: HEMATOLOGY ONCOLOGY | Facility: CLINIC | Age: 50
End: 2020-10-27
Payer: COMMERCIAL

## 2020-10-27 ENCOUNTER — TRANSCRIBE ORDERS (OUTPATIENT)
Dept: ADMINISTRATIVE | Facility: HOSPITAL | Age: 50
End: 2020-10-27

## 2020-10-27 VITALS
RESPIRATION RATE: 16 BRPM | WEIGHT: 166.9 LBS | HEIGHT: 62 IN | SYSTOLIC BLOOD PRESSURE: 138 MMHG | BODY MASS INDEX: 30.71 KG/M2 | HEART RATE: 107 BPM | DIASTOLIC BLOOD PRESSURE: 88 MMHG | TEMPERATURE: 96.8 F | OXYGEN SATURATION: 98 %

## 2020-10-27 DIAGNOSIS — D80.1 HYPOGAMMAGLOBULINEMIA (HCC): Primary | ICD-10-CM

## 2020-10-27 DIAGNOSIS — Z12.31 ENCOUNTER FOR SCREENING MAMMOGRAM FOR MALIGNANT NEOPLASM OF BREAST: Primary | ICD-10-CM

## 2020-10-27 DIAGNOSIS — Z98.84 H/O GASTRIC BYPASS: ICD-10-CM

## 2020-10-27 PROCEDURE — 99214 OFFICE O/P EST MOD 30 MIN: CPT | Performed by: INTERNAL MEDICINE

## 2020-11-03 ENCOUNTER — CONSULT (OUTPATIENT)
Dept: CARDIOLOGY CLINIC | Facility: CLINIC | Age: 50
End: 2020-11-03
Payer: COMMERCIAL

## 2020-11-03 VITALS
DIASTOLIC BLOOD PRESSURE: 70 MMHG | SYSTOLIC BLOOD PRESSURE: 126 MMHG | HEART RATE: 73 BPM | HEIGHT: 62 IN | BODY MASS INDEX: 31.1 KG/M2 | WEIGHT: 169 LBS

## 2020-11-03 DIAGNOSIS — I10 BENIGN ESSENTIAL HTN: Primary | ICD-10-CM

## 2020-11-03 DIAGNOSIS — Z98.84 STATUS POST BARIATRIC SURGERY: ICD-10-CM

## 2020-11-03 DIAGNOSIS — R00.0 TACHYCARDIA: ICD-10-CM

## 2020-11-03 DIAGNOSIS — R00.0 TACHYCARDIA, UNSPECIFIED: ICD-10-CM

## 2020-11-03 PROCEDURE — 93000 ELECTROCARDIOGRAM COMPLETE: CPT | Performed by: INTERNAL MEDICINE

## 2020-11-03 PROCEDURE — 99214 OFFICE O/P EST MOD 30 MIN: CPT | Performed by: INTERNAL MEDICINE

## 2020-11-04 ENCOUNTER — HOSPITAL ENCOUNTER (OUTPATIENT)
Dept: INFUSION CENTER | Facility: HOSPITAL | Age: 50
Discharge: HOME | End: 2020-11-04
Attending: INTERNAL MEDICINE
Payer: COMMERCIAL

## 2020-11-04 VITALS
OXYGEN SATURATION: 98 % | HEART RATE: 88 BPM | SYSTOLIC BLOOD PRESSURE: 145 MMHG | DIASTOLIC BLOOD PRESSURE: 99 MMHG | RESPIRATION RATE: 18 BRPM | TEMPERATURE: 97.4 F

## 2020-11-04 DIAGNOSIS — D80.1 HYPOGAMMAGLOBULINEMIA (HCC): Primary | ICD-10-CM

## 2020-11-04 PROCEDURE — 96365 THER/PROPH/DIAG IV INF INIT: CPT

## 2020-11-04 PROCEDURE — 96366 THER/PROPH/DIAG IV INF ADDON: CPT

## 2020-11-04 PROCEDURE — 96367 TX/PROPH/DG ADDL SEQ IV INF: CPT

## 2020-11-04 RX ORDER — SODIUM CHLORIDE 9 MG/ML
20 INJECTION, SOLUTION INTRAVENOUS ONCE
Status: CANCELLED | OUTPATIENT
Start: 2020-12-02

## 2020-11-04 RX ORDER — ACETAMINOPHEN 325 MG/1
650 TABLET ORAL ONCE
Status: COMPLETED | OUTPATIENT
Start: 2020-11-04 | End: 2020-11-04

## 2020-11-04 RX ORDER — SODIUM CHLORIDE 9 MG/ML
20 INJECTION, SOLUTION INTRAVENOUS ONCE
Status: COMPLETED | OUTPATIENT
Start: 2020-11-04 | End: 2020-11-04

## 2020-11-04 RX ORDER — DIPHENHYDRAMINE HCL 25 MG
25 TABLET ORAL ONCE
Status: COMPLETED | OUTPATIENT
Start: 2020-11-04 | End: 2020-11-04

## 2020-11-04 RX ORDER — ACETAMINOPHEN 325 MG/1
650 TABLET ORAL ONCE
Status: CANCELLED | OUTPATIENT
Start: 2020-12-02

## 2020-11-04 RX ORDER — DIPHENHYDRAMINE HCL 25 MG
25 TABLET ORAL ONCE
Status: CANCELLED | OUTPATIENT
Start: 2020-12-02

## 2020-11-04 RX ADMIN — SODIUM CHLORIDE 20 ML/HR: 0.9 INJECTION, SOLUTION INTRAVENOUS at 09:20

## 2020-11-04 RX ADMIN — Medication 40 G: at 10:44

## 2020-11-04 RX ADMIN — DEXAMETHASONE SODIUM PHOSPHATE 4 MG: 10 INJECTION, SOLUTION INTRAMUSCULAR; INTRAVENOUS at 09:54

## 2020-11-04 RX ADMIN — FAMOTIDINE 20 MG: 10 INJECTION INTRAVENOUS at 09:29

## 2020-11-04 RX ADMIN — SODIUM CHLORIDE 500 ML: 0.9 INJECTION, SOLUTION INTRAVENOUS at 09:15

## 2020-11-04 RX ADMIN — ACETAMINOPHEN 650 MG: 325 TABLET ORAL at 09:24

## 2020-11-04 RX ADMIN — DIPHENHYDRAMINE HCL 25 MG: 25 TABLET ORAL at 09:24

## 2020-11-06 ENCOUNTER — TELEPHONE (OUTPATIENT)
Dept: HEMATOLOGY ONCOLOGY | Facility: CLINIC | Age: 50
End: 2020-11-06

## 2020-11-07 ENCOUNTER — HOSPITAL ENCOUNTER (OUTPATIENT)
Dept: MAMMOGRAPHY | Facility: HOSPITAL | Age: 50
Discharge: HOME | End: 2020-11-07
Attending: INTERNAL MEDICINE
Payer: COMMERCIAL

## 2020-11-07 VITALS — WEIGHT: 169 LBS | HEIGHT: 62 IN | BODY MASS INDEX: 31.1 KG/M2

## 2020-11-07 DIAGNOSIS — Z12.31 ENCOUNTER FOR SCREENING MAMMOGRAM FOR MALIGNANT NEOPLASM OF BREAST: ICD-10-CM

## 2020-11-07 PROCEDURE — 77063 BREAST TOMOSYNTHESIS BI: CPT

## 2020-11-07 PROCEDURE — 77067 SCR MAMMO BI INCL CAD: CPT

## 2020-11-22 DIAGNOSIS — Z98.84 BARIATRIC SURGERY STATUS: ICD-10-CM

## 2020-11-23 RX ORDER — OMEPRAZOLE 20 MG/1
CAPSULE, DELAYED RELEASE ORAL
Qty: 90 CAPSULE | Refills: 0 | Status: SHIPPED | OUTPATIENT
Start: 2020-11-23 | End: 2021-03-22

## 2020-11-27 ENCOUNTER — TELEPHONE (OUTPATIENT)
Dept: HEMATOLOGY ONCOLOGY | Facility: CLINIC | Age: 50
End: 2020-11-27

## 2020-11-28 ENCOUNTER — LAB (OUTPATIENT)
Dept: LAB | Facility: HOSPITAL | Age: 50
End: 2020-11-28
Attending: INTERNAL MEDICINE
Payer: COMMERCIAL

## 2020-11-28 DIAGNOSIS — D80.1 HYPOGAMMAGLOBULINEMIA (HCC): ICD-10-CM

## 2020-11-28 LAB
ALBUMIN SERPL BCP-MCNC: 4.1 G/DL (ref 3.5–5.7)
ALP SERPL-CCNC: 68 U/L (ref 40–150)
ALT SERPL W P-5'-P-CCNC: 15 U/L (ref 7–52)
ANION GAP SERPL CALCULATED.3IONS-SCNC: 6 MMOL/L (ref 4–13)
AST SERPL W P-5'-P-CCNC: 16 U/L (ref 13–39)
BASOPHILS # BLD AUTO: 0 THOUSANDS/ΜL (ref 0–0.1)
BASOPHILS NFR BLD AUTO: 1 % (ref 0–2)
BILIRUB SERPL-MCNC: 0.5 MG/DL (ref 0.2–1)
BUN SERPL-MCNC: 8 MG/DL (ref 7–25)
CALCIUM SERPL-MCNC: 8.5 MG/DL (ref 8.6–10.5)
CHLORIDE SERPL-SCNC: 105 MMOL/L (ref 98–107)
CO2 SERPL-SCNC: 26 MMOL/L (ref 21–31)
CREAT SERPL-MCNC: 0.63 MG/DL (ref 0.6–1.2)
EOSINOPHIL # BLD AUTO: 0.2 THOUSAND/ΜL (ref 0–0.61)
EOSINOPHIL NFR BLD AUTO: 2 % (ref 0–5)
ERYTHROCYTE [DISTWIDTH] IN BLOOD BY AUTOMATED COUNT: 13.4 % (ref 11.5–14.5)
GFR SERPL CREATININE-BSD FRML MDRD: 106 ML/MIN/1.73SQ M
GLUCOSE P FAST SERPL-MCNC: 85 MG/DL (ref 65–99)
HCT VFR BLD AUTO: 37.8 % (ref 42–47)
HGB BLD-MCNC: 12.6 G/DL (ref 12–16)
LDH SERPL-CCNC: 111 U/L (ref 84–246)
LYMPHOCYTES # BLD AUTO: 2.5 THOUSANDS/ΜL (ref 0.6–4.47)
LYMPHOCYTES NFR BLD AUTO: 30 % (ref 21–51)
MCH RBC QN AUTO: 29.8 PG (ref 26–34)
MCHC RBC AUTO-ENTMCNC: 33.3 G/DL (ref 31–37)
MCV RBC AUTO: 90 FL (ref 81–99)
MONOCYTES # BLD AUTO: 0.5 THOUSAND/ΜL (ref 0.17–1.22)
MONOCYTES NFR BLD AUTO: 6 % (ref 2–12)
NEUTROPHILS # BLD AUTO: 5.1 THOUSANDS/ΜL (ref 1.4–6.5)
NEUTS SEG NFR BLD AUTO: 62 % (ref 42–75)
PLATELET # BLD AUTO: 313 THOUSANDS/UL (ref 149–390)
PMV BLD AUTO: 7.9 FL (ref 8.6–11.7)
POTASSIUM SERPL-SCNC: 4.1 MMOL/L (ref 3.5–5.5)
PROT SERPL-MCNC: 6.4 G/DL (ref 6.4–8.9)
RBC # BLD AUTO: 4.22 MILLION/UL (ref 3.9–5.2)
SODIUM SERPL-SCNC: 137 MMOL/L (ref 134–143)
WBC # BLD AUTO: 8.3 THOUSAND/UL (ref 4.8–10.8)

## 2020-11-28 PROCEDURE — 83615 LACTATE (LD) (LDH) ENZYME: CPT

## 2020-11-28 PROCEDURE — 36415 COLL VENOUS BLD VENIPUNCTURE: CPT

## 2020-11-28 PROCEDURE — 85025 COMPLETE CBC W/AUTO DIFF WBC: CPT

## 2020-11-28 PROCEDURE — 80053 COMPREHEN METABOLIC PANEL: CPT

## 2020-12-01 ENCOUNTER — OFFICE VISIT (OUTPATIENT)
Dept: HEMATOLOGY ONCOLOGY | Facility: CLINIC | Age: 50
End: 2020-12-01
Payer: COMMERCIAL

## 2020-12-01 VITALS
RESPIRATION RATE: 16 BRPM | TEMPERATURE: 98.8 F | HEART RATE: 60 BPM | DIASTOLIC BLOOD PRESSURE: 90 MMHG | HEIGHT: 62 IN | OXYGEN SATURATION: 99 % | BODY MASS INDEX: 31.28 KG/M2 | SYSTOLIC BLOOD PRESSURE: 132 MMHG | WEIGHT: 170 LBS

## 2020-12-01 DIAGNOSIS — Z98.84 H/O GASTRIC BYPASS: ICD-10-CM

## 2020-12-01 DIAGNOSIS — D80.1 HYPOGAMMAGLOBULINEMIA (HCC): Primary | ICD-10-CM

## 2020-12-01 PROCEDURE — 99214 OFFICE O/P EST MOD 30 MIN: CPT | Performed by: NURSE PRACTITIONER

## 2020-12-02 ENCOUNTER — HOSPITAL ENCOUNTER (OUTPATIENT)
Dept: INFUSION CENTER | Facility: HOSPITAL | Age: 50
Discharge: HOME | End: 2020-12-02
Attending: INTERNAL MEDICINE
Payer: COMMERCIAL

## 2020-12-02 VITALS
DIASTOLIC BLOOD PRESSURE: 87 MMHG | OXYGEN SATURATION: 98 % | HEART RATE: 76 BPM | SYSTOLIC BLOOD PRESSURE: 142 MMHG | TEMPERATURE: 96.5 F | RESPIRATION RATE: 16 BRPM

## 2020-12-02 DIAGNOSIS — D80.1 HYPOGAMMAGLOBULINEMIA (HCC): Primary | ICD-10-CM

## 2020-12-02 PROCEDURE — 96367 TX/PROPH/DG ADDL SEQ IV INF: CPT

## 2020-12-02 PROCEDURE — 96366 THER/PROPH/DIAG IV INF ADDON: CPT

## 2020-12-02 PROCEDURE — 96365 THER/PROPH/DIAG IV INF INIT: CPT

## 2020-12-02 RX ORDER — ACETAMINOPHEN 325 MG/1
650 TABLET ORAL ONCE
Status: COMPLETED | OUTPATIENT
Start: 2020-12-02 | End: 2020-12-02

## 2020-12-02 RX ORDER — SODIUM CHLORIDE 9 MG/ML
20 INJECTION, SOLUTION INTRAVENOUS ONCE
Status: COMPLETED | OUTPATIENT
Start: 2020-12-02 | End: 2020-12-02

## 2020-12-02 RX ORDER — ACETAMINOPHEN 325 MG/1
650 TABLET ORAL ONCE
Status: CANCELLED | OUTPATIENT
Start: 2020-12-30

## 2020-12-02 RX ORDER — SODIUM CHLORIDE 9 MG/ML
20 INJECTION, SOLUTION INTRAVENOUS ONCE
Status: CANCELLED | OUTPATIENT
Start: 2020-12-30

## 2020-12-02 RX ADMIN — SODIUM CHLORIDE 20 ML/HR: 0.9 INJECTION, SOLUTION INTRAVENOUS at 10:20

## 2020-12-02 RX ADMIN — DIPHENHYDRAMINE HYDROCHLORIDE 25 MG: 50 INJECTION, SOLUTION INTRAMUSCULAR; INTRAVENOUS at 10:17

## 2020-12-02 RX ADMIN — FAMOTIDINE 20 MG: 10 INJECTION INTRAVENOUS at 10:41

## 2020-12-02 RX ADMIN — SODIUM CHLORIDE 500 ML: 0.9 INJECTION, SOLUTION INTRAVENOUS at 09:19

## 2020-12-02 RX ADMIN — Medication 40 G: at 11:39

## 2020-12-02 RX ADMIN — DEXAMETHASONE SODIUM PHOSPHATE 10 MG: 10 INJECTION, SOLUTION INTRAMUSCULAR; INTRAVENOUS at 09:44

## 2020-12-02 RX ADMIN — ACETAMINOPHEN 650 MG: 325 TABLET ORAL at 09:26

## 2020-12-30 ENCOUNTER — HOSPITAL ENCOUNTER (OUTPATIENT)
Dept: INFUSION CENTER | Facility: HOSPITAL | Age: 50
Discharge: HOME | End: 2020-12-30
Attending: INTERNAL MEDICINE
Payer: COMMERCIAL

## 2020-12-30 VITALS
BODY MASS INDEX: 31.25 KG/M2 | RESPIRATION RATE: 16 BRPM | SYSTOLIC BLOOD PRESSURE: 120 MMHG | HEART RATE: 68 BPM | DIASTOLIC BLOOD PRESSURE: 86 MMHG | TEMPERATURE: 97.3 F | WEIGHT: 170.86 LBS

## 2020-12-30 DIAGNOSIS — D80.1 HYPOGAMMAGLOBULINEMIA (HCC): Primary | ICD-10-CM

## 2020-12-30 PROCEDURE — 96365 THER/PROPH/DIAG IV INF INIT: CPT

## 2020-12-30 PROCEDURE — 96367 TX/PROPH/DG ADDL SEQ IV INF: CPT

## 2020-12-30 PROCEDURE — 96366 THER/PROPH/DIAG IV INF ADDON: CPT

## 2020-12-30 RX ORDER — ACETAMINOPHEN 325 MG/1
650 TABLET ORAL ONCE
Status: CANCELLED | OUTPATIENT
Start: 2021-01-27

## 2020-12-30 RX ORDER — SODIUM CHLORIDE 9 MG/ML
20 INJECTION, SOLUTION INTRAVENOUS ONCE
Status: CANCELLED | OUTPATIENT
Start: 2021-01-27

## 2020-12-30 RX ORDER — ACETAMINOPHEN 325 MG/1
650 TABLET ORAL ONCE
Status: COMPLETED | OUTPATIENT
Start: 2020-12-30 | End: 2020-12-30

## 2020-12-30 RX ORDER — SODIUM CHLORIDE 9 MG/ML
20 INJECTION, SOLUTION INTRAVENOUS ONCE
Status: COMPLETED | OUTPATIENT
Start: 2020-12-30 | End: 2020-12-30

## 2020-12-30 RX ADMIN — DEXAMETHASONE SODIUM PHOSPHATE 10 MG: 10 INJECTION, SOLUTION INTRAMUSCULAR; INTRAVENOUS at 09:16

## 2020-12-30 RX ADMIN — SODIUM CHLORIDE 20 ML/HR: 0.9 INJECTION, SOLUTION INTRAVENOUS at 09:09

## 2020-12-30 RX ADMIN — DIPHENHYDRAMINE HYDROCHLORIDE 25 MG: 50 INJECTION, SOLUTION INTRAMUSCULAR; INTRAVENOUS at 09:50

## 2020-12-30 RX ADMIN — FAMOTIDINE 20 MG: 10 INJECTION INTRAVENOUS at 10:11

## 2020-12-30 RX ADMIN — SODIUM CHLORIDE 500 ML: 0.9 INJECTION, SOLUTION INTRAVENOUS at 09:12

## 2020-12-30 RX ADMIN — ACETAMINOPHEN 650 MG: 325 TABLET ORAL at 09:11

## 2020-12-30 RX ADMIN — Medication 40 G: at 10:42

## 2020-12-30 NOTE — PLAN OF CARE

## 2020-12-30 NOTE — PROGRESS NOTES
Pt tolerated iv privigen well  Multiple premeds and hydration given as per orders  Peripheral iv discontinued jelco intact   Discharged ambulatory with avs

## 2021-01-06 ENCOUNTER — OFFICE VISIT (OUTPATIENT)
Dept: BARIATRICS | Facility: CLINIC | Age: 51
End: 2021-01-06
Payer: COMMERCIAL

## 2021-01-06 ENCOUNTER — TELEPHONE (OUTPATIENT)
Dept: HEMATOLOGY ONCOLOGY | Facility: CLINIC | Age: 51
End: 2021-01-06

## 2021-01-06 DIAGNOSIS — K90.2 POSTOPERATIVE BLIND LOOP SYNDROME: ICD-10-CM

## 2021-01-06 DIAGNOSIS — R10.13 EPIGASTRIC PAIN: Primary | ICD-10-CM

## 2021-01-06 DIAGNOSIS — Z98.84 BARIATRIC SURGERY STATUS: ICD-10-CM

## 2021-01-06 DIAGNOSIS — K91.2 POSTSURGICAL MALABSORPTION: ICD-10-CM

## 2021-01-06 PROCEDURE — 99214 OFFICE O/P EST MOD 30 MIN: CPT | Performed by: PHYSICIAN ASSISTANT

## 2021-01-06 RX ORDER — SUCRALFATE ORAL 1 G/10ML
1 SUSPENSION ORAL 4 TIMES DAILY
Qty: 1200 ML | Refills: 1 | Status: SHIPPED | OUTPATIENT
Start: 2021-01-06 | End: 2022-02-10

## 2021-01-06 NOTE — TELEPHONE ENCOUNTER
----- Message from Bridget Boone sent at 1/6/2021  9:54 AM EST -----  Regarding: FW: Non-Urgent Medical Question  Contact: [phone]    ----- Message -----  From: Celina Christina  Sent: 1/6/2021   9:51 AM EST  To: Hematology Oncology [facility]  Subject: Non-Urgent Medical Question                      Dr Sunita Thorne like to know if and when you would recommend me getting the COVID-19 vaccination? What category do I fall in? Any advice would be very helpful      [facility]

## 2021-01-06 NOTE — H&P (VIEW-ONLY)
Virtual Regular Visit      Assessment/Plan:    Problem List Items Addressed This Visit        Digestive    Postoperative blind loop syndrome       Other    Epigastric pain - Primary    Relevant Medications    sucralfate (CARAFATE) 1 g/10 mL suspension      Other Visit Diagnoses     Bariatric surgery status        Relevant Medications    sucralfate (CARAFATE) 1 g/10 mL suspension    Postsurgical malabsorption              52 y o  female status post robotic Eveline-en-Y revision 1 year ago with Dr John Byers for chronic abdominal pain; originally s/p LRYGB at an outside facility and s/p revision by Dr Jacobo James and Angi 23 repair by Dr Nemiah Crigler  Reports for the last few weeks has been having intermittent epigastric pain, feels like "twisting" and "sharp", at worst is a 10/10 level pain  Typically unrelated to food  She is taking prilosec 20 mg daily and hyoscyamine  as needed for stomach cramps which offer only mild relief  An extensive discussion was had with the patient regarding possible origin of pain  Of optimal concern would be a potential internal hernia causing obstruction and given her surgical history and current clinical picture patient would be at risk for this  Will order CT abd/pelvis to r/o acute pathology, but I explained and reiterated to patient the importance of being seen in the ER immediately should this pain return in the same severity or worsen or if she develops any other symptoms  She verbalizes understanding  I have instructed patient to increase prilosec to twice daily and will also send carafate to her pharmacy to take 4x daily should she have an ulcer  She has had a stomach ulcer in the past states under if current symptoms feel similar  Will get her on the schedule for EGD as well should her CT return with unremarkable results and pain persists  She is due for her annual exam next month  Will get her on the schedule  Vitamin labs will be ordered at that time as well       Reason for visit is   Chief Complaint   Patient presents with    Virtual Regular Visit        Encounter provider Karin [place] PA-C    Provider located at [facility] [place] [id number]      Recent Visits  No visits were found meeting these conditions  Showing recent visits within past 7 days and meeting all other requirements     Today's Visits  Date Type Provider Dept   01/06/21 Office Visit Karin Houston PA-C [facility]   Showing today's visits and meeting all other requirements     Future Appointments  No visits were found meeting these conditions  Showing future appointments within next 150 days and meeting all other requirements        The patient was identified by name and date of birth  Brittani Scruggs was informed that this is a telemedicine visit and that the visit is being conducted through [employer] and patient was informed that this is a secure, HIPAA-compliant platform  She agrees to proceed     My office door was closed  No one else was in the room  She acknowledged consent and understanding of privacy and security of the video platform  The patient has agreed to participate and understands they can discontinue the visit at any time  Patient is aware this is a billable service  Subjective  Brittani Scruggs is a 48 y o  female presenting with c/o abdominal pain           Past Medical History:   Diagnosis Date    Anemia     recurrent    Anxiety     Asthma     allergy induced    Contact lens overwear of both eyes     Depression     Diarrhea     Environmental allergies     Fibromyalgia     Fibromyalgia, primary     GERD (gastroesophageal reflux disease)     History of transfusion     2008    Hypertension     Hypogammaglobulinemia ([facility] [address] )     Hypoglycemia after GI (gastrointestinal) surgery     Immune deficiency disorder (HCC)     IgG deficiency    Iron (Fe) deficiency anemia     Kidney stone     Lumbar herniated disc     Migraine     Motion sickness     PONV (postoperative nausea and vomiting)     severe    Seasonal allergies     Wears glasses        Past Surgical History:   Procedure Laterality Date    ABDOMINAL SURGERY      adhesions    ABDOMINOPLASTY      APPENDECTOMY      AUGMENTATION MAMMAPLASTY  2003    BREAST IMPLANT REMOVAL Bilateral 2019    BREAST SURGERY      implant removal     SECTION      CHOLECYSTECTOMY      COSMETIC SURGERY Bilateral     breast augmentation 2004    GASTRIC BYPASS      2009    GASTRIC BYPASS LAPAROSCOPIC N/A 2020    Procedure: ROBOTIC REVISION OF KENYA-EN-Y GASTRIC BYPASS, INTRAOP EGD;  Surgeon: Alessandra Torres MD;  [facility];  Service: [address] [place] [address] [place]      HYSTERECTOMY  2015    KNEE ARTHROSCOPY Left     LYMPH NODE BIOPSY  200-    benign    NERVE BLOCK      MS CORRJ HALLUX VALGUS W/SESMDC W/RESCJ PROX PHAL Left 2016    Procedure: SURGICAL MODIFIED WATKINS BUNIONECTOMY FIRST MTPJ;  Surgeon: Lucas Hi DPM;  [facility];  Service: Podiatry    MS FUSION FOOT BONE,MIDTARSAL,1 JT Left 2016    Procedure: FRIST TMJ FUSION ;  Surgeon: Lucas Hi DPM;  [facility];  Service: Podiatry    [address] [place] (NOT 1ST) Left 2016    Procedure: FOOT SHORTENING OSTEOTOMIES 2ND AND 3RD METATARSAL WITH EXTENSER TENDON RELEASE 3RD AND 4TH TOE;  Surgeon: Lucas Hi DPM;  [facility];  Service: Podiatry    MS REMOVAL DEEP IMPLANT Left 2016    Procedure: REMOVAL SYMPTOMATIC  HARDWARE FIRST RAY,RELATED CONTRACTURE SECOND AND THIRD TOES WITH SUSPENSION FOURTH TOE;  Surgeon: Lucas Hi DPM;  [facility];  Service: Podiatry    SALPINGOOPHORECTOMY Left 2016    with removal of cervix    TONSILLECTOMY      WISDOM TOOTH EXTRACTION         Current Outpatient Medications   Medication Sig Dispense Refill    acetaminophen (TYLENOL) 325 mg tablet Take 3 tablets (975 mg total) by mouth every 8 (eight) hours 30 tablet 0    albuterol (PROVENTIL HFA,VENTOLIN HFA) 90 mcg/act inhaler Inhale 1 puff every 6 (six) hours as needed       ALPRAZolam (XANAX) 2 MG tablet Take 0 5 mg by mouth daily at bedtime       Azelastine-Fluticasone (DYMISTA NA) 2 sprays into each nostril 2 (two) times a day       Botulinum Toxin Type A 200 units SOLR Inject 155 units into face and neck IM every 90 days      budesonide-formoterol (SYMBICORT) 160-4 5 mcg/act inhaler Inhale 2 puffs 2 (two) times a day       buPROPion (WELLBUTRIN) 100 mg tablet Take 100 mg by mouth 2 (two) times a day       celecoxib (CELEBREX) 200 mg capsule Take 200 mg by mouth 2 (two) times a day as needed       cholecalciferol (VITAMIN D3) 26325 units capsule Take 5,000 Units by mouth 2 (two) times a day       Cyanocobalamin (B-12) 1000 MCG/ML KIT Inject as directed every 30 (thirty) days   Cyanocobalamin (VITAMIN B-12 PO) Place 100 mg under the tongue daily       cyclobenzaprine (FLEXERIL) 10 mg tablet Take 10 mg by mouth 3 (three) times a day as needed       Erenumab-aooe (AIMOVIG) 70 MG/ML SOAJ Inject under the skin every 28 days      fexofenadine (ALLEGRA) 180 MG tablet Take 180 mg by mouth daily      hyoscyamine (LEVSIN/SL) 0 125 mg SL tablet Take 0 125 mg by mouth every 4 (four) hours as needed for cramping      Immune Globulin, Human, (PRIVIGEN IV) Infuse into a venous catheter every 30 (thirty) days      losartan (COZAAR) 50 mg tablet Take 50 mg by mouth 2 (two) times a day   meclizine (ANTIVERT) 25 mg tablet Take 1 tablet (25 mg total) by mouth 3 (three) times a day as needed for dizziness for up to 12 doses (Patient not taking: Reported on 9/16/2020) 12 tablet 0    montelukast (SINGULAIR) 5 mg chewable tablet Chew 5 mg 2 (two) times a day        omeprazole (PriLOSEC) 20 mg delayed release capsule Take 1 capsule by mouth daily 90 capsule 0    oxyCODONE (ROXICODONE) 5 mg immediate release tablet Take 1 tablet (5 mg total) by mouth every 4 (four) hours as needed for moderate pain For continuing therapyMax Daily Amount: 30 mg (Patient not taking: Reported on 2/19/2020) 10 tablet 0    predniSONE 10 mg tablet Take 3 tabs BID X 2 days, 2 tabs BID X 2 days, 1 tab BID X 2 days, 1 tab daily X 2 days (Patient not taking: Reported on 12/2/2020) 26 tablet 0    sucralfate (CARAFATE) 1 g/10 mL suspension Take 10 mL (1 g total) by mouth 4 (four) times a day 1200 mL 1    SUMAtriptan (IMITREX) 100 mg tablet Take 100 mg by mouth 2 (two) times a day as needed for migraine      traMADol (ULTRAM) 50 mg tablet Take 50 mg by mouth 2 (two) times a day  No current facility-administered medications for this visit  Allergies   Allergen Reactions    Codeine GI Intolerance and Chest Pain     ABDOMINAL PAIN    Hydrocodone Abdominal Pain and Swelling    Pregabalin Dizziness    Acetaminophen-Codeine Abdominal Pain    Amoxicillin Rash       Review of Systems   Respiratory: Negative  Cardiovascular: Positive for palpitations (followed by cardiology)  Neurological: Negative  Video Exam    There were no vitals filed for this visit  Physical Exam     I spent 25 minutes directly with the patient during this visit      VIRTUAL VISIT DISCLAIMER    oSniya Leung acknowledges that she has consented to an online visit or consultation  She understands that the online visit is based solely on information provided by her, and that, in the absence of a face-to-face physical evaluation by the physician, the diagnosis she receives is both limited and provisional in terms of accuracy and completeness  This is not intended to replace a full medical face-to-face evaluation by the physician  Soniya Leung understands and accepts these terms

## 2021-01-06 NOTE — TELEPHONE ENCOUNTER
I spoke directly with the patient to let her know that we do recommend her getting the COVID-19 vaccine  However, we do not know when it will be available to her  Pt voiced understanding

## 2021-01-06 NOTE — PROGRESS NOTES
Virtual Regular Visit      Assessment/Plan:    Problem List Items Addressed This Visit        Digestive    Postoperative blind loop syndrome       Other    Epigastric pain - Primary    Relevant Medications    sucralfate (CARAFATE) 1 g/10 mL suspension      Other Visit Diagnoses     Bariatric surgery status        Relevant Medications    sucralfate (CARAFATE) 1 g/10 mL suspension    Postsurgical malabsorption              52 y o  female status post robotic Eveline-en-Y revision 1 year ago with Dr Allen Galvan for chronic abdominal pain; originally s/p LRYGB at an outside facility and s/p revision by Dr Martha Mix and Angi 23 repair by Dr Ena Prieto  Reports for the last few weeks has been having intermittent epigastric pain, feels like "twisting" and "sharp", at worst is a 10/10 level pain  Typically unrelated to food  She is taking prilosec 20 mg daily and hyoscyamine  as needed for stomach cramps which offer only mild relief  An extensive discussion was had with the patient regarding possible origin of pain  Of optimal concern would be a potential internal hernia causing obstruction and given her surgical history and current clinical picture patient would be at risk for this  Will order CT abd/pelvis to r/o acute pathology, but I explained and reiterated to patient the importance of being seen in the ER immediately should this pain return in the same severity or worsen or if she develops any other symptoms  She verbalizes understanding  I have instructed patient to increase prilosec to twice daily and will also send carafate to her pharmacy to take 4x daily should she have an ulcer  She has had a stomach ulcer in the past states under if current symptoms feel similar  Will get her on the schedule for EGD as well should her CT return with unremarkable results and pain persists  She is due for her annual exam next month  Will get her on the schedule  Vitamin labs will be ordered at that time as well       Reason for visit is   Chief Complaint   Patient presents with    Virtual Regular Visit        Encounter provider Lazara Castellanos PA-C    Provider located at [address] [place] [id number]      Recent Visits  No visits were found meeting these conditions  Showing recent visits within past 7 days and meeting all other requirements     Today's Visits  Date Type Provider Dept   01/06/21 Office Visit Lazara Castellanos PA-C [facility]   Showing today's visits and meeting all other requirements     Future Appointments  No visits were found meeting these conditions  Showing future appointments within next 150 days and meeting all other requirements        The patient was identified by name and date of birth  Celina Christina was informed that this is a telemedicine visit and that the visit is being conducted through [employer] and patient was informed that this is a secure, HIPAA-compliant platform  She agrees to proceed     My office door was closed  No one else was in the room  She acknowledged consent and understanding of privacy and security of the video platform  The patient has agreed to participate and understands they can discontinue the visit at any time  Patient is aware this is a billable service  Subjective  Celina Christina is a 48 y o  female presenting with c/o abdominal pain           Past Medical History:   Diagnosis Date    Anemia     recurrent    Anxiety     Asthma     allergy induced    Contact lens overwear of both eyes     Depression     Diarrhea     Environmental allergies     Fibromyalgia     Fibromyalgia, primary     GERD (gastroesophageal reflux disease)     History of transfusion     2008    Hypertension     Hypogammaglobulinemia ([facility] [address] )     Hypoglycemia after GI (gastrointestinal) surgery     Immune deficiency disorder (HCC)     IgG deficiency    Iron (Fe) deficiency anemia     Kidney stone     Lumbar herniated disc     Migraine     Motion sickness     PONV (postoperative nausea and vomiting)     severe    Seasonal allergies     Wears glasses        Past Surgical History:   Procedure Laterality Date    ABDOMINAL SURGERY      adhesions    ABDOMINOPLASTY      APPENDECTOMY      AUGMENTATION MAMMAPLASTY  2003    BREAST IMPLANT REMOVAL Bilateral 2019    BREAST SURGERY      implant removal     SECTION      CHOLECYSTECTOMY      COSMETIC SURGERY Bilateral     breast augmentation 2004    GASTRIC BYPASS      2009    GASTRIC BYPASS LAPAROSCOPIC N/A 2020    Procedure: ROBOTIC REVISION OF KENYA-EN-Y GASTRIC BYPASS, INTRAOP EGD;  Surgeon: Hima Holliday MD;  [facility];  Service: [address] [place], [place] [address]      HYSTERECTOMY  2015    KNEE ARTHROSCOPY Left     LYMPH NODE BIOPSY  -    benign    NERVE BLOCK      ND CORRJ HALLUX VALGUS W/SESMDC W/RESCJ PROX PHAL Left 2016    Procedure: SURGICAL MODIFIED WATKINS BUNIONECTOMY FIRST MTPJ;  Surgeon: Sherren Schroeder, DPM;  [facility];  Service: Podiatry    ND FUSION FOOT BONE,MIDTARSAL,1 JT Left 2016    Procedure: FRIST TMJ FUSION ;  Surgeon: Sherren Schroeder, DPM;  [facility];  Service: Podiatry    [address] [place] (NOT 1ST) Left 2016    Procedure: FOOT SHORTENING OSTEOTOMIES 2ND AND 3RD METATARSAL WITH EXTENSER TENDON RELEASE 3RD AND 4TH TOE;  Surgeon: Sherren Schroeder, DPM;  [facility];  Service: Podiatry    ND REMOVAL DEEP IMPLANT Left 2016    Procedure: REMOVAL SYMPTOMATIC  HARDWARE FIRST RAY,RELATED CONTRACTURE SECOND AND THIRD TOES WITH SUSPENSION FOURTH TOE;  Surgeon: Sherren Schroeder, DPM;  [facility];  Service: Podiatry    SALPINGOOPHORECTOMY Left 2016    with removal of cervix    TONSILLECTOMY      WISDOM TOOTH EXTRACTION         Current Outpatient Medications   Medication Sig Dispense Refill    acetaminophen (TYLENOL) 325 mg tablet Take 3 tablets (975 mg total) by mouth every 8 (eight) hours 30 tablet 0    albuterol (PROVENTIL HFA,VENTOLIN HFA) 90 mcg/act inhaler Inhale 1 puff every 6 (six) hours as needed       ALPRAZolam (XANAX) 2 MG tablet Take 0 5 mg by mouth daily at bedtime       Azelastine-Fluticasone (DYMISTA NA) 2 sprays into each nostril 2 (two) times a day       Botulinum Toxin Type A 200 units SOLR Inject 155 units into face and neck IM every 90 days      budesonide-formoterol (SYMBICORT) 160-4 5 mcg/act inhaler Inhale 2 puffs 2 (two) times a day       buPROPion (WELLBUTRIN) 100 mg tablet Take 100 mg by mouth 2 (two) times a day       celecoxib (CELEBREX) 200 mg capsule Take 200 mg by mouth 2 (two) times a day as needed       cholecalciferol (VITAMIN D3) 19810 units capsule Take 5,000 Units by mouth 2 (two) times a day       Cyanocobalamin (B-12) 1000 MCG/ML KIT Inject as directed every 30 (thirty) days   Cyanocobalamin (VITAMIN B-12 PO) Place 100 mg under the tongue daily       cyclobenzaprine (FLEXERIL) 10 mg tablet Take 10 mg by mouth 3 (three) times a day as needed       Erenumab-aooe (AIMOVIG) 70 MG/ML SOAJ Inject under the skin every 28 days      fexofenadine (ALLEGRA) 180 MG tablet Take 180 mg by mouth daily      hyoscyamine (LEVSIN/SL) 0 125 mg SL tablet Take 0 125 mg by mouth every 4 (four) hours as needed for cramping      Immune Globulin, Human, (PRIVIGEN IV) Infuse into a venous catheter every 30 (thirty) days      losartan (COZAAR) 50 mg tablet Take 50 mg by mouth 2 (two) times a day   meclizine (ANTIVERT) 25 mg tablet Take 1 tablet (25 mg total) by mouth 3 (three) times a day as needed for dizziness for up to 12 doses (Patient not taking: Reported on 9/16/2020) 12 tablet 0    montelukast (SINGULAIR) 5 mg chewable tablet Chew 5 mg 2 (two) times a day        omeprazole (PriLOSEC) 20 mg delayed release capsule Take 1 capsule by mouth daily 90 capsule 0    oxyCODONE (ROXICODONE) 5 mg immediate release tablet Take 1 tablet (5 mg total) by mouth every 4 (four) hours as needed for moderate pain For continuing therapyMax Daily Amount: 30 mg (Patient not taking: Reported on 2/19/2020) 10 tablet 0    predniSONE 10 mg tablet Take 3 tabs BID X 2 days, 2 tabs BID X 2 days, 1 tab BID X 2 days, 1 tab daily X 2 days (Patient not taking: Reported on 12/2/2020) 26 tablet 0    sucralfate (CARAFATE) 1 g/10 mL suspension Take 10 mL (1 g total) by mouth 4 (four) times a day 1200 mL 1    SUMAtriptan (IMITREX) 100 mg tablet Take 100 mg by mouth 2 (two) times a day as needed for migraine      traMADol (ULTRAM) 50 mg tablet Take 50 mg by mouth 2 (two) times a day  No current facility-administered medications for this visit  Allergies   Allergen Reactions    Codeine GI Intolerance and Chest Pain     ABDOMINAL PAIN    Hydrocodone Abdominal Pain and Swelling    Pregabalin Dizziness    Acetaminophen-Codeine Abdominal Pain    Amoxicillin Rash       Review of Systems   Respiratory: Negative  Cardiovascular: Positive for palpitations (followed by cardiology)  Neurological: Negative  Video Exam    There were no vitals filed for this visit  Physical Exam     I spent 25 minutes directly with the patient during this visit      VIRTUAL VISIT DISCLAIMER    Heydiwillie Consuelo acknowledges that she has consented to an online visit or consultation  She understands that the online visit is based solely on information provided by her, and that, in the absence of a face-to-face physical evaluation by the physician, the diagnosis she receives is both limited and provisional in terms of accuracy and completeness  This is not intended to replace a full medical face-to-face evaluation by the physician  Harry Cordero understands and accepts these terms

## 2021-01-06 NOTE — PATIENT INSTRUCTIONS
Please proceed to the ER for new or worsening pain as discussed during your visit   Increase prilosec to twice daily for now and Carafate four times daily

## 2021-01-13 ENCOUNTER — HOSPITAL ENCOUNTER (OUTPATIENT)
Dept: CT IMAGING | Facility: HOSPITAL | Age: 51
Discharge: HOME | End: 2021-01-13
Payer: COMMERCIAL

## 2021-01-13 DIAGNOSIS — R10.13 EPIGASTRIC PAIN: ICD-10-CM

## 2021-01-13 DIAGNOSIS — Z98.84 BARIATRIC SURGERY STATUS: ICD-10-CM

## 2021-01-13 PROCEDURE — 74177 CT ABD & PELVIS W/CONTRAST: CPT

## 2021-01-13 RX ADMIN — IOHEXOL 100 ML: 350 INJECTION, SOLUTION INTRAVENOUS at 13:51

## 2021-01-25 ENCOUNTER — TELEPHONE (OUTPATIENT)
Dept: HEMATOLOGY ONCOLOGY | Facility: CLINIC | Age: 51
End: 2021-01-25

## 2021-01-25 NOTE — TELEPHONE ENCOUNTER
Phoned pt to let her know that her insurance as of 1/1/21 requires her to have Privigen done at her home or in a lower cost facility  [facility] does not do IVIG in the home per [facility] intake staff  I sent an e mail back to Omid Iniguez to find out who her insurance company is contracted with to set this up

## 2021-01-26 ENCOUNTER — TELEPHONE (OUTPATIENT)
Dept: HEMATOLOGY ONCOLOGY | Facility: CLINIC | Age: 51
End: 2021-01-26

## 2021-01-26 NOTE — TELEPHONE ENCOUNTER
----- Message from Alannah Vaca sent at 1/25/2021 11:00 AM EST -----  Regarding: FW: Non-Urgent Medical Question  Contact: [phone]  Please advise  Thank you  ----- Message -----  From: Cora Greene MA  Sent: 1/25/2021  10:53 AM EST  To: Alannah Vaca  Subject: FW: Non-Urgent Medical Question                    ----- Message -----  From: Jena Bautista  Sent: 1/25/2021  10:12 AM EST  To: [facility]  Subject: Non-Urgent Medical Question                      I got a call today that my Privigen treatment for this Wednesday has been canceled because the insurance has denied it based on the place of treatment  They require me to find alternative/less expensive way to get treatment  This is nothing new  They have done this before  Im unsure what the prior facility said or did to have them finally avi the treatment site  If for some reason I need to do home treatment how does that work? Also, with all the prophetic medication how would I handle that? Im not totally opposed but I am concerned about the possible problems  Please keep me informed as to my options     [facility]

## 2021-01-26 NOTE — TELEPHONE ENCOUNTER
I phoned pt to ask if she had ever had privgen administered via home health  Pt stated she had not that she last was recieving at [address] before transferrring to [facility] and last year when insurance wanted her to have in her home [address] did an appeal and pt was able to have at [facility]   I have e mailed this information to the Oncology Finance Team

## 2021-01-27 ENCOUNTER — HOSPITAL ENCOUNTER (OUTPATIENT)
Dept: INFUSION CENTER | Facility: HOSPITAL | Age: 51
Discharge: HOME | End: 2021-01-27
Attending: INTERNAL MEDICINE

## 2021-02-02 ENCOUNTER — ANESTHESIA EVENT (OUTPATIENT)
Dept: GASTROENTEROLOGY | Facility: HOSPITAL | Age: 51
End: 2021-02-02

## 2021-02-03 ENCOUNTER — HOSPITAL ENCOUNTER (OUTPATIENT)
Dept: GASTROENTEROLOGY | Facility: HOSPITAL | Age: 51
Setting detail: OUTPATIENT SURGERY
Discharge: HOME | End: 2021-02-03
Attending: SURGERY | Admitting: SURGERY
Payer: COMMERCIAL

## 2021-02-03 ENCOUNTER — ANESTHESIA (OUTPATIENT)
Dept: GASTROENTEROLOGY | Facility: HOSPITAL | Age: 51
End: 2021-02-03

## 2021-02-03 VITALS
SYSTOLIC BLOOD PRESSURE: 125 MMHG | DIASTOLIC BLOOD PRESSURE: 79 MMHG | OXYGEN SATURATION: 99 % | HEART RATE: 70 BPM | TEMPERATURE: 98.3 F | RESPIRATION RATE: 14 BRPM

## 2021-02-03 VITALS — HEART RATE: 77 BPM

## 2021-02-03 DIAGNOSIS — Z87.19 HISTORY OF REPAIR OF HIATAL HERNIA: ICD-10-CM

## 2021-02-03 DIAGNOSIS — Z98.890 HISTORY OF REPAIR OF HIATAL HERNIA: ICD-10-CM

## 2021-02-03 DIAGNOSIS — Z98.84 H/O GASTRIC BYPASS: Primary | ICD-10-CM

## 2021-02-03 PROBLEM — F32.A DEPRESSION: Status: ACTIVE | Noted: 2021-02-03

## 2021-02-03 PROBLEM — J45.20 MILD INTERMITTENT ASTHMA: Status: ACTIVE | Noted: 2021-02-03

## 2021-02-03 PROCEDURE — 43235 EGD DIAGNOSTIC BRUSH WASH: CPT | Performed by: SURGERY

## 2021-02-03 RX ORDER — ONDANSETRON 2 MG/ML
4 INJECTION INTRAMUSCULAR; INTRAVENOUS ONCE AS NEEDED
Status: DISCONTINUED | OUTPATIENT
Start: 2021-02-03 | End: 2021-02-07 | Stop reason: HOSPADM

## 2021-02-03 RX ORDER — SODIUM CHLORIDE 9 MG/ML
125 INJECTION, SOLUTION INTRAVENOUS CONTINUOUS
Status: DISCONTINUED | OUTPATIENT
Start: 2021-02-03 | End: 2021-02-07 | Stop reason: HOSPADM

## 2021-02-03 RX ORDER — PROPOFOL 10 MG/ML
INJECTION, EMULSION INTRAVENOUS AS NEEDED
Status: DISCONTINUED | OUTPATIENT
Start: 2021-02-03 | End: 2021-02-03

## 2021-02-03 RX ADMIN — SODIUM CHLORIDE 125 ML/HR: 0.9 INJECTION, SOLUTION INTRAVENOUS at 07:34

## 2021-02-03 RX ADMIN — PROPOFOL 150 MG: 10 INJECTION, EMULSION INTRAVENOUS at 08:35

## 2021-02-03 NOTE — ANESTHESIA PREPROCEDURE EVALUATION
Procedure:  EGD    Relevant Problems   ANESTHESIA   (+) PONV (postoperative nausea and vomiting)      CARDIO   (+) Benign essential HTN      ENDO (within normal limits)      GI/HEPATIC   (+) Gastroesophageal reflux disease   (+) Status post bariatric surgery      /RENAL (within normal limits)      GYN (within normal limits)      HEMATOLOGY  h/o immuno def disorder      MUSCULOSKELETAL  fibromyalgia       NEURO/PSYCH   (+) Depression      PULMONARY   (+) Mild intermittent asthma        Physical Exam    Airway    Mallampati score: II  TM Distance: >3 FB  Neck ROM: full     Dental   No notable dental hx     Cardiovascular  Rhythm: regular, Rate: normal, Cardiovascular exam normal    Pulmonary  Pulmonary exam normal Breath sounds clear to auscultation,     Other Findings        Anesthesia Plan  ASA Score- 2     Anesthesia Type- general and IV sedation with anesthesia with ASA Monitors  Additional Monitors:   Airway Plan:           Plan Factors-    Chart reviewed  Existing labs reviewed  Patient summary reviewed  Patient is not a current smoker  Induction- intravenous  Postoperative Plan-     Informed Consent- Anesthetic plan and risks discussed with patient

## 2021-02-03 NOTE — INTERVAL H&P NOTE
H&P reviewed  After examining the patient I find no changes in the patients condition since the H&P had been written      Vitals:    02/03/21 0720   BP: 128/77   Pulse: 73   Resp: 16   Temp: 98 3 °F (36 8 °C)   SpO2: 98%

## 2021-02-03 NOTE — DISCHARGE INSTRUCTIONS
Upper Endoscopy   WHAT YOU NEED TO KNOW:   An upper endoscopy is also called an upper gastrointestinal (GI) endoscopy, or an esophagogastroduodenoscopy (EGD)  You may feel bloated, gassy, or have some abdominal discomfort after your procedure  Your throat may be sore for 24 to 36 hours  You may burp or pass gas from air that is still inside your body  DISCHARGE INSTRUCTIONS:   Call 911 for any of the following:   · You have sudden chest pain or trouble breathing  Seek care immediately if:   · You feel dizzy or faint  · You have trouble swallowing  · Your bowel movements are very dark or black  · Your abdomen is hard and firm and you have severe pain  · You vomit blood  Contact your healthcare provider if:   · You feel full or bloated and cannot burp or pass gas  · You have not had a bowel movement for 3 days after your procedure  · You have neck pain  · You have a fever or chills  · You have nausea or are vomiting  · You have a rash or hives  · You have questions or concerns about your endoscopy  Relieve a sore throat:  Suck on throat lozenges or crushed ice  Gargle with a small amount of warm salt water  Mix 1 teaspoon of salt and 1 cup of warm water to make salt water  Relieve gas and discomfort from bloating:  Lie on your right side with a heating pad on your abdomen  Take short walks to help pass gas  Eat small meals until bloating is relieved  Rest after your procedure: You have been given medicine to relax you  Do not  drive or make important decisions until the day after your procedure  Return to your normal activity as directed  You can usually return to work the day after your procedure  Follow up with your healthcare provider as directed:  Write down your questions so you remember to ask them during your visits     © 2017 [address] is for End User's use only and may not be sold, redistributed or otherwise used for commercial purposes  All illustrations and images included in CareNotes® are the copyrighted property of A D [employer] M [employer]  or Nehemiah Brown  The above information is an  only  It is not intended as medical advice for individual conditions or treatments  Talk to your doctor, nurse or pharmacist before following any medical regimen to see if it is safe and effective for you

## 2021-02-04 NOTE — TELEPHONE ENCOUNTER
Patient states that her IVIG treatment has been canceled due to being denied by her insurance and following up on the status  Best call back [phone]

## 2021-02-04 NOTE — TELEPHONE ENCOUNTER
Returned a call to pt to let her know that I heard from [employer] and I have a number and a name to call tomorrow to see if can have IVIG infused in infusion or gather the information as to what  Sherrie Owusu to use and what pharmacy could fill the Rx for Privigen

## 2021-02-08 ENCOUNTER — TELEPHONE (OUTPATIENT)
Dept: HEMATOLOGY ONCOLOGY | Facility: CLINIC | Age: 51
End: 2021-02-08

## 2021-02-08 NOTE — TELEPHONE ENCOUNTER
Phoned pt to let her know that Kiera Navarrete just did peer to peer with Dr Miller [facility] and he said he would approve the Privigen to be done at infusion but the final approval would be sent over  I told her once I hear from Jose Mcfarland [address]  that we have auth I will call her back nad get [place] [facility]

## 2021-02-11 ENCOUNTER — TELEPHONE (OUTPATIENT)
Dept: HEMATOLOGY ONCOLOGY | Facility: CLINIC | Age: 51
End: 2021-02-11

## 2021-02-11 DIAGNOSIS — D80.1 HYPOGAMMAGLOBULINEMIA (HCC): Primary | ICD-10-CM

## 2021-02-11 RX ORDER — SODIUM CHLORIDE 9 MG/ML
20 INJECTION, SOLUTION INTRAVENOUS ONCE
Status: CANCELLED | OUTPATIENT
Start: 2021-02-24

## 2021-02-11 RX ORDER — ACETAMINOPHEN 325 MG/1
650 TABLET ORAL ONCE
Status: CANCELLED | OUTPATIENT
Start: 2021-02-24

## 2021-02-11 NOTE — TELEPHONE ENCOUNTER
Phoned pt and let her know that we received the auth for one year for her to have Privigen in the infusion room at [facility]  I provided pt the phone number for her to call and schedule her infusion   Pt will call and R/S ra[facility] infusion

## 2021-02-18 ENCOUNTER — TELEMEDICINE (OUTPATIENT)
Dept: BARIATRICS | Facility: CLINIC | Age: 51
End: 2021-02-18
Payer: COMMERCIAL

## 2021-02-18 VITALS — HEIGHT: 62 IN | BODY MASS INDEX: 29.81 KG/M2 | WEIGHT: 162 LBS

## 2021-02-18 DIAGNOSIS — R10.13 EPIGASTRIC PAIN: Primary | ICD-10-CM

## 2021-02-18 PROCEDURE — 99214 OFFICE O/P EST MOD 30 MIN: CPT | Performed by: SURGERY

## 2021-02-19 ENCOUNTER — TELEPHONE (OUTPATIENT)
Dept: HEMATOLOGY ONCOLOGY | Facility: CLINIC | Age: 51
End: 2021-02-19

## 2021-02-19 NOTE — TELEPHONE ENCOUNTER
Per Uzma Carmona RN called pt and pushed f/u apt back 2 months  Patient will be seen for apt on Thursday 04/29/2021 at 2:30 pm w/Daphney CORADO at the [facility]

## 2021-02-23 ENCOUNTER — LAB (OUTPATIENT)
Dept: LAB | Facility: HOSPITAL | Age: 51
End: 2021-02-23
Payer: COMMERCIAL

## 2021-02-23 ENCOUNTER — TRANSCRIBE ORDERS (OUTPATIENT)
Dept: LAB | Facility: HOSPITAL | Age: 51
End: 2021-02-23

## 2021-02-23 DIAGNOSIS — M79.7 FIBROMYALGIA: ICD-10-CM

## 2021-02-23 DIAGNOSIS — Z79.899 ENCOUNTER FOR LONG-TERM (CURRENT) USE OF OTHER MEDICATIONS: Primary | ICD-10-CM

## 2021-02-23 DIAGNOSIS — Z79.899 ENCOUNTER FOR LONG-TERM (CURRENT) USE OF OTHER MEDICATIONS: ICD-10-CM

## 2021-02-23 LAB
ALBUMIN SERPL BCP-MCNC: 4.2 G/DL (ref 3.5–5.7)
ALP SERPL-CCNC: 65 U/L (ref 40–150)
ALT SERPL W P-5'-P-CCNC: 11 U/L (ref 7–52)
ANION GAP SERPL CALCULATED.3IONS-SCNC: 11 MMOL/L (ref 4–13)
AST SERPL W P-5'-P-CCNC: 17 U/L (ref 13–39)
BASOPHILS # BLD AUTO: 0 THOUSANDS/ΜL (ref 0–0.1)
BASOPHILS NFR BLD AUTO: 0 % (ref 0–2)
BILIRUB SERPL-MCNC: 0.5 MG/DL (ref 0.2–1)
BUN SERPL-MCNC: 10 MG/DL (ref 7–25)
CALCIUM SERPL-MCNC: 9.2 MG/DL (ref 8.6–10.5)
CHLORIDE SERPL-SCNC: 101 MMOL/L (ref 98–107)
CO2 SERPL-SCNC: 24 MMOL/L (ref 21–31)
CREAT SERPL-MCNC: 0.72 MG/DL (ref 0.6–1.2)
CRP SERPL QL: 5.6 MG/L
EOSINOPHIL # BLD AUTO: 0.2 THOUSAND/ΜL (ref 0–0.61)
EOSINOPHIL NFR BLD AUTO: 2 % (ref 0–5)
ERYTHROCYTE [DISTWIDTH] IN BLOOD BY AUTOMATED COUNT: 13.1 % (ref 11.5–14.5)
ERYTHROCYTE [SEDIMENTATION RATE] IN BLOOD: 8 MM/HOUR (ref 0–29)
GFR SERPL CREATININE-BSD FRML MDRD: 98 ML/MIN/1.73SQ M
GLUCOSE SERPL-MCNC: 82 MG/DL (ref 65–99)
HCT VFR BLD AUTO: 40.3 % (ref 42–47)
HGB BLD-MCNC: 13.1 G/DL (ref 12–16)
LYMPHOCYTES # BLD AUTO: 2.9 THOUSANDS/ΜL (ref 0.6–4.47)
LYMPHOCYTES NFR BLD AUTO: 32 % (ref 21–51)
MCH RBC QN AUTO: 29.4 PG (ref 26–34)
MCHC RBC AUTO-ENTMCNC: 32.5 G/DL (ref 31–37)
MCV RBC AUTO: 91 FL (ref 81–99)
MONOCYTES # BLD AUTO: 0.5 THOUSAND/ΜL (ref 0.17–1.22)
MONOCYTES NFR BLD AUTO: 6 % (ref 2–12)
NEUTROPHILS # BLD AUTO: 5.3 THOUSANDS/ΜL (ref 1.4–6.5)
NEUTS SEG NFR BLD AUTO: 59 % (ref 42–75)
PLATELET # BLD AUTO: 339 THOUSANDS/UL (ref 149–390)
PMV BLD AUTO: 7.3 FL (ref 8.6–11.7)
POTASSIUM SERPL-SCNC: 3.8 MMOL/L (ref 3.5–5.5)
PROT SERPL-MCNC: 6.8 G/DL (ref 6.4–8.9)
RBC # BLD AUTO: 4.46 MILLION/UL (ref 3.9–5.2)
SODIUM SERPL-SCNC: 136 MMOL/L (ref 134–143)
WBC # BLD AUTO: 8.9 THOUSAND/UL (ref 4.8–10.8)

## 2021-02-23 PROCEDURE — 80053 COMPREHEN METABOLIC PANEL: CPT

## 2021-02-23 PROCEDURE — 86140 C-REACTIVE PROTEIN: CPT

## 2021-02-23 PROCEDURE — 85025 COMPLETE CBC W/AUTO DIFF WBC: CPT

## 2021-02-23 PROCEDURE — 36415 COLL VENOUS BLD VENIPUNCTURE: CPT

## 2021-02-23 PROCEDURE — 85652 RBC SED RATE AUTOMATED: CPT

## 2021-02-24 ENCOUNTER — HOSPITAL ENCOUNTER (OUTPATIENT)
Dept: INFUSION CENTER | Facility: HOSPITAL | Age: 51
Discharge: HOME | End: 2021-02-24
Attending: INTERNAL MEDICINE
Payer: COMMERCIAL

## 2021-02-24 DIAGNOSIS — D80.1 HYPOGAMMAGLOBULINEMIA (HCC): Primary | ICD-10-CM

## 2021-02-24 PROCEDURE — 96366 THER/PROPH/DIAG IV INF ADDON: CPT

## 2021-02-24 PROCEDURE — 96365 THER/PROPH/DIAG IV INF INIT: CPT

## 2021-02-24 PROCEDURE — 96367 TX/PROPH/DG ADDL SEQ IV INF: CPT

## 2021-02-24 RX ORDER — SODIUM CHLORIDE 9 MG/ML
20 INJECTION, SOLUTION INTRAVENOUS ONCE
Status: CANCELLED | OUTPATIENT
Start: 2021-03-24

## 2021-02-24 RX ORDER — ACETAMINOPHEN 325 MG/1
650 TABLET ORAL ONCE
Status: COMPLETED | OUTPATIENT
Start: 2021-02-24 | End: 2021-02-24

## 2021-02-24 RX ORDER — SODIUM CHLORIDE 9 MG/ML
20 INJECTION, SOLUTION INTRAVENOUS ONCE
Status: COMPLETED | OUTPATIENT
Start: 2021-02-24 | End: 2021-02-24

## 2021-02-24 RX ORDER — ACETAMINOPHEN 325 MG/1
650 TABLET ORAL ONCE
Status: CANCELLED | OUTPATIENT
Start: 2021-03-24

## 2021-02-24 RX ADMIN — DIPHENHYDRAMINE HYDROCHLORIDE 25 MG: 50 INJECTION, SOLUTION INTRAMUSCULAR; INTRAVENOUS at 09:04

## 2021-02-24 RX ADMIN — ACETAMINOPHEN 650 MG: 325 TABLET ORAL at 08:42

## 2021-02-24 RX ADMIN — DEXAMETHASONE SODIUM PHOSPHATE 10 MG: 10 INJECTION, SOLUTION INTRAMUSCULAR; INTRAVENOUS at 08:29

## 2021-02-24 RX ADMIN — FAMOTIDINE 20 MG: 10 INJECTION INTRAVENOUS at 09:29

## 2021-02-24 RX ADMIN — SODIUM CHLORIDE 500 ML: 0.9 INJECTION, SOLUTION INTRAVENOUS at 08:17

## 2021-02-24 RX ADMIN — SODIUM CHLORIDE 20 ML/HR: 0.9 INJECTION, SOLUTION INTRAVENOUS at 08:28

## 2021-02-24 RX ADMIN — Medication 40 G: at 10:12

## 2021-03-08 ENCOUNTER — TELEPHONE (OUTPATIENT)
Dept: BARIATRICS | Facility: CLINIC | Age: 51
End: 2021-03-08

## 2021-03-10 DIAGNOSIS — Z23 ENCOUNTER FOR IMMUNIZATION: ICD-10-CM

## 2021-03-21 DIAGNOSIS — Z98.84 BARIATRIC SURGERY STATUS: ICD-10-CM

## 2021-03-22 RX ORDER — OMEPRAZOLE 20 MG/1
CAPSULE, DELAYED RELEASE ORAL
Qty: 30 CAPSULE | Refills: 1 | Status: SHIPPED | OUTPATIENT
Start: 2021-03-22 | End: 2022-06-09 | Stop reason: HOSPADM

## 2021-03-22 NOTE — TELEPHONE ENCOUNTER
Spoke with patient, states at last visit [facility] Dr Makenzie Henderson was advised to continue taking for  Still occasionally has stomach pain  Is seeing GI next month   Will refill for now then will have GI continue refills prn

## 2021-03-25 ENCOUNTER — HOSPITAL ENCOUNTER (OUTPATIENT)
Dept: INFUSION CENTER | Facility: HOSPITAL | Age: 51
Discharge: HOME | End: 2021-03-25
Payer: COMMERCIAL

## 2021-03-25 VITALS
TEMPERATURE: 98.1 F | SYSTOLIC BLOOD PRESSURE: 120 MMHG | HEART RATE: 91 BPM | DIASTOLIC BLOOD PRESSURE: 84 MMHG | RESPIRATION RATE: 16 BRPM

## 2021-03-25 DIAGNOSIS — D80.1 HYPOGAMMAGLOBULINEMIA (HCC): Primary | ICD-10-CM

## 2021-03-25 PROCEDURE — 96366 THER/PROPH/DIAG IV INF ADDON: CPT

## 2021-03-25 PROCEDURE — 96365 THER/PROPH/DIAG IV INF INIT: CPT

## 2021-03-25 PROCEDURE — 96367 TX/PROPH/DG ADDL SEQ IV INF: CPT

## 2021-03-25 RX ORDER — ACETAMINOPHEN 325 MG/1
650 TABLET ORAL ONCE
Status: COMPLETED | OUTPATIENT
Start: 2021-03-25 | End: 2021-03-25

## 2021-03-25 RX ORDER — ACETAMINOPHEN 325 MG/1
650 TABLET ORAL ONCE
Status: CANCELLED | OUTPATIENT
Start: 2021-04-22

## 2021-03-25 RX ORDER — SODIUM CHLORIDE 9 MG/ML
20 INJECTION, SOLUTION INTRAVENOUS ONCE
Status: COMPLETED | OUTPATIENT
Start: 2021-03-25 | End: 2021-03-25

## 2021-03-25 RX ORDER — SODIUM CHLORIDE 9 MG/ML
20 INJECTION, SOLUTION INTRAVENOUS ONCE
Status: CANCELLED | OUTPATIENT
Start: 2021-04-22

## 2021-03-25 RX ADMIN — SODIUM CHLORIDE 20 ML/HR: 0.9 INJECTION, SOLUTION INTRAVENOUS at 08:13

## 2021-03-25 RX ADMIN — DIPHENHYDRAMINE HYDROCHLORIDE 25 MG: 50 INJECTION, SOLUTION INTRAMUSCULAR; INTRAVENOUS at 09:10

## 2021-03-25 RX ADMIN — DEXAMETHASONE SODIUM PHOSPHATE 10 MG: 10 INJECTION, SOLUTION INTRAMUSCULAR; INTRAVENOUS at 08:34

## 2021-03-25 RX ADMIN — FAMOTIDINE 20 MG: 10 INJECTION INTRAVENOUS at 09:33

## 2021-03-25 RX ADMIN — SODIUM CHLORIDE 500 ML: 0.9 INJECTION, SOLUTION INTRAVENOUS at 08:15

## 2021-03-25 RX ADMIN — ACETAMINOPHEN 650 MG: 325 TABLET ORAL at 08:20

## 2021-03-25 RX ADMIN — Medication 40 G: at 10:01

## 2021-03-25 NOTE — PLAN OF CARE
Problem: Potential for Falls  Goal: Patient will remain free of falls  Description: INTERVENTIONS:  - Assess patient frequently for physical needs  -  Identify cognitive and physical deficits and behaviors that affect risk of falls    -  [place] fall precautions as indicated by assessment   - Educate patient/family on patient safety including physical limitations  - Instruct patient to call for assistance with activity based on assessment  - Modify environment to reduce risk of injury  - Consider OT/PT consult to assist with strengthening/mobility  Outcome: Progressing

## 2021-04-14 ENCOUNTER — TELEMEDICINE (OUTPATIENT)
Dept: GASTROENTEROLOGY | Facility: CLINIC | Age: 51
End: 2021-04-14
Payer: COMMERCIAL

## 2021-04-14 DIAGNOSIS — Z98.84 H/O GASTRIC BYPASS: ICD-10-CM

## 2021-04-14 DIAGNOSIS — K63.89 SMALL INTESTINAL BACTERIAL OVERGROWTH: Primary | ICD-10-CM

## 2021-04-14 DIAGNOSIS — K58.2 IRRITABLE BOWEL SYNDROME WITH BOTH CONSTIPATION AND DIARRHEA: ICD-10-CM

## 2021-04-14 PROCEDURE — 99204 OFFICE O/P NEW MOD 45 MIN: CPT | Performed by: INTERNAL MEDICINE

## 2021-04-15 ENCOUNTER — DOCUMENTATION (OUTPATIENT)
Dept: GASTROENTEROLOGY | Facility: CLINIC | Age: 51
End: 2021-04-15

## 2021-04-15 PROBLEM — K63.8219 SMALL INTESTINAL BACTERIAL OVERGROWTH: Status: ACTIVE | Noted: 2021-04-15

## 2021-04-15 PROBLEM — K58.2 IRRITABLE BOWEL SYNDROME WITH BOTH CONSTIPATION AND DIARRHEA: Status: ACTIVE | Noted: 2021-04-15

## 2021-04-15 PROBLEM — K63.89 SMALL INTESTINAL BACTERIAL OVERGROWTH: Status: ACTIVE | Noted: 2021-04-15

## 2021-04-15 NOTE — PROGRESS NOTES
nm  Virtual Regular Visit      Assessment/Plan:    Problem List Items Addressed This Visit     None      Visit Diagnoses     Small intestinal bacterial overgrowth    -  Primary    Relevant Medications    rifaximin (XIFAXAN) 550 mg tablet    Irritable bowel syndrome with both constipation and diarrhea        Relevant Medications    rifaximin (XIFAXAN) 550 mg tablet      1  Small intestinal bacterial overgrowth  - rifaximin (XIFAXAN) 550 mg tablet; Take 1 tablet (550 mg total) by mouth every 8 (eight) hours for 14 days  Dispense: 42 tablet; Refill: 0    2  Irritable bowel syndrome with both constipation and diarrhea  - rifaximin (XIFAXAN) 550 mg tablet; Take 1 tablet (550 mg total) by mouth every 8 (eight) hours for 14 days  Dispense: 42 tablet; Refill: 0    3  H/O gastric bypass      It is possible that her issues may be secondary to adhesions again  She likely has small intestinal bacterial overgrowth in setting of history of adhesions and multiple abdominal surgeries  We can treat her with medical management and will continue to manage her symptoms along with our Bariatric Team   I suspect there is also an overlap of functional symptoms  - will mail low FODMAP diet  - avoidance of dairy  - treatment for small intestinal bacterial overgrowth and IBS with rifaximin therapy for 14 days  - recommended probiotics  - increase fluid and fiber    Reason for visit is   Chief Complaint   Patient presents with    Virtual Regular Visit        Encounter provider Ade Ordonez MD    Provider located at [address] [place] [address]  [phone]      Recent Visits  No visits were found meeting these conditions  Showing recent visits within past 7 days and meeting all other requirements     Future Appointments  No visits were found meeting these conditions     Showing future appointments within next 150 days and meeting all other requirements        The patient was identified by name and date of birth  Bright Uribe was informed that this is a telemedicine visit and that the visit is being conducted through [facility] and patient was informed that this is a secure, HIPAA-compliant platform  She agrees to proceed     My office door was closed  Other methods to assure confidentiality were taken  No one else was in the room  She acknowledged consent and understanding of privacy and security of the video platform  The patient has agreed to participate and understands they can discontinue the visit at any time  Patient is aware this is a billable service  Subjective  Bright Uribe is a 48 y o  female   With symptoms of IBS mixed, history of gastric bypass, dyspepsia and concerns for small intestinal bacterial overgrowth presents here for evaluation  She has abdominal distension and abdominal discomfort associated with pain which is postprandial  She has had multiple abdominal surgeries in her life time including history of gastric bypass surgery  Colonoscopy performed 2019 was within normal limits  gastric bypass surgeries in 2009  EGD completed in 2021 was within normal limits with anatomy consistent with previous history of gastric bypass surgery  There was no evidence of marginal ulceration  CT scan of the abdomen showed distended loops of bowel in the left upper quadrant which have since stable overall  She has had multiple surgeries with lysis of adhesions  There was also evidence of may be a small bowel intussusception on this imaging study  She has symptoms of diarrhea and constipation which have been difficult to control  Taking any over-the-counter supplements would lead to  Aggravation and worsening symptoms  Laxatives with resultant diarrhea for example and antidiarrheals  would resultant severe constipation  Denies excessive NSAID use    HPI     Past Medical History:   Diagnosis Date    Anemia     recurrent    Anxiety     Asthma     allergy induced    Contact lens overwear of both eyes     Depression     Diarrhea     Environmental allergies     Fibromyalgia     Fibromyalgia, primary     GERD (gastroesophageal reflux disease)     History of transfusion     2008    Hypertension     Hypogammaglobulinemia ([address] )     Hypoglycemia after GI (gastrointestinal) surgery     Immune deficiency disorder (HCC)     IgG deficiency    Iron (Fe) deficiency anemia     Kidney stone     Lumbar herniated disc     Migraine     Motion sickness     PONV (postoperative nausea and vomiting)     severe    Seasonal allergies     Wears glasses        Past Surgical History:   Procedure Laterality Date    ABDOMINAL SURGERY      adhesions    ABDOMINOPLASTY      APPENDECTOMY      AUGMENTATION MAMMAPLASTY  2003    BREAST IMPLANT REMOVAL Bilateral 2019    BREAST SURGERY      implant removal     SECTION      CHOLECYSTECTOMY      COSMETIC SURGERY Bilateral     breast augmentation 2004    GASTRIC BYPASS      2009    GASTRIC BYPASS LAPAROSCOPIC N/A 2020    Procedure: ROBOTIC REVISION OF KENYA-EN-Y GASTRIC BYPASS, INTRAOP EGD;  Surgeon: Amador Avelar MD;  [facility];  Service: [address] [place] [address] [place]      HYSTERECTOMY  2015    KNEE ARTHROSCOPY Left     LYMPH NODE BIOPSY  200-    benign    NERVE BLOCK      AK CORRJ HALLUX VALGUS W/SESMDC W/RESCJ PROX PHAL Left 2016    Procedure: SURGICAL MODIFIED WATKINS BUNIONECTOMY FIRST MTPJ;  Surgeon: Shahnaz Whittington DPM;  [facility];  Service: Podiatry    AK FUSION FOOT BONE,MIDTARSAL,1 JT Left 2016    Procedure: FRIST TMJ FUSION ;  Surgeon: Shahnaz Whittington DPM;  [facility];  Service: Podiatry    [address] [place] (NOT 1ST) Left 2016    Procedure: FOOT SHORTENING OSTEOTOMIES 2ND AND 3RD METATARSAL WITH [address] AND 4TH TOE;  Surgeon: Shahnaz Whittington CHRIS;  [facility];  Service: Podiatry    IN REMOVAL DEEP IMPLANT Left 12/7/2016    Procedure: REMOVAL SYMPTOMATIC  HARDWARE FIRST RAY,RELATED CONTRACTURE SECOND AND THIRD TOES WITH SUSPENSION FOURTH TOE;  Surgeon: Deborah Paz DPM;  [facility];  Service: Podiatry    SALPINGOOPHORECTOMY Left 2016    with removal of cervix    TONSILLECTOMY      WISDOM TOOTH EXTRACTION         Current Outpatient Medications   Medication Sig Dispense Refill    acetaminophen (TYLENOL) 325 mg tablet Take 3 tablets (975 mg total) by mouth every 8 (eight) hours 30 tablet 0    albuterol (PROVENTIL HFA,VENTOLIN HFA) 90 mcg/act inhaler Inhale 1 puff every 6 (six) hours as needed       ALPRAZolam (XANAX) 2 MG tablet Take 0 5 mg by mouth daily at bedtime       Azelastine-Fluticasone (DYMISTA NA) 2 sprays into each nostril 2 (two) times a day       Botulinum Toxin Type A 200 units SOLR Inject 155 units into face and neck IM every 90 days      budesonide-formoterol (SYMBICORT) 160-4 5 mcg/act inhaler Inhale 2 puffs 2 (two) times a day       buPROPion (WELLBUTRIN) 100 mg tablet Take 100 mg by mouth 2 (two) times a day       celecoxib (CELEBREX) 200 mg capsule Take 200 mg by mouth 2 (two) times a day as needed       cholecalciferol (VITAMIN D3) 40521 units capsule Take 5,000 Units by mouth 2 (two) times a day       Cyanocobalamin (B-12) 1000 MCG/ML KIT Inject as directed every 30 (thirty) days        Cyanocobalamin (VITAMIN B-12 PO) Place 100 mg under the tongue daily       cyclobenzaprine (FLEXERIL) 10 mg tablet Take 10 mg by mouth 3 (three) times a day as needed       Erenumab-aooe (AIMOVIG) 70 MG/ML SOAJ Inject under the skin every 28 days      fexofenadine (ALLEGRA) 180 MG tablet Take 180 mg by mouth daily      fremanezumab-vfrm (Ajovy) 225 MG/1 5ML prefilled syringe Inject 225 mg under the skin every 30 (thirty) days      hyoscyamine (LEVSIN/SL) 0 125 mg SL tablet Take 0 125 mg by mouth every 4 (four) hours as needed for cramping      Immune Globulin, Human, (PRIVIGEN IV) Infuse into a venous catheter every 30 (thirty) days      losartan (COZAAR) 50 mg tablet Take 50 mg by mouth 2 (two) times a day   meclizine (ANTIVERT) 25 mg tablet Take 1 tablet (25 mg total) by mouth 3 (three) times a day as needed for dizziness for up to 12 doses 12 tablet 0    montelukast (SINGULAIR) 5 mg chewable tablet Chew 5 mg 2 (two) times a day   omeprazole (PriLOSEC) 20 mg delayed release capsule Take 1 capsule by mouth daily 30 capsule 1    oxyCODONE (ROXICODONE) 5 mg immediate release tablet Take 1 tablet (5 mg total) by mouth every 4 (four) hours as needed for moderate pain For continuing therapyMax Daily Amount: 30 mg (Patient not taking: Reported on 2/19/2020) 10 tablet 0    predniSONE 10 mg tablet Take 3 tabs BID X 2 days, 2 tabs BID X 2 days, 1 tab BID X 2 days, 1 tab daily X 2 days (Patient not taking: Reported on 12/2/2020) 26 tablet 0    rifaximin (XIFAXAN) 550 mg tablet Take 1 tablet (550 mg total) by mouth every 8 (eight) hours for 14 days 42 tablet 0    sucralfate (CARAFATE) 1 g/10 mL suspension Take 10 mL (1 g total) by mouth 4 (four) times a day 1200 mL 1    SUMAtriptan (IMITREX) 100 mg tablet Take 100 mg by mouth 2 (two) times a day as needed for migraine      traMADol (ULTRAM) 50 mg tablet Take 50 mg by mouth 2 (two) times a day  No current facility-administered medications for this visit  Allergies   Allergen Reactions    Codeine GI Intolerance and Chest Pain     ABDOMINAL PAIN    Hydrocodone Abdominal Pain and Swelling    Pregabalin Dizziness    Acetaminophen-Codeine Abdominal Pain    Amoxicillin Rash       Review of Systems    Video Exam    There were no vitals filed for this visit  REVIEW OF SYSTEMS:    CONSTITUTIONAL: Denies any fever, chills, rigors, and weight loss  HEENT: No earache or tinnitus  Denies hearing loss or visual disturbances    CARDIOVASCULAR: No chest pain or palpitations  RESPIRATORY: Denies any cough, hemoptysis, shortness of breath or dyspnea on exertion  GASTROINTESTINAL: As noted in the History of Present Illness  GENITOURINARY: No problems with urination  Denies any hematuria or dysuria  NEUROLOGIC: No dizziness or vertigo, denies headaches  MUSCULOSKELETAL: Denies any muscle or joint pain  SKIN: Denies skin rashes or itching  ENDOCRINE: Denies excessive thirst  Denies intolerance to heat or cold  PSYCHOSOCIAL: Denies depression or anxiety  Denies any recent memory loss  PHYSICAL EXAMINATION:  Appearance and vitals taken from home devices    General Appearance:   Alert, cooperative, no distress   HEENT:  Normocephalic, atraumatic, anicteric  Neck supple, symmetrical, trachea midline  Lungs:   Equal chest rise and unlabored breathing, normal effort, no coughing  Cardiovascular:   No visualized JVD  Abdomen:   No abdominal distension  Skin:   No jaundice, rashes, or lesions  Musculoskeletal:   Normal range of motion visualized  Psych:  Normal affect and normal insight  Neuro:  Alert and appropriate  VIRTUAL VISIT DISCLAIMER    Stephen Fernandez acknowledges that she has consented to an online visit or consultation  She understands that the online visit is based solely on information provided by her, and that, in the absence of a face-to-face physical evaluation by the physician, the diagnosis she receives is both limited and provisional in terms of accuracy and completeness  This is not intended to replace a full medical face-to-face evaluation by the physician  Stephen Fernandez understands and accepts these terms

## 2021-04-15 NOTE — PROGRESS NOTES
Send form and record to [facility] [place] for her prior auth of xifaxan

## 2021-04-17 ENCOUNTER — APPOINTMENT (OUTPATIENT)
Dept: LAB | Facility: HOSPITAL | Age: 51
End: 2021-04-17
Payer: COMMERCIAL

## 2021-04-17 DIAGNOSIS — D80.1 HYPOGAMMAGLOBULINEMIA (HCC): ICD-10-CM

## 2021-04-17 DIAGNOSIS — Z98.84 H/O GASTRIC BYPASS: ICD-10-CM

## 2021-04-17 LAB
ALBUMIN SERPL BCP-MCNC: 3.9 G/DL (ref 3.5–5.7)
ALP SERPL-CCNC: 64 U/L (ref 40–150)
ALT SERPL W P-5'-P-CCNC: 12 U/L (ref 7–52)
ANION GAP SERPL CALCULATED.3IONS-SCNC: 10 MMOL/L (ref 4–13)
AST SERPL W P-5'-P-CCNC: 14 U/L (ref 13–39)
BASOPHILS # BLD AUTO: 0.1 THOUSANDS/ΜL (ref 0–0.1)
BASOPHILS NFR BLD AUTO: 1 % (ref 0–2)
BILIRUB SERPL-MCNC: 0.7 MG/DL (ref 0.2–1)
BUN SERPL-MCNC: 13 MG/DL (ref 7–25)
CALCIUM SERPL-MCNC: 9.1 MG/DL (ref 8.6–10.5)
CHLORIDE SERPL-SCNC: 102 MMOL/L (ref 98–107)
CO2 SERPL-SCNC: 27 MMOL/L (ref 21–31)
CREAT SERPL-MCNC: 0.73 MG/DL (ref 0.6–1.2)
CRP SERPL QL: <5 MG/L
EOSINOPHIL # BLD AUTO: 0.2 THOUSAND/ΜL (ref 0–0.61)
EOSINOPHIL NFR BLD AUTO: 2 % (ref 0–5)
ERYTHROCYTE [DISTWIDTH] IN BLOOD BY AUTOMATED COUNT: 13.9 % (ref 11.5–14.5)
ERYTHROCYTE [SEDIMENTATION RATE] IN BLOOD: 21 MM/HOUR (ref 0–29)
FERRITIN SERPL-MCNC: 134 NG/ML (ref 8–388)
GFR SERPL CREATININE-BSD FRML MDRD: 96 ML/MIN/1.73SQ M
GLUCOSE P FAST SERPL-MCNC: 81 MG/DL (ref 65–99)
HCT VFR BLD AUTO: 39.8 % (ref 42–47)
HGB BLD-MCNC: 13.2 G/DL (ref 12–16)
IGA SERPL-MCNC: 133 MG/DL (ref 70–400)
IGG SERPL-MCNC: 779 MG/DL (ref 700–1600)
IGM SERPL-MCNC: 49 MG/DL (ref 40–230)
IRON SATN MFR SERPL: 32 %
IRON SERPL-MCNC: 133 UG/DL (ref 50–170)
LYMPHOCYTES # BLD AUTO: 2.3 THOUSANDS/ΜL (ref 0.6–4.47)
LYMPHOCYTES NFR BLD AUTO: 30 % (ref 21–51)
MCH RBC QN AUTO: 29.8 PG (ref 26–34)
MCHC RBC AUTO-ENTMCNC: 33 G/DL (ref 31–37)
MCV RBC AUTO: 90 FL (ref 81–99)
MONOCYTES # BLD AUTO: 0.5 THOUSAND/ΜL (ref 0.17–1.22)
MONOCYTES NFR BLD AUTO: 7 % (ref 2–12)
NEUTROPHILS # BLD AUTO: 4.7 THOUSANDS/ΜL (ref 1.4–6.5)
NEUTS SEG NFR BLD AUTO: 60 % (ref 42–75)
PLATELET # BLD AUTO: 327 THOUSANDS/UL (ref 149–390)
PMV BLD AUTO: 7.7 FL (ref 8.6–11.7)
POTASSIUM SERPL-SCNC: 4.2 MMOL/L (ref 3.5–5.5)
PROT SERPL-MCNC: 6.6 G/DL (ref 6.4–8.9)
RBC # BLD AUTO: 4.42 MILLION/UL (ref 3.9–5.2)
SODIUM SERPL-SCNC: 139 MMOL/L (ref 134–143)
TIBC SERPL-MCNC: 410 UG/DL (ref 250–450)
VIT B12 SERPL-MCNC: 452 PG/ML (ref 100–900)
WBC # BLD AUTO: 7.8 THOUSAND/UL (ref 4.8–10.8)

## 2021-04-17 PROCEDURE — 85025 COMPLETE CBC W/AUTO DIFF WBC: CPT

## 2021-04-17 PROCEDURE — 83550 IRON BINDING TEST: CPT

## 2021-04-17 PROCEDURE — 86140 C-REACTIVE PROTEIN: CPT

## 2021-04-17 PROCEDURE — 36415 COLL VENOUS BLD VENIPUNCTURE: CPT

## 2021-04-17 PROCEDURE — 82784 ASSAY IGA/IGD/IGG/IGM EACH: CPT

## 2021-04-17 PROCEDURE — 85652 RBC SED RATE AUTOMATED: CPT

## 2021-04-17 PROCEDURE — 82728 ASSAY OF FERRITIN: CPT

## 2021-04-17 PROCEDURE — 80053 COMPREHEN METABOLIC PANEL: CPT

## 2021-04-17 PROCEDURE — 83540 ASSAY OF IRON: CPT

## 2021-04-17 PROCEDURE — 82607 VITAMIN B-12: CPT

## 2021-04-22 ENCOUNTER — HOSPITAL ENCOUNTER (OUTPATIENT)
Dept: INFUSION CENTER | Facility: HOSPITAL | Age: 51
Discharge: HOME | End: 2021-04-22
Payer: COMMERCIAL

## 2021-04-22 VITALS
OXYGEN SATURATION: 98 % | HEART RATE: 96 BPM | DIASTOLIC BLOOD PRESSURE: 86 MMHG | SYSTOLIC BLOOD PRESSURE: 124 MMHG | TEMPERATURE: 96.3 F | RESPIRATION RATE: 18 BRPM

## 2021-04-22 DIAGNOSIS — D80.1 HYPOGAMMAGLOBULINEMIA (HCC): Primary | ICD-10-CM

## 2021-04-22 PROCEDURE — 96366 THER/PROPH/DIAG IV INF ADDON: CPT

## 2021-04-22 PROCEDURE — 96367 TX/PROPH/DG ADDL SEQ IV INF: CPT

## 2021-04-22 PROCEDURE — 96365 THER/PROPH/DIAG IV INF INIT: CPT

## 2021-04-22 RX ORDER — ACETAMINOPHEN 325 MG/1
650 TABLET ORAL ONCE
Status: COMPLETED | OUTPATIENT
Start: 2021-04-22 | End: 2021-04-22

## 2021-04-22 RX ORDER — ACETAMINOPHEN 325 MG/1
650 TABLET ORAL ONCE
Status: CANCELLED | OUTPATIENT
Start: 2021-05-20

## 2021-04-22 RX ORDER — SODIUM CHLORIDE 9 MG/ML
20 INJECTION, SOLUTION INTRAVENOUS ONCE
Status: CANCELLED | OUTPATIENT
Start: 2021-05-20

## 2021-04-22 RX ORDER — SODIUM CHLORIDE 9 MG/ML
20 INJECTION, SOLUTION INTRAVENOUS ONCE
Status: COMPLETED | OUTPATIENT
Start: 2021-04-22 | End: 2021-04-22

## 2021-04-22 RX ADMIN — SODIUM CHLORIDE 20 ML/HR: 0.9 INJECTION, SOLUTION INTRAVENOUS at 08:22

## 2021-04-22 RX ADMIN — DEXAMETHASONE SODIUM PHOSPHATE 10 MG: 10 INJECTION, SOLUTION INTRAMUSCULAR; INTRAVENOUS at 08:29

## 2021-04-22 RX ADMIN — Medication 40 G: at 09:58

## 2021-04-22 RX ADMIN — DIPHENHYDRAMINE HYDROCHLORIDE 25 MG: 50 INJECTION, SOLUTION INTRAMUSCULAR; INTRAVENOUS at 09:03

## 2021-04-22 RX ADMIN — ACETAMINOPHEN 650 MG: 325 TABLET ORAL at 08:26

## 2021-04-22 RX ADMIN — FAMOTIDINE 20 MG: 10 INJECTION INTRAVENOUS at 09:28

## 2021-04-22 RX ADMIN — SODIUM CHLORIDE 500 ML: 0.9 INJECTION, SOLUTION INTRAVENOUS at 08:22

## 2021-04-29 ENCOUNTER — OFFICE VISIT (OUTPATIENT)
Dept: HEMATOLOGY ONCOLOGY | Facility: CLINIC | Age: 51
End: 2021-04-29
Payer: COMMERCIAL

## 2021-04-29 VITALS
WEIGHT: 173.1 LBS | SYSTOLIC BLOOD PRESSURE: 124 MMHG | RESPIRATION RATE: 16 BRPM | HEIGHT: 62 IN | HEART RATE: 84 BPM | DIASTOLIC BLOOD PRESSURE: 82 MMHG | TEMPERATURE: 97.5 F | OXYGEN SATURATION: 98 % | BODY MASS INDEX: 31.86 KG/M2

## 2021-04-29 DIAGNOSIS — D80.1 HYPOGAMMAGLOBULINEMIA (HCC): Primary | ICD-10-CM

## 2021-04-29 DIAGNOSIS — Z98.84 H/O GASTRIC BYPASS: ICD-10-CM

## 2021-04-29 DIAGNOSIS — R23.8 EASY BRUISING: ICD-10-CM

## 2021-04-29 PROCEDURE — 99214 OFFICE O/P EST MOD 30 MIN: CPT | Performed by: NURSE PRACTITIONER

## 2021-04-29 NOTE — PROGRESS NOTES
Hematology/Oncology Outpatient Follow-up  Pamela Turner 48 y o  female 1970 [id number]    Date:  4/29/2021      Assessment and Plan:  1  Hypogammaglobulinemia ([address] )  Patient will be continued on her monthly IVIG infusions 40 g for her decreased IgG/hypogammaglobulinemia with chronic recurrent upper respiratory infections/sinusitis  She reports improvement with less infections while she is on the treatment  She will be back for follow-up again with repeat labs in about 4 months  - IgG, IgA, IgM; Future  - CBC and differential; Future  - Comprehensive metabolic panel; Future  - C-reactive protein; Future  - Sedimentation rate, automated; Future    2  H/O gastric bypass  Patient will continue her bariatric vitamin  Her iron panel and vitamin B12 are appropriate     - CBC and differential; Future  - Comprehensive metabolic panel; Future  - C-reactive protein; Future  - Sedimentation rate, automated; Future  - Vitamin B12; Future  - Iron Panel (Includes Ferritin, Iron Sat%, Iron, and TIBC); Future  - Ferritin; Future    3  Easy bruising  Patient is reporting easy bruising  Not seem to be taking medications at this point that can cause this  She denies bleeding from any site  Will check her coags before her next office visit  Did review IVIG side effect profile after the visit there have been reported bruising/hematoma typically less than/equal to 4%  - APTT; Future  - Protime-INR; Future  - Fibrinogen;  Future      HPI:  This is a 70-year-old female with multiple comorbid conditions including history of morbid obesity status post post gastric bypass surgery in 2009, anemia due to iron deficiency, cholecystectomy, hysterectomy, hypertension, gastroesophageal reflux disease, depression, asthma, chronic sinusitis, etc      The patient was apparently found to have hypogammaglobinemia on multiple occasions with frequent/ recurrent infections mainly in the upper respiratory airway with chronic sinusitis  The patient was evaluated by the Hematology service from [facility] and was started on IVIG around December of 2019 on a monthly basis   She received then 3-4 sessions of IV Ig which improved her chronic sinusitis and other infectious process, according to the patient, significantly  Estevan Fairly, due to the COVID-19 pandemic she stopped doing the IVIG and decided to transfer her care to our service since she lives close by  Her immunoglobulin levels from 07/17/2019 before she had any IVIG treatment showed IgG of 554, IgA of 116 and IgM of 39 mg/dL  Interval history:  Patient presents today for a follow-up visit  She has not had any issues with sinopulmonary infections since she resumed her IVIG  She is tolerating her monthly injections well  States that she did get migraines after her February and March treatment but has been working with her neurologist regarding this issue  She has been having some diarrhea and GI upset; her gastroenterologist is aware of it will be trialing rifaximin in the near future  The patient mentions that she has been experiencing some easy bruising often it is after trauma but sometimes spontaneous  Is not taking any NSAIDs or blood thinners  She was taking Celebrex in the past but has been off of it for several months  Denies bleeding from any site  Her most recent laboratory studies from 04/17/2021 showed essentially normal CBC and CMP, hemoglobin 13 2  Inflammatory markers are not elevated  Her iron panel and vitamin B12 are appropriate  Her IgG is normal on treatment 779 with normal IgA and IgM  ROS: Review of Systems   Constitutional: Positive for fatigue  Negative for activity change, appetite change, chills, fever and unexpected weight change  HENT: Negative for congestion, mouth sores, nosebleeds, sore throat and trouble swallowing  Eyes: Negative  Respiratory: Negative for cough, chest tightness and shortness of breath      Cardiovascular: Negative for chest pain, palpitations and leg swelling  Gastrointestinal: Positive for constipation, diarrhea and nausea  Negative for abdominal distention, abdominal pain, blood in stool and vomiting  Genitourinary: Negative for difficulty urinating, dysuria, frequency, hematuria and urgency  Musculoskeletal: Positive for arthralgias and myalgias  Negative for back pain, gait problem and joint swelling  Fibromyalgia   Skin: Negative for color change, pallor and rash  Neurological: Positive for numbness and headaches  Negative for dizziness, weakness and light-headedness  Hematological: Negative for adenopathy  Bruises/bleeds easily (Easy bruising)  Psychiatric/Behavioral: Positive for sleep disturbance  Negative for dysphoric mood  The patient is not nervous/anxious          Past Medical History:   Diagnosis Date    Anemia     recurrent    Anxiety     Asthma     allergy induced    Contact lens overwear of both eyes     Depression     Diarrhea     Environmental allergies     Fibromyalgia     Fibromyalgia, primary     GERD (gastroesophageal reflux disease)     History of transfusion         Hypertension     Hypogammaglobulinemia ([address] )     Hypoglycemia after GI (gastrointestinal) surgery     Immune deficiency disorder (HCC)     IgG deficiency    Iron (Fe) deficiency anemia     Kidney stone     Lumbar herniated disc     Migraine     Motion sickness     PONV (postoperative nausea and vomiting)     severe    Seasonal allergies     Wears glasses        Past Surgical History:   Procedure Laterality Date    ABDOMINAL SURGERY      adhesions    ABDOMINOPLASTY      APPENDECTOMY      AUGMENTATION MAMMAPLASTY  2003    BREAST IMPLANT REMOVAL Bilateral 2019    BREAST SURGERY      implant removal     SECTION      CHOLECYSTECTOMY      COSMETIC SURGERY Bilateral     breast augmentation 2004    GASTRIC BYPASS      2009    GASTRIC BYPASS LAPAROSCOPIC N/A 2020 Procedure: ROBOTIC REVISION OF KENYA-EN-Y GASTRIC BYPASS, INTRAOP EGD;  Surgeon: Samantha Ann MD;  [facility];  Service: [address]      HYSTERECTOMY  2015    KNEE ARTHROSCOPY Left     LYMPH NODE BIOPSY  200-2001    benign    NERVE BLOCK      MN CORRJ HALLUX VALGUS W/SESMDC W/RESCJ PROX PHAL Left 9/9/2016    Procedure: SURGICAL MODIFIED WATKINS BUNIONECTOMY FIRST MTPJ;  Surgeon: Andre Torre DPM;  [facility];  Service: Podiatry    MN FUSION FOOT BONE,MIDTARSAL,1 JT Left 9/9/2016    Procedure: FRIST TMJ FUSION ;  Surgeon: Andre Torre DPM;  [facility];  Service: Podiatry    [address] (NOT 1ST) Left 9/9/2016    Procedure: FOOT SHORTENING OSTEOTOMIES 2ND AND 3RD METATARSAL WITH EXTENSER TENDON RELEASE 3RD AND 4TH TOE;  Surgeon: Andre Torre DPM;  [facility];  Service: Podiatry    MN REMOVAL DEEP IMPLANT Left 12/7/2016    Procedure: REMOVAL SYMPTOMATIC  HARDWARE FIRST RAY,RELATED CONTRACTURE SECOND AND THIRD TOES WITH SUSPENSION FOURTH TOE;  Surgeon: Andre Torre DPM;  [facility];  Service: Podiatry    SALPINGOOPHORECTOMY Left 2016    with removal of cervix    TONSILLECTOMY      WISDOM TOOTH EXTRACTION         Social History     Socioeconomic History    Marital status: /Civil Union     Spouse name: None    Number of children: None    Years of education: None    Highest education level: None   Occupational History    None   Social Needs    Financial resource strain: None    Food insecurity     Worry: None     Inability: None    Transportation needs     Medical: None     Non-medical: None   Tobacco Use    Smoking status: Never Smoker    Smokeless tobacco: Never Used   Substance and Sexual Activity    Alcohol use: Yes     Frequency: Monthly or less     Binge frequency: Less than monthly     Comment: rarely    Drug use: No    Sexual activity: None   Lifestyle    Physical activity     Days per week: None     Minutes per session: None    Stress: None   Relationships    Social connections     Talks on phone: None     Gets together: None     Attends [employer] service: None     Active member of club or organization: None     Attends meetings of clubs or organizations: None     Relationship status: None    Intimate partner violence     Fear of current or ex partner: None     Emotionally abused: None     Physically abused: None     Forced sexual activity: None   Other Topics Concern    None   Social History Narrative    Consumes on average 3 cups of coffee per day       Family History   Problem Relation Age of Onset    Lymphoma Mother     COPD Mother     Arthritis Mother     Heart disease Father     Breast cancer Maternal Aunt     Breast cancer Cousin     No Known Problems Daughter     No Known Problems Maternal Grandmother     No Known Problems Maternal Grandfather     No Known Problems Paternal Grandmother     No Known Problems Paternal Grandfather     Pancreatic cancer Maternal Aunt     No Known Problems Maternal Aunt     No Known Problems Maternal Aunt     Multiple sclerosis Paternal Aunt     Breast cancer Cousin        Allergies   Allergen Reactions    Codeine GI Intolerance and Chest Pain     ABDOMINAL PAIN    Hydrocodone Abdominal Pain and Swelling    Pregabalin Dizziness    Acetaminophen-Codeine Abdominal Pain    Amoxicillin Rash         Current Outpatient Medications:     acetaminophen (TYLENOL) 325 mg tablet, Take 3 tablets (975 mg total) by mouth every 8 (eight) hours, Disp: 30 tablet, Rfl: 0    ALPRAZolam (XANAX) 2 MG tablet, Take 0 5 mg by mouth daily at bedtime , Disp: , Rfl:     Azelastine-Fluticasone (DYMISTA NA), 2 sprays into each nostril 2 (two) times a day , Disp: , Rfl:     Botulinum Toxin Type A 200 units SOLR, Inject 155 units into face and neck IM every 90 days, Disp: , Rfl:     budesonide-formoterol (SYMBICORT) 160-4 5 mcg/act inhaler, Inhale 2 puffs 2 (two) times a day , Disp: , Rfl:     buPROPion (WELLBUTRIN) 100 mg tablet, Take 100 mg by mouth 2 (two) times a day , Disp: , Rfl:     cholecalciferol (VITAMIN D3) 57269 units capsule, Take 5,000 Units by mouth 2 (two) times a day , Disp: , Rfl:     Cyanocobalamin (B-12) 1000 MCG/ML KIT, Inject as directed every 30 (thirty) days  , Disp: , Rfl:     Cyanocobalamin (VITAMIN B-12 PO), Place 100 mg under the tongue daily , Disp: , Rfl:     Erenumab-aooe (AIMOVIG) 70 MG/ML SOAJ, Inject under the skin every 28 days, Disp: , Rfl:     fexofenadine (ALLEGRA) 180 MG tablet, Take 180 mg by mouth daily, Disp: , Rfl:     fremanezumab-vfrm (Ajovy) 225 MG/1 5ML prefilled syringe, Inject 225 mg under the skin every 30 (thirty) days, Disp: , Rfl:     hyoscyamine (LEVSIN/SL) 0 125 mg SL tablet, Take 0 125 mg by mouth every 4 (four) hours as needed for cramping, Disp: , Rfl:     Immune Globulin, Human, (PRIVIGEN IV), Infuse into a venous catheter every 30 (thirty) days, Disp: , Rfl:     losartan (COZAAR) 50 mg tablet, Take 50 mg by mouth 2 (two) times a day , Disp: , Rfl:     montelukast (SINGULAIR) 5 mg chewable tablet, Chew 5 mg 2 (two) times a day , Disp: , Rfl:     omeprazole (PriLOSEC) 20 mg delayed release capsule, Take 1 capsule by mouth daily, Disp: 30 capsule, Rfl: 1    SUMAtriptan (IMITREX) 100 mg tablet, Take 100 mg by mouth 2 (two) times a day as needed for migraine, Disp: , Rfl:     traMADol (ULTRAM) 50 mg tablet, Take 50 mg by mouth 2 (two) times a day , Disp: , Rfl:     albuterol (PROVENTIL HFA,VENTOLIN HFA) 90 mcg/act inhaler, Inhale 1 puff every 6 (six) hours as needed , Disp: , Rfl:     celecoxib (CELEBREX) 200 mg capsule, Take 200 mg by mouth 2 (two) times a day as needed , Disp: , Rfl:     cyclobenzaprine (FLEXERIL) 10 mg tablet, Take 10 mg by mouth 3 (three) times a day as needed , Disp: , Rfl:     meclizine (ANTIVERT) 25 mg tablet, Take 1 tablet (25 mg total) by mouth 3 (three) times a day as needed for dizziness for up to 12 doses (Patient not taking: Reported on 4/29/2021), Disp: 12 tablet, Rfl: 0    oxyCODONE (ROXICODONE) 5 mg immediate release tablet, Take 1 tablet (5 mg total) by mouth every 4 (four) hours as needed for moderate pain For continuing therapyMax Daily Amount: 30 mg (Patient not taking: Reported on 2/19/2020), Disp: 10 tablet, Rfl: 0    predniSONE 10 mg tablet, Take 3 tabs BID X 2 days, 2 tabs BID X 2 days, 1 tab BID X 2 days, 1 tab daily X 2 days (Patient not taking: Reported on 12/2/2020), Disp: 26 tablet, Rfl: 0    sucralfate (CARAFATE) 1 g/10 mL suspension, Take 10 mL (1 g total) by mouth 4 (four) times a day, Disp: 1200 mL, Rfl: 1      Physical Exam:  /82 (BP Location: Left arm, Patient Position: Sitting, Cuff Size: Adult)   Pulse 84   Temp 97 5 °F (36 4 °C) (Tympanic)   Resp 16   Ht 5' 2" (1 575 m)   Wt 78 5 kg (173 lb 1 6 oz)   SpO2 98%   BMI 31 66 kg/m²     Physical Exam  Vitals signs reviewed  Constitutional:       General: She is not in acute distress  Appearance: She is well-developed  She is not diaphoretic  HENT:      Head: Normocephalic and atraumatic  Eyes:      General: No scleral icterus  Conjunctiva/sclera: Conjunctivae normal       Pupils: Pupils are equal, round, and reactive to light  Neck:      Musculoskeletal: Normal range of motion and neck supple  Thyroid: No thyromegaly  Cardiovascular:      Rate and Rhythm: Normal rate and regular rhythm  Heart sounds: Normal heart sounds  No murmur  Pulmonary:      Effort: Pulmonary effort is normal  No respiratory distress  Breath sounds: Normal breath sounds  Abdominal:      General: There is no distension  Palpations: Abdomen is soft  There is no hepatomegaly or splenomegaly  Tenderness: There is no abdominal tenderness  Musculoskeletal: Normal range of motion  General: No swelling     Lymphadenopathy:      Cervical: No cervical adenopathy  Upper Body:      Right upper body: No axillary adenopathy  Left upper body: No axillary adenopathy  Skin:     General: Skin is warm and dry  Findings: No erythema or rash  Neurological:      General: No focal deficit present  Mental Status: She is alert and oriented to person, place, and time  Psychiatric:         Mood and Affect: Mood and affect normal          Behavior: Behavior normal  Behavior is cooperative  Thought Content: Thought content normal          Judgment: Judgment normal            Labs:  Lab Results   Component Value Date    WBC 7 80 04/17/2021    HGB 13 2 04/17/2021    HCT 39 8 (L) 04/17/2021    MCV 90 04/17/2021     04/17/2021     Lab Results   Component Value Date    K 4 2 04/17/2021     04/17/2021    CO2 27 04/17/2021    BUN 13 04/17/2021    CREATININE 0 73 04/17/2021    GLUF 81 04/17/2021    CALCIUM 9 1 04/17/2021    AST 14 04/17/2021    ALT 12 04/17/2021    ALKPHOS 64 04/17/2021    EGFR 96 04/17/2021       Patient voiced understanding and agreement in the above discussion  Aware to contact our office with questions/symptoms in the interim  This note has been generated by voice recognition software system  Therefore, there may be spelling, grammar, and or syntax errors  Please contact if questions arise

## 2021-05-20 ENCOUNTER — HOSPITAL ENCOUNTER (OUTPATIENT)
Dept: INFUSION CENTER | Facility: HOSPITAL | Age: 51
Discharge: HOME | End: 2021-05-20
Attending: INTERNAL MEDICINE
Payer: COMMERCIAL

## 2021-05-20 VITALS
RESPIRATION RATE: 16 BRPM | TEMPERATURE: 97.7 F | DIASTOLIC BLOOD PRESSURE: 83 MMHG | SYSTOLIC BLOOD PRESSURE: 123 MMHG | HEART RATE: 99 BPM

## 2021-05-20 DIAGNOSIS — D80.1 HYPOGAMMAGLOBULINEMIA (HCC): Primary | ICD-10-CM

## 2021-05-20 PROCEDURE — 96367 TX/PROPH/DG ADDL SEQ IV INF: CPT

## 2021-05-20 PROCEDURE — 96365 THER/PROPH/DIAG IV INF INIT: CPT

## 2021-05-20 PROCEDURE — 96366 THER/PROPH/DIAG IV INF ADDON: CPT

## 2021-05-20 PROCEDURE — 96375 TX/PRO/DX INJ NEW DRUG ADDON: CPT

## 2021-05-20 RX ORDER — ACETAMINOPHEN 325 MG/1
650 TABLET ORAL ONCE
Status: CANCELLED | OUTPATIENT
Start: 2021-06-17

## 2021-05-20 RX ORDER — ACETAMINOPHEN 325 MG/1
650 TABLET ORAL ONCE
Status: COMPLETED | OUTPATIENT
Start: 2021-05-20 | End: 2021-05-20

## 2021-05-20 RX ORDER — SODIUM CHLORIDE 9 MG/ML
20 INJECTION, SOLUTION INTRAVENOUS ONCE
Status: CANCELLED | OUTPATIENT
Start: 2021-06-17

## 2021-05-20 RX ORDER — SODIUM CHLORIDE 9 MG/ML
20 INJECTION, SOLUTION INTRAVENOUS ONCE
Status: COMPLETED | OUTPATIENT
Start: 2021-05-20 | End: 2021-05-20

## 2021-05-20 RX ADMIN — ACETAMINOPHEN 650 MG: 325 TABLET ORAL at 08:41

## 2021-05-20 RX ADMIN — DEXAMETHASONE SODIUM PHOSPHATE 10 MG: 10 INJECTION, SOLUTION INTRAMUSCULAR; INTRAVENOUS at 08:42

## 2021-05-20 RX ADMIN — DIPHENHYDRAMINE HYDROCHLORIDE 25 MG: 50 INJECTION INTRAMUSCULAR; INTRAVENOUS at 09:27

## 2021-05-20 RX ADMIN — SODIUM CHLORIDE 20 ML/HR: 0.9 INJECTION, SOLUTION INTRAVENOUS at 09:34

## 2021-05-20 RX ADMIN — SODIUM CHLORIDE 500 ML: 0.9 INJECTION, SOLUTION INTRAVENOUS at 08:36

## 2021-05-20 RX ADMIN — Medication 40 G: at 10:16

## 2021-05-20 RX ADMIN — FAMOTIDINE 20 MG: 10 INJECTION INTRAVENOUS at 09:52

## 2021-05-20 NOTE — PLAN OF CARE
Problem: INFECTION - ADULT  Goal: Absence or prevention of progression during hospitalization  Description: INTERVENTIONS:  - Assess and monitor for signs and symptoms of infection  - Monitor lab/diagnostic results  - Monitor all insertion sites, i e  indwelling lines, tubes, and drains  - Monitor endotracheal if appropriate and nasal secretions for changes in amount and color  - [place] appropriate cooling/warming therapies per order  - Administer medications as ordered  - Instruct and encourage patient and family to use good hand hygiene technique  - Identify and instruct in appropriate isolation precautions for identified infection/condition  Outcome: Progressing     Problem: Knowledge Deficit  Goal: Patient/family/caregiver demonstrates understanding of disease process, treatment plan, medications, and discharge instructions  Description: Complete learning assessment and assess knowledge base    Interventions:  - Provide teaching at level of understanding  - Provide teaching via preferred learning methods  Outcome: Progressing

## 2021-06-02 DIAGNOSIS — E80.20 PORPHYRIA, UNSPECIFIED PORPHYRIA TYPE (HCC): Primary | ICD-10-CM

## 2021-06-04 ENCOUNTER — TELEPHONE (OUTPATIENT)
Dept: HEMATOLOGY ONCOLOGY | Facility: CLINIC | Age: 51
End: 2021-06-04

## 2021-06-04 NOTE — TELEPHONE ENCOUNTER
Phoned pt to let her know that orders are placed for 24 hr urine fractionated for porphyrins and CMP  Pt can go to any [facility] lab and  container  ----- Message from Clenton Lazier, [address] [place] sent at 6/3/2021  9:05 AM EDT -----  Regarding: FW: Non-Urgent Medical Question  Contact: [phone]    ----- Message -----  From: Hosea Birch  Sent: 6/2/2021   6:07 PM EDT  To: Hematology Oncology [facility]  Subject: RE: Non-Urgent Medical Question                  Nicole Callaway,  Thank you for speaking with Dr Talia Mims and answering my message  I will wait to hear from you as to when and where I need to go for these labs      Yuniel Mckeon

## 2021-06-11 ENCOUNTER — APPOINTMENT (OUTPATIENT)
Dept: LAB | Facility: HOSPITAL | Age: 51
End: 2021-06-11
Attending: INTERNAL MEDICINE
Payer: COMMERCIAL

## 2021-06-11 ENCOUNTER — TRANSCRIBE ORDERS (OUTPATIENT)
Dept: LAB | Facility: HOSPITAL | Age: 51
End: 2021-06-11

## 2021-06-11 DIAGNOSIS — E80.20 PORPHYRIA, UNSPECIFIED PORPHYRIA TYPE (HCC): ICD-10-CM

## 2021-06-11 LAB
ALBUMIN SERPL BCP-MCNC: 3.9 G/DL (ref 3.5–5.7)
ALP SERPL-CCNC: 68 U/L (ref 40–150)
ALT SERPL W P-5'-P-CCNC: 12 U/L (ref 7–52)
ANION GAP SERPL CALCULATED.3IONS-SCNC: 8 MMOL/L (ref 4–13)
AST SERPL W P-5'-P-CCNC: 15 U/L (ref 13–39)
BILIRUB SERPL-MCNC: 0.6 MG/DL (ref 0.2–1)
BUN SERPL-MCNC: 10 MG/DL (ref 7–25)
CALCIUM SERPL-MCNC: 8.9 MG/DL (ref 8.6–10.5)
CHLORIDE SERPL-SCNC: 105 MMOL/L (ref 98–107)
CO2 SERPL-SCNC: 26 MMOL/L (ref 21–31)
CREAT SERPL-MCNC: 0.62 MG/DL (ref 0.6–1.2)
GFR SERPL CREATININE-BSD FRML MDRD: 105 ML/MIN/1.73SQ M
GLUCOSE P FAST SERPL-MCNC: 80 MG/DL (ref 65–99)
POTASSIUM SERPL-SCNC: 4.2 MMOL/L (ref 3.5–5.5)
PROT SERPL-MCNC: 6.4 G/DL (ref 6.4–8.9)
SODIUM SERPL-SCNC: 139 MMOL/L (ref 134–143)

## 2021-06-11 PROCEDURE — 36415 COLL VENOUS BLD VENIPUNCTURE: CPT

## 2021-06-11 PROCEDURE — 80053 COMPREHEN METABOLIC PANEL: CPT

## 2021-06-14 ENCOUNTER — TELEPHONE (OUTPATIENT)
Dept: HEMATOLOGY ONCOLOGY | Facility: CLINIC | Age: 51
End: 2021-06-14

## 2021-06-14 ENCOUNTER — APPOINTMENT (OUTPATIENT)
Dept: LAB | Facility: HOSPITAL | Age: 51
End: 2021-06-14
Attending: INTERNAL MEDICINE
Payer: COMMERCIAL

## 2021-06-14 DIAGNOSIS — E80.20 PORPHYRIA, UNSPECIFIED PORPHYRIA TYPE (HCC): ICD-10-CM

## 2021-06-14 PROCEDURE — 84120 ASSAY OF URINE PORPHYRINS: CPT

## 2021-06-14 NOTE — TELEPHONE ENCOUNTER
Patient calling to update Dr Raymon Rollins that during her 24 hour urine collection she thinks she passed a kidney stone    Patient wanted to make Dr Raymon Rollins aware incase there is any additional testing he would like to add to the urine collection  Urine test ordered - Porphyrins    Will send to RN to update Dr Raymon Rollins  Call back number for patient if needed - [phone]

## 2021-06-16 ENCOUNTER — HOSPITAL ENCOUNTER (OUTPATIENT)
Dept: INFUSION CENTER | Facility: HOSPITAL | Age: 51
Discharge: HOME | End: 2021-06-16
Attending: INTERNAL MEDICINE
Payer: COMMERCIAL

## 2021-06-16 VITALS
SYSTOLIC BLOOD PRESSURE: 134 MMHG | TEMPERATURE: 97.8 F | DIASTOLIC BLOOD PRESSURE: 89 MMHG | RESPIRATION RATE: 16 BRPM | HEART RATE: 91 BPM

## 2021-06-16 DIAGNOSIS — D80.1 HYPOGAMMAGLOBULINEMIA (HCC): Primary | ICD-10-CM

## 2021-06-16 LAB
COPRO1 24H UR-MCNC: 8 UG/L
COPRO1 24H UR-MRATE: 21 UG/24 HR (ref 0–24)
COPRO3 24H UR-MCNC: 27 UG/L
COPRO3 24H UR-MRATE: 70 UG/24 HR (ref 0–74)
HEPTA-CP 24H UR-MRATE: 5 UG/24 HR (ref 0–4)
HEPTA-CP UR-MCNC: 2 UG/L
HEXA-CP 24H UR-MRATE: <3 UG/24 HR (ref 0–1)
HEXA-CP UR-MCNC: <1 UG/L
PENTA-CP 24H UR-MRATE: 5 UG/24 HR (ref 0–4)
PENTA-CP UR-MCNC: 2 UG/L
UROPOR 24H UR-MRATE: 13 UG/24 HR (ref 0–24)
UROPOR UR-MCNC: 5 UG/L

## 2021-06-16 PROCEDURE — 96366 THER/PROPH/DIAG IV INF ADDON: CPT

## 2021-06-16 PROCEDURE — 96375 TX/PRO/DX INJ NEW DRUG ADDON: CPT

## 2021-06-16 PROCEDURE — 96365 THER/PROPH/DIAG IV INF INIT: CPT

## 2021-06-16 PROCEDURE — 96367 TX/PROPH/DG ADDL SEQ IV INF: CPT

## 2021-06-16 RX ORDER — SODIUM CHLORIDE 9 MG/ML
20 INJECTION, SOLUTION INTRAVENOUS ONCE
Status: CANCELLED | OUTPATIENT
Start: 2021-07-14

## 2021-06-16 RX ORDER — ACETAMINOPHEN 325 MG/1
650 TABLET ORAL ONCE
Status: COMPLETED | OUTPATIENT
Start: 2021-06-16 | End: 2021-06-16

## 2021-06-16 RX ORDER — ACETAMINOPHEN 325 MG/1
650 TABLET ORAL ONCE
Status: CANCELLED | OUTPATIENT
Start: 2021-07-14

## 2021-06-16 RX ORDER — SODIUM CHLORIDE 9 MG/ML
20 INJECTION, SOLUTION INTRAVENOUS ONCE
Status: COMPLETED | OUTPATIENT
Start: 2021-06-16 | End: 2021-06-16

## 2021-06-16 RX ADMIN — SODIUM CHLORIDE 20 ML/HR: 0.9 INJECTION, SOLUTION INTRAVENOUS at 08:32

## 2021-06-16 RX ADMIN — Medication 40 G: at 09:50

## 2021-06-16 RX ADMIN — DEXAMETHASONE SODIUM PHOSPHATE 10 MG: 10 INJECTION, SOLUTION INTRAMUSCULAR; INTRAVENOUS at 08:35

## 2021-06-16 RX ADMIN — DIPHENHYDRAMINE HYDROCHLORIDE 25 MG: 50 INJECTION INTRAMUSCULAR; INTRAVENOUS at 09:06

## 2021-06-16 RX ADMIN — ACETAMINOPHEN 650 MG: 325 TABLET ORAL at 08:12

## 2021-06-16 RX ADMIN — FAMOTIDINE 20 MG: 10 INJECTION INTRAVENOUS at 09:28

## 2021-06-16 RX ADMIN — SODIUM CHLORIDE 500 ML: 0.9 INJECTION, SOLUTION INTRAVENOUS at 08:11

## 2021-06-16 NOTE — PROGRESS NOTES
Pt offers no complaints  Tolerated IVIG infusion well without incident  Discharged in stable condition and pt aware of next infusion appointment in 4 weeks  AVS provided

## 2021-06-23 ENCOUNTER — TELEPHONE (OUTPATIENT)
Dept: HEMATOLOGY ONCOLOGY | Facility: CLINIC | Age: 51
End: 2021-06-23

## 2021-06-23 NOTE — TELEPHONE ENCOUNTER
Phoned pt and gave her instruction regarding providing a random urine specimen to any [facility] lab as the test could not be added to the ureine that was provided previosly

## 2021-06-24 ENCOUNTER — APPOINTMENT (OUTPATIENT)
Dept: LAB | Facility: HOSPITAL | Age: 51
End: 2021-06-24
Attending: INTERNAL MEDICINE
Payer: COMMERCIAL

## 2021-06-24 DIAGNOSIS — E80.20 PORPHYRIA, UNSPECIFIED PORPHYRIA TYPE (HCC): ICD-10-CM

## 2021-06-24 PROCEDURE — 84110 ASSAY OF PORPHOBILINOGEN: CPT

## 2021-06-24 PROCEDURE — 36415 COLL VENOUS BLD VENIPUNCTURE: CPT

## 2021-06-30 LAB — MISCELLANEOUS LAB TEST RESULT: NORMAL

## 2021-06-30 NOTE — TELEPHONE ENCOUNTER
Returned a call to pt after reviewing the results of Porphobilinogen Random urine which was negative  Dr Michaela Shipley does not feel pt has porphyria and suggested she have genetic testing at [facility]  Pt is agreeable to this but I let her know that I am not sure if she needs referral and I will get back to her regarding this

## 2021-07-01 NOTE — TELEPHONE ENCOUNTER
Phoned pt and let her know that I spoke to Dr Marc Crow and let her know that Dr Argenis Stoll is fine with pt having genetic testing but he suggested she have this done at [facility], PCP will refer her

## 2021-07-14 ENCOUNTER — HOSPITAL ENCOUNTER (OUTPATIENT)
Dept: INFUSION CENTER | Facility: HOSPITAL | Age: 51
Discharge: HOME | End: 2021-07-14
Attending: INTERNAL MEDICINE
Payer: COMMERCIAL

## 2021-07-14 VITALS
TEMPERATURE: 97.3 F | RESPIRATION RATE: 18 BRPM | DIASTOLIC BLOOD PRESSURE: 65 MMHG | SYSTOLIC BLOOD PRESSURE: 123 MMHG | HEART RATE: 68 BPM

## 2021-07-14 DIAGNOSIS — D80.1 HYPOGAMMAGLOBULINEMIA (HCC): Primary | ICD-10-CM

## 2021-07-14 PROCEDURE — 96367 TX/PROPH/DG ADDL SEQ IV INF: CPT

## 2021-07-14 PROCEDURE — 96365 THER/PROPH/DIAG IV INF INIT: CPT

## 2021-07-14 PROCEDURE — 96366 THER/PROPH/DIAG IV INF ADDON: CPT

## 2021-07-14 PROCEDURE — 96361 HYDRATE IV INFUSION ADD-ON: CPT

## 2021-07-14 RX ORDER — SODIUM CHLORIDE 9 MG/ML
20 INJECTION, SOLUTION INTRAVENOUS ONCE
Status: COMPLETED | OUTPATIENT
Start: 2021-07-14 | End: 2021-07-14

## 2021-07-14 RX ORDER — SODIUM CHLORIDE 9 MG/ML
20 INJECTION, SOLUTION INTRAVENOUS ONCE
Status: CANCELLED | OUTPATIENT
Start: 2021-08-11

## 2021-07-14 RX ORDER — ACETAMINOPHEN 325 MG/1
650 TABLET ORAL ONCE
Status: CANCELLED | OUTPATIENT
Start: 2021-08-11

## 2021-07-14 RX ORDER — HYDROXYCHLOROQUINE SULFATE 200 MG/1
200 TABLET, FILM COATED ORAL 2 TIMES DAILY WITH MEALS
COMMUNITY

## 2021-07-14 RX ORDER — ACETAMINOPHEN 325 MG/1
650 TABLET ORAL ONCE
Status: COMPLETED | OUTPATIENT
Start: 2021-07-14 | End: 2021-07-14

## 2021-07-14 RX ADMIN — Medication 40 G: at 11:14

## 2021-07-14 RX ADMIN — DEXAMETHASONE SODIUM PHOSPHATE 10 MG: 100 INJECTION INTRAMUSCULAR; INTRAVENOUS at 09:33

## 2021-07-14 RX ADMIN — SODIUM CHLORIDE 500 ML: 0.9 INJECTION, SOLUTION INTRAVENOUS at 08:29

## 2021-07-14 RX ADMIN — ACETAMINOPHEN 650 MG: 325 TABLET ORAL at 08:28

## 2021-07-14 RX ADMIN — SODIUM CHLORIDE 20 ML/HR: 0.9 INJECTION, SOLUTION INTRAVENOUS at 08:27

## 2021-07-14 RX ADMIN — DIPHENHYDRAMINE HYDROCHLORIDE 25 MG: 50 INJECTION, SOLUTION INTRAMUSCULAR; INTRAVENOUS at 10:06

## 2021-07-14 RX ADMIN — FAMOTIDINE 20 MG: 10 INJECTION, SOLUTION INTRAVENOUS at 10:34

## 2021-07-14 NOTE — PROGRESS NOTES
Pt tolerated todays ivig well  No adverse reaction noted  Peripheral iv discontinued jelco intact   Discharged ambulatory deferring avs

## 2021-08-11 ENCOUNTER — HOSPITAL ENCOUNTER (OUTPATIENT)
Dept: INFUSION CENTER | Facility: HOSPITAL | Age: 51
Discharge: HOME | End: 2021-08-11
Attending: INTERNAL MEDICINE
Payer: COMMERCIAL

## 2021-08-11 VITALS
DIASTOLIC BLOOD PRESSURE: 74 MMHG | SYSTOLIC BLOOD PRESSURE: 120 MMHG | HEART RATE: 81 BPM | RESPIRATION RATE: 18 BRPM | OXYGEN SATURATION: 98 % | TEMPERATURE: 98 F

## 2021-08-11 DIAGNOSIS — D80.1 HYPOGAMMAGLOBULINEMIA (HCC): Primary | ICD-10-CM

## 2021-08-11 PROCEDURE — 96366 THER/PROPH/DIAG IV INF ADDON: CPT

## 2021-08-11 PROCEDURE — 96367 TX/PROPH/DG ADDL SEQ IV INF: CPT

## 2021-08-11 PROCEDURE — 96365 THER/PROPH/DIAG IV INF INIT: CPT

## 2021-08-11 PROCEDURE — 96375 TX/PRO/DX INJ NEW DRUG ADDON: CPT

## 2021-08-11 RX ORDER — SODIUM CHLORIDE 9 MG/ML
20 INJECTION, SOLUTION INTRAVENOUS ONCE
Status: CANCELLED | OUTPATIENT
Start: 2021-09-08

## 2021-08-11 RX ORDER — SODIUM CHLORIDE 9 MG/ML
20 INJECTION, SOLUTION INTRAVENOUS ONCE
Status: COMPLETED | OUTPATIENT
Start: 2021-08-11 | End: 2021-08-11

## 2021-08-11 RX ORDER — ACETAMINOPHEN 325 MG/1
650 TABLET ORAL ONCE
Status: COMPLETED | OUTPATIENT
Start: 2021-08-11 | End: 2021-08-11

## 2021-08-11 RX ORDER — ACETAMINOPHEN 325 MG/1
650 TABLET ORAL ONCE
Status: CANCELLED | OUTPATIENT
Start: 2021-09-08

## 2021-08-11 RX ADMIN — DEXAMETHASONE SODIUM PHOSPHATE 10 MG: 10 INJECTION, SOLUTION INTRAMUSCULAR; INTRAVENOUS at 08:29

## 2021-08-11 RX ADMIN — Medication 40 G: at 09:48

## 2021-08-11 RX ADMIN — FAMOTIDINE 20 MG: 10 INJECTION, SOLUTION INTRAVENOUS at 09:22

## 2021-08-11 RX ADMIN — DIPHENHYDRAMINE HYDROCHLORIDE 25 MG: 50 INJECTION INTRAMUSCULAR; INTRAVENOUS at 09:00

## 2021-08-11 RX ADMIN — SODIUM CHLORIDE 500 ML: 0.9 INJECTION, SOLUTION INTRAVENOUS at 08:17

## 2021-08-11 RX ADMIN — ACETAMINOPHEN 650 MG: 325 TABLET ORAL at 08:24

## 2021-08-11 RX ADMIN — SODIUM CHLORIDE 20 ML/HR: 0.9 INJECTION, SOLUTION INTRAVENOUS at 08:25

## 2021-08-11 NOTE — PROGRESS NOTES
Pt offers no complaints  Tolerated IVIG infusion well without incident and pt discharged in stable condition  Next appointment scheduled in 4 weeks and AVS provided

## 2021-08-12 DIAGNOSIS — R11.0 NAUSEA: Primary | ICD-10-CM

## 2021-08-12 RX ORDER — ONDANSETRON 8 MG/1
8 TABLET, ORALLY DISINTEGRATING ORAL EVERY 8 HOURS PRN
Qty: 20 TABLET | Refills: 1 | Status: SHIPPED | OUTPATIENT
Start: 2021-08-12

## 2021-08-16 ENCOUNTER — TELEPHONE (OUTPATIENT)
Dept: HEMATOLOGY ONCOLOGY | Facility: CLINIC | Age: 51
End: 2021-08-16

## 2021-08-16 NOTE — TELEPHONE ENCOUNTER
LVM to patient in regards to patient concerns for 3rd COVID vaccination  Informed patient that according to the [address]  [facility] policy the 3rd vaccination is still up in the air  Informed patient that we are still waiting for more information from the [facility]  Informed patient that she can call her pharmacy and see if she can get any more information on the 3rd vaccination if they are doing it now  Informed patient if she has any other questions or concerns to give the office a call back at [phone]

## 2021-08-28 ENCOUNTER — APPOINTMENT (OUTPATIENT)
Dept: LAB | Facility: HOSPITAL | Age: 51
End: 2021-08-28
Payer: COMMERCIAL

## 2021-08-28 DIAGNOSIS — Z98.84 H/O GASTRIC BYPASS: ICD-10-CM

## 2021-08-28 DIAGNOSIS — R23.8 EASY BRUISING: ICD-10-CM

## 2021-08-28 DIAGNOSIS — D80.1 HYPOGAMMAGLOBULINEMIA (HCC): ICD-10-CM

## 2021-08-28 LAB
ALBUMIN SERPL BCP-MCNC: 4.3 G/DL (ref 3.5–5.7)
ALP SERPL-CCNC: 66 U/L (ref 40–150)
ALT SERPL W P-5'-P-CCNC: 11 U/L (ref 7–52)
ANION GAP SERPL CALCULATED.3IONS-SCNC: 10 MMOL/L (ref 4–13)
APTT PPP: 27 SECONDS (ref 23–37)
AST SERPL W P-5'-P-CCNC: 18 U/L (ref 13–39)
BASOPHILS # BLD AUTO: 0 THOUSANDS/ΜL (ref 0–0.1)
BASOPHILS NFR BLD AUTO: 1 % (ref 0–2)
BILIRUB SERPL-MCNC: 0.6 MG/DL (ref 0.2–1)
BUN SERPL-MCNC: 15 MG/DL (ref 7–25)
CALCIUM SERPL-MCNC: 10.1 MG/DL (ref 8.6–10.5)
CHLORIDE SERPL-SCNC: 101 MMOL/L (ref 98–107)
CO2 SERPL-SCNC: 27 MMOL/L (ref 21–31)
CREAT SERPL-MCNC: 0.8 MG/DL (ref 0.6–1.2)
CRP SERPL QL: <5 MG/L
EOSINOPHIL # BLD AUTO: 0.2 THOUSAND/ΜL (ref 0–0.61)
EOSINOPHIL NFR BLD AUTO: 2 % (ref 0–5)
ERYTHROCYTE [DISTWIDTH] IN BLOOD BY AUTOMATED COUNT: 13.2 % (ref 11.5–14.5)
ERYTHROCYTE [SEDIMENTATION RATE] IN BLOOD: 20 MM/HOUR (ref 0–29)
FERRITIN SERPL-MCNC: 127 NG/ML (ref 8–388)
FIBRINOGEN PPP-MCNC: 418 MG/DL (ref 227–495)
GFR SERPL CREATININE-BSD FRML MDRD: 86 ML/MIN/1.73SQ M
GLUCOSE P FAST SERPL-MCNC: 80 MG/DL (ref 65–99)
HCT VFR BLD AUTO: 42.3 % (ref 42–47)
HGB BLD-MCNC: 13.8 G/DL (ref 12–16)
IGA SERPL-MCNC: 142 MG/DL (ref 70–400)
IGG SERPL-MCNC: 1050 MG/DL (ref 700–1600)
IGM SERPL-MCNC: 45 MG/DL (ref 40–230)
INR PPP: 1.07 (ref 0.84–1.19)
IRON SATN MFR SERPL: 17 %
IRON SERPL-MCNC: 73 UG/DL (ref 50–170)
LYMPHOCYTES # BLD AUTO: 2.5 THOUSANDS/ΜL (ref 0.6–4.47)
LYMPHOCYTES NFR BLD AUTO: 31 % (ref 21–51)
MCH RBC QN AUTO: 29.2 PG (ref 26–34)
MCHC RBC AUTO-ENTMCNC: 32.7 G/DL (ref 31–37)
MCV RBC AUTO: 89 FL (ref 81–99)
MONOCYTES # BLD AUTO: 0.5 THOUSAND/ΜL (ref 0.17–1.22)
MONOCYTES NFR BLD AUTO: 6 % (ref 2–12)
NEUTROPHILS # BLD AUTO: 4.7 THOUSANDS/ΜL (ref 1.4–6.5)
NEUTS SEG NFR BLD AUTO: 60 % (ref 42–75)
PLATELET # BLD AUTO: 338 THOUSANDS/UL (ref 149–390)
PMV BLD AUTO: 7.6 FL (ref 8.6–11.7)
POTASSIUM SERPL-SCNC: 4.3 MMOL/L (ref 3.5–5.5)
PROT SERPL-MCNC: 7.3 G/DL (ref 6.4–8.9)
PROTHROMBIN TIME: 13.8 SECONDS (ref 11.6–14.5)
RBC # BLD AUTO: 4.75 MILLION/UL (ref 3.9–5.2)
SODIUM SERPL-SCNC: 138 MMOL/L (ref 134–143)
TIBC SERPL-MCNC: 438 UG/DL (ref 250–450)
VIT B12 SERPL-MCNC: 391 PG/ML (ref 100–900)
WBC # BLD AUTO: 7.9 THOUSAND/UL (ref 4.8–10.8)

## 2021-08-28 PROCEDURE — 85610 PROTHROMBIN TIME: CPT

## 2021-08-28 PROCEDURE — 86140 C-REACTIVE PROTEIN: CPT

## 2021-08-28 PROCEDURE — 85730 THROMBOPLASTIN TIME PARTIAL: CPT

## 2021-08-28 PROCEDURE — 36415 COLL VENOUS BLD VENIPUNCTURE: CPT

## 2021-08-28 PROCEDURE — 82607 VITAMIN B-12: CPT

## 2021-08-28 PROCEDURE — 83550 IRON BINDING TEST: CPT

## 2021-08-28 PROCEDURE — 83540 ASSAY OF IRON: CPT

## 2021-08-28 PROCEDURE — 80053 COMPREHEN METABOLIC PANEL: CPT

## 2021-08-28 PROCEDURE — 85384 FIBRINOGEN ACTIVITY: CPT

## 2021-08-28 PROCEDURE — 82728 ASSAY OF FERRITIN: CPT

## 2021-08-28 PROCEDURE — 85025 COMPLETE CBC W/AUTO DIFF WBC: CPT

## 2021-08-28 PROCEDURE — 82784 ASSAY IGA/IGD/IGG/IGM EACH: CPT

## 2021-08-28 PROCEDURE — 85652 RBC SED RATE AUTOMATED: CPT

## 2021-09-02 ENCOUNTER — OFFICE VISIT (OUTPATIENT)
Dept: HEMATOLOGY ONCOLOGY | Facility: CLINIC | Age: 51
End: 2021-09-02
Payer: COMMERCIAL

## 2021-09-02 VITALS
RESPIRATION RATE: 18 BRPM | TEMPERATURE: 98.2 F | HEIGHT: 62 IN | WEIGHT: 171.7 LBS | HEART RATE: 84 BPM | BODY MASS INDEX: 31.6 KG/M2 | DIASTOLIC BLOOD PRESSURE: 82 MMHG | SYSTOLIC BLOOD PRESSURE: 128 MMHG | OXYGEN SATURATION: 99 %

## 2021-09-02 DIAGNOSIS — D80.1 HYPOGAMMAGLOBULINEMIA (HCC): Primary | ICD-10-CM

## 2021-09-02 DIAGNOSIS — Z98.84 H/O GASTRIC BYPASS: ICD-10-CM

## 2021-09-02 DIAGNOSIS — R23.8 EASY BRUISING: ICD-10-CM

## 2021-09-02 DIAGNOSIS — E83.52 HYPERCALCEMIA: ICD-10-CM

## 2021-09-02 PROCEDURE — 99214 OFFICE O/P EST MOD 30 MIN: CPT | Performed by: INTERNAL MEDICINE

## 2021-09-02 NOTE — PROGRESS NOTES
Hematology/Oncology Outpatient Follow-up  Mary Finney 48 y o  female 1970 [id number]    Date:  9/2/2021        Assessment and Plan:  1  Hypogammaglobulinemia ([facility] [address] )   the patient will be continued on the IVIG infusion 40 g for her hypogammaglobinemia which is protecting her from upper respiratory airway infections and sinusitis  - CBC and differential; Future  - Comprehensive metabolic panel; Future  - Magnesium; Future  - IgG, IgA, IgM; Future    2  H/O gastric bypass    She is giving herself vitamin B12 injections on every 4 week basis  I did ask her to increase the frequency to every 3 weeks or every 2 weeks  3  Easy bruising    Initial workup did not reveal any obvious prolongation of the PT or PTT  We will check von Willebrand by panel for completeness sake since she continues to complain about mucosal bleeding  And easy bruising   - Von Willebrand Comprehensive Panel; Future    4  Hypercalcemia   she stated that she has a history of hyperparathyroidism which is causing the calcium level to go up  I will check her intact PTH prior to her next visit  - Comprehensive metabolic panel; Future  - PTH, intact; Future        HPI:  This is a 63-year-old female with multiple comorbid conditions including history of morbid obesity status post post gastric bypass surgery in 2009, anemia due to iron deficiency, cholecystectomy, hysterectomy, hypertension, gastroesophageal reflux disease, depression, asthma, chronic sinusitis, etc      The patient was apparently found to have hypogammaglobinemia on multiple occasions with frequent/ recurrent infections mainly in the upper respiratory airway with chronic sinusitis    The patient was evaluated by the Hematology service from [facility] and was started on IVIG around December of 2019 on a monthly basis   She received then 3-4 sessions of IV Ig which improved her chronic sinusitis and other infectious process, according to the patient, significantly   However, due to the COVID-19 pandemic she stopped doing the IVIG and decided to transfer her care to our service since she lives close by  Her immunoglobulin levels from 07/17/2019 before she had any IVIG treatment showed IgG of 554, IgA of 116 and IgM of 39 mg/dL  Interval history:  Patient came today for a follow-up visit accompanied by her daughter  She continues to be on the IVIG on every 4 week basis which is decreasing the frequency of upper respiratory airway infections  She continues to complain about easy bruising and occasional epistaxis  Her most recent blood work on 08/28 showed normal hemoglobin level of 13 8 with normal white cells and platelets  Immunoglobulin levels were within normal range with IgG of 1050 mg/dL  CMP entirely normal   Inflammation markers are within normal range  B12 391  PT and PTT were within normal range  Fibrinogen 418 normal     Iron panel showed saturation of 17% and ferritin of 127  ROS: Review of Systems   Constitutional: Negative for chills and fever  HENT: Negative for ear pain and sore throat  Eyes: Negative for pain and visual disturbance  Respiratory: Negative for cough and shortness of breath  Cardiovascular: Negative for chest pain and palpitations  Gastrointestinal: Positive for constipation and diarrhea  Negative for abdominal pain and vomiting  Genitourinary: Negative for dysuria and hematuria  Musculoskeletal: Positive for arthralgias  Negative for back pain  Skin: Negative for color change and rash  Neurological: Positive for dizziness, numbness and headaches  Negative for seizures and syncope  Psychiatric/Behavioral: Positive for sleep disturbance  All other systems reviewed and are negative        Past Medical History:   Diagnosis Date    Anemia     recurrent    Anxiety     Asthma     allergy induced    Contact lens overwear of both eyes     Depression     Diarrhea     Environmental allergies     Fibromyalgia     Fibromyalgia, primary     GERD (gastroesophageal reflux disease)     History of transfusion         Hypertension     Hypogammaglobulinemia (HCC)     Hypoglycemia after GI (gastrointestinal) surgery     Immune deficiency disorder (HCC)     IgG deficiency    Iron (Fe) deficiency anemia     Kidney stone     Lumbar herniated disc     Migraine     Motion sickness     PONV (postoperative nausea and vomiting)     severe    Seasonal allergies     Wears glasses        Past Surgical History:   Procedure Laterality Date    ABDOMINAL SURGERY      adhesions    ABDOMINOPLASTY      APPENDECTOMY      AUGMENTATION MAMMAPLASTY  2003    BREAST IMPLANT REMOVAL Bilateral 2019    BREAST SURGERY      implant removal     SECTION      CHOLECYSTECTOMY      COSMETIC SURGERY Bilateral     breast augmentation 2004    GASTRIC BYPASS      2009    GASTRIC BYPASS LAPAROSCOPIC N/A 2020    Procedure: ROBOTIC REVISION OF KENYA-EN-Y GASTRIC BYPASS, INTRAOP EGD;  Surgeon: Puma Blunt MD;  [facility];  Service: [address]      HYSTERECTOMY  2015    KNEE ARTHROSCOPY Left     LYMPH NODE BIOPSY  -    benign    NERVE BLOCK      OR CORRJ HALLUX VALGUS W/SESMDC W/RESCJ PROX PHAL Left 2016    Procedure: SURGICAL MODIFIED WATKINS BUNIONECTOMY FIRST MTPJ;  Surgeon: Anne Rojas DPM;  [facility];  Service: Podiatry    OR FUSION FOOT BONE,MIDTARSAL,1 JT Left 2016    Procedure: FRIST TMJ FUSION ;  Surgeon: Anne Rojas DPM;  [facility];  Service: Podiatry    [address] (NOT 1ST) Left 2016    Procedure: FOOT SHORTENING OSTEOTOMIES 2ND AND 3RD METATARSAL WITH EXTENSER TENDON RELEASE 3RD AND 4TH TOE;  Surgeon: Anne Rojas DPM;  [facility];  Service: Podiatry    OR REMOVAL DEEP IMPLANT Left 2016    Procedure: REMOVAL SYMPTOMATIC  HARDWARE FIRST RAY,RELATED CONTRACTURE SECOND AND THIRD TOES WITH SUSPENSION FOURTH TOE;  Surgeon: Zoltan Bush DPM;  [facility];  Service: Podiatry    SALPINGOOPHORECTOMY Left 2016    with removal of cervix    TONSILLECTOMY      WISDOM TOOTH EXTRACTION         Social History     Socioeconomic History    Marital status: /Civil Union     Spouse name: None    Number of children: None    Years of education: None    Highest education level: None   Occupational History    None   Tobacco Use    Smoking status: Never Smoker    Smokeless tobacco: Never Used   Vaping Use    Vaping Use: Never used   Substance and Sexual Activity    Alcohol use: Yes     Comment: rarely    Drug use: No    Sexual activity: None   Other Topics Concern    None   Social History Narrative    Consumes on average 3 cups of coffee per day     Social Determinants of Health     Financial Resource Strain:     Difficulty of Paying Living Expenses:    Food Insecurity:     Worried About Running Out of Food in the Last Year:     Ran Out of Food in the Last Year:    Transportation Needs:     Lack of Transportation (Medical):      Lack of Transportation (Non-Medical):    Physical Activity:     Days of Exercise per Week:     Minutes of Exercise per Session:    Stress:     Feeling of Stress :    Social Connections:     Frequency of Communication with Friends and Family:     Frequency of Social Gatherings with Friends and Family:     Attends Oriental orthodox Services:     Active Member of Clubs or Organizations:     Attends Club or Organization Meetings:     Marital Status:    Intimate Partner Violence:     Fear of Current or Ex-Partner:     Emotionally Abused:     Physically Abused:     Sexually Abused:        Family History   Problem Relation Age of Onset    Lymphoma Mother     COPD Mother     Arthritis Mother     Heart disease Father     Breast cancer Maternal Aunt     Breast cancer Cousin     No Known Problems Daughter     No Known Problems Maternal Grandmother     No Known Problems Maternal Grandfather     No Known Problems Paternal Grandmother     No Known Problems Paternal Grandfather     Pancreatic cancer Maternal Aunt     No Known Problems Maternal Aunt     No Known Problems Maternal Aunt     Multiple sclerosis Paternal Aunt     Breast cancer Cousin        Allergies   Allergen Reactions    Codeine GI Intolerance and Chest Pain     ABDOMINAL PAIN    Hydrocodone Abdominal Pain and Swelling    Pregabalin Dizziness    Acetaminophen-Codeine Abdominal Pain    Amoxicillin Rash         Current Outpatient Medications:     acetaminophen (TYLENOL) 325 mg tablet, Take 3 tablets (975 mg total) by mouth every 8 (eight) hours, Disp: 30 tablet, Rfl: 0    albuterol (PROVENTIL HFA,VENTOLIN HFA) 90 mcg/act inhaler, Inhale 1 puff every 6 (six) hours as needed As needed, Disp: , Rfl:     ALPRAZolam (XANAX) 2 MG tablet, Take 0 5 mg by mouth daily at bedtime , Disp: , Rfl:     Azelastine-Fluticasone (DYMISTA NA), 2 sprays into each nostril 2 (two) times a day , Disp: , Rfl:     Botulinum Toxin Type A 200 units SOLR, Inject 155 units into face and neck IM every 90 days, Disp: , Rfl:     budesonide-formoterol (SYMBICORT) 160-4 5 mcg/act inhaler, Inhale 2 puffs 2 (two) times a day , Disp: , Rfl:     buPROPion (WELLBUTRIN) 100 mg tablet, Take 100 mg by mouth 2 (two) times a day , Disp: , Rfl:     celecoxib (CELEBREX) 200 mg capsule, Take 200 mg by mouth 2 (two) times a day as needed , Disp: , Rfl:     cholecalciferol (VITAMIN D3) 36911 units capsule, Take 5,000 Units by mouth 2 (two) times a day , Disp: , Rfl:     Cyanocobalamin (B-12) 1000 MCG/ML KIT, Inject as directed every 30 (thirty) days  , Disp: , Rfl:     Erenumab-aooe (AIMOVIG) 70 MG/ML SOAJ, Inject under the skin every 28 days, Disp: , Rfl:     fexofenadine (ALLEGRA) 180 MG tablet, Take 180 mg by mouth daily, Disp: , Rfl:     hydroxychloroquine (PLAQUENIL) 200 mg tablet, Take 200 mg by mouth 2 (two) times a day with meals, Disp: , Rfl:     hyoscyamine (LEVSIN/SL) 0 125 mg SL tablet, Take 0 125 mg by mouth every 4 (four) hours as needed for cramping, Disp: , Rfl:     Immune Globulin, Human, (PRIVIGEN IV), Infuse into a venous catheter every 30 (thirty) days, Disp: , Rfl:     losartan (COZAAR) 50 mg tablet, Take 50 mg by mouth 2 (two) times a day , Disp: , Rfl:     montelukast (SINGULAIR) 5 mg chewable tablet, Chew 5 mg 2 (two) times a day , Disp: , Rfl:     ondansetron (ZOFRAN-ODT) 8 mg disintegrating tablet, Take 1 tablet (8 mg total) by mouth every 8 (eight) hours as needed for nausea or vomiting, Disp: 20 tablet, Rfl: 1    traMADol (ULTRAM) 50 mg tablet, Take 50 mg by mouth 2 (two) times a day , Disp: , Rfl:     Cyanocobalamin (VITAMIN B-12 PO), Place 100 mg under the tongue daily , Disp: , Rfl:     cyclobenzaprine (FLEXERIL) 10 mg tablet, Take 10 mg by mouth 3 (three) times a day as needed  (Patient not taking: Reported on 9/2/2021), Disp: , Rfl:     fremanezumab-vfrm (Ajovy) 225 MG/1 5ML prefilled syringe, Inject 225 mg under the skin every 30 (thirty) days, Disp: , Rfl:     meclizine (ANTIVERT) 25 mg tablet, Take 1 tablet (25 mg total) by mouth 3 (three) times a day as needed for dizziness for up to 12 doses (Patient not taking: Reported on 4/29/2021), Disp: 12 tablet, Rfl: 0    omeprazole (PriLOSEC) 20 mg delayed release capsule, Take 1 capsule by mouth daily, Disp: 30 capsule, Rfl: 1    oxyCODONE (ROXICODONE) 5 mg immediate release tablet, Take 1 tablet (5 mg total) by mouth every 4 (four) hours as needed for moderate pain For continuing therapyMax Daily Amount: 30 mg (Patient not taking: Reported on 2/19/2020), Disp: 10 tablet, Rfl: 0    predniSONE 10 mg tablet, Take 3 tabs BID X 2 days, 2 tabs BID X 2 days, 1 tab BID X 2 days, 1 tab daily X 2 days (Patient not taking: Reported on 12/2/2020), Disp: 26 tablet, Rfl: 0    sucralfate (CARAFATE) 1 g/10 mL suspension, Take 10 mL (1 g total) by mouth 4 (four) times a day, Disp: 1200 mL, Rfl: 1    SUMAtriptan (IMITREX) 100 mg tablet, Take 100 mg by mouth 2 (two) times a day as needed for migraine (Patient not taking: Reported on 9/2/2021), Disp: , Rfl:       Physical Exam:  /82 (BP Location: Left arm, Patient Position: Sitting, Cuff Size: Adult)   Pulse 84   Temp 98 2 °F (36 8 °C) (Tympanic)   Resp 18   Ht 5' 2" (1 575 m)   Wt 77 9 kg (171 lb 11 2 oz)   SpO2 99%   BMI 31 40 kg/m²     Physical Exam  Constitutional:       General: She is not in acute distress  Appearance: She is well-developed  She is not diaphoretic  HENT:      Head: Normocephalic and atraumatic  Eyes:      General: No scleral icterus  Right eye: No discharge  Left eye: No discharge  Conjunctiva/sclera: Conjunctivae normal       Pupils: Pupils are equal, round, and reactive to light  Neck:      Thyroid: No thyromegaly  Vascular: No JVD  Trachea: No tracheal deviation  Cardiovascular:      Rate and Rhythm: Normal rate and regular rhythm  Heart sounds: Normal heart sounds  No murmur heard  No friction rub  Pulmonary:      Effort: Pulmonary effort is normal  No respiratory distress  Breath sounds: Normal breath sounds  No stridor  No wheezing or rales  Chest:      Chest wall: No tenderness  Abdominal:      General: There is no distension  Palpations: Abdomen is soft  There is no hepatomegaly or splenomegaly  Tenderness: There is no abdominal tenderness  There is no guarding or rebound  Musculoskeletal:         General: No tenderness or deformity  Normal range of motion  Cervical back: Normal range of motion and neck supple  Lymphadenopathy:      Cervical: No cervical adenopathy  Skin:     General: Skin is warm and dry  Coloration: Skin is not pale  Findings: Bruising present  No erythema or rash  Neurological:      Mental Status: She is alert and oriented to person, place, and time  Cranial Nerves:  No cranial nerve deficit  Coordination: Coordination normal       Deep Tendon Reflexes: Reflexes are normal and symmetric  Psychiatric:         Behavior: Behavior normal          Thought Content: Thought content normal          Judgment: Judgment normal            Labs:  Lab Results   Component Value Date    WBC 7 90 08/28/2021    HGB 13 8 08/28/2021    HCT 42 3 08/28/2021    MCV 89 08/28/2021     08/28/2021     Lab Results   Component Value Date    K 4 3 08/28/2021     08/28/2021    CO2 27 08/28/2021    BUN 15 08/28/2021    CREATININE 0 80 08/28/2021    GLUF 80 08/28/2021    CALCIUM 10 1 08/28/2021    AST 18 08/28/2021    ALT 11 08/28/2021    ALKPHOS 66 08/28/2021    EGFR 86 08/28/2021     No results found for: TSH    Patient voiced understanding and agreement in the above discussion  Aware to contact our office with questions/symptoms in the interim

## 2021-09-08 ENCOUNTER — HOSPITAL ENCOUNTER (OUTPATIENT)
Dept: INFUSION CENTER | Facility: HOSPITAL | Age: 51
Discharge: HOME | End: 2021-09-08
Attending: INTERNAL MEDICINE
Payer: COMMERCIAL

## 2021-09-08 VITALS
DIASTOLIC BLOOD PRESSURE: 86 MMHG | TEMPERATURE: 97.7 F | HEART RATE: 87 BPM | SYSTOLIC BLOOD PRESSURE: 132 MMHG | RESPIRATION RATE: 18 BRPM | OXYGEN SATURATION: 97 %

## 2021-09-08 DIAGNOSIS — D80.1 HYPOGAMMAGLOBULINEMIA (HCC): Primary | ICD-10-CM

## 2021-09-08 PROCEDURE — 96367 TX/PROPH/DG ADDL SEQ IV INF: CPT

## 2021-09-08 PROCEDURE — 96365 THER/PROPH/DIAG IV INF INIT: CPT

## 2021-09-08 PROCEDURE — 96366 THER/PROPH/DIAG IV INF ADDON: CPT

## 2021-09-08 PROCEDURE — 96375 TX/PRO/DX INJ NEW DRUG ADDON: CPT

## 2021-09-08 RX ORDER — SODIUM CHLORIDE 9 MG/ML
20 INJECTION, SOLUTION INTRAVENOUS ONCE
Status: CANCELLED | OUTPATIENT
Start: 2021-10-06

## 2021-09-08 RX ORDER — ACETAMINOPHEN 325 MG/1
650 TABLET ORAL ONCE
Status: CANCELLED | OUTPATIENT
Start: 2021-10-06

## 2021-09-08 RX ORDER — ACETAMINOPHEN 325 MG/1
650 TABLET ORAL ONCE
Status: COMPLETED | OUTPATIENT
Start: 2021-09-08 | End: 2021-09-08

## 2021-09-08 RX ORDER — SODIUM CHLORIDE 9 MG/ML
20 INJECTION, SOLUTION INTRAVENOUS ONCE
Status: COMPLETED | OUTPATIENT
Start: 2021-09-08 | End: 2021-09-08

## 2021-09-08 RX ADMIN — DIPHENHYDRAMINE HYDROCHLORIDE 25 MG: 50 INJECTION, SOLUTION INTRAMUSCULAR; INTRAVENOUS at 09:05

## 2021-09-08 RX ADMIN — SODIUM CHLORIDE 20 ML/HR: 0.9 INJECTION, SOLUTION INTRAVENOUS at 08:28

## 2021-09-08 RX ADMIN — DEXAMETHASONE SODIUM PHOSPHATE 10 MG: 10 INJECTION, SOLUTION INTRAMUSCULAR; INTRAVENOUS at 08:29

## 2021-09-08 RX ADMIN — Medication 40 G: at 10:01

## 2021-09-08 RX ADMIN — FAMOTIDINE 20 MG: 10 INJECTION, SOLUTION INTRAVENOUS at 09:31

## 2021-09-08 RX ADMIN — SODIUM CHLORIDE 500 ML: 0.9 INJECTION, SOLUTION INTRAVENOUS at 08:16

## 2021-09-08 RX ADMIN — ACETAMINOPHEN 650 MG: 325 TABLET ORAL at 08:24

## 2021-09-08 NOTE — PROGRESS NOTES
Patient tolerated IVIG infusion without issue  Next appointment scheduled for Oct 12th as patient will be away on the 6th   Discharged in stable condition, declined AVS

## 2021-10-12 ENCOUNTER — HOSPITAL ENCOUNTER (OUTPATIENT)
Dept: INFUSION CENTER | Facility: HOSPITAL | Age: 51
Discharge: HOME | End: 2021-10-12
Attending: INTERNAL MEDICINE
Payer: COMMERCIAL

## 2021-10-12 VITALS
TEMPERATURE: 97.8 F | SYSTOLIC BLOOD PRESSURE: 135 MMHG | OXYGEN SATURATION: 97 % | HEART RATE: 80 BPM | RESPIRATION RATE: 18 BRPM | DIASTOLIC BLOOD PRESSURE: 83 MMHG

## 2021-10-12 DIAGNOSIS — D80.1 HYPOGAMMAGLOBULINEMIA (HCC): Primary | ICD-10-CM

## 2021-10-12 PROCEDURE — 96366 THER/PROPH/DIAG IV INF ADDON: CPT

## 2021-10-12 PROCEDURE — 96367 TX/PROPH/DG ADDL SEQ IV INF: CPT

## 2021-10-12 PROCEDURE — 96365 THER/PROPH/DIAG IV INF INIT: CPT

## 2021-10-12 RX ORDER — ACETAMINOPHEN 325 MG/1
650 TABLET ORAL ONCE
Status: CANCELLED | OUTPATIENT
Start: 2021-11-09

## 2021-10-12 RX ORDER — SODIUM CHLORIDE 9 MG/ML
20 INJECTION, SOLUTION INTRAVENOUS ONCE
Status: CANCELLED | OUTPATIENT
Start: 2021-11-09

## 2021-10-12 RX ORDER — ACETAMINOPHEN 325 MG/1
650 TABLET ORAL ONCE
Status: COMPLETED | OUTPATIENT
Start: 2021-10-12 | End: 2021-10-12

## 2021-10-12 RX ORDER — SODIUM CHLORIDE 9 MG/ML
20 INJECTION, SOLUTION INTRAVENOUS ONCE
Status: COMPLETED | OUTPATIENT
Start: 2021-10-12 | End: 2021-10-12

## 2021-10-12 RX ADMIN — DIPHENHYDRAMINE HYDROCHLORIDE 25 MG: 50 INJECTION, SOLUTION INTRAMUSCULAR; INTRAVENOUS at 08:47

## 2021-10-12 RX ADMIN — SODIUM CHLORIDE 500 ML: 0.9 INJECTION, SOLUTION INTRAVENOUS at 08:15

## 2021-10-12 RX ADMIN — DEXAMETHASONE SODIUM PHOSPHATE 10 MG: 10 INJECTION, SOLUTION INTRAMUSCULAR; INTRAVENOUS at 08:27

## 2021-10-12 RX ADMIN — FAMOTIDINE 20 MG: 10 INJECTION INTRAVENOUS at 09:14

## 2021-10-12 RX ADMIN — SODIUM CHLORIDE 20 ML/HR: 0.9 INJECTION, SOLUTION INTRAVENOUS at 08:18

## 2021-10-12 RX ADMIN — ACETAMINOPHEN 650 MG: 325 TABLET ORAL at 08:19

## 2021-10-12 RX ADMIN — Medication 40 G: at 09:46

## 2021-11-09 ENCOUNTER — HOSPITAL ENCOUNTER (OUTPATIENT)
Dept: INFUSION CENTER | Facility: HOSPITAL | Age: 51
Discharge: HOME | End: 2021-11-09
Attending: INTERNAL MEDICINE
Payer: COMMERCIAL

## 2021-11-09 ENCOUNTER — TELEPHONE (OUTPATIENT)
Dept: HEMATOLOGY ONCOLOGY | Facility: CLINIC | Age: 51
End: 2021-11-09

## 2021-11-09 VITALS
TEMPERATURE: 96.6 F | RESPIRATION RATE: 18 BRPM | HEART RATE: 86 BPM | SYSTOLIC BLOOD PRESSURE: 133 MMHG | DIASTOLIC BLOOD PRESSURE: 87 MMHG

## 2021-11-09 DIAGNOSIS — D80.1 HYPOGAMMAGLOBULINEMIA (HCC): Primary | ICD-10-CM

## 2021-11-09 PROCEDURE — 96375 TX/PRO/DX INJ NEW DRUG ADDON: CPT

## 2021-11-09 PROCEDURE — 96366 THER/PROPH/DIAG IV INF ADDON: CPT

## 2021-11-09 PROCEDURE — 96367 TX/PROPH/DG ADDL SEQ IV INF: CPT

## 2021-11-09 PROCEDURE — 96361 HYDRATE IV INFUSION ADD-ON: CPT

## 2021-11-09 PROCEDURE — 96365 THER/PROPH/DIAG IV INF INIT: CPT

## 2021-11-09 RX ORDER — ACETAMINOPHEN 325 MG/1
650 TABLET ORAL ONCE
Status: COMPLETED | OUTPATIENT
Start: 2021-11-09 | End: 2021-11-09

## 2021-11-09 RX ORDER — SODIUM CHLORIDE 9 MG/ML
20 INJECTION, SOLUTION INTRAVENOUS ONCE
Status: CANCELLED | OUTPATIENT
Start: 2021-12-07

## 2021-11-09 RX ORDER — SODIUM CHLORIDE 9 MG/ML
20 INJECTION, SOLUTION INTRAVENOUS ONCE
Status: COMPLETED | OUTPATIENT
Start: 2021-11-09 | End: 2021-11-09

## 2021-11-09 RX ORDER — ACETAMINOPHEN 325 MG/1
650 TABLET ORAL ONCE
Status: CANCELLED | OUTPATIENT
Start: 2021-12-07

## 2021-11-09 RX ADMIN — Medication 40 G: at 10:35

## 2021-11-09 RX ADMIN — FAMOTIDINE 20 MG: 10 INJECTION INTRAVENOUS at 10:20

## 2021-11-09 RX ADMIN — DEXAMETHASONE SODIUM PHOSPHATE 10 MG: 10 INJECTION, SOLUTION INTRAMUSCULAR; INTRAVENOUS at 09:23

## 2021-11-09 RX ADMIN — SODIUM CHLORIDE 20 ML/HR: 0.9 INJECTION, SOLUTION INTRAVENOUS at 08:23

## 2021-11-09 RX ADMIN — SODIUM CHLORIDE 500 ML: 0.9 INJECTION, SOLUTION INTRAVENOUS at 08:20

## 2021-11-09 RX ADMIN — ACETAMINOPHEN 650 MG: 325 TABLET ORAL at 08:23

## 2021-11-09 RX ADMIN — DIPHENHYDRAMINE HYDROCHLORIDE 25 MG: 50 INJECTION, SOLUTION INTRAMUSCULAR; INTRAVENOUS at 10:04

## 2021-11-10 ENCOUNTER — HOSPITAL ENCOUNTER (OUTPATIENT)
Dept: BONE DENSITY | Facility: HOSPITAL | Age: 51
Discharge: HOME | End: 2021-11-10
Payer: COMMERCIAL

## 2021-11-10 DIAGNOSIS — M85.89 OTHER SPECIFIED DISORDERS OF BONE DENSITY AND STRUCTURE, MULTIPLE SITES: ICD-10-CM

## 2021-11-10 PROCEDURE — 77080 DXA BONE DENSITY AXIAL: CPT

## 2021-11-12 ENCOUNTER — HOSPITAL ENCOUNTER (OUTPATIENT)
Dept: MAMMOGRAPHY | Facility: HOSPITAL | Age: 51
Discharge: HOME | End: 2021-11-12
Attending: INTERNAL MEDICINE
Payer: COMMERCIAL

## 2021-11-12 VITALS — WEIGHT: 170 LBS | BODY MASS INDEX: 31.28 KG/M2 | HEIGHT: 62 IN

## 2021-11-12 DIAGNOSIS — Z12.31 SCREENING MAMMOGRAM, ENCOUNTER FOR: ICD-10-CM

## 2021-11-12 PROCEDURE — 77063 BREAST TOMOSYNTHESIS BI: CPT

## 2021-11-12 PROCEDURE — 77067 SCR MAMMO BI INCL CAD: CPT

## 2021-12-08 ENCOUNTER — HOSPITAL ENCOUNTER (OUTPATIENT)
Dept: INFUSION CENTER | Facility: HOSPITAL | Age: 51
Discharge: HOME | End: 2021-12-08
Attending: INTERNAL MEDICINE
Payer: COMMERCIAL

## 2021-12-08 VITALS
SYSTOLIC BLOOD PRESSURE: 136 MMHG | DIASTOLIC BLOOD PRESSURE: 92 MMHG | RESPIRATION RATE: 18 BRPM | HEART RATE: 86 BPM | TEMPERATURE: 97.8 F | OXYGEN SATURATION: 98 %

## 2021-12-08 DIAGNOSIS — D80.1 HYPOGAMMAGLOBULINEMIA (HCC): Primary | ICD-10-CM

## 2021-12-08 PROCEDURE — 96367 TX/PROPH/DG ADDL SEQ IV INF: CPT

## 2021-12-08 PROCEDURE — 96365 THER/PROPH/DIAG IV INF INIT: CPT

## 2021-12-08 PROCEDURE — 96366 THER/PROPH/DIAG IV INF ADDON: CPT

## 2021-12-08 PROCEDURE — 96375 TX/PRO/DX INJ NEW DRUG ADDON: CPT

## 2021-12-08 RX ORDER — ACETAMINOPHEN 325 MG/1
650 TABLET ORAL ONCE
Status: COMPLETED | OUTPATIENT
Start: 2021-12-08 | End: 2021-12-08

## 2021-12-08 RX ORDER — SODIUM CHLORIDE 9 MG/ML
20 INJECTION, SOLUTION INTRAVENOUS ONCE
Status: COMPLETED | OUTPATIENT
Start: 2021-12-08 | End: 2021-12-08

## 2021-12-08 RX ORDER — SODIUM CHLORIDE 9 MG/ML
20 INJECTION, SOLUTION INTRAVENOUS ONCE
Status: CANCELLED | OUTPATIENT
Start: 2022-01-05

## 2021-12-08 RX ORDER — ACETAMINOPHEN 325 MG/1
650 TABLET ORAL ONCE
Status: CANCELLED | OUTPATIENT
Start: 2022-01-05

## 2021-12-08 RX ADMIN — DIPHENHYDRAMINE HYDROCHLORIDE 25 MG: 50 INJECTION INTRAMUSCULAR; INTRAVENOUS at 09:21

## 2021-12-08 RX ADMIN — FAMOTIDINE 20 MG: 10 INJECTION INTRAVENOUS at 09:47

## 2021-12-08 RX ADMIN — ACETAMINOPHEN 650 MG: 325 TABLET ORAL at 08:34

## 2021-12-08 RX ADMIN — SODIUM CHLORIDE 20 ML/HR: 0.9 INJECTION, SOLUTION INTRAVENOUS at 08:30

## 2021-12-08 RX ADMIN — SODIUM CHLORIDE 500 ML: 0.9 INJECTION, SOLUTION INTRAVENOUS at 08:34

## 2021-12-08 RX ADMIN — DEXAMETHASONE SODIUM PHOSPHATE 10 MG: 10 INJECTION, SOLUTION INTRAMUSCULAR; INTRAVENOUS at 08:51

## 2021-12-08 RX ADMIN — Medication 40 G: at 10:24

## 2022-01-03 ENCOUNTER — APPOINTMENT (OUTPATIENT)
Dept: LAB | Facility: HOSPITAL | Age: 52
End: 2022-01-03
Attending: INTERNAL MEDICINE
Payer: COMMERCIAL

## 2022-01-03 DIAGNOSIS — E83.52 HYPERCALCEMIA: ICD-10-CM

## 2022-01-03 DIAGNOSIS — D80.1 HYPOGAMMAGLOBULINEMIA (HCC): ICD-10-CM

## 2022-01-03 DIAGNOSIS — R23.8 EASY BRUISING: Primary | ICD-10-CM

## 2022-01-03 LAB
ALBUMIN SERPL BCP-MCNC: 3.4 G/DL (ref 3.5–5)
ALP SERPL-CCNC: 74 U/L (ref 46–116)
ALT SERPL W P-5'-P-CCNC: 23 U/L (ref 12–78)
ANION GAP SERPL CALCULATED.3IONS-SCNC: 3 MMOL/L (ref 4–13)
APTT PPP: 27 SECONDS (ref 23–37)
AST SERPL W P-5'-P-CCNC: 16 U/L (ref 5–45)
BASOPHILS # BLD AUTO: 0.07 THOUSANDS/ΜL (ref 0–0.1)
BASOPHILS NFR BLD AUTO: 1 % (ref 0–1)
BILIRUB SERPL-MCNC: 0.6 MG/DL (ref 0.2–1)
BUN SERPL-MCNC: 13 MG/DL (ref 5–25)
CALCIUM ALBUM COR SERPL-MCNC: 10.1 MG/DL (ref 8.3–10.1)
CALCIUM SERPL-MCNC: 9.6 MG/DL (ref 8.3–10.1)
CHLORIDE SERPL-SCNC: 106 MMOL/L (ref 100–108)
CO2 SERPL-SCNC: 28 MMOL/L (ref 21–32)
CREAT SERPL-MCNC: 0.7 MG/DL (ref 0.6–1.3)
EOSINOPHIL # BLD AUTO: 0.29 THOUSAND/ΜL (ref 0–0.61)
EOSINOPHIL NFR BLD AUTO: 3 % (ref 0–6)
ERYTHROCYTE [DISTWIDTH] IN BLOOD BY AUTOMATED COUNT: 13.5 % (ref 11.6–15.1)
GFR SERPL CREATININE-BSD FRML MDRD: 100 ML/MIN/1.73SQ M
GLUCOSE P FAST SERPL-MCNC: 82 MG/DL (ref 65–99)
HCT VFR BLD AUTO: 39.8 % (ref 34.8–46.1)
HGB BLD-MCNC: 12.8 G/DL (ref 11.5–15.4)
IGA SERPL-MCNC: 124 MG/DL (ref 70–400)
IGG SERPL-MCNC: 783 MG/DL (ref 700–1600)
IGM SERPL-MCNC: 36 MG/DL (ref 40–230)
IMM GRANULOCYTES # BLD AUTO: 0.03 THOUSAND/UL (ref 0–0.2)
IMM GRANULOCYTES NFR BLD AUTO: 0 % (ref 0–2)
LYMPHOCYTES # BLD AUTO: 2.92 THOUSANDS/ΜL (ref 0.6–4.47)
LYMPHOCYTES NFR BLD AUTO: 34 % (ref 14–44)
MAGNESIUM SERPL-MCNC: 2.1 MG/DL (ref 1.6–2.6)
MCH RBC QN AUTO: 29.6 PG (ref 26.8–34.3)
MCHC RBC AUTO-ENTMCNC: 32.2 G/DL (ref 31.4–37.4)
MCV RBC AUTO: 92 FL (ref 82–98)
MONOCYTES # BLD AUTO: 0.6 THOUSAND/ΜL (ref 0.17–1.22)
MONOCYTES NFR BLD AUTO: 7 % (ref 4–12)
NEUTROPHILS # BLD AUTO: 4.73 THOUSANDS/ΜL (ref 1.85–7.62)
NEUTS SEG NFR BLD AUTO: 55 % (ref 43–75)
NRBC BLD AUTO-RTO: 0 /100 WBCS
PLATELET # BLD AUTO: 304 THOUSANDS/UL (ref 149–390)
PMV BLD AUTO: 9.4 FL (ref 8.9–12.7)
POTASSIUM SERPL-SCNC: 4 MMOL/L (ref 3.5–5.3)
PROT SERPL-MCNC: 6.7 G/DL (ref 6.4–8.2)
PTH-INTACT SERPL-MCNC: 54.1 PG/ML (ref 18.4–80.1)
RBC # BLD AUTO: 4.33 MILLION/UL (ref 3.81–5.12)
SODIUM SERPL-SCNC: 137 MMOL/L (ref 136–145)
WBC # BLD AUTO: 8.64 THOUSAND/UL (ref 4.31–10.16)

## 2022-01-03 PROCEDURE — 85730 THROMBOPLASTIN TIME PARTIAL: CPT

## 2022-01-03 PROCEDURE — 85247 CLOT FACTOR VIII MULTIMETRIC: CPT

## 2022-01-03 PROCEDURE — 36415 COLL VENOUS BLD VENIPUNCTURE: CPT

## 2022-01-03 PROCEDURE — 85240 CLOT FACTOR VIII AHG 1 STAGE: CPT

## 2022-01-03 PROCEDURE — 85246 CLOT FACTOR VIII VW ANTIGEN: CPT

## 2022-01-03 PROCEDURE — 85245 CLOT FACTOR VIII VW RISTOCTN: CPT

## 2022-01-03 PROCEDURE — 83970 ASSAY OF PARATHORMONE: CPT

## 2022-01-03 PROCEDURE — 83735 ASSAY OF MAGNESIUM: CPT

## 2022-01-03 PROCEDURE — 85025 COMPLETE CBC W/AUTO DIFF WBC: CPT

## 2022-01-03 PROCEDURE — 80053 COMPREHEN METABOLIC PANEL: CPT

## 2022-01-03 PROCEDURE — 82784 ASSAY IGA/IGD/IGG/IGM EACH: CPT

## 2022-01-05 ENCOUNTER — HOSPITAL ENCOUNTER (OUTPATIENT)
Dept: INFUSION CENTER | Facility: HOSPITAL | Age: 52
Discharge: HOME | End: 2022-01-05
Attending: INTERNAL MEDICINE
Payer: COMMERCIAL

## 2022-01-05 VITALS
HEART RATE: 88 BPM | RESPIRATION RATE: 18 BRPM | TEMPERATURE: 96.9 F | SYSTOLIC BLOOD PRESSURE: 125 MMHG | DIASTOLIC BLOOD PRESSURE: 77 MMHG | OXYGEN SATURATION: 98 %

## 2022-01-05 DIAGNOSIS — D80.1 HYPOGAMMAGLOBULINEMIA (HCC): Primary | ICD-10-CM

## 2022-01-05 LAB
FACT XIIIA PPP-ACNC: 115 % (ref 56–140)
VWF AG ACT/NOR PPP IA: 90 % (ref 50–200)
VWF:RCO ACT/NOR PPP PL AGG: 97 % (ref 50–200)

## 2022-01-05 PROCEDURE — 96365 THER/PROPH/DIAG IV INF INIT: CPT

## 2022-01-05 PROCEDURE — 96366 THER/PROPH/DIAG IV INF ADDON: CPT

## 2022-01-05 PROCEDURE — 96367 TX/PROPH/DG ADDL SEQ IV INF: CPT

## 2022-01-05 RX ORDER — ACETAMINOPHEN 325 MG/1
650 TABLET ORAL ONCE
Status: CANCELLED | OUTPATIENT
Start: 2022-02-02

## 2022-01-05 RX ORDER — SODIUM CHLORIDE 9 MG/ML
20 INJECTION, SOLUTION INTRAVENOUS ONCE
Status: CANCELLED | OUTPATIENT
Start: 2022-02-02

## 2022-01-05 RX ORDER — ACETAMINOPHEN 325 MG/1
650 TABLET ORAL ONCE
Status: COMPLETED | OUTPATIENT
Start: 2022-01-05 | End: 2022-01-05

## 2022-01-05 RX ORDER — SODIUM CHLORIDE 9 MG/ML
20 INJECTION, SOLUTION INTRAVENOUS ONCE
Status: COMPLETED | OUTPATIENT
Start: 2022-01-05 | End: 2022-01-05

## 2022-01-05 RX ADMIN — ACETAMINOPHEN 650 MG: 325 TABLET ORAL at 08:36

## 2022-01-05 RX ADMIN — DEXAMETHASONE SODIUM PHOSPHATE 10 MG: 10 INJECTION, SOLUTION INTRAMUSCULAR; INTRAVENOUS at 08:38

## 2022-01-05 RX ADMIN — Medication 40 G: at 10:16

## 2022-01-05 RX ADMIN — FAMOTIDINE 20 MG: 10 INJECTION INTRAVENOUS at 09:35

## 2022-01-05 RX ADMIN — SODIUM CHLORIDE 20 ML/HR: 0.9 INJECTION, SOLUTION INTRAVENOUS at 08:36

## 2022-01-05 RX ADMIN — DIPHENHYDRAMINE HYDROCHLORIDE 25 MG: 50 INJECTION INTRAMUSCULAR; INTRAVENOUS at 09:11

## 2022-01-05 RX ADMIN — SODIUM CHLORIDE 500 ML: 0.9 INJECTION, SOLUTION INTRAVENOUS at 08:42

## 2022-01-05 NOTE — PROGRESS NOTES
Patient tolerated IV Privigen without reaction or issues  AVS given to patient  Patient ambulated off unit without incident  All personal belongings taken with patient

## 2022-01-11 LAB — FACT VIII AG ACT/NOR PPP IA: 98 %

## 2022-01-13 ENCOUNTER — OFFICE VISIT (OUTPATIENT)
Dept: HEMATOLOGY ONCOLOGY | Facility: CLINIC | Age: 52
End: 2022-01-13
Payer: COMMERCIAL

## 2022-01-13 VITALS
DIASTOLIC BLOOD PRESSURE: 80 MMHG | TEMPERATURE: 96.1 F | OXYGEN SATURATION: 98 % | RESPIRATION RATE: 20 BRPM | BODY MASS INDEX: 32.57 KG/M2 | WEIGHT: 178.1 LBS | SYSTOLIC BLOOD PRESSURE: 130 MMHG | HEART RATE: 77 BPM

## 2022-01-13 DIAGNOSIS — Z98.84 H/O GASTRIC BYPASS: ICD-10-CM

## 2022-01-13 DIAGNOSIS — D80.1 HYPOGAMMAGLOBULINEMIA (HCC): Primary | ICD-10-CM

## 2022-01-13 DIAGNOSIS — R53.83 OTHER FATIGUE: ICD-10-CM

## 2022-01-13 DIAGNOSIS — R30.0 DYSURIA: ICD-10-CM

## 2022-01-13 DIAGNOSIS — E53.8 B12 DEFICIENCY: ICD-10-CM

## 2022-01-13 DIAGNOSIS — R23.8 EASY BRUISING: ICD-10-CM

## 2022-01-13 PROCEDURE — 99214 OFFICE O/P EST MOD 30 MIN: CPT | Performed by: NURSE PRACTITIONER

## 2022-01-13 RX ORDER — SODIUM CHLORIDE 9 MG/ML
20 INJECTION, SOLUTION INTRAVENOUS ONCE
Status: CANCELLED | OUTPATIENT
Start: 2022-01-26

## 2022-01-13 RX ORDER — ACETAMINOPHEN 325 MG/1
650 TABLET ORAL ONCE
Status: CANCELLED | OUTPATIENT
Start: 2022-01-26

## 2022-01-13 RX ORDER — SODIUM CHLORIDE 9 MG/ML
20 INJECTION, SOLUTION INTRAVENOUS ONCE
Status: CANCELLED | OUTPATIENT
Start: 2022-02-04

## 2022-01-13 RX ORDER — ACETAMINOPHEN 325 MG/1
650 TABLET ORAL ONCE
Status: CANCELLED | OUTPATIENT
Start: 2022-02-04

## 2022-01-13 NOTE — PROGRESS NOTES
Hematology/Oncology Outpatient Follow-up  Tito Park 46 y o  female 1970 [id number]    Date:  1/13/2022      Assessment and Plan:  1  Hypogammaglobulinemia ([address] )  Patient is reporting significant increase of her sinus infections/bronchitis which decreased significantly in the past after she started the IVIG  She has been receiving her IVIG treatment 40g on a monthly basis without interruption  Is scheduled to meet with pulmonology in the near future  Recommended that she also follow up with her ENT provider for further recommendations regarding the frequent sinus infections  We will trial increasing her IVIG infusion to every 3 weeks rather than every 4 weeks to see if this will decrease the frequency of her  sinopulmonary infections  Could possibly consider increasing the dose slightly in the future as well if there is no improvement (current calculated dose seems to be 500 mg/kg)  - CBC and differential; Future  - Comprehensive metabolic panel; Future  - LD,Blood; Future  - C-reactive protein; Future  - Sedimentation rate, automated; Future    2  B12 deficiency  Patient has been giving herself vitamin B12 injections on a monthly basis  Her vitamin B12 in August was not at goal 391  Was advised to increase her injections to every other week but has not been able to do so due to supply  We will send a new script to her pharmacy for every 14 day dosing      - Cyanocobalamin 1000 MCG/ML KIT; Inject 1 mL (1, if indicated mcg total) as directed every 14 (fourteen) days  Dispense: 6 mL; Refill: 3    3  Dysuria  The patient is reporting dysuria and left flank pain  Has a history of kidney stones  Did offer to send her for ultrasound of the kidneys and bladder however decision was made to check her UA C&S 1st to rule out infection/hematuria  Consider ultrasound afterward if there is significant hematuria/etc     - UA w Reflex to Microscopic w Reflex to Culture; Future  - Urine culture; Future    4  H/O gastric bypass  - CBC and differential; Future  - Iron Panel (Includes Ferritin, Iron Sat%, Iron, and TIBC); Future  - Folate; Future  - Ferritin; Future  - Vitamin B12; Future    5  Easy bruising  Patient continues to report easy bruising with minimal trauma and nose bleeds weekly on a daily basis  She believes her nose bleeds may partially be due to her sinus issues  Her coags have been normal with normal von Willebrand's workup  She admits that she has type O blood which has been associated with increased bleeding tendency  Not entirely sure if her treatment is contributory or not IVIG side effect profile reports bruising/hematoma typically less than/equal to 4%  ?Nutritional deficiencies  We briefly discussed pursuing platelet aggregation study for completeness however decision was made to defer for now as she states she would not be able to be off of her antihistamine at this point before pursuing test     6  Other fatigue  Patient denies recent COVID-19 infection  Will send her to the lab today/this week to evaluate for nutritional deficiencies;replace if needed  Also check TSH since she is reporting worsening fatigue with hair loss/hair thinning     - Iron Panel (Includes Ferritin, Iron Sat%, Iron, and TIBC); Future  - Folate; Future  - Ferritin; Future  - Vitamin B12; Future  - TSH, 3rd generation with Free T4 reflex; Future      HPI:  This is a 52-year-old female with multiple comorbid conditions including history of morbid obesity status post post gastric bypass surgery in 2009, anemia due to iron deficiency, cholecystectomy, hysterectomy, hypertension, gastroesophageal reflux disease, depression, asthma, chronic sinusitis, etc      The patient was apparently found to have hypogammaglobinemia on multiple occasions with frequent/recurrent infections mainly in the upper respiratory airway with chronic sinusitis    The patient was evaluated by the Hematology service from [facility] and was started on IVIG around December of 2019 on a monthly basis   She received then 3-4 sessions of IVIg which improved her chronic sinusitis and other infectious process, according to the patient, significantly   However, due to the COVID-19 pandemic she stopped doing the IVIG and decided to transfer her care to our service since she lives close by  Her immunoglobulin levels from 07/17/2019 before she had any IVIG treatment showed IgG of 554, IgA of 116 and IgM of 39 mg/dL  Interval history:  Patient presents today for a follow-up visit  She states that over the past several months she has been having in infections more frequently typically monthly  IVIG does not seem to be helping as much as it did in the past   She is having mostly sinus infections but also bouts with bronchitis  Mentions that she did have IV infiltrate while getting her IVIG November 2021  Feels dyspneic at times but has been checking O2 sat which has been normal; is scheduled to meet with pulmonology in the near future  She states that she has noticed worsening of her numbness and tingling in the fingers and toes and significant fatigue more than her usual   Admits to hairloss/thinning  Has been doing her vitamin B12 injections on a monthly basis; was advised to increase them to every other week however has not been able to do so since a new prescription was not sent  She states that she has also been experiencing some left flank pain and dysuria; has history of kidney stones  She continues to report easy bruising with minimal trauma and nose bleeds which have been occurring pretty much on daily basis  Her most recent laboratory studies from 01/03/2021 showed normal CBC hemoglobin 12 8  Albumin slightly below average 3 4 remaining metabolic panel is normal   Her immunoglobulin showed normal IgA 124, normal IgG 783 was slightly decreased IgM 36  PTH was normal 54 1  PTT was not prolonged 27   Her von Willebrand's panel is normal von Willebrand's activity 97%, von Willebrand's antigen 90%, factor 8 activity 115% and factor 8 antigen 98%  ROS: Review of Systems   Constitutional: Positive for fatigue  Negative for activity change, appetite change, chills, fever and unexpected weight change  HENT: Positive for nosebleeds  Negative for congestion, mouth sores, sore throat and trouble swallowing  Eyes: Negative  Respiratory: Positive for shortness of breath  Negative for cough and chest tightness  Cardiovascular: Negative for chest pain, palpitations and leg swelling  Gastrointestinal: Positive for abdominal pain, constipation, diarrhea and nausea  Negative for abdominal distention, blood in stool and vomiting  Genitourinary: Positive for dysuria and flank pain (left side)  Negative for difficulty urinating, frequency, hematuria and urgency  Reports decreased libido   Musculoskeletal: Positive for arthralgias and myalgias  Negative for back pain, gait problem and joint swelling  Fibromyalgia   Skin: Negative for color change, pallor and rash  Neurological: Positive for dizziness (mild), numbness and headaches  Negative for weakness and light-headedness  Hematological: Negative for adenopathy  Bruises/bleeds easily (Easy bruising)  Psychiatric/Behavioral: Positive for sleep disturbance  Negative for dysphoric mood  The patient is not nervous/anxious          Past Medical History:   Diagnosis Date    Anemia     recurrent    Anxiety     Asthma     allergy induced    Contact lens overwear of both eyes     Depression     Diarrhea     Environmental allergies     Fibromyalgia     Fibromyalgia, primary     GERD (gastroesophageal reflux disease)     History of transfusion     2008    Hypertension     [address]ogammagl[address]ulinemia ([facility] [address] )     Hypoglycemia after GI (gastrointestinal) surgery     Immune deficiency disorder (HCC)     IgG deficiency    Iron (Fe) deficiency anemia     Kidney stone     Lumbar herniated disc     Migraine     Motion sickness     PONV (postoperative nausea and vomiting)     severe    Seasonal allergies     Wears glasses        Past Surgical History:   Procedure Laterality Date    ABDOMINAL SURGERY      adhesions    ABDOMINOPLASTY      APPENDECTOMY      AUGMENTATION MAMMAPLASTY  2003    BREAST IMPLANT REMOVAL Bilateral 2019    BREAST SURGERY      implant removal     SECTION      CHOLECYSTECTOMY      COSMETIC SURGERY Bilateral     breast augmentation 2004    GASTRIC BYPASS      2009    GASTRIC BYPASS LAPAROSCOPIC N/A 2020    Procedure: ROBOTIC REVISION OF KENYA-EN-Y GASTRIC BYPASS, INTRAOP EGD;  Surgeon: Bryant Justice MD;  [facility];  Service: [address] [place] [address] [place]      HYSTERECTOMY  2015    KNEE ARTHROSCOPY Left     LYMPH NODE BIOPSY  200-    benign    NERVE BLOCK      TN CORRJ HALLUX VALGUS W/SESMDC W/RESCJ PROX PHAL Left 2016    Procedure: SURGICAL MODIFIED WATKINS BUNIONECTOMY FIRST MTPJ;  Surgeon: Salazar Dodge DPM;  [facility];  Service: Podiatry    TN FUSION FOOT BONE,MIDTARSAL,1 JT Left 2016    Procedure: FRIST TMJ FUSION ;  Surgeon: Salazar Dodge DPM;  [facility];  Service: Podiatry    [address] [place] (NOT 1ST) Left 2016    Procedure: FOOT SHORTENING OSTEOTOMIES 2ND AND 3RD METATARSAL WITH EXTENSER TENDON RELEASE 3RD AND 4TH TOE;  Surgeon: Salazar Dodge DPM;  [facility];  Service: Podiatry    TN REMOVAL DEEP IMPLANT Left 2016    Procedure: REMOVAL SYMPTOMATIC  HARDWARE FIRST RAY,RELATED CONTRACTURE SECOND AND THIRD TOES WITH SUSPENSION FOURTH TOE;  Surgeon: Salazar Dodge DPM;  [facility];  Service: Podiatry    SALPINGOOPHORECTOMY Left 2016    with removal of cervix    TONSILLECTOMY      WISDOM TOOTH EXTRACTION         Social History     Socioeconomic History    Marital status: /Civil Union     Spouse name: Not on file    Number of children: Not on file    Years of education: Not on file    Highest education level: Not on file   Occupational History    Not on file   Tobacco Use    Smoking status: Never Smoker    Smokeless tobacco: Never Used   Vaping Use    Vaping Use: Never used   Substance and Sexual Activity    Alcohol use: Yes     Comment: rarely    Drug use: No    Sexual activity: Not on file   Other Topics Concern    Not on file   Social History Narrative    Consumes on average 3 cups of coffee per day     Social Determinants of Health     Financial Resource Strain: Not on file   Food Insecurity: Not on file   Transportation Needs: Not on file   Physical Activity: Not on file   Stress: Not on file   Social Connections: Not on file   Intimate Partner Violence: Not on file   Housing Stability: Not on file       Family History   Problem Relation Age of Onset    Lymphoma Mother     COPD Mother     Arthritis Mother     Heart disease Father     Breast cancer Maternal Aunt     Breast cancer Cousin     No Known Problems Daughter     No Known Problems Maternal Grandmother     No Known Problems Maternal Grandfather     No Known Problems Paternal Grandmother     No Known Problems Paternal Grandfather     Pancreatic cancer Maternal Aunt     No Known Problems Maternal Aunt     No Known Problems Maternal Aunt     Multiple sclerosis Paternal Aunt     Breast cancer Cousin        Allergies   Allergen Reactions    Codeine GI Intolerance and Chest Pain     ABDOMINAL PAIN    Hydrocodone Abdominal Pain and Swelling    Pregabalin Dizziness    Acetaminophen-Codeine Abdominal Pain    Amoxicillin Rash         Current Outpatient Medications:     albuterol (PROVENTIL HFA,VENTOLIN HFA) 90 mcg/act inhaler, Inhale 1 puff every 6 (six) hours as needed As needed, Disp: , Rfl:     ALPRAZolam (XANAX) 2 MG tablet, Take 0 5 mg by mouth daily at bedtime , Disp: , Rfl:     Azelastine-Fluticasone (DYMISTA NA), 2 sprays into each nostril 2 (two) times a day , Disp: , Rfl:     Botulinum Toxin Type A 200 units SOLR, Inject 155 units into face and neck IM every 90 days, Disp: , Rfl:     budesonide-formoterol (SYMBICORT) 160-4 5 mcg/act inhaler, Inhale 2 puffs 2 (two) times a day , Disp: , Rfl:     buPROPion (WELLBUTRIN) 100 mg tablet, Take 100 mg by mouth 2 (two) times a day , Disp: , Rfl:     celecoxib (CELEBREX) 200 mg capsule, Take 200 mg by mouth 2 (two) times a day as needed Takes once at bedtime , Disp: , Rfl:     cholecalciferol (VITAMIN D3) 59745 units capsule, Take 5,000 Units by mouth 2 (two) times a day , Disp: , Rfl:     cyclobenzaprine (FLEXERIL) 10 mg tablet, Take 10 mg by mouth 3 (three) times a day as needed  , Disp: , Rfl:     Erenumab-aooe (AIMOVIG) 70 MG/ML SOAJ, Inject under the skin every 28 days, Disp: , Rfl:     fexofenadine (ALLEGRA) 180 MG tablet, Take 180 mg by mouth daily, Disp: , Rfl:     hydroxychloroquine (PLAQUENIL) 200 mg tablet, Take 200 mg by mouth 2 (two) times a day with meals, Disp: , Rfl:     hyoscyamine (LEVSIN/SL) 0 125 mg SL tablet, Take 0 125 mg by mouth every 4 (four) hours as needed for cramping, Disp: , Rfl:     Immune Globulin, Human, (PRIVIGEN IV), Infuse into a venous catheter every 30 (thirty) days, Disp: , Rfl:     losartan (COZAAR) 50 mg tablet, Take 50 mg by mouth 2 (two) times a day , Disp: , Rfl:     meclizine (ANTIVERT) 25 mg tablet, Take 1 tablet (25 mg total) by mouth 3 (three) times a day as needed for dizziness for up to 12 doses, Disp: 12 tablet, Rfl: 0    montelukast (SINGULAIR) 5 mg chewable tablet, Chew 5 mg 2 (two) times a day , Disp: , Rfl:     ondansetron (ZOFRAN-ODT) 8 mg disintegrating tablet, Take 1 tablet (8 mg total) by mouth every 8 (eight) hours as needed for nausea or vomiting, Disp: 20 tablet, Rfl: 1    SUMAtriptan (IMITREX) 100 mg tablet, Take 100 mg by mouth 2 (two) times a day as needed for migraine  , Disp: , Rfl:     traMADol (ULTRAM) 50 mg tablet, Take 50 mg by mouth 2 (two) times a day , Disp: , Rfl:     acetaminophen (TYLENOL) 325 mg tablet, Take 3 tablets (975 mg total) by mouth every 8 (eight) hours (Patient not taking: Reported on 1/13/2022 ), Disp: 30 tablet, Rfl: 0    Cyanocobalamin 1000 MCG/ML KIT, Inject 1 mL (1,000 mcg total) as directed every 14 (fourteen) days, Disp: 6 mL, Rfl: 3    fremanezumab-vfrm (Ajovy) 225 MG/1 5ML prefilled syringe, Inject 225 mg under the skin every 30 (thirty) days (Patient not taking: Reported on 1/13/2022 ), Disp: , Rfl:     omeprazole (PriLOSEC) 20 mg delayed release capsule, Take 1 capsule by mouth daily (Patient not taking: Reported on 1/13/2022), Disp: 30 capsule, Rfl: 1    oxyCODONE (ROXICODONE) 5 mg immediate release tablet, Take 1 tablet (5 mg total) by mouth every 4 (four) hours as needed for moderate pain For continuing therapyMax Daily Amount: 30 mg (Patient not taking: Reported on 2/19/2020), Disp: 10 tablet, Rfl: 0    predniSONE 10 mg tablet, Take 3 tabs BID X 2 days, 2 tabs BID X 2 days, 1 tab BID X 2 days, 1 tab daily X 2 days (Patient not taking: Reported on 12/2/2020), Disp: 26 tablet, Rfl: 0    sucralfate (CARAFATE) 1 g/10 mL suspension, Take 10 mL (1 g total) by mouth 4 (four) times a day, Disp: 1200 mL, Rfl: 1      Physical Exam:  /80 (BP Location: Left arm, Patient Position: Sitting, Cuff Size: Standard)   Pulse 77   Temp (!) 96 1 °F (35 6 °C) (Temporal)   Resp 20   Wt 80 8 kg (178 lb 1 6 oz)   SpO2 98% Comment: room air  BMI 32 57 kg/m²     Physical Exam  Vitals reviewed  Constitutional:       General: She is not in acute distress  Appearance: She is well-developed  She is not diaphoretic  HENT:      Head: Normocephalic and atraumatic  Eyes:      General: No scleral icterus  Conjunctiva/sclera: Conjunctivae normal       Pupils: Pupils are equal, round, and reactive to light  Neck:      Thyroid: No thyromegaly     Cardiovascular:      Rate and Rhythm: Normal rate and regular rhythm  Heart sounds: Normal heart sounds  No murmur heard  Pulmonary:      Effort: Pulmonary effort is normal  No respiratory distress  Breath sounds: Normal breath sounds  Chest:   Breasts:      Right: No axillary adenopathy  Left: No axillary adenopathy  Abdominal:      General: There is no distension  Palpations: Abdomen is soft  There is no hepatomegaly or splenomegaly  Tenderness: There is no abdominal tenderness  There is left CVA tenderness  Musculoskeletal:         General: No swelling  Normal range of motion  Cervical back: Normal range of motion and neck supple  Lymphadenopathy:      Cervical: No cervical adenopathy  Upper Body:      Right upper body: No axillary adenopathy  Left upper body: No axillary adenopathy  Skin:     General: Skin is warm and dry  Findings: No erythema or rash  Neurological:      General: No focal deficit present  Mental Status: She is alert and oriented to person, place, and time  Psychiatric:         Mood and Affect: Affect normal  Mood is anxious  Behavior: Behavior normal  Behavior is cooperative  Thought Content: Thought content normal          Judgment: Judgment normal            Labs:  Lab Results   Component Value Date    WBC 8 64 01/03/2022    HGB 12 8 01/03/2022    HCT 39 8 01/03/2022    MCV 92 01/03/2022     01/03/2022     Lab Results   Component Value Date    K 4 0 01/03/2022     01/03/2022    CO2 28 01/03/2022    BUN 13 01/03/2022    CREATININE 0 70 01/03/2022    GLUF 82 01/03/2022    CALCIUM 9 6 01/03/2022    CORRECTEDCA 10 1 01/03/2022    AST 16 01/03/2022    ALT 23 01/03/2022    ALKPHOS 74 01/03/2022    EGFR 100 01/03/2022       Patient voiced understanding and agreement in the above discussion  Aware to contact our office with questions/symptoms in the interim  This note has been generated by voice recognition software system    Therefore, there may be spelling, grammar, and or syntax errors  Please contact if questions arise

## 2022-01-14 ENCOUNTER — APPOINTMENT (OUTPATIENT)
Dept: LAB | Facility: HOSPITAL | Age: 52
End: 2022-01-14
Payer: COMMERCIAL

## 2022-01-14 DIAGNOSIS — D80.1 HYPOGAMMAGLOBULINEMIA (HCC): ICD-10-CM

## 2022-01-14 DIAGNOSIS — R53.83 OTHER FATIGUE: ICD-10-CM

## 2022-01-14 DIAGNOSIS — R30.0 DYSURIA: ICD-10-CM

## 2022-01-14 DIAGNOSIS — Z98.84 H/O GASTRIC BYPASS: ICD-10-CM

## 2022-01-14 LAB
ALBUMIN SERPL BCP-MCNC: 3.7 G/DL (ref 3.5–5)
ALP SERPL-CCNC: 74 U/L (ref 46–116)
ALT SERPL W P-5'-P-CCNC: 23 U/L (ref 12–78)
ANION GAP SERPL CALCULATED.3IONS-SCNC: 7 MMOL/L (ref 4–13)
AST SERPL W P-5'-P-CCNC: 18 U/L (ref 5–45)
BACTERIA UR QL AUTO: ABNORMAL /HPF
BASOPHILS # BLD AUTO: 0.04 THOUSANDS/ΜL (ref 0–0.1)
BASOPHILS NFR BLD AUTO: 1 % (ref 0–1)
BILIRUB SERPL-MCNC: 0.73 MG/DL (ref 0.2–1)
BILIRUB UR QL STRIP: NEGATIVE
BUN SERPL-MCNC: 16 MG/DL (ref 5–25)
CALCIUM SERPL-MCNC: 10 MG/DL (ref 8.3–10.1)
CAOX CRY URNS QL MICRO: ABNORMAL /HPF
CHLORIDE SERPL-SCNC: 105 MMOL/L (ref 100–108)
CLARITY UR: CLEAR
CO2 SERPL-SCNC: 25 MMOL/L (ref 21–32)
COLOR UR: YELLOW
CREAT SERPL-MCNC: 0.76 MG/DL (ref 0.6–1.3)
CRP SERPL QL: 4.4 MG/L
EOSINOPHIL # BLD AUTO: 0.19 THOUSAND/ΜL (ref 0–0.61)
EOSINOPHIL NFR BLD AUTO: 3 % (ref 0–6)
ERYTHROCYTE [DISTWIDTH] IN BLOOD BY AUTOMATED COUNT: 13.7 % (ref 11.6–15.1)
ERYTHROCYTE [SEDIMENTATION RATE] IN BLOOD: 23 MM/HOUR (ref 0–29)
FERRITIN SERPL-MCNC: 94 NG/ML (ref 8–388)
FOLATE SERPL-MCNC: >20 NG/ML (ref 3.1–17.5)
GFR SERPL CREATININE-BSD FRML MDRD: 91 ML/MIN/1.73SQ M
GLUCOSE SERPL-MCNC: 87 MG/DL (ref 65–140)
GLUCOSE UR STRIP-MCNC: NEGATIVE MG/DL
HCT VFR BLD AUTO: 40.3 % (ref 34.8–46.1)
HGB BLD-MCNC: 13.2 G/DL (ref 11.5–15.4)
HGB UR QL STRIP.AUTO: NORMAL
IMM GRANULOCYTES # BLD AUTO: 0.03 THOUSAND/UL (ref 0–0.2)
IMM GRANULOCYTES NFR BLD AUTO: 0 % (ref 0–2)
IRON SATN MFR SERPL: 21 % (ref 15–50)
IRON SERPL-MCNC: 105 UG/DL (ref 50–170)
KETONES UR STRIP-MCNC: NEGATIVE MG/DL
LDH SERPL-CCNC: 175 U/L (ref 81–234)
LEUKOCYTE ESTERASE UR QL STRIP: NEGATIVE
LYMPHOCYTES # BLD AUTO: 2.2 THOUSANDS/ΜL (ref 0.6–4.47)
LYMPHOCYTES NFR BLD AUTO: 29 % (ref 14–44)
MCH RBC QN AUTO: 29.4 PG (ref 26.8–34.3)
MCHC RBC AUTO-ENTMCNC: 32.8 G/DL (ref 31.4–37.4)
MCV RBC AUTO: 90 FL (ref 82–98)
MONOCYTES # BLD AUTO: 0.55 THOUSAND/ΜL (ref 0.17–1.22)
MONOCYTES NFR BLD AUTO: 7 % (ref 4–12)
NEUTROPHILS # BLD AUTO: 4.72 THOUSANDS/ΜL (ref 1.85–7.62)
NEUTS SEG NFR BLD AUTO: 60 % (ref 43–75)
NITRITE UR QL STRIP: NEGATIVE
NON-SQ EPI CELLS URNS QL MICRO: ABNORMAL /HPF
NRBC BLD AUTO-RTO: 0 /100 WBCS
PH UR STRIP.AUTO: 5.5 [PH]
PLATELET # BLD AUTO: 305 THOUSANDS/UL (ref 149–390)
PMV BLD AUTO: 9.4 FL (ref 8.9–12.7)
POTASSIUM SERPL-SCNC: 4.1 MMOL/L (ref 3.5–5.3)
PROT SERPL-MCNC: 7.7 G/DL (ref 6.4–8.2)
PROT UR STRIP-MCNC: NEGATIVE MG/DL
RBC # BLD AUTO: 4.49 MILLION/UL (ref 3.81–5.12)
RBC #/AREA URNS AUTO: ABNORMAL /HPF
SODIUM SERPL-SCNC: 137 MMOL/L (ref 136–145)
SP GR UR STRIP.AUTO: >=1.03 (ref 1–1.03)
TIBC SERPL-MCNC: 505 UG/DL (ref 250–450)
TSH SERPL DL<=0.05 MIU/L-ACNC: 3.35 UIU/ML (ref 0.36–3.74)
UROBILINOGEN UR QL STRIP.AUTO: 0.2 E.U./DL
VIT B12 SERPL-MCNC: 568 PG/ML (ref 100–900)
WBC # BLD AUTO: 7.73 THOUSAND/UL (ref 4.31–10.16)
WBC #/AREA URNS AUTO: ABNORMAL /HPF

## 2022-01-14 PROCEDURE — 81001 URINALYSIS AUTO W/SCOPE: CPT

## 2022-01-14 PROCEDURE — 82746 ASSAY OF FOLIC ACID SERUM: CPT

## 2022-01-14 PROCEDURE — 85025 COMPLETE CBC W/AUTO DIFF WBC: CPT

## 2022-01-14 PROCEDURE — 36415 COLL VENOUS BLD VENIPUNCTURE: CPT

## 2022-01-14 PROCEDURE — 83540 ASSAY OF IRON: CPT

## 2022-01-14 PROCEDURE — 82607 VITAMIN B-12: CPT

## 2022-01-14 PROCEDURE — 83615 LACTATE (LD) (LDH) ENZYME: CPT

## 2022-01-14 PROCEDURE — 87086 URINE CULTURE/COLONY COUNT: CPT

## 2022-01-14 PROCEDURE — 83550 IRON BINDING TEST: CPT

## 2022-01-14 PROCEDURE — 86140 C-REACTIVE PROTEIN: CPT

## 2022-01-14 PROCEDURE — 85652 RBC SED RATE AUTOMATED: CPT

## 2022-01-14 PROCEDURE — 80053 COMPREHEN METABOLIC PANEL: CPT

## 2022-01-14 PROCEDURE — 84443 ASSAY THYROID STIM HORMONE: CPT

## 2022-01-14 PROCEDURE — 82728 ASSAY OF FERRITIN: CPT

## 2022-01-16 LAB — BACTERIA UR CULT: NORMAL

## 2022-02-02 ENCOUNTER — HOSPITAL ENCOUNTER (OUTPATIENT)
Dept: INFUSION CENTER | Facility: HOSPITAL | Age: 52
Discharge: HOME | End: 2022-02-02
Attending: INTERNAL MEDICINE

## 2022-02-08 DIAGNOSIS — R10.9 FLANK PAIN: Primary | ICD-10-CM

## 2022-02-09 ENCOUNTER — TELEPHONE (OUTPATIENT)
Dept: BARIATRICS | Facility: CLINIC | Age: 52
End: 2022-02-09

## 2022-02-10 PROBLEM — E21.1 HYPERPARATHYROIDISM , SECONDARY, NON-RENAL (HCC): Status: ACTIVE | Noted: 2022-02-10

## 2022-02-11 LAB — VWF MULTIMERS PPP IB: NORMAL

## 2022-02-16 ENCOUNTER — HOSPITAL ENCOUNTER (OUTPATIENT)
Dept: CT IMAGING | Facility: HOSPITAL | Age: 52
Discharge: HOME | End: 2022-02-16
Payer: COMMERCIAL

## 2022-02-16 DIAGNOSIS — J32.9 CHRONIC SINUSITIS WITH RECURRENT BRONCHITIS: ICD-10-CM

## 2022-02-16 DIAGNOSIS — J40 CHRONIC SINUSITIS WITH RECURRENT BRONCHITIS: ICD-10-CM

## 2022-02-16 PROCEDURE — 70486 CT MAXILLOFACIAL W/O DYE: CPT

## 2022-02-16 PROCEDURE — G1004 CDSM NDSC: HCPCS

## 2022-02-22 ENCOUNTER — HOSPITAL ENCOUNTER (OUTPATIENT)
Dept: ULTRASOUND IMAGING | Facility: HOSPITAL | Age: 52
Discharge: HOME | End: 2022-02-22
Payer: COMMERCIAL

## 2022-02-22 DIAGNOSIS — R10.9 FLANK PAIN: ICD-10-CM

## 2022-02-22 DIAGNOSIS — D80.1 HYPOGAMMAGLOBULINEMIA (HCC): Primary | ICD-10-CM

## 2022-02-22 PROCEDURE — 76700 US EXAM ABDOM COMPLETE: CPT

## 2022-02-22 RX ORDER — ACETAMINOPHEN 325 MG/1
650 TABLET ORAL ONCE
Status: CANCELLED | OUTPATIENT
Start: 2022-02-23

## 2022-02-22 RX ORDER — SODIUM CHLORIDE 9 MG/ML
20 INJECTION, SOLUTION INTRAVENOUS ONCE
Status: CANCELLED | OUTPATIENT
Start: 2022-02-23

## 2022-02-23 ENCOUNTER — HOSPITAL ENCOUNTER (OUTPATIENT)
Dept: INFUSION CENTER | Facility: HOSPITAL | Age: 52
Discharge: HOME | End: 2022-02-23
Attending: INTERNAL MEDICINE
Payer: COMMERCIAL

## 2022-02-23 VITALS
TEMPERATURE: 96.6 F | SYSTOLIC BLOOD PRESSURE: 124 MMHG | DIASTOLIC BLOOD PRESSURE: 92 MMHG | RESPIRATION RATE: 18 BRPM | HEART RATE: 96 BPM

## 2022-02-23 DIAGNOSIS — D80.1 HYPOGAMMAGLOBULINEMIA (HCC): Primary | ICD-10-CM

## 2022-02-23 PROCEDURE — 96367 TX/PROPH/DG ADDL SEQ IV INF: CPT

## 2022-02-23 PROCEDURE — 96365 THER/PROPH/DIAG IV INF INIT: CPT

## 2022-02-23 PROCEDURE — 96366 THER/PROPH/DIAG IV INF ADDON: CPT

## 2022-02-23 PROCEDURE — 96375 TX/PRO/DX INJ NEW DRUG ADDON: CPT

## 2022-02-23 RX ORDER — SODIUM CHLORIDE 9 MG/ML
20 INJECTION, SOLUTION INTRAVENOUS ONCE
Status: CANCELLED | OUTPATIENT
Start: 2022-03-16

## 2022-02-23 RX ORDER — SODIUM CHLORIDE 9 MG/ML
20 INJECTION, SOLUTION INTRAVENOUS ONCE
Status: COMPLETED | OUTPATIENT
Start: 2022-02-23 | End: 2022-02-23

## 2022-02-23 RX ORDER — ACETAMINOPHEN 325 MG/1
650 TABLET ORAL ONCE
Status: CANCELLED | OUTPATIENT
Start: 2022-03-16

## 2022-02-23 RX ORDER — ACETAMINOPHEN 325 MG/1
650 TABLET ORAL ONCE
Status: DISCONTINUED | OUTPATIENT
Start: 2022-02-23 | End: 2022-02-27 | Stop reason: HOSPADM

## 2022-02-23 RX ADMIN — DIPHENHYDRAMINE HYDROCHLORIDE 25 MG: 50 INJECTION INTRAMUSCULAR; INTRAVENOUS at 09:13

## 2022-02-23 RX ADMIN — FAMOTIDINE 20 MG: 10 INJECTION INTRAVENOUS at 09:36

## 2022-02-23 RX ADMIN — SODIUM CHLORIDE 20 ML/HR: 0.9 INJECTION, SOLUTION INTRAVENOUS at 08:24

## 2022-02-23 RX ADMIN — Medication 40 G: at 10:10

## 2022-02-23 RX ADMIN — DEXAMETHASONE SODIUM PHOSPHATE 10 MG: 10 INJECTION, SOLUTION INTRAMUSCULAR; INTRAVENOUS at 08:40

## 2022-02-23 RX ADMIN — SODIUM CHLORIDE 500 ML: 0.9 INJECTION, SOLUTION INTRAVENOUS at 08:27

## 2022-03-16 ENCOUNTER — HOSPITAL ENCOUNTER (OUTPATIENT)
Dept: INFUSION CENTER | Facility: HOSPITAL | Age: 52
Discharge: HOME | End: 2022-03-16
Attending: INTERNAL MEDICINE
Payer: COMMERCIAL

## 2022-03-16 VITALS
TEMPERATURE: 97.8 F | SYSTOLIC BLOOD PRESSURE: 142 MMHG | HEART RATE: 89 BPM | DIASTOLIC BLOOD PRESSURE: 83 MMHG | RESPIRATION RATE: 16 BRPM | OXYGEN SATURATION: 99 %

## 2022-03-16 DIAGNOSIS — D80.1 HYPOGAMMAGLOBULINEMIA (HCC): Primary | ICD-10-CM

## 2022-03-16 PROCEDURE — 96365 THER/PROPH/DIAG IV INF INIT: CPT

## 2022-03-16 PROCEDURE — 96367 TX/PROPH/DG ADDL SEQ IV INF: CPT

## 2022-03-16 PROCEDURE — 96366 THER/PROPH/DIAG IV INF ADDON: CPT

## 2022-03-16 RX ORDER — ACETAMINOPHEN 325 MG/1
650 TABLET ORAL ONCE
Status: CANCELLED | OUTPATIENT
Start: 2022-04-06

## 2022-03-16 RX ORDER — ACETAMINOPHEN 325 MG/1
650 TABLET ORAL ONCE
Status: COMPLETED | OUTPATIENT
Start: 2022-03-16 | End: 2022-03-16

## 2022-03-16 RX ORDER — SODIUM CHLORIDE 9 MG/ML
20 INJECTION, SOLUTION INTRAVENOUS ONCE
Status: COMPLETED | OUTPATIENT
Start: 2022-03-16 | End: 2022-03-16

## 2022-03-16 RX ORDER — SODIUM CHLORIDE 9 MG/ML
20 INJECTION, SOLUTION INTRAVENOUS ONCE
Status: CANCELLED | OUTPATIENT
Start: 2022-04-06

## 2022-03-16 RX ADMIN — DIPHENHYDRAMINE HYDROCHLORIDE 25 MG: 50 INJECTION INTRAMUSCULAR; INTRAVENOUS at 08:47

## 2022-03-16 RX ADMIN — SODIUM CHLORIDE 500 ML: 9 INJECTION, SOLUTION INTRAVENOUS at 08:10

## 2022-03-16 RX ADMIN — FAMOTIDINE 20 MG: 10 INJECTION INTRAVENOUS at 09:08

## 2022-03-16 RX ADMIN — Medication 40 G: at 09:36

## 2022-03-16 RX ADMIN — DEXAMETHASONE SODIUM PHOSPHATE 10 MG: 10 INJECTION, SOLUTION INTRAMUSCULAR; INTRAVENOUS at 08:15

## 2022-03-16 RX ADMIN — ACETAMINOPHEN 650 MG: 325 TABLET ORAL at 08:11

## 2022-03-16 RX ADMIN — SODIUM CHLORIDE 20 ML/HR: 9 INJECTION, SOLUTION INTRAVENOUS at 08:08

## 2022-03-16 NOTE — PROGRESS NOTES
Pt offers no complaints  Tolerated IVIG infusion well without incident  Discharged in stable condition and is aware of next infusion appointment  AVS provided

## 2022-04-06 ENCOUNTER — HOSPITAL ENCOUNTER (OUTPATIENT)
Dept: INFUSION CENTER | Facility: HOSPITAL | Age: 52
Discharge: HOME | End: 2022-04-06
Attending: INTERNAL MEDICINE
Payer: COMMERCIAL

## 2022-04-06 VITALS
SYSTOLIC BLOOD PRESSURE: 134 MMHG | HEART RATE: 97 BPM | DIASTOLIC BLOOD PRESSURE: 87 MMHG | RESPIRATION RATE: 16 BRPM | OXYGEN SATURATION: 95 % | TEMPERATURE: 97 F

## 2022-04-06 DIAGNOSIS — D80.1 HYPOGAMMAGLOBULINEMIA (HCC): Primary | ICD-10-CM

## 2022-04-06 PROCEDURE — 96365 THER/PROPH/DIAG IV INF INIT: CPT

## 2022-04-06 PROCEDURE — 96375 TX/PRO/DX INJ NEW DRUG ADDON: CPT

## 2022-04-06 PROCEDURE — 96367 TX/PROPH/DG ADDL SEQ IV INF: CPT

## 2022-04-06 PROCEDURE — 96366 THER/PROPH/DIAG IV INF ADDON: CPT

## 2022-04-06 RX ORDER — SODIUM CHLORIDE 9 MG/ML
20 INJECTION, SOLUTION INTRAVENOUS ONCE
Status: COMPLETED | OUTPATIENT
Start: 2022-04-06 | End: 2022-04-06

## 2022-04-06 RX ORDER — ACETAMINOPHEN 325 MG/1
650 TABLET ORAL ONCE
Status: CANCELLED | OUTPATIENT
Start: 2022-04-27

## 2022-04-06 RX ORDER — ACETAMINOPHEN 325 MG/1
650 TABLET ORAL ONCE
Status: DISCONTINUED | OUTPATIENT
Start: 2022-04-06 | End: 2022-04-10 | Stop reason: HOSPADM

## 2022-04-06 RX ORDER — SODIUM CHLORIDE 9 MG/ML
20 INJECTION, SOLUTION INTRAVENOUS ONCE
Status: CANCELLED | OUTPATIENT
Start: 2022-04-27

## 2022-04-06 RX ADMIN — DIPHENHYDRAMINE HYDROCHLORIDE 25 MG: 50 INJECTION INTRAMUSCULAR; INTRAVENOUS at 09:12

## 2022-04-06 RX ADMIN — DEXAMETHASONE SODIUM PHOSPHATE 10 MG: 10 INJECTION, SOLUTION INTRAMUSCULAR; INTRAVENOUS at 08:39

## 2022-04-06 RX ADMIN — FAMOTIDINE 20 MG: 10 INJECTION INTRAVENOUS at 09:39

## 2022-04-06 RX ADMIN — Medication 40 G: at 10:12

## 2022-04-06 RX ADMIN — SODIUM CHLORIDE 20 ML/HR: 9 INJECTION, SOLUTION INTRAVENOUS at 08:16

## 2022-04-06 RX ADMIN — SODIUM CHLORIDE 500 ML: 0.9 INJECTION, SOLUTION INTRAVENOUS at 08:17

## 2022-04-06 NOTE — PROGRESS NOTES
Pt tolerated todays IVIG without incident  Peripheral iv discontinued jelco intact   Discharged ambulatory with avs

## 2022-04-22 ENCOUNTER — APPOINTMENT (OUTPATIENT)
Dept: LAB | Facility: HOSPITAL | Age: 52
End: 2022-04-22
Payer: COMMERCIAL

## 2022-04-22 DIAGNOSIS — E53.8 B12 DEFICIENCY: ICD-10-CM

## 2022-04-22 DIAGNOSIS — M79.10 MYALGIA: ICD-10-CM

## 2022-04-22 DIAGNOSIS — D80.1 HYPOGAMMAGLOBULINEMIA (HCC): ICD-10-CM

## 2022-04-22 DIAGNOSIS — E55.9 VITAMIN D DEFICIENCY: ICD-10-CM

## 2022-04-22 DIAGNOSIS — I10 ESSENTIAL HYPERTENSION: ICD-10-CM

## 2022-04-22 DIAGNOSIS — Z98.84 H/O GASTRIC BYPASS: ICD-10-CM

## 2022-04-22 DIAGNOSIS — R73.9 HYPERGLYCEMIA: ICD-10-CM

## 2022-04-22 DIAGNOSIS — M25.50 ARTHRALGIA, UNSPECIFIED JOINT: ICD-10-CM

## 2022-04-22 LAB
CHOLEST SERPL-MCNC: 175 MG/DL
CK SERPL-CCNC: 62 U/L (ref 26–192)
ERYTHROCYTE [SEDIMENTATION RATE] IN BLOOD: 10 MM/HOUR (ref 0–29)
FERRITIN SERPL-MCNC: 95 NG/ML (ref 8–388)
HDLC SERPL-MCNC: 68 MG/DL
IGA SERPL-MCNC: 142 MG/DL (ref 70–400)
IGG SERPL-MCNC: 1060 MG/DL (ref 700–1600)
IGM SERPL-MCNC: 40 MG/DL (ref 40–230)
IRON SATN MFR SERPL: 35 % (ref 15–50)
IRON SERPL-MCNC: 148 UG/DL (ref 50–170)
LDH SERPL-CCNC: 142 U/L (ref 140–271)
LDLC SERPL CALC-MCNC: 73 MG/DL (ref 0–100)
MAGNESIUM SERPL-MCNC: 2 MG/DL (ref 1.9–2.7)
NONHDLC SERPL-MCNC: 107 MG/DL
PTH-INTACT SERPL-MCNC: 93.8 PG/ML (ref 18.4–80.1)
T4 FREE SERPL-MCNC: 0.82 NG/DL (ref 0.76–1.46)
TIBC SERPL-MCNC: 426 UG/DL (ref 250–450)
TRIGL SERPL-MCNC: 168 MG/DL
TSH SERPL DL<=0.05 MIU/L-ACNC: 2.42 UIU/ML (ref 0.45–4.5)
VIT B12 SERPL-MCNC: 636 PG/ML (ref 100–900)

## 2022-04-22 PROCEDURE — 86340 INTRINSIC FACTOR ANTIBODY: CPT

## 2022-04-22 PROCEDURE — 82784 ASSAY IGA/IGD/IGG/IGM EACH: CPT

## 2022-04-22 PROCEDURE — 83970 ASSAY OF PARATHORMONE: CPT

## 2022-04-22 PROCEDURE — 82550 ASSAY OF CK (CPK): CPT

## 2022-04-22 PROCEDURE — 84443 ASSAY THYROID STIM HORMONE: CPT

## 2022-04-22 PROCEDURE — 85652 RBC SED RATE AUTOMATED: CPT

## 2022-04-22 PROCEDURE — 82728 ASSAY OF FERRITIN: CPT

## 2022-04-22 PROCEDURE — 83540 ASSAY OF IRON: CPT

## 2022-04-22 PROCEDURE — 83735 ASSAY OF MAGNESIUM: CPT

## 2022-04-22 PROCEDURE — 86430 RHEUMATOID FACTOR TEST QUAL: CPT

## 2022-04-22 PROCEDURE — 83615 LACTATE (LD) (LDH) ENZYME: CPT

## 2022-04-22 PROCEDURE — 82607 VITAMIN B-12: CPT

## 2022-04-22 PROCEDURE — 83516 IMMUNOASSAY NONANTIBODY: CPT

## 2022-04-22 PROCEDURE — 83550 IRON BINDING TEST: CPT

## 2022-04-22 PROCEDURE — 84439 ASSAY OF FREE THYROXINE: CPT

## 2022-04-22 PROCEDURE — 80061 LIPID PANEL: CPT

## 2022-04-24 LAB — IF BLOCK AB SER QL RIA: 1.1 AU/ML (ref 0–1.1)

## 2022-04-25 LAB
PCA AB SER-ACNC: 20.4 UNITS (ref 0–20)
RHEUMATOID FACT SER QL LA: NEGATIVE

## 2022-04-27 ENCOUNTER — HOSPITAL ENCOUNTER (OUTPATIENT)
Dept: INFUSION CENTER | Facility: HOSPITAL | Age: 52
Discharge: HOME | End: 2022-04-27
Attending: INTERNAL MEDICINE
Payer: COMMERCIAL

## 2022-04-27 VITALS
RESPIRATION RATE: 16 BRPM | TEMPERATURE: 96.9 F | OXYGEN SATURATION: 98 % | SYSTOLIC BLOOD PRESSURE: 122 MMHG | DIASTOLIC BLOOD PRESSURE: 84 MMHG

## 2022-04-27 DIAGNOSIS — D80.1 HYPOGAMMAGLOBULINEMIA (HCC): Primary | ICD-10-CM

## 2022-04-27 PROCEDURE — 96366 THER/PROPH/DIAG IV INF ADDON: CPT

## 2022-04-27 PROCEDURE — 96367 TX/PROPH/DG ADDL SEQ IV INF: CPT

## 2022-04-27 PROCEDURE — 96365 THER/PROPH/DIAG IV INF INIT: CPT

## 2022-04-27 RX ORDER — SODIUM CHLORIDE 9 MG/ML
20 INJECTION, SOLUTION INTRAVENOUS ONCE
Status: COMPLETED | OUTPATIENT
Start: 2022-04-27 | End: 2022-04-27

## 2022-04-27 RX ORDER — SODIUM CHLORIDE 9 MG/ML
20 INJECTION, SOLUTION INTRAVENOUS ONCE
Status: CANCELLED | OUTPATIENT
Start: 2022-05-18

## 2022-04-27 RX ORDER — ACETAMINOPHEN 325 MG/1
650 TABLET ORAL ONCE
Status: COMPLETED | OUTPATIENT
Start: 2022-04-27 | End: 2022-04-27

## 2022-04-27 RX ORDER — ACETAMINOPHEN 325 MG/1
650 TABLET ORAL ONCE
Status: CANCELLED | OUTPATIENT
Start: 2022-05-18

## 2022-04-27 RX ADMIN — FAMOTIDINE 20 MG: 10 INJECTION INTRAVENOUS at 09:13

## 2022-04-27 RX ADMIN — DIPHENHYDRAMINE HYDROCHLORIDE 25 MG: 50 INJECTION INTRAMUSCULAR; INTRAVENOUS at 08:51

## 2022-04-27 RX ADMIN — Medication 40 G: at 09:38

## 2022-04-27 RX ADMIN — DEXAMETHASONE SODIUM PHOSPHATE 10 MG: 10 INJECTION, SOLUTION INTRAMUSCULAR; INTRAVENOUS at 08:23

## 2022-04-27 RX ADMIN — SODIUM CHLORIDE 20 ML/HR: 0.9 INJECTION, SOLUTION INTRAVENOUS at 08:23

## 2022-04-27 RX ADMIN — SODIUM CHLORIDE 500 ML: 0.9 INJECTION, SOLUTION INTRAVENOUS at 08:17

## 2022-04-27 RX ADMIN — ACETAMINOPHEN 650 MG: 325 TABLET ORAL at 08:20

## 2022-04-27 NOTE — PROGRESS NOTES
Pt tolerated todays IVIG well  No adverse reactions noted  Peripheral iv discontinued jelco intact  Discharged ambulatory with avs  Next two appts scheduled

## 2022-05-03 ENCOUNTER — TELEPHONE (OUTPATIENT)
Dept: HEMATOLOGY ONCOLOGY | Facility: CLINIC | Age: 52
End: 2022-05-03

## 2022-05-05 ENCOUNTER — APPOINTMENT (OUTPATIENT)
Dept: LAB | Facility: HOSPITAL | Age: 52
End: 2022-05-05
Payer: COMMERCIAL

## 2022-05-05 DIAGNOSIS — M54.41 CHRONIC MIDLINE LOW BACK PAIN WITH BILATERAL SCIATICA: ICD-10-CM

## 2022-05-05 DIAGNOSIS — M54.42 CHRONIC MIDLINE LOW BACK PAIN WITH BILATERAL SCIATICA: ICD-10-CM

## 2022-05-05 DIAGNOSIS — G89.29 CHRONIC MIDLINE LOW BACK PAIN WITH BILATERAL SCIATICA: ICD-10-CM

## 2022-05-05 PROCEDURE — 86430 RHEUMATOID FACTOR TEST QUAL: CPT

## 2022-05-05 PROCEDURE — 81374 HLA I TYPING 1 ANTIGEN LR: CPT

## 2022-05-05 PROCEDURE — 36415 COLL VENOUS BLD VENIPUNCTURE: CPT

## 2022-05-06 LAB — RHEUMATOID FACT SER QL LA: NEGATIVE

## 2022-05-13 LAB — HLA-B27 QL NAA+PROBE: NEGATIVE

## 2022-05-18 ENCOUNTER — HOSPITAL ENCOUNTER (OUTPATIENT)
Dept: INFUSION CENTER | Facility: HOSPITAL | Age: 52
Discharge: HOME | End: 2022-05-18
Attending: INTERNAL MEDICINE
Payer: COMMERCIAL

## 2022-05-18 VITALS
TEMPERATURE: 96.8 F | RESPIRATION RATE: 16 BRPM | HEART RATE: 96 BPM | DIASTOLIC BLOOD PRESSURE: 80 MMHG | OXYGEN SATURATION: 99 % | SYSTOLIC BLOOD PRESSURE: 139 MMHG

## 2022-05-18 DIAGNOSIS — D80.1 HYPOGAMMAGLOBULINEMIA (HCC): Primary | ICD-10-CM

## 2022-05-18 PROCEDURE — 96366 THER/PROPH/DIAG IV INF ADDON: CPT

## 2022-05-18 PROCEDURE — 96365 THER/PROPH/DIAG IV INF INIT: CPT

## 2022-05-18 PROCEDURE — 96367 TX/PROPH/DG ADDL SEQ IV INF: CPT

## 2022-05-18 RX ORDER — SODIUM CHLORIDE 9 MG/ML
20 INJECTION, SOLUTION INTRAVENOUS ONCE
Status: COMPLETED | OUTPATIENT
Start: 2022-05-18 | End: 2022-05-18

## 2022-05-18 RX ORDER — SODIUM CHLORIDE 9 MG/ML
20 INJECTION, SOLUTION INTRAVENOUS ONCE
Status: CANCELLED | OUTPATIENT
Start: 2022-06-08

## 2022-05-18 RX ORDER — ACETAMINOPHEN 325 MG/1
650 TABLET ORAL ONCE
Status: COMPLETED | OUTPATIENT
Start: 2022-05-18 | End: 2022-05-18

## 2022-05-18 RX ORDER — ACETAMINOPHEN 325 MG/1
650 TABLET ORAL ONCE
Status: CANCELLED | OUTPATIENT
Start: 2022-06-08

## 2022-05-18 RX ADMIN — Medication 40 G: at 09:59

## 2022-05-18 RX ADMIN — DEXAMETHASONE SODIUM PHOSPHATE 10 MG: 10 INJECTION, SOLUTION INTRAMUSCULAR; INTRAVENOUS at 08:25

## 2022-05-18 RX ADMIN — DIPHENHYDRAMINE HYDROCHLORIDE 25 MG: 50 INJECTION INTRAMUSCULAR; INTRAVENOUS at 09:17

## 2022-05-18 RX ADMIN — ACETAMINOPHEN 650 MG: 325 TABLET ORAL at 09:29

## 2022-05-18 RX ADMIN — FAMOTIDINE 20 MG: 10 INJECTION INTRAVENOUS at 08:58

## 2022-05-18 RX ADMIN — SODIUM CHLORIDE 500 ML: 0.9 INJECTION, SOLUTION INTRAVENOUS at 08:17

## 2022-05-18 RX ADMIN — SODIUM CHLORIDE 20 ML/HR: 0.9 INJECTION, SOLUTION INTRAVENOUS at 08:12

## 2022-05-18 NOTE — PROGRESS NOTES
Patient tolerated IVIG treatment without reaction or issues  AVS declined  Patient is aware of next appointment  Patient ambulated off unit without incident  All personal belongings taken with patient

## 2022-05-27 ENCOUNTER — TELEPHONE (OUTPATIENT)
Dept: HEMATOLOGY ONCOLOGY | Facility: CLINIC | Age: 52
End: 2022-05-27

## 2022-05-27 NOTE — TELEPHONE ENCOUNTER
Spoke with patient informing her that unfortuneately I had to r/s her appointment with Dr Linda Del Toro from 6/1/22 to 6/9/22 at 11:30 with Sage Infante  Patient was fine with this appointment change

## 2022-06-08 ENCOUNTER — HOSPITAL ENCOUNTER (OUTPATIENT)
Dept: INFUSION CENTER | Facility: HOSPITAL | Age: 52
Discharge: HOME | End: 2022-06-08
Attending: INTERNAL MEDICINE
Payer: COMMERCIAL

## 2022-06-08 VITALS
DIASTOLIC BLOOD PRESSURE: 77 MMHG | HEART RATE: 83 BPM | OXYGEN SATURATION: 99 % | SYSTOLIC BLOOD PRESSURE: 128 MMHG | RESPIRATION RATE: 18 BRPM | TEMPERATURE: 97.3 F

## 2022-06-08 DIAGNOSIS — D80.1 HYPOGAMMAGLOBULINEMIA (HCC): Primary | ICD-10-CM

## 2022-06-08 PROCEDURE — 96365 THER/PROPH/DIAG IV INF INIT: CPT

## 2022-06-08 PROCEDURE — 96367 TX/PROPH/DG ADDL SEQ IV INF: CPT

## 2022-06-08 PROCEDURE — 96366 THER/PROPH/DIAG IV INF ADDON: CPT

## 2022-06-08 RX ORDER — ACETAMINOPHEN 325 MG/1
650 TABLET ORAL ONCE
Status: COMPLETED | OUTPATIENT
Start: 2022-06-08 | End: 2022-06-08

## 2022-06-08 RX ORDER — ACETAMINOPHEN 325 MG/1
650 TABLET ORAL ONCE
Status: DISCONTINUED | OUTPATIENT
Start: 2022-06-08 | End: 2022-06-12 | Stop reason: HOSPADM

## 2022-06-08 RX ORDER — ACETAMINOPHEN 325 MG/1
650 TABLET ORAL ONCE
Status: CANCELLED | OUTPATIENT
Start: 2022-06-29

## 2022-06-08 RX ORDER — SODIUM CHLORIDE 9 MG/ML
20 INJECTION, SOLUTION INTRAVENOUS ONCE
Status: COMPLETED | OUTPATIENT
Start: 2022-06-08 | End: 2022-06-08

## 2022-06-08 RX ORDER — SODIUM CHLORIDE 9 MG/ML
20 INJECTION, SOLUTION INTRAVENOUS ONCE
Status: CANCELLED | OUTPATIENT
Start: 2022-06-29

## 2022-06-08 RX ADMIN — DEXAMETHASONE SODIUM PHOSPHATE 10 MG: 10 INJECTION, SOLUTION INTRAMUSCULAR; INTRAVENOUS at 08:24

## 2022-06-08 RX ADMIN — SODIUM CHLORIDE 20 ML/HR: 9 INJECTION, SOLUTION INTRAVENOUS at 08:18

## 2022-06-08 RX ADMIN — FAMOTIDINE 20 MG: 10 INJECTION INTRAVENOUS at 09:04

## 2022-06-08 RX ADMIN — ACETAMINOPHEN 650 MG: 325 TABLET ORAL at 09:01

## 2022-06-08 RX ADMIN — SODIUM CHLORIDE 500 ML: 9 INJECTION, SOLUTION INTRAVENOUS at 08:19

## 2022-06-08 RX ADMIN — Medication 40 G: at 10:10

## 2022-06-08 RX ADMIN — DIPHENHYDRAMINE HYDROCHLORIDE 25 MG: 50 INJECTION INTRAMUSCULAR; INTRAVENOUS at 09:31

## 2022-06-09 ENCOUNTER — OFFICE VISIT (OUTPATIENT)
Dept: HEMATOLOGY ONCOLOGY | Facility: CLINIC | Age: 52
End: 2022-06-09
Payer: COMMERCIAL

## 2022-06-09 VITALS
WEIGHT: 173 LBS | TEMPERATURE: 97.9 F | HEIGHT: 62 IN | SYSTOLIC BLOOD PRESSURE: 126 MMHG | HEART RATE: 71 BPM | DIASTOLIC BLOOD PRESSURE: 78 MMHG | OXYGEN SATURATION: 99 % | BODY MASS INDEX: 31.83 KG/M2 | RESPIRATION RATE: 18 BRPM

## 2022-06-09 DIAGNOSIS — D80.1 HYPOGAMMAGLOBULINEMIA (HCC): Primary | ICD-10-CM

## 2022-06-09 DIAGNOSIS — Z98.84 H/O GASTRIC BYPASS: ICD-10-CM

## 2022-06-09 DIAGNOSIS — E53.8 B12 DEFICIENCY: ICD-10-CM

## 2022-06-09 PROCEDURE — 99214 OFFICE O/P EST MOD 30 MIN: CPT | Performed by: NURSE PRACTITIONER

## 2022-06-09 RX ORDER — ESTRADIOL 0.06 MG/D
PATCH TRANSDERMAL
COMMUNITY

## 2022-06-09 RX ORDER — VARENICLINE 0.03 MG/.05ML
SPRAY NASAL
COMMUNITY

## 2022-06-09 RX ORDER — MINOCYCLINE HYDROCHLORIDE 50 MG/1
CAPSULE ORAL
COMMUNITY

## 2022-06-09 RX ORDER — LOTEPREDNOL ETABONATE 3.8 MG/G
GEL OPHTHALMIC
COMMUNITY

## 2022-06-09 NOTE — PROGRESS NOTES
Hematology/Oncology Outpatient Follow-up  Jair Carmona 46 y o  female 1970 [id number]    Date:  6/9/2022      Assessment and Plan:  1  Hypogammaglobulinemia ([address] )  Patient will be continued on her current treatment with IVIG 40 mg (500 mg/kg) on an every three-week basis  Frequency was increased at her last office visit from every 4 weeks to every 3 weeks and she states that she has not had any bacterial infections since that time  She did have 2 mild viral infections  She continues to follow-up with her ENT on occasion  We will continue to monitor her and her laboratory studies on an every 4-6 month basis or sooner should the need arise     - CBC and differential; Future  - Comprehensive metabolic panel; Future  - C-reactive protein; Future  - Sedimentation rate, automated; Future  - IgG, IgA, IgM; Future    2  B12 deficiency  Patient will be continued on her vitamin B12 injections on an every 2 week basis which is maintaining her levels appropriately  Her anti parietal antibody test came back equivocal   She states that her father does have pernicious anemia  - Vitamin B12; Future    3  H/O gastric bypass  - Vitamin B12; Future  - Iron Panel (Includes Ferritin, Iron Sat%, Iron, and TIBC); Future  - Ferritin; Future  - Folate; Future      HPI:  This is a 52-year-old female with multiple comorbid conditions including history of morbid obesity status post post gastric bypass surgery in 2009, anemia due to iron deficiency, cholecystectomy, hysterectomy, hypertension, gastroesophageal reflux disease, depression, asthma, chronic sinusitis, etc      The patient was apparently found to have hypogammaglobinemia on multiple occasions with frequent/recurrent infections mainly in the upper respiratory airway with chronic sinusitis    The patient was evaluated by the Hematology service from [facility] and was started on [facility] December of 2019 on a monthly basis   She received then 3-4 sessions of IVIg which improved her chronic sinusitis and other infectious process, according to the patient, significantly   However, due to the COVID-19 pandemic she stopped doing the IVIG and decided to transfer her care to our service since she lives close by  Her immunoglobulin levels from 07/17/2019 before she had any IVIG treatment showed IgG of 554, IgA of 116 and IgM of 39 mg/dL  She was started on vitamin B12 injections every 2 weeks  Interval history:  Patient presents today for a follow-up visit  She continues to have chronic pain to her lower back hips and knees which gets better after each treatment due to the steroids  She states that her PCP is managing her pain but also is on hydroxychloroquine and sees Rheumatology on occasion  She states that she did get influenza A around April from her daughter; was treated with Tamiflu and had very mild symptoms  She also had an additional mild viral infection after that but has not been having any sinus infections/respiratory infections/etc   The frequency of her IVIG treatments were increased every 3 weeks at her last office visit and she feels that this has been decreasing her risk of infection significantly  She continues to give herself vitamin B12 injections on an every 2 week basis and states that her numbness and tingling/burning in her hands and feet has resolved since she increased  Her most recent laboratory studies from 04/22/2022 showed normal CBC and CMP hemoglobin 14 0   C-reactive protein is normal 2 7  B12 is appropriate on supplementation 636  Intrinsic factor came back in the normal range 1 1  Her anti parietal antibody test was equivocal 20 4  Iron panel is appropriate  LDH normal   Her immunoglobulins are all in the normal range  ROS: Review of Systems   Constitutional: Positive for fatigue  Negative for activity change, appetite change, chills, fever and unexpected weight change     HENT: Negative for congestion, mouth sores, nosebleeds, sore throat and trouble swallowing  Eyes: Negative  Respiratory: Negative for cough, chest tightness and shortness of breath  Cardiovascular: Negative for chest pain, palpitations and leg swelling  Gastrointestinal: Positive for constipation, diarrhea and nausea  Negative for abdominal distention, abdominal pain, blood in stool and vomiting  Genitourinary: Negative for difficulty urinating, dysuria, flank pain, frequency, hematuria and urgency  Musculoskeletal: Positive for arthralgias, back pain and myalgias  Negative for gait problem and joint swelling  Fibromyalgia   Skin: Negative for color change, pallor and rash  Allergic/Immunologic: Positive for environmental allergies  Neurological: Positive for dizziness (mild) and headaches  Negative for weakness, light-headedness and numbness  Hematological: Negative for adenopathy  Bruises/bleeds easily (Easy bruising with minimal trauma)  Psychiatric/Behavioral: Positive for sleep disturbance  Negative for dysphoric mood  The patient is not nervous/anxious          Past Medical History:   Diagnosis Date    Anemia     recurrent    Anxiety     Asthma     allergy induced    Contact lens overwear of both eyes     Depression     Diarrhea     Environmental allergies     Fibromyalgia     Fibromyalgia, primary     GERD (gastroesophageal reflux disease)     History of transfusion     2008    Hypertension     Hypogammaglobulinemia ([address] )     Hypoglycemia after GI (gastrointestinal) surgery     Immune deficiency disorder (HCC)     IgG deficiency    Iron (Fe) deficiency anemia     Kidney stone     Lumbar herniated disc     Migraine     Motion sickness     PONV (postoperative nausea and vomiting)     severe    Seasonal allergies     Wears glasses        Past Surgical History:   Procedure Laterality Date    ABDOMINAL SURGERY      adhesions    ABDOMINOPLASTY      APPENDECTOMY      AUGMENTATION MAMMAPLASTY  2003    BREAST IMPLANT REMOVAL Bilateral 2019    BREAST SURGERY      implant removal     SECTION      CHOLECYSTECTOMY      COSMETIC SURGERY Bilateral     breast augmentation 2004    GASTRIC BYPASS      2009    GASTRIC BYPASS LAPAROSCOPIC N/A 2020    Procedure: ROBOTIC REVISION OF KENYA-EN-Y GASTRIC BYPASS, INTRAOP EGD;  Surgeon: Delon Fish MD;  [facility];  Service: [facility] [place]      HYSTERECTOMY  2015    KNEE ARTHROSCOPY Left     LYMPH NODE BIOPSY  200-    benign    NERVE BLOCK      AR CORRJ HALLUX VALGUS W/SESMDC W/RESCJ PROX PHAL Left 2016    Procedure: SURGICAL MODIFIED WATKINS BUNIONECTOMY FIRST MTPJ;  Surgeon: Humble Jorge DPM;  [facility];  Service: Podiatry    AR FUSION FOOT BONE,MIDTARSAL,1 JT Left 2016    Procedure: FRIST TMJ FUSION ;  Surgeon: Humble Jorge DPM;  [facility];  Service: Podiatry    [address] [place] (NOT 1ST) Left 2016    Procedure: FOOT SHORTENING OSTEOTOMIES 2ND AND 3RD METATARSAL WITH EXTENSER TENDON RELEASE 3RD AND 4TH TOE;  Surgeon: Humble Jorge DPM;  [facility];  Service: Podiatry    AR REMOVAL DEEP IMPLANT Left 2016    Procedure: REMOVAL SYMPTOMATIC  HARDWARE FIRST RAY,RELATED CONTRACTURE SECOND AND THIRD TOES WITH SUSPENSION FOURTH TOE;  Surgeon: Humble Jorge DPM;  [facility];  Service: Podiatry    SALPINGOOPHORECTOMY Left 2016    with removal of cervix    TONSILLECTOMY      WISDOM TOOTH EXTRACTION         Social History     Socioeconomic History    Marital status: /Civil Union     Spouse name: None    Number of children: None    Years of education: None    Highest education level: None   Occupational History    None   Tobacco Use    Smoking status: Never Smoker    Smokeless tobacco: Never Used   Vaping Use    Vaping Use: Never used   Substance and Sexual Activity    Alcohol use: Yes     Comment: rarely    Drug use: No    Sexual activity: None   Other Topics Concern    None   Social History Narrative    Consumes on average 3 cups of coffee per day     Social Determinants of Health     Financial Resource Strain: Not on file   Food Insecurity: Not on file   Transportation Needs: Not on file   Physical Activity: Not on file   Stress: Not on file   Social Connections: Not on file   Intimate Partner Violence: Not on file   Housing Stability: Not on file       Family History   Problem Relation Age of Onset    Lymphoma Mother     COPD Mother     Arthritis Mother     Heart disease Father     Breast cancer Maternal Aunt     Breast cancer Cousin     No Known Problems Daughter     No Known Problems Maternal Grandmother     No Known Problems Maternal Grandfather     No Known Problems Paternal Grandmother     No Known Problems Paternal Grandfather     Pancreatic cancer Maternal Aunt     No Known Problems Maternal Aunt     No Known Problems Maternal Aunt     Multiple sclerosis Paternal Aunt     Breast cancer Cousin        Allergies   Allergen Reactions    Codeine GI Intolerance and Chest Pain     ABDOMINAL PAIN    Hydrocodone Abdominal Pain and Swelling    Pregabalin Dizziness    Acetaminophen-Codeine Abdominal Pain    Amoxicillin Rash         Current Outpatient Medications:     ALPRAZolam (XANAX) 2 MG tablet, Take 0 5 mg by mouth daily at bedtime , Disp: , Rfl:     Azelastine-Fluticasone (DYMISTA NA), 2 sprays into each nostril 2 (two) times a day , Disp: , Rfl:     Botulinum Toxin Type A 200 units SOLR, Inject 155 units into face and neck IM every 90 days, Disp: , Rfl:     budesonide-formoterol (SYMBICORT) 160-4 5 mcg/act inhaler, Inhale 2 puffs 2 (two) times a day , Disp: , Rfl:     buPROPion (WELLBUTRIN) 100 mg tablet, Take 100 mg by mouth 2 (two) times a day , Disp: , Rfl:     cholecalciferol (VITAMIN D3) 17845 units capsule, Take 5,000 Units by mouth 2 (two) times a day , Disp: , Rfl:     Cyanocobalamin 1000 MCG/ML KIT, Inject 1 mL (1,000 mcg total) as directed every 14 (fourteen) days, Disp: 6 mL, Rfl: 3    cyclobenzaprine (FLEXERIL) 10 mg tablet, Take 10 mg by mouth 3 (three) times a day as needed  , Disp: , Rfl:     Erenumab-aooe 70 MG/ML SOAJ, Inject under the skin every 28 days, Disp: , Rfl:     estradiol (CLIMARA) 0 06 MG/24HR, estradiol 0 06 mg/24 hr weekly transdermal patch  APPLY 1 PATCH TOPICALLY ONCE A WEEK, Disp: , Rfl:     fexofenadine (ALLEGRA) 180 MG tablet, Take 180 mg by mouth daily, Disp: , Rfl:     hydroxychloroquine (PLAQUENIL) 200 mg tablet, Take 200 mg by mouth 2 (two) times a day with meals, Disp: , Rfl:     hyoscyamine (LEVSIN/SL) 0 125 mg SL tablet, Take 0 125 mg by mouth every 4 (four) hours as needed for cramping, Disp: , Rfl:     Immune Globulin, Human, (PRIVIGEN IV), Infuse into a venous catheter every 30 (thirty) days, Disp: , Rfl:     losartan (COZAAR) 50 mg tablet, Take 50 mg by mouth 2 (two) times a day , Disp: , Rfl:     Loteprednol Etabonate (Lotemax SM) 0 38 % GEL, Lotemax SM 0 38 % eye gel drops  INSTILL 1 TWICE DAILY INTO EACH EYE, Disp: , Rfl:     minocycline (MINOCIN) 50 mg capsule, minocycline 50 mg capsule  TAKE 1 CAPSULE BY MOUTH ONCE DAILY IN THE EVENING WITH WATER AT LEAST 2 HOURS AFTER DINNER, Disp: , Rfl:     montelukast (SINGULAIR) 5 mg chewable tablet, Chew 5 mg 2 (two) times a day , Disp: , Rfl:     ondansetron (ZOFRAN-ODT) 8 mg disintegrating tablet, Take 1 tablet (8 mg total) by mouth every 8 (eight) hours as needed for nausea or vomiting, Disp: 20 tablet, Rfl: 1    SUMAtriptan (IMITREX) 100 mg tablet, Take 100 mg by mouth 2 (two) times a day as needed for migraine  , Disp: , Rfl:     traMADol (ULTRAM) 50 mg tablet, Take 50 mg by mouth 2 (two) times a day , Disp: , Rfl:     Varenicline Tartrate (Tyrvaya) 0 03 MG/ACT SOLN, into each nostril, Disp: , Rfl:     acetaminophen (TYLENOL) 325 mg tablet, Take 3 tablets (975 mg total) by mouth every 8 (eight) hours (Patient not taking: No sig reported), Disp: 30 tablet, Rfl: 0    albuterol (PROVENTIL HFA,VENTOLIN HFA) 90 mcg/act inhaler, Inhale 1 puff every 6 (six) hours as needed As needed (Patient not taking: Reported on 6/9/2022), Disp: , Rfl:     meclizine (ANTIVERT) 25 mg tablet, Take 1 tablet (25 mg total) by mouth 3 (three) times a day as needed for dizziness for up to 12 doses (Patient not taking: Reported on 6/9/2022), Disp: 12 tablet, Rfl: 0    sucralfate (CARAFATE) 1 g/10 mL suspension, Take 10 mL (1 g total) by mouth 4 (four) times a day, Disp: 1200 mL, Rfl: 1  No current facility-administered medications for this visit  Facility-Administered Medications Ordered in Other Visits:     acetaminophen (TYLENOL) tablet 650 mg, 650 mg, Oral, Once, Fransico Ye MD      Physical Exam:  /78 (BP Location: Right arm, Patient Position: Sitting, Cuff Size: Adult)   Pulse 71   Temp 97 9 °F (36 6 °C)   Resp 18   Ht 5' 2" (1 575 m)   Wt 78 5 kg (173 lb)   SpO2 99%   BMI 31 64 kg/m²     Physical Exam  Vitals reviewed  Constitutional:       General: She is not in acute distress  Appearance: She is well-developed  She is not diaphoretic  HENT:      Head: Normocephalic and atraumatic  Eyes:      General: No scleral icterus  Conjunctiva/sclera: Conjunctivae normal       Pupils: Pupils are equal, round, and reactive to light  Neck:      Thyroid: No thyromegaly  Cardiovascular:      Rate and Rhythm: Normal rate and regular rhythm  Heart sounds: Normal heart sounds  No murmur heard  Pulmonary:      Effort: Pulmonary effort is normal  No respiratory distress  Breath sounds: Normal breath sounds  Chest:   Breasts:      Right: No axillary adenopathy  Left: No axillary adenopathy  Abdominal:      General: There is no distension  Palpations: Abdomen is soft  There is no hepatomegaly or splenomegaly  Tenderness: There is no abdominal tenderness     Musculoskeletal: General: No swelling  Normal range of motion  Cervical back: Normal range of motion and neck supple  Lymphadenopathy:      Cervical: No cervical adenopathy  Upper Body:      Right upper body: No axillary adenopathy  Left upper body: No axillary adenopathy  Skin:     General: Skin is warm and dry  Findings: No erythema or rash  Neurological:      General: No focal deficit present  Mental Status: She is alert and oriented to person, place, and time  Psychiatric:         Mood and Affect: Mood and affect normal          Behavior: Behavior normal  Behavior is cooperative  Thought Content: Thought content normal          Judgment: Judgment normal            Labs:  Lab Results   Component Value Date    WBC 7 09 04/22/2022    HGB 14 0 04/22/2022    HCT 43 7 04/22/2022    MCV 93 04/22/2022     04/22/2022     Lab Results   Component Value Date    K 4 0 04/22/2022     04/22/2022    CO2 29 04/22/2022    BUN 10 04/22/2022    CREATININE 0 75 04/22/2022    GLUF 72 04/22/2022    CALCIUM 9 0 04/22/2022    CORRECTEDCA 10 1 01/03/2022    AST 17 04/22/2022    ALT 13 04/22/2022    ALKPHOS 79 04/22/2022    EGFR 92 04/22/2022       Patient voiced understanding and agreement in the above discussion  Aware to contact our office with questions/symptoms in the interim  This note has been generated by voice recognition software system  Therefore, there may be spelling, grammar, and or syntax errors  Please contact if questions arise

## 2022-06-29 ENCOUNTER — HOSPITAL ENCOUNTER (OUTPATIENT)
Dept: INFUSION CENTER | Facility: HOSPITAL | Age: 52
Discharge: HOME | End: 2022-06-29
Attending: INTERNAL MEDICINE
Payer: COMMERCIAL

## 2022-06-29 VITALS
RESPIRATION RATE: 18 BRPM | SYSTOLIC BLOOD PRESSURE: 138 MMHG | HEART RATE: 87 BPM | OXYGEN SATURATION: 99 % | DIASTOLIC BLOOD PRESSURE: 87 MMHG | TEMPERATURE: 98.7 F

## 2022-06-29 DIAGNOSIS — D80.1 HYPOGAMMAGLOBULINEMIA (HCC): Primary | ICD-10-CM

## 2022-06-29 PROCEDURE — 96366 THER/PROPH/DIAG IV INF ADDON: CPT

## 2022-06-29 PROCEDURE — 96367 TX/PROPH/DG ADDL SEQ IV INF: CPT

## 2022-06-29 PROCEDURE — 96365 THER/PROPH/DIAG IV INF INIT: CPT

## 2022-06-29 RX ORDER — ACETAMINOPHEN 325 MG/1
650 TABLET ORAL ONCE
Status: COMPLETED | OUTPATIENT
Start: 2022-06-29 | End: 2022-06-29

## 2022-06-29 RX ORDER — ACETAMINOPHEN 325 MG/1
650 TABLET ORAL ONCE
Status: CANCELLED | OUTPATIENT
Start: 2022-07-20

## 2022-06-29 RX ORDER — SODIUM CHLORIDE 9 MG/ML
20 INJECTION, SOLUTION INTRAVENOUS ONCE
Status: CANCELLED | OUTPATIENT
Start: 2022-07-20

## 2022-06-29 RX ORDER — SODIUM CHLORIDE 9 MG/ML
20 INJECTION, SOLUTION INTRAVENOUS ONCE
Status: COMPLETED | OUTPATIENT
Start: 2022-06-29 | End: 2022-06-29

## 2022-06-29 RX ADMIN — SODIUM CHLORIDE 500 ML: 0.9 INJECTION, SOLUTION INTRAVENOUS at 08:40

## 2022-06-29 RX ADMIN — SODIUM CHLORIDE 20 ML/HR: 0.9 INJECTION, SOLUTION INTRAVENOUS at 08:43

## 2022-06-29 RX ADMIN — DIPHENHYDRAMINE HYDROCHLORIDE 25 MG: 50 INJECTION INTRAMUSCULAR; INTRAVENOUS at 08:49

## 2022-06-29 RX ADMIN — ACETAMINOPHEN 650 MG: 325 TABLET ORAL at 09:34

## 2022-06-29 RX ADMIN — DEXAMETHASONE SODIUM PHOSPHATE 10 MG: 10 INJECTION, SOLUTION INTRAMUSCULAR; INTRAVENOUS at 09:32

## 2022-06-29 RX ADMIN — FAMOTIDINE 20 MG: 10 INJECTION INTRAVENOUS at 09:12

## 2022-06-29 RX ADMIN — Medication 40 G: at 10:28

## 2022-06-29 NOTE — PROGRESS NOTES
Patient tolerated IVIG treatment without reaction or issues  AVS declined  Patient ambulated off unit without incident  All personal belongings taken with patient

## 2022-07-20 ENCOUNTER — HOSPITAL ENCOUNTER (OUTPATIENT)
Dept: INFUSION CENTER | Facility: HOSPITAL | Age: 52
Discharge: HOME | End: 2022-07-20
Attending: INTERNAL MEDICINE
Payer: COMMERCIAL

## 2022-07-20 VITALS
SYSTOLIC BLOOD PRESSURE: 125 MMHG | DIASTOLIC BLOOD PRESSURE: 77 MMHG | RESPIRATION RATE: 18 BRPM | OXYGEN SATURATION: 100 % | TEMPERATURE: 96.9 F | HEART RATE: 90 BPM

## 2022-07-20 DIAGNOSIS — D80.1 HYPOGAMMAGLOBULINEMIA (HCC): Primary | ICD-10-CM

## 2022-07-20 PROCEDURE — 96375 TX/PRO/DX INJ NEW DRUG ADDON: CPT

## 2022-07-20 PROCEDURE — 96367 TX/PROPH/DG ADDL SEQ IV INF: CPT

## 2022-07-20 PROCEDURE — 96366 THER/PROPH/DIAG IV INF ADDON: CPT

## 2022-07-20 PROCEDURE — 96365 THER/PROPH/DIAG IV INF INIT: CPT

## 2022-07-20 RX ORDER — SODIUM CHLORIDE 9 MG/ML
20 INJECTION, SOLUTION INTRAVENOUS ONCE
Status: COMPLETED | OUTPATIENT
Start: 2022-07-20 | End: 2022-07-20

## 2022-07-20 RX ORDER — ACETAMINOPHEN 325 MG/1
650 TABLET ORAL ONCE
Status: COMPLETED | OUTPATIENT
Start: 2022-07-20 | End: 2022-07-20

## 2022-07-20 RX ORDER — ACETAMINOPHEN 325 MG/1
650 TABLET ORAL ONCE
Status: CANCELLED | OUTPATIENT
Start: 2022-08-10

## 2022-07-20 RX ORDER — SODIUM CHLORIDE 9 MG/ML
20 INJECTION, SOLUTION INTRAVENOUS ONCE
Status: CANCELLED | OUTPATIENT
Start: 2022-08-10

## 2022-07-20 RX ADMIN — SODIUM CHLORIDE 500 ML: 0.9 INJECTION, SOLUTION INTRAVENOUS at 08:12

## 2022-07-20 RX ADMIN — Medication 40 G: at 10:48

## 2022-07-20 RX ADMIN — Medication 25 MG: at 09:47

## 2022-07-20 RX ADMIN — FAMOTIDINE 20 MG: 10 INJECTION, SOLUTION INTRAVENOUS at 10:24

## 2022-07-20 RX ADMIN — DEXAMETHASONE SODIUM PHOSPHATE 10 MG: 10 INJECTION, SOLUTION INTRAMUSCULAR; INTRAVENOUS at 09:15

## 2022-07-20 RX ADMIN — SODIUM CHLORIDE 20 ML/HR: 0.9 INJECTION, SOLUTION INTRAVENOUS at 08:11

## 2022-07-20 RX ADMIN — ACETAMINOPHEN 650 MG: 325 TABLET ORAL at 08:04

## 2022-08-10 ENCOUNTER — HOSPITAL ENCOUNTER (OUTPATIENT)
Dept: INFUSION CENTER | Facility: HOSPITAL | Age: 52
Discharge: HOME | End: 2022-08-10
Attending: INTERNAL MEDICINE
Payer: COMMERCIAL

## 2022-08-10 VITALS
OXYGEN SATURATION: 94 % | HEART RATE: 97 BPM | DIASTOLIC BLOOD PRESSURE: 87 MMHG | RESPIRATION RATE: 16 BRPM | SYSTOLIC BLOOD PRESSURE: 132 MMHG | TEMPERATURE: 98.4 F

## 2022-08-10 DIAGNOSIS — D80.1 HYPOGAMMAGLOBULINEMIA (HCC): Primary | ICD-10-CM

## 2022-08-10 PROCEDURE — 96375 TX/PRO/DX INJ NEW DRUG ADDON: CPT

## 2022-08-10 PROCEDURE — 96366 THER/PROPH/DIAG IV INF ADDON: CPT

## 2022-08-10 PROCEDURE — 96367 TX/PROPH/DG ADDL SEQ IV INF: CPT

## 2022-08-10 PROCEDURE — 96365 THER/PROPH/DIAG IV INF INIT: CPT

## 2022-08-10 RX ORDER — ACETAMINOPHEN 325 MG/1
650 TABLET ORAL ONCE
Status: CANCELLED | OUTPATIENT
Start: 2022-08-31

## 2022-08-10 RX ORDER — SODIUM CHLORIDE 9 MG/ML
20 INJECTION, SOLUTION INTRAVENOUS ONCE
Status: COMPLETED | OUTPATIENT
Start: 2022-08-10 | End: 2022-08-10

## 2022-08-10 RX ORDER — ACETAMINOPHEN 325 MG/1
650 TABLET ORAL ONCE
Status: COMPLETED | OUTPATIENT
Start: 2022-08-10 | End: 2022-08-10

## 2022-08-10 RX ORDER — SODIUM CHLORIDE 9 MG/ML
20 INJECTION, SOLUTION INTRAVENOUS ONCE
Status: CANCELLED | OUTPATIENT
Start: 2022-08-31

## 2022-08-10 RX ADMIN — SODIUM CHLORIDE 20 ML/HR: 0.9 INJECTION, SOLUTION INTRAVENOUS at 08:18

## 2022-08-10 RX ADMIN — DEXAMETHASONE SODIUM PHOSPHATE 10 MG: 10 INJECTION, SOLUTION INTRAMUSCULAR; INTRAVENOUS at 08:26

## 2022-08-10 RX ADMIN — ACETAMINOPHEN 650 MG: 325 TABLET ORAL at 08:28

## 2022-08-10 RX ADMIN — Medication 40 G: at 10:10

## 2022-08-10 RX ADMIN — FAMOTIDINE 20 MG: 10 INJECTION, SOLUTION INTRAVENOUS at 09:29

## 2022-08-10 RX ADMIN — SODIUM CHLORIDE 500 ML: 0.9 INJECTION, SOLUTION INTRAVENOUS at 08:20

## 2022-08-10 RX ADMIN — DIPHENHYDRAMINE HYDROCHLORIDE 25 MG: 50 INJECTION INTRAMUSCULAR; INTRAVENOUS at 09:03

## 2022-08-31 ENCOUNTER — HOSPITAL ENCOUNTER (OUTPATIENT)
Dept: INFUSION CENTER | Facility: HOSPITAL | Age: 52
Discharge: HOME | End: 2022-08-31
Attending: INTERNAL MEDICINE
Payer: COMMERCIAL

## 2022-08-31 VITALS
TEMPERATURE: 97 F | OXYGEN SATURATION: 98 % | DIASTOLIC BLOOD PRESSURE: 97 MMHG | HEART RATE: 81 BPM | SYSTOLIC BLOOD PRESSURE: 138 MMHG | RESPIRATION RATE: 16 BRPM

## 2022-08-31 DIAGNOSIS — D80.1 HYPOGAMMAGLOBULINEMIA (HCC): Primary | ICD-10-CM

## 2022-08-31 PROCEDURE — 96365 THER/PROPH/DIAG IV INF INIT: CPT

## 2022-08-31 PROCEDURE — 96367 TX/PROPH/DG ADDL SEQ IV INF: CPT

## 2022-08-31 PROCEDURE — 96366 THER/PROPH/DIAG IV INF ADDON: CPT

## 2022-08-31 PROCEDURE — 96375 TX/PRO/DX INJ NEW DRUG ADDON: CPT

## 2022-08-31 RX ORDER — SODIUM CHLORIDE 9 MG/ML
20 INJECTION, SOLUTION INTRAVENOUS ONCE
Status: CANCELLED | OUTPATIENT
Start: 2022-09-21

## 2022-08-31 RX ORDER — ACETAMINOPHEN 325 MG/1
650 TABLET ORAL ONCE
Status: COMPLETED | OUTPATIENT
Start: 2022-08-31 | End: 2022-08-31

## 2022-08-31 RX ORDER — ACETAMINOPHEN 325 MG/1
650 TABLET ORAL ONCE
Status: CANCELLED | OUTPATIENT
Start: 2022-09-21

## 2022-08-31 RX ORDER — SODIUM CHLORIDE 9 MG/ML
20 INJECTION, SOLUTION INTRAVENOUS ONCE
Status: COMPLETED | OUTPATIENT
Start: 2022-08-31 | End: 2022-08-31

## 2022-08-31 RX ADMIN — ACETAMINOPHEN 650 MG: 325 TABLET ORAL at 09:16

## 2022-08-31 RX ADMIN — SODIUM CHLORIDE 20 ML/HR: 0.9 INJECTION, SOLUTION INTRAVENOUS at 09:11

## 2022-08-31 RX ADMIN — DIPHENHYDRAMINE HYDROCHLORIDE 25 MG: 50 INJECTION INTRAMUSCULAR; INTRAVENOUS at 09:51

## 2022-08-31 RX ADMIN — SODIUM CHLORIDE 500 ML: 0.9 INJECTION, SOLUTION INTRAVENOUS at 09:15

## 2022-08-31 RX ADMIN — DEXAMETHASONE SODIUM PHOSPHATE 10 MG: 10 INJECTION, SOLUTION INTRAMUSCULAR; INTRAVENOUS at 09:14

## 2022-08-31 RX ADMIN — FAMOTIDINE 20 MG: 10 INJECTION, SOLUTION INTRAVENOUS at 10:17

## 2022-08-31 RX ADMIN — Medication 40 G: at 10:48

## 2022-08-31 NOTE — PROGRESS NOTES
Patient tolerated privigen infusion without issue  Discharged in stable condition, declined AVS but aware of next appointment

## 2022-09-19 ENCOUNTER — APPOINTMENT (OUTPATIENT)
Dept: LAB | Facility: CLINIC | Age: 52
End: 2022-09-19
Payer: COMMERCIAL

## 2022-09-19 DIAGNOSIS — Z11.4 SCREENING FOR HIV (HUMAN IMMUNODEFICIENCY VIRUS): ICD-10-CM

## 2022-09-19 DIAGNOSIS — L71.8 OTHER ROSACEA: ICD-10-CM

## 2022-09-19 DIAGNOSIS — L65.0 TELOGEN EFFLUVIUM: ICD-10-CM

## 2022-09-19 DIAGNOSIS — L60.8 OTHER NAIL DISORDERS: ICD-10-CM

## 2022-09-19 DIAGNOSIS — Z11.59 NEED FOR HEPATITIS C SCREENING TEST: Primary | ICD-10-CM

## 2022-09-19 LAB
BASOPHILS # BLD AUTO: 0.03 THOUSANDS/ΜL (ref 0–0.1)
BASOPHILS NFR BLD AUTO: 0 % (ref 0–1)
EOSINOPHIL # BLD AUTO: 0.1 THOUSAND/ΜL (ref 0–0.61)
EOSINOPHIL NFR BLD AUTO: 1 % (ref 0–6)
ERYTHROCYTE [DISTWIDTH] IN BLOOD BY AUTOMATED COUNT: 13.6 % (ref 11.6–15.1)
HCT VFR BLD AUTO: 40.2 % (ref 34.8–46.1)
HCV AB SER QL: NORMAL
HGB BLD-MCNC: 12.7 G/DL (ref 11.5–15.4)
IMM GRANULOCYTES # BLD AUTO: 0.02 THOUSAND/UL (ref 0–0.2)
IMM GRANULOCYTES NFR BLD AUTO: 0 % (ref 0–2)
IRON SERPL-MCNC: 114 UG/DL (ref 50–170)
LYMPHOCYTES # BLD AUTO: 2.12 THOUSANDS/ΜL (ref 0.6–4.47)
LYMPHOCYTES NFR BLD AUTO: 29 % (ref 14–44)
MCH RBC QN AUTO: 29.3 PG (ref 26.8–34.3)
MCHC RBC AUTO-ENTMCNC: 31.6 G/DL (ref 31.4–37.4)
MCV RBC AUTO: 93 FL (ref 82–98)
MONOCYTES # BLD AUTO: 0.49 THOUSAND/ΜL (ref 0.17–1.22)
MONOCYTES NFR BLD AUTO: 7 % (ref 4–12)
NEUTROPHILS # BLD AUTO: 4.53 THOUSANDS/ΜL (ref 1.85–7.62)
NEUTS SEG NFR BLD AUTO: 63 % (ref 43–75)
NRBC BLD AUTO-RTO: 0 /100 WBCS
PLATELET # BLD AUTO: 288 THOUSANDS/UL (ref 149–390)
PMV BLD AUTO: 9.6 FL (ref 8.9–12.7)
RBC # BLD AUTO: 4.33 MILLION/UL (ref 3.81–5.12)
T4 FREE SERPL-MCNC: 0.74 NG/DL (ref 0.76–1.46)
TIBC SERPL-MCNC: 410 UG/DL (ref 250–450)
TSH SERPL DL<=0.05 MIU/L-ACNC: 2.2 UIU/ML (ref 0.45–4.5)
WBC # BLD AUTO: 7.29 THOUSAND/UL (ref 4.31–10.16)

## 2022-09-19 PROCEDURE — 86038 ANTINUCLEAR ANTIBODIES: CPT

## 2022-09-19 PROCEDURE — 83550 IRON BINDING TEST: CPT

## 2022-09-19 PROCEDURE — 84439 ASSAY OF FREE THYROXINE: CPT

## 2022-09-19 PROCEDURE — 83540 ASSAY OF IRON: CPT

## 2022-09-19 PROCEDURE — 86803 HEPATITIS C AB TEST: CPT

## 2022-09-19 PROCEDURE — 36415 COLL VENOUS BLD VENIPUNCTURE: CPT

## 2022-09-19 PROCEDURE — 84443 ASSAY THYROID STIM HORMONE: CPT

## 2022-09-19 PROCEDURE — 87389 HIV-1 AG W/HIV-1&-2 AB AG IA: CPT

## 2022-09-19 PROCEDURE — 85025 COMPLETE CBC W/AUTO DIFF WBC: CPT

## 2022-09-20 LAB
HIV 1+2 AB+HIV1 P24 AG SERPL QL IA: NORMAL
RYE IGE QN: NEGATIVE

## 2022-09-21 ENCOUNTER — HOSPITAL ENCOUNTER (OUTPATIENT)
Dept: INFUSION CENTER | Facility: HOSPITAL | Age: 52
Discharge: HOME | End: 2022-09-21
Attending: INTERNAL MEDICINE
Payer: COMMERCIAL

## 2022-09-21 VITALS
DIASTOLIC BLOOD PRESSURE: 79 MMHG | RESPIRATION RATE: 18 BRPM | HEART RATE: 83 BPM | TEMPERATURE: 97.3 F | SYSTOLIC BLOOD PRESSURE: 128 MMHG | OXYGEN SATURATION: 100 %

## 2022-09-21 DIAGNOSIS — D80.1 HYPOGAMMAGLOBULINEMIA (HCC): Primary | ICD-10-CM

## 2022-09-21 PROCEDURE — 96365 THER/PROPH/DIAG IV INF INIT: CPT

## 2022-09-21 PROCEDURE — 96367 TX/PROPH/DG ADDL SEQ IV INF: CPT

## 2022-09-21 PROCEDURE — 96375 TX/PRO/DX INJ NEW DRUG ADDON: CPT

## 2022-09-21 PROCEDURE — 96366 THER/PROPH/DIAG IV INF ADDON: CPT

## 2022-09-21 RX ORDER — SODIUM CHLORIDE 9 MG/ML
20 INJECTION, SOLUTION INTRAVENOUS ONCE
Status: COMPLETED | OUTPATIENT
Start: 2022-09-21 | End: 2022-09-21

## 2022-09-21 RX ORDER — ACETAMINOPHEN 325 MG/1
650 TABLET ORAL ONCE
Status: CANCELLED | OUTPATIENT
Start: 2022-10-12

## 2022-09-21 RX ORDER — ACETAMINOPHEN 325 MG/1
650 TABLET ORAL ONCE
Status: COMPLETED | OUTPATIENT
Start: 2022-09-21 | End: 2022-09-21

## 2022-09-21 RX ORDER — SODIUM CHLORIDE 9 MG/ML
20 INJECTION, SOLUTION INTRAVENOUS ONCE
Status: CANCELLED | OUTPATIENT
Start: 2022-10-12

## 2022-09-21 RX ADMIN — SODIUM CHLORIDE 500 ML: 0.9 INJECTION, SOLUTION INTRAVENOUS at 08:37

## 2022-09-21 RX ADMIN — Medication 40 G: at 10:04

## 2022-09-21 RX ADMIN — DIPHENHYDRAMINE HYDROCHLORIDE 25 MG: 50 INJECTION INTRAMUSCULAR; INTRAVENOUS at 09:05

## 2022-09-21 RX ADMIN — FAMOTIDINE 20 MG: 10 INJECTION, SOLUTION INTRAVENOUS at 09:33

## 2022-09-21 RX ADMIN — DEXAMETHASONE SODIUM PHOSPHATE 10 MG: 10 INJECTION, SOLUTION INTRAMUSCULAR; INTRAVENOUS at 08:33

## 2022-09-21 RX ADMIN — ACETAMINOPHEN 650 MG: 325 TABLET ORAL at 08:33

## 2022-09-21 RX ADMIN — SODIUM CHLORIDE 20 ML/HR: 0.9 INJECTION, SOLUTION INTRAVENOUS at 08:29

## 2022-10-12 ENCOUNTER — HOSPITAL ENCOUNTER (OUTPATIENT)
Dept: INFUSION CENTER | Facility: HOSPITAL | Age: 52
Discharge: HOME | End: 2022-10-12
Attending: INTERNAL MEDICINE
Payer: COMMERCIAL

## 2022-10-12 VITALS
RESPIRATION RATE: 18 BRPM | DIASTOLIC BLOOD PRESSURE: 92 MMHG | TEMPERATURE: 98.1 F | OXYGEN SATURATION: 98 % | SYSTOLIC BLOOD PRESSURE: 135 MMHG | HEART RATE: 76 BPM

## 2022-10-12 DIAGNOSIS — D80.1 HYPOGAMMAGLOBULINEMIA (HCC): Primary | ICD-10-CM

## 2022-10-12 PROCEDURE — 96365 THER/PROPH/DIAG IV INF INIT: CPT

## 2022-10-12 PROCEDURE — 96367 TX/PROPH/DG ADDL SEQ IV INF: CPT

## 2022-10-12 PROCEDURE — 96366 THER/PROPH/DIAG IV INF ADDON: CPT

## 2022-10-12 RX ORDER — SODIUM CHLORIDE 9 MG/ML
20 INJECTION, SOLUTION INTRAVENOUS ONCE
OUTPATIENT
Start: 2022-11-02

## 2022-10-12 RX ORDER — SODIUM CHLORIDE 9 MG/ML
20 INJECTION, SOLUTION INTRAVENOUS ONCE
Status: COMPLETED | OUTPATIENT
Start: 2022-10-12 | End: 2022-10-12

## 2022-10-12 RX ORDER — ACETAMINOPHEN 325 MG/1
650 TABLET ORAL ONCE
Status: COMPLETED | OUTPATIENT
Start: 2022-10-12 | End: 2022-10-12

## 2022-10-12 RX ORDER — ACETAMINOPHEN 325 MG/1
650 TABLET ORAL ONCE
OUTPATIENT
Start: 2022-11-02

## 2022-10-12 RX ADMIN — DEXAMETHASONE SODIUM PHOSPHATE 10 MG: 10 INJECTION, SOLUTION INTRAMUSCULAR; INTRAVENOUS at 08:24

## 2022-10-12 RX ADMIN — SODIUM CHLORIDE 500 ML: 0.9 INJECTION, SOLUTION INTRAVENOUS at 08:20

## 2022-10-12 RX ADMIN — DIPHENHYDRAMINE HYDROCHLORIDE 25 MG: 50 INJECTION INTRAMUSCULAR; INTRAVENOUS at 08:59

## 2022-10-12 RX ADMIN — Medication 40 G: at 09:49

## 2022-10-12 RX ADMIN — SODIUM CHLORIDE 20 ML/HR: 0.9 INJECTION, SOLUTION INTRAVENOUS at 08:21

## 2022-10-12 RX ADMIN — ACETAMINOPHEN 650 MG: 325 TABLET ORAL at 08:23

## 2022-10-12 RX ADMIN — FAMOTIDINE 20 MG: 10 INJECTION INTRAVENOUS at 09:20

## 2022-10-12 NOTE — PLAN OF CARE
Problem: Knowledge Deficit  Goal: Patient/family/caregiver demonstrates understanding of disease process, treatment plan, medications, and discharge instructions  Description: Complete learning assessment and assess knowledge base    Interventions:  - Provide teaching at level of understanding  - Provide teaching via preferred learning methods  10/12/2022 0844 by Adrien Childress RN  Outcome: Progressing  10/12/2022 0844 by Adrien Childress RN  Outcome: Progressing

## 2022-10-31 DIAGNOSIS — R53.83 OTHER FATIGUE: ICD-10-CM

## 2022-10-31 DIAGNOSIS — D80.1 HYPOGAMMAGLOBULINEMIA (HCC): Primary | ICD-10-CM

## 2022-11-04 ENCOUNTER — TELEPHONE (OUTPATIENT)
Dept: HEMATOLOGY ONCOLOGY | Facility: CLINIC | Age: 52
End: 2022-11-04

## 2022-11-04 NOTE — TELEPHONE ENCOUNTER
LVM to the patient in regards to her lab work that needs to be completed prior to her appt on 11/10/22 at 10:40am

## 2022-11-07 ENCOUNTER — TELEPHONE (OUTPATIENT)
Dept: HEMATOLOGY ONCOLOGY | Facility: CLINIC | Age: 52
End: 2022-11-07

## 2022-11-07 NOTE — TELEPHONE ENCOUNTER
Appointment Cancellation Or Reschedule     Person calling in Patient    If other than patient calling, are they listed on the communication consent form? na   Provider Dr Adam Speaker   Office Visit Date and Time 11/10/22 @ 10:40am   Office Visit Location [facility]   Did patient want to reschedule their office appointment? If so, when was it scheduled to? Yes 12/20/22 @ 2pm   Did you have STAR scheduled for this appointment? No   Do you need STAR set up for your new appointment? If yes, please send to "PATIENT RIDESHARE" pool for STAR rescheduling No   If you are cancelling appointment, can we notify STAR to cancel ride? If yes, please send to "PATIENT RIDESHARE" pool for STAR to cancel service NA   Is this patient calling to reschedule an infusion appointment? No   When is their next infusion appointment? NA   Is this patient a Chemo patient? No   Reason for Cancellation or Reschedule Patient has Covid; Patient states she contacted the infusion center and cancelled appt     If the patient is a treatment patient, please route this to the office nurse  If the patient is not on treatment, please route to the office MA  If the patient is a surgical oncology patient, please route to surg/onc clinical pool

## 2022-11-10 DIAGNOSIS — D80.1 HYPOGAMMAGLOBULINEMIA (HCC): Primary | ICD-10-CM

## 2022-11-10 RX ORDER — SODIUM CHLORIDE 9 MG/ML
20 INJECTION, SOLUTION INTRAVENOUS ONCE
Status: CANCELLED | OUTPATIENT
Start: 2022-11-14

## 2022-11-10 RX ORDER — ACETAMINOPHEN 325 MG/1
650 TABLET ORAL ONCE
Status: CANCELLED | OUTPATIENT
Start: 2022-11-14

## 2022-11-14 ENCOUNTER — HOSPITAL ENCOUNTER (OUTPATIENT)
Dept: INFUSION CENTER | Facility: HOSPITAL | Age: 52
Discharge: HOME | End: 2022-11-14
Attending: INTERNAL MEDICINE

## 2022-11-14 VITALS
TEMPERATURE: 97.9 F | DIASTOLIC BLOOD PRESSURE: 91 MMHG | SYSTOLIC BLOOD PRESSURE: 153 MMHG | RESPIRATION RATE: 18 BRPM | HEART RATE: 106 BPM

## 2022-11-14 DIAGNOSIS — D80.1 HYPOGAMMAGLOBULINEMIA (HCC): Primary | ICD-10-CM

## 2022-11-14 RX ORDER — ACETAMINOPHEN 325 MG/1
650 TABLET ORAL ONCE
Status: DISCONTINUED | OUTPATIENT
Start: 2022-11-14 | End: 2022-11-18 | Stop reason: HOSPADM

## 2022-11-14 RX ORDER — SODIUM CHLORIDE 9 MG/ML
20 INJECTION, SOLUTION INTRAVENOUS ONCE
OUTPATIENT
Start: 2022-12-05

## 2022-11-14 RX ORDER — ACETAMINOPHEN 325 MG/1
650 TABLET ORAL ONCE
OUTPATIENT
Start: 2022-12-05

## 2022-11-14 RX ORDER — SODIUM CHLORIDE 9 MG/ML
20 INJECTION, SOLUTION INTRAVENOUS ONCE
Status: COMPLETED | OUTPATIENT
Start: 2022-11-14 | End: 2022-11-14

## 2022-11-14 RX ADMIN — FAMOTIDINE 20 MG: 10 INJECTION INTRAVENOUS at 09:17

## 2022-11-14 RX ADMIN — DEXAMETHASONE SODIUM PHOSPHATE 10 MG: 10 INJECTION, SOLUTION INTRAMUSCULAR; INTRAVENOUS at 08:21

## 2022-11-14 RX ADMIN — SODIUM CHLORIDE 20 ML/HR: 0.9 INJECTION, SOLUTION INTRAVENOUS at 08:19

## 2022-11-14 RX ADMIN — Medication 40 G: at 09:46

## 2022-11-14 RX ADMIN — DIPHENHYDRAMINE HYDROCHLORIDE 25 MG: 50 INJECTION, SOLUTION INTRAMUSCULAR; INTRAVENOUS at 08:55

## 2022-11-14 RX ADMIN — SODIUM CHLORIDE 500 ML: 0.9 INJECTION, SOLUTION INTRAVENOUS at 08:13

## 2022-11-14 NOTE — PROGRESS NOTES
Pt tolerated IVIG infusion well without incident  Discharged in stable condition and next infusion appointment scheduled in 3 weeks  AVS provided

## 2022-11-15 ENCOUNTER — APPOINTMENT (OUTPATIENT)
Dept: RADIOLOGY | Facility: CLINIC | Age: 52
End: 2022-11-15

## 2022-11-15 ENCOUNTER — TELEMEDICINE (OUTPATIENT)
Dept: INTERNAL MEDICINE CLINIC | Facility: CLINIC | Age: 52
End: 2022-11-15

## 2022-11-15 DIAGNOSIS — R05.9 COUGH IN ADULT: ICD-10-CM

## 2022-11-15 DIAGNOSIS — U07.1 COVID-19: Primary | ICD-10-CM

## 2022-11-15 DIAGNOSIS — J06.9 ACUTE URI: ICD-10-CM

## 2022-11-15 DIAGNOSIS — U07.1 COVID-19 VIRUS DETECTED: ICD-10-CM

## 2022-11-15 DIAGNOSIS — U07.1 COVID-19 VIRUS DETECTED: Primary | ICD-10-CM

## 2022-11-15 DIAGNOSIS — J45.20 MILD INTERMITTENT ASTHMA WITHOUT COMPLICATION: ICD-10-CM

## 2022-11-15 RX ORDER — NIRMATRELVIR AND RITONAVIR 300-100 MG
KIT ORAL
COMMUNITY
Start: 2022-11-07

## 2022-11-15 RX ORDER — MAGNESIUM GLUCONATE 27 MG(500)
500 TABLET ORAL DAILY
COMMUNITY

## 2022-11-15 RX ORDER — LEVOFLOXACIN 500 MG/1
500 TABLET, FILM COATED ORAL EVERY 24 HOURS
Qty: 7 TABLET | Refills: 0 | Status: SHIPPED | OUTPATIENT
Start: 2022-11-15 | End: 2022-11-22

## 2022-11-15 RX ORDER — MONTELUKAST SODIUM 5 MG/1
5 TABLET, CHEWABLE ORAL 2 TIMES DAILY
Qty: 90 TABLET | Refills: 3 | Status: SHIPPED | OUTPATIENT
Start: 2022-11-15

## 2022-11-15 RX ORDER — BENZONATATE 200 MG/1
200 CAPSULE ORAL 3 TIMES DAILY PRN
Qty: 90 CAPSULE | Refills: 0 | Status: SHIPPED | OUTPATIENT
Start: 2022-11-15

## 2022-11-15 RX ORDER — FLUCONAZOLE 150 MG/1
150 TABLET ORAL DAILY
Qty: 5 TABLET | Refills: 0 | Status: SHIPPED | OUTPATIENT
Start: 2022-11-15 | End: 2022-11-20

## 2022-11-15 NOTE — ASSESSMENT & PLAN NOTE
· 11/15/2022  · Stat CXR - my personal wet read is no active pulmonary issues  · Will start the patient on levaquin and diflucan  · Continue to use her nasal spray  · Continue to use mucinex  · Will order tessalon perles  · Okay to continue hot tea, honey tea, warm salt water gargles  · Okay to use chloraseptic spray  · Okay to use over the counter robitussin cough syrup  · Asked the patient to let us know Thursday or Friday if she has had improvement or no improvement

## 2022-11-15 NOTE — PATIENT INSTRUCTIONS
Problem List Items Addressed This Visit          Respiratory    Mild intermittent asthma    Relevant Medications    montelukast (SINGULAIR) 5 mg chewable tablet    Acute URI     11/15/2022  Stat CXR - my personal wet read is no active pulmonary issues  Will start the patient on levaquin and diflucan  Continue to use her nasal spray  Continue to use mucinex  Will order tessalon perles  Okay to continue hot tea, honey tea, warm salt water gargles  Okay to use chloraseptic spray  Okay to use over the counter robitussin cough syrup  Asked the patient to let us know Thursday or Friday if she has had improvement or no improvement              Relevant Medications    levofloxacin (LEVAQUIN) 500 mg tablet    fluconazole (DIFLUCAN) 150 mg tablet    benzonatate (TESSALON) 200 MG capsule       Other    COVID-19 virus detected     11/5/2022 Positive            Relevant Medications    levofloxacin (LEVAQUIN) 500 mg tablet    fluconazole (DIFLUCAN) 150 mg tablet    benzonatate (TESSALON) 200 MG capsule          Other Visit Diagnoses       COVID-19    -  Primary    Relevant Medications    Paxlovid, 300/100, tablet therapy pack    levofloxacin (LEVAQUIN) 500 mg tablet    fluconazole (DIFLUCAN) 150 mg tablet    benzonatate (TESSALON) 200 MG capsule

## 2022-11-15 NOTE — PROGRESS NOTES
Virtual Regular Visit    Verification of patient location:    Patient is located in the following state in which I hold an active license [place]      Assessment/Plan:    Problem List Items Addressed This Visit        Respiratory    Mild intermittent asthma    Relevant Medications    montelukast (SINGULAIR) 5 mg chewable tablet    Acute URI     11/15/2022  Stat CXR - my personal wet read is no active pulmonary issues  Will start the patient on levaquin and diflucan  Continue to use her nasal spray  Continue to use mucinex  Will order tessalon perles  Okay to continue hot tea, honey tea, warm salt water gargles  Okay to use chloraseptic spray  Okay to use over the counter robitussin cough syrup  Asked the patient to let us know Thursday or Friday if she has had improvement or no improvement  Relevant Medications    levofloxacin (LEVAQUIN) 500 mg tablet    fluconazole (DIFLUCAN) 150 mg tablet    benzonatate (TESSALON) 200 MG capsule       Other    COVID-19 virus detected     11/5/2022 Positive          Relevant Medications    levofloxacin (LEVAQUIN) 500 mg tablet    fluconazole (DIFLUCAN) 150 mg tablet    benzonatate (TESSALON) 200 MG capsule      Other Visit Diagnoses     COVID-19    -  Primary    Relevant Medications    Paxlovid, 300/100, tablet therapy pack    levofloxacin (LEVAQUIN) 500 mg tablet    fluconazole (DIFLUCAN) 150 mg tablet    benzonatate (TESSALON) 200 MG capsule               Reason for visit is No chief complaint on file  Encounter provider MICKIE Maier    Provider located at [address]  [address] [place] [address]      Recent Visits  No visits were found meeting these conditions    Showing recent visits within past 7 days and meeting all other requirements  Today's Visits  Date Type Provider Dept   11/15/22 Telemedicine MICKIE Santana Pg today's visits and meeting all other requirements  Future Appointments  No visits were found meeting these conditions  Showing future appointments within next 150 days and meeting all other requirements       The patient was identified by name and date of birth  Terri Mcleod was informed that this is a telemedicine visit and that the visit is being conducted through the [address] [employer] platform  She agrees to proceed     My office door was closed  No one else was in the room  She acknowledged consent and understanding of privacy and security of the video platform  The patient has agreed to participate and understands they can discontinue the visit at any time  Patient is aware this is a billable service  Subjective  Terri Mcleod is a 46 y o  female   The patient is here today to discuss her recent COVID infection, and upper respiratory infection  Please continue to the [facility] section of the note for details of today's visit           Past Medical History:   Diagnosis Date   • Anemia     recurrent   • Anxiety    • Asthma     allergy induced   • Contact lens overwear of both eyes    • Depression    • Diarrhea    • Environmental allergies    • Fibromyalgia    • Fibromyalgia, primary    • GERD (gastroesophageal reflux disease)    • History of transfusion        • Hypertension    • Hypogammaglobulinemia ([address] )    • Hypoglycemia after GI (gastrointestinal) surgery    • Immune deficiency disorder (HCC)     IgG deficiency   • Iron (Fe) deficiency anemia    • Kidney stone    • Lumbar herniated disc    • Migraine    • Motion sickness    • PONV (postoperative nausea and vomiting)     severe   • Seasonal allergies    • Wears glasses        Past Surgical History:   Procedure Laterality Date   • ABDOMINAL SURGERY      adhesions   • ABDOMINOPLASTY     • APPENDECTOMY     • AUGMENTATION MAMMAPLASTY     • BREAST IMPLANT REMOVAL Bilateral 2019   • BREAST SURGERY      implant removal   •  SECTION     • CHOLECYSTECTOMY     • COSMETIC SURGERY Bilateral     breast augmentation 2004   • GASTRIC BYPASS      2009   • GASTRIC BYPASS LAPAROSCOPIC N/A 2/11/2020    Procedure: ROBOTIC REVISION OF KENYA-EN-Y GASTRIC BYPASS, INTRAOP EGD;  Surgeon: Doreen Tate MD;  Location: [facility];  Service: Bariatrics   • HIATAL HERNIA REPAIR     • HYSTERECTOMY  2015   • KNEE ARTHROSCOPY Left    • LYMPH NODE BIOPSY  200-2001    benign   • NERVE BLOCK     • NV CORRJ HALLUX VALGUS W/SESMDC W/RESCJ PROX PHAL Left 9/9/2016    Procedure: SURGICAL MODIFIED WATKINS BUNIONECTOMY FIRST MTPJ;  Surgeon: Laura Morales DPM;  Location: [facility];  Service: Podiatry   • NV FUSION FOOT BONE,MIDTARSAL,1 JT Left 9/9/2016    Procedure: FRIST TMJ FUSION ;  Surgeon: Laura Morales DPM;  Location: [facility];  Service: Podiatry   • NV OSTEOTOMY METATARSAL (NOT 1ST) Left 9/9/2016    Procedure: FOOT SHORTENING OSTEOTOMIES 2ND AND 3RD METATARSAL WITH EXTENSER TENDON RELEASE 3RD AND 4TH TOE;  Surgeon: Laura Morales DPM;  Location[facility];  Service: Podiatry   • NV REMOVAL DEEP IMPLANT Left 12/7/2016    Procedure: REMOVAL SYMPTOMATIC  HARDWARE FIRST RAY,RELATED CONTRACTURE SECOND AND THIRD TOES WITH SUSPENSION FOURTH TOE;  Surgeon: Laura Morales DPM;  Location: [facility];  Service: Podiatry   • SALPINGOOPHORECTOMY Left 2016    with removal of cervix   • TONSILLECTOMY     • WISDOM TOOTH EXTRACTION         Current Outpatient Medications   Medication Sig Dispense Refill   • ALPRAZolam (XANAX) 2 MG tablet Take 0 5 mg by mouth daily at bedtime      • Azelastine-Fluticasone (DYMISTA NA) 2 sprays into each nostril 2 (two) times a day      • benzonatate (TESSALON) 200 MG capsule Take 1 capsule (200 mg total) by mouth 3 (three) times a day as needed for cough 90 capsule 0   • Botulinum Toxin Type A 200 units SOLR Inject 155 units into face and neck IM every 90 days     • budesonide-formoterol (SYMBICORT) 160-4 5 mcg/act inhaler Inhale 2 puffs 2 (two) times a day      • buPROPion [facility]) 100 mg tablet Take 100 mg by mouth 2 (two) times a day      • cholecalciferol (VITAMIN D3) 16178 units capsule Take 5,000 Units by mouth 2 (two) times a day      • Cyanocobalamin 1000 MCG/ML KIT Inject 1 mL (1,000 mcg total) as directed every 14 (fourteen) days 6 mL 3   • cyclobenzaprine (FLEXERIL) 10 mg tablet Take 10 mg by mouth 3 (three) times a day as needed       • Erenumab-aooe 70 MG/ML SOAJ Inject under the skin every 28 days     • estradiol (CLIMARA) 0 06 MG/24HR estradiol 0 06 mg/24 hr weekly transdermal patch   APPLY 1 PATCH TOPICALLY ONCE A WEEK     • fexofenadine (ALLEGRA) 180 MG tablet Take 180 mg by mouth daily     • fluconazole (DIFLUCAN) 150 mg tablet Take 1 tablet (150 mg total) by mouth daily for 5 doses 5 tablet 0   • hydroxychloroquine (PLAQUENIL) 200 mg tablet Take 200 mg by mouth 2 (two) times a day with meals     • hyoscyamine (LEVSIN/SL) 0 125 mg SL tablet Take 0 125 mg by mouth every 4 (four) hours as needed for cramping     • levofloxacin (LEVAQUIN) 500 mg tablet Take 1 tablet (500 mg total) by mouth every 24 hours for 7 days 7 tablet 0   • losartan (COZAAR) 50 mg tablet Take 50 mg by mouth 2 (two) times a day       • Loteprednol Etabonate (Lotemax SM) 0 38 % GEL Lotemax SM 0 38 % eye gel drops   INSTILL 1 TWICE DAILY INTO EACH EYE     • magnesium gluconate (MAGONATE) 500 MG tablet Take 500 mg by mouth daily     • minocycline (MINOCIN) 50 mg capsule minocycline 50 mg capsule   TAKE 1 CAPSULE BY MOUTH ONCE DAILY IN THE EVENING WITH WATER AT LEAST 2 HOURS AFTER DINNER     • montelukast (SINGULAIR) 5 mg chewable tablet Chew 1 tablet (5 mg total) 2 (two) times a day 90 tablet 3   • ondansetron (ZOFRAN-ODT) 8 mg disintegrating tablet Take 1 tablet (8 mg total) by mouth every 8 (eight) hours as needed for nausea or vomiting 20 tablet 3   • SUMAtriptan (IMITREX) 100 mg tablet Take 100 mg by mouth 2 (two) times a day as needed for migraine       • traMADol (ULTRAM) 50 mg tablet Take 50 mg by mouth 2 (two) times a day  • Varenicline Tartrate (Tyrvaya) 0 03 MG/ACT SOLN into each nostril     • acetaminophen (TYLENOL) 325 mg tablet Take 3 tablets (975 mg total) by mouth every 8 (eight) hours (Patient not taking: No sig reported) 30 tablet 0   • albuterol (PROVENTIL HFA,VENTOLIN HFA) 90 mcg/act inhaler Inhale 1 puff every 6 (six) hours as needed As needed (Patient not taking: No sig reported)     • Immune Globulin, Human, (PRIVIGEN IV) Infuse into a venous catheter every 30 (thirty) days (Patient not taking: Reported on 11/15/2022)     • meclizine (ANTIVERT) 25 mg tablet Take 1 tablet (25 mg total) by mouth 3 (three) times a day as needed for dizziness for up to 12 doses (Patient not taking: No sig reported) 12 tablet 0   • Paxlovid, 300/100, tablet therapy pack TAKE 2 TABLETS NIRMATRELVIR AND 1 TABLET RITONAVIR BY MOUTH TWICE DAILY FOR 5 DAYS TAKE NIRMATRELVIR AND RITONAVIR TABLETS TOGETHER (Patient not taking: Reported on 11/15/2022)     • sucralfate (CARAFATE) 1 g/10 mL suspension Take 10 mL (1 g total) by mouth 4 (four) times a day (Patient not taking: Reported on 11/15/2022) 1200 mL 1     No current facility-administered medications for this visit  Facility-Administered Medications Ordered in Other Visits   Medication Dose Route Frequency Provider Last Rate Last Admin   • acetaminophen (TYLENOL) tablet 650 mg  650 mg Oral Once Kevin Plascencia MD            Allergies   Allergen Reactions   • Codeine GI Intolerance and Chest Pain     ABDOMINAL PAIN   • Hydrocodone Abdominal Pain and Swelling   • Pregabalin Dizziness   • Acetaminophen-Codeine Abdominal Pain   • Amoxicillin Rash       Review of Systems   Constitutional: Negative for activity change, appetite change, chills, fatigue and fever  HENT: Positive for ear pain, postnasal drip, rhinorrhea, sinus pressure, sinus pain, sore throat and trouble swallowing  Negative for congestion  Eyes: Negative for pain     Respiratory: Positive for cough  Negative for chest tightness and shortness of breath  Cardiovascular: Negative for chest pain, palpitations and leg swelling  Gastrointestinal: Negative for abdominal pain, constipation, diarrhea, nausea and vomiting  Genitourinary: Negative for difficulty urinating, flank pain, frequency and urgency  Musculoskeletal: Negative for back pain, joint swelling and myalgias  Skin: Negative for color change  Neurological: Negative for dizziness, weakness, light-headedness and headaches  Psychiatric/Behavioral: Negative for sleep disturbance  The patient is not nervous/anxious  Video Exam    There were no vitals filed for this visit  Physical Exam  Constitutional:       General: She is awake  Appearance: Normal appearance  She is well-developed  HENT:      Head: Normocephalic  Comments: Sinus congestion, with purulent drainage  Cough with yellow/green sputum production  Sore throat  Post nasal drip  Sinus pain  Bilateral ear pain     Nose: Nose normal       Mouth/Throat:      Mouth: Mucous membranes are moist    Eyes:      Extraocular Movements: Extraocular movements intact  Cardiovascular:      Rate and Rhythm: Normal rate  Pulmonary:      Effort: Pulmonary effort is normal    Abdominal:      Palpations: Abdomen is soft  Musculoskeletal:         General: Normal range of motion  Cervical back: Normal range of motion  Skin:     General: Skin is warm and dry  Neurological:      Mental Status: She is alert and oriented to person, place, and time  Psychiatric:         Attention and Perception: Attention normal          Mood and Affect: Mood normal          Speech: Speech normal          Behavior: Behavior normal  Behavior is cooperative  I spent 15 minutes with patient today in which greater than 50% of the time was spent in counseling/coordination of care regarding medications, treatment plan, follow up care

## 2022-11-21 DIAGNOSIS — J06.9 ACUTE URI: Primary | ICD-10-CM

## 2022-11-21 RX ORDER — SULFAMETHOXAZOLE AND TRIMETHOPRIM 800; 160 MG/1; MG/1
1 TABLET ORAL EVERY 12 HOURS SCHEDULED
Qty: 20 TABLET | Refills: 0 | Status: SHIPPED | OUTPATIENT
Start: 2022-11-21 | End: 2022-12-01

## 2022-11-29 ENCOUNTER — APPOINTMENT (OUTPATIENT)
Dept: LAB | Facility: CLINIC | Age: 52
End: 2022-11-29

## 2022-11-29 DIAGNOSIS — E53.8 B12 DEFICIENCY: ICD-10-CM

## 2022-11-29 DIAGNOSIS — Z98.84 H/O GASTRIC BYPASS: ICD-10-CM

## 2022-11-29 DIAGNOSIS — R53.83 OTHER FATIGUE: ICD-10-CM

## 2022-11-29 DIAGNOSIS — D80.1 HYPOGAMMAGLOBULINEMIA (HCC): ICD-10-CM

## 2022-11-29 LAB
ALBUMIN SERPL BCP-MCNC: 3.4 G/DL (ref 3.5–5)
ALP SERPL-CCNC: 80 U/L (ref 46–116)
ALT SERPL W P-5'-P-CCNC: 20 U/L (ref 12–78)
ANION GAP SERPL CALCULATED.3IONS-SCNC: 5 MMOL/L (ref 4–13)
AST SERPL W P-5'-P-CCNC: 16 U/L (ref 5–45)
BASOPHILS # BLD AUTO: 0.04 THOUSANDS/ÂΜL (ref 0–0.1)
BASOPHILS NFR BLD AUTO: 1 % (ref 0–1)
BILIRUB SERPL-MCNC: 0.73 MG/DL (ref 0.2–1)
BUN SERPL-MCNC: 12 MG/DL (ref 5–25)
CALCIUM ALBUM COR SERPL-MCNC: 10.1 MG/DL (ref 8.3–10.1)
CALCIUM SERPL-MCNC: 9.6 MG/DL (ref 8.3–10.1)
CHLORIDE SERPL-SCNC: 106 MMOL/L (ref 96–108)
CO2 SERPL-SCNC: 28 MMOL/L (ref 21–32)
CREAT SERPL-MCNC: 0.74 MG/DL (ref 0.6–1.3)
CRP SERPL QL: <3 MG/L
EOSINOPHIL # BLD AUTO: 0.1 THOUSAND/ÂΜL (ref 0–0.61)
EOSINOPHIL NFR BLD AUTO: 1 % (ref 0–6)
ERYTHROCYTE [DISTWIDTH] IN BLOOD BY AUTOMATED COUNT: 13.6 % (ref 11.6–15.1)
ERYTHROCYTE [SEDIMENTATION RATE] IN BLOOD: 21 MM/HOUR (ref 0–29)
FERRITIN SERPL-MCNC: 52 NG/ML (ref 8–388)
FOLATE SERPL-MCNC: 15.6 NG/ML (ref 3.1–17.5)
GFR SERPL CREATININE-BSD FRML MDRD: 94 ML/MIN/1.73SQ M
GLUCOSE P FAST SERPL-MCNC: 90 MG/DL (ref 65–99)
HCT VFR BLD AUTO: 42 % (ref 34.8–46.1)
HGB BLD-MCNC: 13.5 G/DL (ref 11.5–15.4)
IGA SERPL-MCNC: 129 MG/DL (ref 70–400)
IGG SERPL-MCNC: 922 MG/DL (ref 700–1600)
IGM SERPL-MCNC: 31 MG/DL (ref 40–230)
IMM GRANULOCYTES # BLD AUTO: 0.03 THOUSAND/UL (ref 0–0.2)
IMM GRANULOCYTES NFR BLD AUTO: 0 % (ref 0–2)
IRON SATN MFR SERPL: 28 % (ref 15–50)
IRON SERPL-MCNC: 114 UG/DL (ref 50–170)
LYMPHOCYTES # BLD AUTO: 2.49 THOUSANDS/ÂΜL (ref 0.6–4.47)
LYMPHOCYTES NFR BLD AUTO: 32 % (ref 14–44)
MCH RBC QN AUTO: 29.5 PG (ref 26.8–34.3)
MCHC RBC AUTO-ENTMCNC: 32.1 G/DL (ref 31.4–37.4)
MCV RBC AUTO: 92 FL (ref 82–98)
MONOCYTES # BLD AUTO: 0.47 THOUSAND/ÂΜL (ref 0.17–1.22)
MONOCYTES NFR BLD AUTO: 6 % (ref 4–12)
NEUTROPHILS # BLD AUTO: 4.67 THOUSANDS/ÂΜL (ref 1.85–7.62)
NEUTS SEG NFR BLD AUTO: 60 % (ref 43–75)
NRBC BLD AUTO-RTO: 0 /100 WBCS
PLATELET # BLD AUTO: 261 THOUSANDS/UL (ref 149–390)
PMV BLD AUTO: 9.7 FL (ref 8.9–12.7)
POTASSIUM SERPL-SCNC: 4.5 MMOL/L (ref 3.5–5.3)
PROT SERPL-MCNC: 7.5 G/DL (ref 6.4–8.4)
RBC # BLD AUTO: 4.58 MILLION/UL (ref 3.81–5.12)
SODIUM SERPL-SCNC: 139 MMOL/L (ref 135–147)
TIBC SERPL-MCNC: 406 UG/DL (ref 250–450)
VIT B12 SERPL-MCNC: 538 PG/ML (ref 100–900)
WBC # BLD AUTO: 7.8 THOUSAND/UL (ref 4.31–10.16)

## 2022-11-30 LAB — THYROPEROXIDASE AB SERPL-ACNC: 17 IU/ML (ref 0–34)

## 2022-12-05 ENCOUNTER — HOSPITAL ENCOUNTER (OUTPATIENT)
Dept: INFUSION CENTER | Facility: HOSPITAL | Age: 52
Discharge: HOME | End: 2022-12-05
Attending: INTERNAL MEDICINE

## 2022-12-05 VITALS
TEMPERATURE: 98.1 F | RESPIRATION RATE: 16 BRPM | SYSTOLIC BLOOD PRESSURE: 154 MMHG | HEART RATE: 83 BPM | DIASTOLIC BLOOD PRESSURE: 83 MMHG

## 2022-12-05 DIAGNOSIS — D80.1 HYPOGAMMAGLOBULINEMIA (HCC): Primary | ICD-10-CM

## 2022-12-05 RX ORDER — SODIUM CHLORIDE 9 MG/ML
20 INJECTION, SOLUTION INTRAVENOUS ONCE
OUTPATIENT
Start: 2022-12-26

## 2022-12-05 RX ORDER — SODIUM CHLORIDE 9 MG/ML
20 INJECTION, SOLUTION INTRAVENOUS ONCE
Status: COMPLETED | OUTPATIENT
Start: 2022-12-05 | End: 2022-12-05

## 2022-12-05 RX ORDER — ACETAMINOPHEN 325 MG/1
650 TABLET ORAL ONCE
Status: DISCONTINUED | OUTPATIENT
Start: 2022-12-05 | End: 2022-12-09 | Stop reason: HOSPADM

## 2022-12-05 RX ORDER — ACETAMINOPHEN 325 MG/1
650 TABLET ORAL ONCE
OUTPATIENT
Start: 2022-12-26

## 2022-12-05 RX ADMIN — FAMOTIDINE 20 MG: 10 INJECTION INTRAVENOUS at 09:29

## 2022-12-05 RX ADMIN — SODIUM CHLORIDE 20 ML/HR: 0.9 INJECTION, SOLUTION INTRAVENOUS at 08:23

## 2022-12-05 RX ADMIN — DIPHENHYDRAMINE HYDROCHLORIDE 25 MG: 50 INJECTION, SOLUTION INTRAMUSCULAR; INTRAVENOUS at 09:01

## 2022-12-05 RX ADMIN — SODIUM CHLORIDE 500 ML: 0.9 INJECTION, SOLUTION INTRAVENOUS at 08:23

## 2022-12-05 RX ADMIN — DEXAMETHASONE SODIUM PHOSPHATE 10 MG: 10 INJECTION, SOLUTION INTRAMUSCULAR; INTRAVENOUS at 08:27

## 2022-12-05 RX ADMIN — Medication 40 G: at 09:51

## 2022-12-10 LAB
THYROGLOB AB SERPL-ACNC: 18.4 IU/ML (ref 0–0.9)
THYROGLOB SERPL-MCNC: 2.4 NG/ML

## 2022-12-20 ENCOUNTER — OFFICE VISIT (OUTPATIENT)
Dept: HEMATOLOGY ONCOLOGY | Facility: CLINIC | Age: 52
End: 2022-12-20

## 2022-12-20 ENCOUNTER — TELEPHONE (OUTPATIENT)
Dept: HEMATOLOGY ONCOLOGY | Facility: CLINIC | Age: 52
End: 2022-12-20

## 2022-12-20 VITALS
DIASTOLIC BLOOD PRESSURE: 82 MMHG | OXYGEN SATURATION: 99 % | HEIGHT: 62 IN | SYSTOLIC BLOOD PRESSURE: 144 MMHG | HEART RATE: 96 BPM | RESPIRATION RATE: 16 BRPM | WEIGHT: 172 LBS | BODY MASS INDEX: 31.65 KG/M2 | TEMPERATURE: 98 F

## 2022-12-20 DIAGNOSIS — D80.1 HYPOGAMMAGLOBULINEMIA (HCC): Primary | ICD-10-CM

## 2022-12-20 DIAGNOSIS — E53.8 B12 DEFICIENCY: ICD-10-CM

## 2022-12-20 DIAGNOSIS — Z98.84 H/O GASTRIC BYPASS: ICD-10-CM

## 2022-12-20 DIAGNOSIS — R00.0 TACHYCARDIA: ICD-10-CM

## 2022-12-20 RX ORDER — METRONIDAZOLE 7.5 MG/G
GEL TOPICAL
COMMUNITY
Start: 2022-10-11

## 2022-12-20 NOTE — PROGRESS NOTES
Hematology/Oncology Outpatient Follow-up  Abimael Bob 46 y o  female 1970 [id number]    Date:  12/20/2022    Assessment and Plan:  1  Hypogammaglobulinemia ([address] )  The patient will be continued on the current treatment with IVIG 40 random(500 mg/kg) on every 3-week basis  She stated that she has less upper respiratory or airway infection with the current schedule which will be continued without interruption   - IgG, IgA, IgM; Future    2  B12 deficiency  She is to continue with vitamin B12 injections on every other week basis  I did ask her to take also vitamin B12 supplements sublingually daily   - CBC and differential; Future  - Comprehensive metabolic panel; Future  - Magnesium; Future  - Iron Panel (Includes Ferritin, Iron Sat%, Iron, and TIBC); Future  - Ferritin; Future  - Vitamin B12; Future  - C-reactive protein; Future  - Sedimentation rate, automated; Future  - IgG, IgA, IgM; Future    3  H/O gastric bypass  Her recent iron level showed ferritin of 52 and saturation of 28%  There is no need for iron IV at this point  However, at the next visit in 6 months she might benefit from iron IV if her ferritin continues to decline   - Iron Panel (Includes Ferritin, Iron Sat%, Iron, and TIBC); Future  - Ferritin; Future    4  Tachycardia  She did explain that a significant tachycardia when she is sitting on multiple occasions on a daily basis  I did ask her to consider seeing the cardiologist for further evaluation   - Ambulatory referral to Cardiology;  Future      HPI:  This is a 48-year-old female with multiple comorbid conditions including history of morbid obesity status post post gastric bypass surgery in 2009, anemia due to iron deficiency, cholecystectomy, hysterectomy, hypertension, gastroesophageal reflux disease, depression, asthma, chronic sinusitis, etc      The patient was apparently found to have hypogammaglobinemia on multiple occasions with frequent/recurrent infections mainly in the upper respiratory airway with chronic sinusitis  The patient was evaluated by the Hematology service from [facility] and [facility] a [facility] basis   She received then 3-4 sessions of IVIg which improved her chronic sinusitis and other infectious process, according to the patient, significantly  Danette Richardson, due to the COVID-19 pandemic she stopped doing the IVIG and decided to transfer her care to our service since she lives close by  Her immunoglobulin levels from 07/17/2019 before she had any IVIG treatment showed IgG of 554, IgA of 116 and IgM of 39 mg/dL  She was started on vitamin B12 injections every 2 weeks  Interval history:  Patient came in today for a follow-up visit  She did complain about fatigue and couple episodes of tachycardia on a daily basis  Recent blood work on 11/29/2022 showed hemoglobin of 13 5 with normal white cells and platelets  Creatinine 0 7 with normal calcium and liver enzymes  C-reactive protein and sed rate were within normal range  Vitamin B12 538  Folate 15 6  Immunoglobulins were within normal range  ROS: Review of Systems   Constitutional: Positive for fatigue  Negative for chills and fever  HENT: Negative for ear pain and sore throat  Eyes: Negative for pain and visual disturbance  Respiratory: Negative for cough and shortness of breath  Cardiovascular: Negative for chest pain and palpitations  Gastrointestinal: Positive for constipation and diarrhea  Negative for abdominal pain and vomiting  Genitourinary: Negative for dysuria and hematuria  Musculoskeletal: Positive for arthralgias  Negative for back pain  Skin: Negative for color change and rash  Neurological: Positive for dizziness, numbness and headaches  Negative for seizures and syncope  Hematological: Bruises/bleeds easily  Psychiatric/Behavioral: Positive for sleep disturbance  All other systems reviewed and are negative        Past Medical History:   Diagnosis Date   • Anemia     recurrent   • Anxiety    • Asthma     allergy induced   • Contact lens overwear of both eyes    • Depression    • Diarrhea    • Environmental allergies    • Fibromyalgia    • Fibromyalgia, primary    • GERD (gastroesophageal reflux disease)    • History of transfusion        • Hypertension    • Hypogammaglobulinemia ([address] )    • Hypoglycemia after GI (gastrointestinal) surgery    • Immune deficiency disorder (HCC)     IgG deficiency   • Iron (Fe) deficiency anemia    • Kidney stone    • Lumbar herniated disc    • Migraine    • Motion sickness    • PONV (postoperative nausea and vomiting)     severe   • Seasonal allergies    • Wears glasses        Past Surgical History:   Procedure Laterality Date   • ABDOMINAL SURGERY      adhesions   • ABDOMINOPLASTY     • APPENDECTOMY     • AUGMENTATION MAMMAPLASTY     • BREAST IMPLANT REMOVAL Bilateral 2019   • BREAST SURGERY      implant removal   •  SECTION     • CHOLECYSTECTOMY     • COSMETIC SURGERY Bilateral     breast augmentation    • GASTRIC BYPASS         • GASTRIC BYPASS LAPAROSCOPIC N/A 2020    Procedure: ROBOTIC REVISION OF KENYA-EN-Y GASTRIC BYPASS, INTRAOP EGD;  Surgeon: Brittany Pal MD;  [facility];  Service: Bariatrics   • HIATAL HERNIA REPAIR     • HYSTERECTOMY     • KNEE ARTHROSCOPY Left    • LYMPH NODE BIOPSY      benign   • NERVE BLOCK     • FL CORRJ HALLUX VALGUS W/SESMDC W/RESCJ PROX PHAL Left 2016    Procedure: SURGICAL MODIFIED WATKINS BUNIONECTOMY FIRST MTPJ;  Surgeon: Aby Vaughn DPM;  [facility];  Service: Podiatry   • FL FUSION FOOT BONE,MIDTARSAL,1 JT Left 2016    Procedure: FRIST TMJ FUSION ;  Surgeon: Aby Vaughn DPM;  [facility];  Service: Podiatry   • FL OSTEOTOMY METATARSAL (NOT 1ST) Left 2016    Procedure: FOOT SHORTENING OSTEOTOMIES 2ND AND 3RD METATARSAL WITH EXTENSER TENDON RELEASE 3RD AND 4TH TOE; Surgeon: aCrlos Figueroa DPM;  [facility];  Service: Podiatry   • [place] REMOVAL DEEP IMPLANT Left 12/7/2016    Procedure: REMOVAL SYMPTOMATIC  HARDWARE FIRST RAY,RELATED CONTRACTURE SECOND AND THIRD TOES WITH SUSPENSION FOURTH TOE;  Surgeon: Carlos Figueroa DPM;  [facility];  Service: Podiatry   • SALPINGOOPHORECTOMY Left 2016    with removal of cervix   • TONSILLECTOMY     • WISDOM TOOTH EXTRACTION         Social History     Socioeconomic History   • Marital status: /Civil Union     Spouse name: None   • Number of children: None   • Years of education: None   • Highest education level: None   Occupational History   • None   Tobacco Use   • Smoking status: Never   • Smokeless tobacco: Never   Vaping Use   • Vaping Use: Never used   Substance and Sexual Activity   • Alcohol use: Yes     Comment: rarely   • Drug use: No   • Sexual activity: None   Other Topics Concern   • None   Social History Narrative    Consumes on average 3 cups of coffee per day     Social Determinants of Health     Financial Resource Strain: Not on file   Food Insecurity: Not on file   Transportation Needs: Not on file   Physical Activity: Not on file   Stress: Not on file   Social Connections: Not on file   Intimate Partner Violence: Not on file   Housing Stability: Not on file       Family History   Problem Relation Age of Onset   • Lymphoma Mother    • COPD Mother    • Arthritis Mother    • Heart disease Father    • Breast cancer Maternal Aunt    • Breast cancer Cousin    • No Known Problems Daughter    • No Known Problems Maternal Grandmother    • No Known Problems Maternal Grandfather    • No Known Problems Paternal Grandmother    • No Known Problems Paternal Grandfather    • Pancreatic cancer Maternal Aunt    • No Known Problems Maternal Aunt    • No Known Problems Maternal Aunt    • Multiple sclerosis Paternal Aunt    • Breast cancer Cousin        Allergies   Allergen Reactions   • Codeine GI Intolerance and Chest Pain     ABDOMINAL PAIN   • Hydrocodone Abdominal Pain and Swelling   • Pregabalin Dizziness   • Acetaminophen-Codeine Abdominal Pain   • Amoxicillin Rash         Current Outpatient Medications:   •  acetaminophen (TYLENOL) 325 mg tablet, Take 3 tablets (975 mg total) by mouth every 8 (eight) hours, Disp: 30 tablet, Rfl: 0  •  ALPRAZolam (XANAX) 2 MG tablet, Take 0 5 mg by mouth daily at bedtime , Disp: , Rfl:   •  Azelastine-Fluticasone (DYMISTA NA), 2 sprays into each nostril 2 (two) times a day , Disp: , Rfl:   •  benzonatate (TESSALON) 200 MG capsule, Take 1 capsule (200 mg total) by mouth 3 (three) times a day as needed for cough, Disp: 90 capsule, Rfl: 0  •  Botulinum Toxin Type A 200 units SOLR, Inject 155 units into face and neck IM every 90 days, Disp: , Rfl:   •  budesonide-formoterol (SYMBICORT) 160-4 5 mcg/act inhaler, Inhale 2 puffs 2 (two) times a day , Disp: , Rfl:   •  buPROPion (WELLBUTRIN) 100 mg tablet, Take 100 mg by mouth 2 (two) times a day , Disp: , Rfl:   •  cholecalciferol (VITAMIN D3) 03269 units capsule, Take 5,000 Units by mouth 2 (two) times a day , Disp: , Rfl:   •  Cyanocobalamin 1000 MCG/ML KIT, Inject 1 mL (1,000 mcg total) as directed every 14 (fourteen) days, Disp: 6 mL, Rfl: 3  •  cyclobenzaprine (FLEXERIL) 10 mg tablet, Take 10 mg by mouth 3 (three) times a day as needed  , Disp: , Rfl:   •  Erenumab-aooe 70 MG/ML SOAJ, Inject under the skin every 28 days, Disp: , Rfl:   •  estradiol (CLIMARA) 0 06 MG/24HR, estradiol 0 06 mg/24 hr weekly transdermal patch  APPLY 1 PATCH TOPICALLY ONCE A WEEK, Disp: , Rfl:   •  fexofenadine (ALLEGRA) 180 MG tablet, Take 180 mg by mouth daily, Disp: , Rfl:   •  hydroxychloroquine (PLAQUENIL) 200 mg tablet, Take 200 mg by mouth 2 (two) times a day with meals, Disp: , Rfl:   •  hyoscyamine (LEVSIN/SL) 0 125 mg SL tablet, Take 0 125 mg by mouth every 4 (four) hours as needed for cramping, Disp: , Rfl:   •  Immune Globulin, Human, (PRIVIGEN IV), Inject into a catheter in a vein every 30 (thirty) days, Disp: , Rfl:   •  losartan (COZAAR) 50 mg tablet, Take 50 mg by mouth 2 (two) times a day , Disp: , Rfl:   •  Loteprednol Etabonate (Lotemax SM) 0 38 % GEL, Lotemax SM 0 38 % eye gel drops  INSTILL 1 TWICE DAILY INTO EACH EYE, Disp: , Rfl:   •  magnesium gluconate (MAGONATE) 500 MG tablet, Take 500 mg by mouth daily, Disp: , Rfl:   •  meclizine (ANTIVERT) 25 mg tablet, Take 1 tablet (25 mg total) by mouth 3 (three) times a day as needed for dizziness for up to 12 doses, Disp: 12 tablet, Rfl: 0  •  metroNIDAZOLE (METROGEL) 0 75 % gel, APPLY THIN LAYER TOPICALLY TWICE DAILY TO AFFECTED AREA ON FACE FOR ROSACEA, Disp: , Rfl:   •  minocycline (MINOCIN) 50 mg capsule, minocycline 50 mg capsule  TAKE 1 CAPSULE BY MOUTH ONCE DAILY IN THE EVENING WITH WATER AT LEAST 2 HOURS AFTER DINNER, Disp: , Rfl:   •  montelukast (SINGULAIR) 5 mg chewable tablet, Chew 1 tablet (5 mg total) 2 (two) times a day, Disp: 90 tablet, Rfl: 3  •  ondansetron (ZOFRAN-ODT) 8 mg disintegrating tablet, Take 1 tablet (8 mg total) by mouth every 8 (eight) hours as needed for nausea or vomiting, Disp: 20 tablet, Rfl: 3  •  SUMAtriptan (IMITREX) 100 mg tablet, Take 100 mg by mouth 2 (two) times a day as needed for migraine  , Disp: , Rfl:   •  traMADol (ULTRAM) 50 mg tablet, Take 50 mg by mouth 2 (two) times a day , Disp: , Rfl:   •  Varenicline Tartrate (Tyrvaya) 0 03 MG/ACT SOLN, into each nostril, Disp: , Rfl:   •  albuterol (PROVENTIL HFA,VENTOLIN HFA) 90 mcg/act inhaler, Inhale 1 puff every 6 (six) hours as needed As needed (Patient not taking: Reported on 6/9/2022), Disp: , Rfl:   •  Paxlovid, 300/100, tablet therapy pack, TAKE 2 TABLETS NIRMATRELVIR AND 1 TABLET RITONAVIR BY MOUTH TWICE DAILY FOR 5 DAYS TAKE NIRMATRELVIR AND RITONAVIR TABLETS TOGETHER (Patient not taking: Reported on 11/15/2022), Disp: , Rfl:   •  sucralfate (CARAFATE) 1 g/10 mL suspension, Take 10 mL (1 g total) by mouth 4 (four) times a day (Patient not taking: Reported on 11/15/2022), Disp: 1200 mL, Rfl: 1      Physical Exam:  /82 (BP Location: Left arm, Patient Position: Sitting, Cuff Size: Standard)   Pulse 96   Temp 98 °F (36 7 °C)   Resp 16   Ht 5' 2" (1 575 m)   Wt 78 kg (172 lb)   SpO2 99%   BMI 31 46 kg/m²     Physical Exam  Constitutional:       General: She is not in acute distress  Appearance: She is well-developed  She is not diaphoretic  HENT:      Head: Normocephalic and atraumatic  Nose: Nose normal    Eyes:      General: No scleral icterus  Right eye: No discharge  Left eye: No discharge  Conjunctiva/sclera: Conjunctivae normal       Pupils: Pupils are equal, round, and reactive to light  Neck:      Thyroid: No thyromegaly  Vascular: No JVD  Trachea: No tracheal deviation  Cardiovascular:      Rate and Rhythm: Normal rate and regular rhythm  Heart sounds: Normal heart sounds  No murmur heard  No friction rub  Pulmonary:      Effort: Pulmonary effort is normal  No respiratory distress  Breath sounds: Normal breath sounds  No stridor  No wheezing or rales  Chest:      Chest wall: No tenderness  Abdominal:      General: There is no distension  Palpations: Abdomen is soft  There is no hepatomegaly or splenomegaly  Tenderness: There is no abdominal tenderness  There is no guarding or rebound  Musculoskeletal:         General: No tenderness or deformity  Normal range of motion  Cervical back: Normal range of motion and neck supple  Lymphadenopathy:      Cervical: No cervical adenopathy  Skin:     General: Skin is warm and dry  Coloration: Skin is not pale  Findings: No erythema or rash  Neurological:      Mental Status: She is alert and oriented to person, place, and time  Cranial Nerves: No cranial nerve deficit        Coordination: Coordination normal       Deep Tendon Reflexes: Reflexes are normal and symmetric  Psychiatric:         Behavior: Behavior normal          Thought Content: Thought content normal          Judgment: Judgment normal            Labs:  Lab Results   Component Value Date    WBC 7 80 11/29/2022    HGB 13 5 11/29/2022    HCT 42 0 11/29/2022    MCV 92 11/29/2022     11/29/2022     Lab Results   Component Value Date    K 4 5 11/29/2022     11/29/2022    CO2 28 11/29/2022    BUN 12 11/29/2022    CREATININE 0 74 11/29/2022    GLUF 90 11/29/2022    CALCIUM 9 6 11/29/2022    CORRECTEDCA 10 1 11/29/2022    AST 16 11/29/2022    ALT 20 11/29/2022    ALKPHOS 80 11/29/2022    EGFR 94 11/29/2022       Patient voiced understanding and agreement in the above discussion  Aware to contact our office with questions/symptoms in the interim

## 2022-12-28 ENCOUNTER — HOSPITAL ENCOUNTER (OUTPATIENT)
Dept: INFUSION CENTER | Facility: HOSPITAL | Age: 52
Discharge: HOME | End: 2022-12-28
Attending: INTERNAL MEDICINE

## 2022-12-28 VITALS
HEART RATE: 86 BPM | RESPIRATION RATE: 18 BRPM | DIASTOLIC BLOOD PRESSURE: 86 MMHG | TEMPERATURE: 97.5 F | OXYGEN SATURATION: 97 % | SYSTOLIC BLOOD PRESSURE: 140 MMHG

## 2022-12-28 DIAGNOSIS — D80.1 HYPOGAMMAGLOBULINEMIA (HCC): Primary | ICD-10-CM

## 2022-12-28 RX ORDER — ACETAMINOPHEN 325 MG/1
650 TABLET ORAL ONCE
Status: COMPLETED | OUTPATIENT
Start: 2022-12-28 | End: 2022-12-28

## 2022-12-28 RX ORDER — ACETAMINOPHEN 325 MG/1
650 TABLET ORAL ONCE
OUTPATIENT
Start: 2023-01-16

## 2022-12-28 RX ORDER — SODIUM CHLORIDE 9 MG/ML
20 INJECTION, SOLUTION INTRAVENOUS ONCE
OUTPATIENT
Start: 2023-01-16

## 2022-12-28 RX ORDER — SODIUM CHLORIDE 9 MG/ML
20 INJECTION, SOLUTION INTRAVENOUS ONCE
Status: COMPLETED | OUTPATIENT
Start: 2022-12-28 | End: 2022-12-28

## 2022-12-28 RX ADMIN — DIPHENHYDRAMINE HYDROCHLORIDE 25 MG: 50 INJECTION, SOLUTION INTRAMUSCULAR; INTRAVENOUS at 08:54

## 2022-12-28 RX ADMIN — ACETAMINOPHEN 650 MG: 325 TABLET ORAL at 08:16

## 2022-12-28 RX ADMIN — FAMOTIDINE 20 MG: 10 INJECTION, SOLUTION INTRAVENOUS at 09:23

## 2022-12-28 RX ADMIN — SODIUM CHLORIDE 20 ML/HR: 0.9 INJECTION, SOLUTION INTRAVENOUS at 08:15

## 2022-12-28 RX ADMIN — Medication 40 G: at 09:48

## 2022-12-28 RX ADMIN — DEXAMETHASONE SODIUM PHOSPHATE 10 MG: 100 INJECTION INTRAMUSCULAR; INTRAVENOUS at 08:23

## 2022-12-28 RX ADMIN — SODIUM CHLORIDE 500 ML: 0.9 INJECTION, SOLUTION INTRAVENOUS at 08:15

## 2023-01-04 ENCOUNTER — CLINICAL SUPPORT (OUTPATIENT)
Dept: CARDIOLOGY CLINIC | Facility: CLINIC | Age: 53
End: 2023-01-04

## 2023-01-04 ENCOUNTER — OFFICE VISIT (OUTPATIENT)
Dept: CARDIOLOGY CLINIC | Facility: CLINIC | Age: 53
End: 2023-01-04

## 2023-01-04 VITALS
SYSTOLIC BLOOD PRESSURE: 130 MMHG | HEIGHT: 62 IN | DIASTOLIC BLOOD PRESSURE: 90 MMHG | HEART RATE: 76 BPM | BODY MASS INDEX: 32.57 KG/M2 | WEIGHT: 177 LBS

## 2023-01-04 DIAGNOSIS — R00.0 TACHYCARDIA: ICD-10-CM

## 2023-01-04 DIAGNOSIS — R00.2 PALPITATIONS: Primary | ICD-10-CM

## 2023-01-04 DIAGNOSIS — R00.2 PALPITATIONS: ICD-10-CM

## 2023-01-04 DIAGNOSIS — I10 BENIGN ESSENTIAL HTN: ICD-10-CM

## 2023-01-04 NOTE — PROGRESS NOTES
Patient ID: Emerson Giles is a 46 y o  female  Plan:      Tachycardia  Patient noted tachycardia on her apple watch  Will check 1 week ZIO    Benign essential HTN  Blood pressure is well controlled  Continue losartan 50 mg daily    Palpitations  Zio patch and echo ordered       Follow up Plan/Summary Comments:  I reviewed available blood work including chemistry and thyroid from 12/22/2022 and a CBC from 11/29/2022 all of which is unremarkable  I have ordered a 1 week Zio patch monitor for further assessment of her heart rate and rhythm and an echocardiogram to rule out any structural abnormalities  I will follow-up with the results by phone and schedule return office visit pending the results  HPI: I had the pleasure of seeing Antonino Felton in the office today for evaluation of palpitations and tachycardia  She has a longstanding history of palpitations and has been evaluated previously  She had a Holter monitor 2 years ago which was unremarkable  She continued to experience symptoms, however noted increased frequency recently  She feels palpitations and gets notifications on her Apple Watch that her HR is elevated  She notices this more when she is at rest then with activity  She occasionally feels dizzy or lightheaded, but no syncope  Symptoms have been happening more frequently and occur at rest   Occasional SOB  She denies any chest pain  She consumes a moderate amount of caffeine; 2 large cups of coffee and decaf iced tea during the day in addition to water  Review of Systems   10  point ROS  was otherwise non pertinent or negative except as per HPI or as below     Gait: Normal      Most recent or relevant cardiac/vascular testing:    Holter monitor 8/13/2020  Basic mechanism was sinus, rates ranging from 51 to 112 bpm  Rare ectopy  No pauses  No change in rate or rhythm during patient triggers    Results for orders placed or performed in visit on 01/04/23   POCT ECG Impression    Sinus rhythm       Objective:     /90   Pulse 76   Ht 5' 2" (1 575 m)   Wt 80 3 kg (177 lb)   BMI 32 37 kg/m²     PHYSICAL EXAM:    General:  Normal appearance, no acute distress  Eyes:  Anicteric  Oral mucosa:  Moist   Neck:  No JVD  Carotid upstrokes are brisk without bruits  No masses  Chest:  Clear to auscultation   Cardiac:  No palpable PMI  Normal S1 and S2  No murmur gallop or rub  Abdomen:  Soft and nontender  No palpable organomegaly or aortic enlargement  Extremities:  No peripheral edema  Musculoskeletal:  Symmetric  Vascular:  Pedal pulses are intact  Neuro:  Grossly symmetric  Psych:  Alert and oriented x3      Allergies   Allergen Reactions   • Codeine GI Intolerance and Chest Pain     ABDOMINAL PAIN   • Hydrocodone Abdominal Pain and Swelling   • Pregabalin Dizziness   • Acetaminophen-Codeine Abdominal Pain   • Amoxicillin Rash       Current Outpatient Medications:   •  acetaminophen (TYLENOL) 325 mg tablet, Take 3 tablets (975 mg total) by mouth every 8 (eight) hours, Disp: 30 tablet, Rfl: 0  •  ALPRAZolam (XANAX) 2 MG tablet, Take 0 5 mg by mouth daily at bedtime , Disp: , Rfl:   •  Azelastine-Fluticasone (DYMISTA NA), 2 sprays into each nostril 2 (two) times a day , Disp: , Rfl:   •  benzonatate (TESSALON) 200 MG capsule, Take 1 capsule (200 mg total) by mouth 3 (three) times a day as needed for cough, Disp: 90 capsule, Rfl: 0  •  Botulinum Toxin Type A 200 units SOLR, Inject 155 units into face and neck IM every 90 days, Disp: , Rfl:   •  budesonide-formoterol (SYMBICORT) 160-4 5 mcg/act inhaler, Inhale 2 puffs 2 (two) times a day , Disp: , Rfl:   •  buPROPion (WELLBUTRIN) 100 mg tablet, Take 100 mg by mouth 2 (two) times a day , Disp: , Rfl:   •  cholecalciferol (VITAMIN D3) 21397 units capsule, Take 5,000 Units by mouth 2 (two) times a day , Disp: , Rfl:   •  Cyanocobalamin 1000 MCG/ML KIT, Inject 1 mL (1,000 mcg total) as directed every 14 (fourteen) days, Disp: 6 mL, Rfl: 3  •  cyclobenzaprine (FLEXERIL) 10 mg tablet, Take 10 mg by mouth 3 (three) times a day as needed  , Disp: , Rfl:   •  Erenumab-aooe 70 MG/ML SOAJ, Inject under the skin every 28 days, Disp: , Rfl:   •  estradiol (CLIMARA) 0 06 MG/24HR, estradiol 0 06 mg/24 hr weekly transdermal patch  APPLY 1 PATCH TOPICALLY ONCE A WEEK, Disp: , Rfl:   •  fexofenadine (ALLEGRA) 180 MG tablet, Take 180 mg by mouth daily, Disp: , Rfl:   •  hydroxychloroquine (PLAQUENIL) 200 mg tablet, Take 200 mg by mouth 2 (two) times a day with meals, Disp: , Rfl:   •  hyoscyamine (LEVSIN/SL) 0 125 mg SL tablet, Take 0 125 mg by mouth every 4 (four) hours as needed for cramping, Disp: , Rfl:   •  Immune Globulin, Human, (PRIVIGEN IV), Inject into a catheter in a vein every 30 (thirty) days, Disp: , Rfl:   •  losartan (COZAAR) 50 mg tablet, Take 50 mg by mouth 2 (two) times a day , Disp: , Rfl:   •  Loteprednol Etabonate (Lotemax SM) 0 38 % GEL, Lotemax SM 0 38 % eye gel drops  INSTILL 1 TWICE DAILY INTO EACH EYE, Disp: , Rfl:   •  magnesium gluconate (MAGONATE) 500 MG tablet, Take 500 mg by mouth daily, Disp: , Rfl:   •  metroNIDAZOLE (METROGEL) 0 75 % gel, APPLY THIN LAYER TOPICALLY TWICE DAILY TO AFFECTED AREA ON FACE FOR ROSACEA, Disp: , Rfl:   •  minocycline (MINOCIN) 50 mg capsule, minocycline 50 mg capsule  TAKE 1 CAPSULE BY MOUTH ONCE DAILY IN THE EVENING WITH WATER AT LEAST 2 HOURS AFTER DINNER, Disp: , Rfl:   •  montelukast (SINGULAIR) 5 mg chewable tablet, Chew 1 tablet (5 mg total) 2 (two) times a day, Disp: 90 tablet, Rfl: 3  •  ondansetron (ZOFRAN-ODT) 8 mg disintegrating tablet, Take 1 tablet (8 mg total) by mouth every 8 (eight) hours as needed for nausea or vomiting, Disp: 20 tablet, Rfl: 3  •  SUMAtriptan (IMITREX) 100 mg tablet, Take 100 mg by mouth 2 (two) times a day as needed for migraine  , Disp: , Rfl:   •  traMADol (ULTRAM) 50 mg tablet, Take 50 mg by mouth 2 (two) times a day , Disp: , Rfl:   • Varenicline Tartrate (Tyrvaya) 0 03 MG/ACT SOLN, into each nostril, Disp: , Rfl:   •  albuterol (PROVENTIL HFA,VENTOLIN HFA) 90 mcg/act inhaler, Inhale 1 puff every 6 (six) hours as needed As needed (Patient not taking: Reported on 2022), Disp: , Rfl:   •  meclizine (ANTIVERT) 25 mg tablet, Take 1 tablet (25 mg total) by mouth 3 (three) times a day as needed for dizziness for up to 12 doses (Patient not taking: Reported on 2023), Disp: 12 tablet, Rfl: 0  •  Paxlovid, 300/100, tablet therapy pack, TAKE 2 TABLETS NIRMATRELVIR AND 1 TABLET RITONAVIR BY MOUTH TWICE DAILY FOR 5 DAYS TAKE NIRMATRELVIR AND RITONAVIR TABLETS TOGETHER (Patient not taking: Reported on 11/15/2022), Disp: , Rfl:   •  sucralfate (CARAFATE) 1 g/10 mL suspension, Take 10 mL (1 g total) by mouth 4 (four) times a day (Patient not taking: Reported on 11/15/2022), Disp: 1200 mL, Rfl: 1  Past Medical History:   Diagnosis Date   • Anemia     recurrent   • Anxiety    • Asthma     allergy induced   • Contact lens overwear of both eyes    • Depression    • Diarrhea    • Environmental allergies    • Fibromyalgia    • Fibromyalgia, primary    • GERD (gastroesophageal reflux disease)    • History of transfusion        • Hypertension    • Hypogammaglobulinemia ([facility] [address] )    • Hypoglycemia after GI (gastrointestinal) surgery    • Immune deficiency disorder (HCC)     IgG deficiency   • Iron (Fe) deficiency anemia    • Kidney stone    • Lumbar herniated disc    • Migraine    • Motion sickness    • PONV (postoperative nausea and vomiting)     severe   • Seasonal allergies    • Wears glasses      Past Surgical History:   Procedure Laterality Date   • ABDOMINAL SURGERY      adhesions   • ABDOMINOPLASTY     • APPENDECTOMY     • AUGMENTATION MAMMAPLASTY     • BREAST IMPLANT REMOVAL Bilateral 2019   • BREAST SURGERY      implant removal   •  SECTION     • CHOLECYSTECTOMY     • COSMETIC SURGERY Bilateral     breast augmentation    • GASTRIC BYPASS      2009   • GASTRIC BYPASS LAPAROSCOPIC N/A 2/11/2020    Procedure: ROBOTIC REVISION OF KENYA-EN-Y GASTRIC BYPASS, INTRAOP EGD;  Surgeon: Mara Caicedo MD;  [facility];  Service: Bariatrics   • HIATAL HERNIA REPAIR     • HYSTERECTOMY  2015   • KNEE ARTHROSCOPY Left    • LYMPH NODE BIOPSY  200-2001    benign   • NERVE BLOCK     • WV ARTHRODESIS MIDTARSOMETATARSAL SINGLE JOINT Left 9/9/2016    Procedure: FRIST TMJ FUSION ;  Surgeon: Daria Plata DPM;  [facility];  Service: Podiatry   • W[place] CORRJ HALLUX VALGUS W/SESMDC W/RESCJ PROX PHAL Left 9/9/2016    Procedure: SURGICAL MODIFIED WATKINS BUNIONECTOMY FIRST MTPJ;  Surgeon: Daria Plata DPM;  [facility];  Service: Podiatry   • [place] OSTEOT W/ [address] SHRT/CORRJ METAR XCP 1ST EA Left 9/9/2016    Procedure: FOOT SHORTENING OSTEOTOMIES 2ND AND 3RD METATARSAL WITH EXTENSER TENDON RELEASE 3RD AND 4TH TOE;  Surgeon: Daria Plata DPM;  [facility];  Service: Podiatry   • [place] REMOVAL IMPLANT DEEP Left 12/7/2016    Procedure: REMOVAL SYMPTOMATIC  HARDWARE FIRST RAY,RELATED CONTRACTURE SECOND AND THIRD TOES WITH SUSPENSION FOURTH TOE;  Surgeon: Daria Plata DPM;  [facility];  Service: Podiatry   • SALPINGOOPHORECTOMY Left 2016    with removal of cervix   • TONSILLECTOMY     • WISDOM TOOTH EXTRACTION         CMP:   Lab Results   Component Value Date    K 4 5 11/29/2022     11/29/2022    CO2 28 11/29/2022    BUN 12 11/29/2022    CREATININE 0 74 11/29/2022    EGFR 94 11/29/2022     Lipid Profile:    Lab Results   Component Value Date    TRIG 168 (H) 04/22/2022    HDL 68 04/22/2022         Social History     Tobacco Use   Smoking Status Never   Smokeless Tobacco Never

## 2023-01-14 PROBLEM — J06.9 ACUTE URI: Status: RESOLVED | Noted: 2022-11-15 | Resolved: 2023-01-14

## 2023-01-17 ENCOUNTER — CLINICAL SUPPORT (OUTPATIENT)
Dept: CARDIOLOGY CLINIC | Facility: CLINIC | Age: 53
End: 2023-01-17

## 2023-01-17 DIAGNOSIS — R00.0 TACHYCARDIA: ICD-10-CM

## 2023-01-17 DIAGNOSIS — R00.2 PALPITATIONS: ICD-10-CM

## 2023-01-18 ENCOUNTER — HOSPITAL ENCOUNTER (OUTPATIENT)
Dept: INFUSION CENTER | Facility: HOSPITAL | Age: 53
Discharge: HOME | End: 2023-01-18
Attending: INTERNAL MEDICINE

## 2023-01-18 VITALS
OXYGEN SATURATION: 97 % | DIASTOLIC BLOOD PRESSURE: 86 MMHG | HEART RATE: 75 BPM | RESPIRATION RATE: 18 BRPM | SYSTOLIC BLOOD PRESSURE: 139 MMHG

## 2023-01-18 DIAGNOSIS — D80.1 HYPOGAMMAGLOBULINEMIA (HCC): Primary | ICD-10-CM

## 2023-01-18 RX ORDER — ACETAMINOPHEN 325 MG/1
650 TABLET ORAL ONCE
Status: DISCONTINUED | OUTPATIENT
Start: 2023-01-18 | End: 2023-01-22 | Stop reason: HOSPADM

## 2023-01-18 RX ORDER — SODIUM CHLORIDE 9 MG/ML
20 INJECTION, SOLUTION INTRAVENOUS ONCE
Status: CANCELLED | OUTPATIENT
Start: 2023-02-08

## 2023-01-18 RX ORDER — SODIUM CHLORIDE 9 MG/ML
20 INJECTION, SOLUTION INTRAVENOUS ONCE
Status: DISCONTINUED | OUTPATIENT
Start: 2023-01-18 | End: 2023-01-22 | Stop reason: HOSPADM

## 2023-01-18 RX ORDER — ACETAMINOPHEN 325 MG/1
650 TABLET ORAL ONCE
Status: CANCELLED | OUTPATIENT
Start: 2023-02-08

## 2023-01-18 RX ADMIN — SODIUM CHLORIDE 500 ML: 0.9 INJECTION, SOLUTION INTRAVENOUS at 08:15

## 2023-01-18 RX ADMIN — Medication 40 G: at 09:43

## 2023-01-18 RX ADMIN — DIPHENHYDRAMINE HYDROCHLORIDE 25 MG: 50 INJECTION, SOLUTION INTRAMUSCULAR; INTRAVENOUS at 08:48

## 2023-01-18 RX ADMIN — DEXAMETHASONE SODIUM PHOSPHATE 10 MG: 100 INJECTION INTRAMUSCULAR; INTRAVENOUS at 08:23

## 2023-01-18 RX ADMIN — FAMOTIDINE 20 MG: 10 INJECTION, SOLUTION INTRAVENOUS at 09:10

## 2023-01-18 NOTE — RESULT ENCOUNTER NOTE
Basic mechanism was sinus with rates ranging from 53 - 133 beats per minute while in sinus rhythm  The average heart rate was 83 beats per minute  Atrial ectopy was rare  Ventricular ectopy was rare  No pauses were present  No symptoms were reported            Barrie Goins MD

## 2023-01-23 ENCOUNTER — HOSPITAL ENCOUNTER (OUTPATIENT)
Dept: NON INVASIVE DIAGNOSTICS | Facility: CLINIC | Age: 53
Discharge: HOME | End: 2023-01-23

## 2023-01-23 VITALS
BODY MASS INDEX: 32.57 KG/M2 | HEIGHT: 62 IN | WEIGHT: 177 LBS | HEART RATE: 73 BPM | DIASTOLIC BLOOD PRESSURE: 86 MMHG | SYSTOLIC BLOOD PRESSURE: 139 MMHG

## 2023-01-23 DIAGNOSIS — R00.2 PALPITATIONS: ICD-10-CM

## 2023-01-23 DIAGNOSIS — R00.0 TACHYCARDIA: ICD-10-CM

## 2023-01-23 LAB
AORTIC ROOT: 3.3 CM
APICAL FOUR CHAMBER EJECTION FRACTION: 64 %
ASCENDING AORTA: 3.6 CM
E WAVE DECELERATION TIME: 153 MS
FRACTIONAL SHORTENING: 34 (ref 28–44)
INTERVENTRICULAR SEPTUM IN DIASTOLE (PARASTERNAL SHORT AXIS VIEW): 1 CM
INTERVENTRICULAR SEPTUM: 1 CM (ref 0.6–1.1)
IVC: 13 MM
LAAS-AP2: 14.2 CM2
LAAS-AP4: 18.6 CM2
LEFT ATRIUM SIZE: 3.4 CM
LEFT INTERNAL DIMENSION IN SYSTOLE: 2.9 CM (ref 2.1–4)
LEFT VENTRICULAR INTERNAL DIMENSION IN DIASTOLE: 4.4 CM (ref 3.5–6)
LEFT VENTRICULAR POSTERIOR WALL IN END DIASTOLE: 0.9 CM
LEFT VENTRICULAR STROKE VOLUME: 58 ML
LVSV (TEICH): 58 ML
MV E'TISSUE VEL-SEP: 9 CM/S
MV PEAK A VEL: 0.82 M/S
MV PEAK E VEL: 63 CM/S
MV STENOSIS PRESSURE HALF TIME: 44 MS
MV VALVE AREA P 1/2 METHOD: 5
RIGHT ATRIUM AREA SYSTOLE A4C: 12.3 CM2
RIGHT VENTRICLE ID DIMENSION: 2.9 CM
SL CV LEFT ATRIUM LENGTH A2C: 4.8 CM
SL CV LV EF: 60
SL CV PED ECHO LEFT VENTRICLE DIASTOLIC VOLUME (MOD BIPLANE) 2D: 89 ML
SL CV PED ECHO LEFT VENTRICLE SYSTOLIC VOLUME (MOD BIPLANE) 2D: 31 ML
TR MAX PG: 27 MMHG
TR PEAK VELOCITY: 2.6 M/S
TRICUSPID VALVE PEAK REGURGITATION VELOCITY: 2.59 M/S

## 2023-02-08 ENCOUNTER — HOSPITAL ENCOUNTER (OUTPATIENT)
Dept: INFUSION CENTER | Facility: HOSPITAL | Age: 53
Discharge: HOME | End: 2023-02-08
Attending: INTERNAL MEDICINE

## 2023-02-08 VITALS
TEMPERATURE: 97.5 F | SYSTOLIC BLOOD PRESSURE: 143 MMHG | OXYGEN SATURATION: 100 % | HEART RATE: 89 BPM | DIASTOLIC BLOOD PRESSURE: 91 MMHG | RESPIRATION RATE: 16 BRPM

## 2023-02-08 DIAGNOSIS — D80.1 HYPOGAMMAGLOBULINEMIA (HCC): Primary | ICD-10-CM

## 2023-02-08 RX ORDER — BIOTIN 5 MG
1 CAPSULE ORAL DAILY
COMMUNITY
Start: 2023-01-12

## 2023-02-08 RX ORDER — SODIUM CHLORIDE 9 MG/ML
20 INJECTION, SOLUTION INTRAVENOUS ONCE
OUTPATIENT
Start: 2023-03-01

## 2023-02-08 RX ORDER — ACETAMINOPHEN 325 MG/1
650 TABLET ORAL ONCE
Status: DISCONTINUED | OUTPATIENT
Start: 2023-02-08 | End: 2023-02-12 | Stop reason: HOSPADM

## 2023-02-08 RX ORDER — ACETAMINOPHEN 325 MG/1
650 TABLET ORAL ONCE
OUTPATIENT
Start: 2023-03-01

## 2023-02-08 RX ORDER — SODIUM CHLORIDE 9 MG/ML
20 INJECTION, SOLUTION INTRAVENOUS ONCE
Status: COMPLETED | OUTPATIENT
Start: 2023-02-08 | End: 2023-02-08

## 2023-02-08 RX ADMIN — FAMOTIDINE 20 MG: 10 INJECTION INTRAVENOUS at 09:45

## 2023-02-08 RX ADMIN — SODIUM CHLORIDE 20 ML/HR: 0.9 INJECTION, SOLUTION INTRAVENOUS at 08:23

## 2023-02-08 RX ADMIN — DIPHENHYDRAMINE HYDROCHLORIDE 25 MG: 50 INJECTION INTRAMUSCULAR; INTRAVENOUS at 09:16

## 2023-02-08 RX ADMIN — DEXAMETHASONE SODIUM PHOSPHATE 10 MG: 10 INJECTION, SOLUTION INTRAMUSCULAR; INTRAVENOUS at 08:33

## 2023-02-08 RX ADMIN — Medication 40 G: at 10:24

## 2023-02-08 RX ADMIN — SODIUM CHLORIDE 500 ML: 0.9 INJECTION, SOLUTION INTRAVENOUS at 08:26

## 2023-02-08 NOTE — PROGRESS NOTES
Patient tolerated IVIG infusion without reaction or issues  AVS declined  Patient has Mychart  Patient ambulated off unit without incident  All personal belongings taken with patient

## 2023-02-14 ENCOUNTER — TELEPHONE (OUTPATIENT)
Dept: HEMATOLOGY ONCOLOGY | Facility: CLINIC | Age: 53
End: 2023-02-14

## 2023-02-14 NOTE — TELEPHONE ENCOUNTER
CALL RETURN FORM   Reason for patient call? Rapheal Barthel from [facility] calling to request a prior authorization be submitted for patients injection Pridigen (IVIG)  Authorization expires 02/23/23   Patient's primary oncologist? Dr Pantera Angeles   Name of person the patient was calling for? Dr Pantera Angeles   Any additional information to add, if applicable? Please call patient   [phone]   Once this has been processed    [facility] pre-authorization Department:  [phone]  Option 2, then option 2    Informed patient that the message will be forwarded to the team and someone will get back to them as soon as possible    Did you relay this information to the patient?  Yes

## 2023-02-18 DIAGNOSIS — E53.8 B12 DEFICIENCY: ICD-10-CM

## 2023-02-27 ENCOUNTER — TELEPHONE (OUTPATIENT)
Dept: HEMATOLOGY ONCOLOGY | Facility: CLINIC | Age: 53
End: 2023-02-27

## 2023-02-27 DIAGNOSIS — D80.1 HYPOGAMMAGLOBULINEMIA (HCC): Primary | ICD-10-CM

## 2023-02-27 NOTE — TELEPHONE ENCOUNTER
Phoned pt to let her know that [employer] needs repeat Cbc and iGG, IgA and IgM levels drawn in order to obtain auth for Privigen so those orders were placed and pt is going to have these labs drawn

## 2023-02-28 ENCOUNTER — TELEPHONE (OUTPATIENT)
Dept: HEMATOLOGY ONCOLOGY | Facility: CLINIC | Age: 53
End: 2023-02-28

## 2023-02-28 ENCOUNTER — APPOINTMENT (OUTPATIENT)
Dept: LAB | Facility: CLINIC | Age: 53
End: 2023-02-28

## 2023-02-28 DIAGNOSIS — D80.1 HYPOGAMMAGLOBULINEMIA (HCC): Primary | ICD-10-CM

## 2023-02-28 DIAGNOSIS — D80.1 HYPOGAMMAGLOBULINEMIA (HCC): ICD-10-CM

## 2023-02-28 LAB
BASOPHILS # BLD AUTO: 0.07 THOUSANDS/ÂΜL (ref 0–0.1)
BASOPHILS NFR BLD AUTO: 1 % (ref 0–1)
EOSINOPHIL # BLD AUTO: 0.34 THOUSAND/ÂΜL (ref 0–0.61)
EOSINOPHIL NFR BLD AUTO: 5 % (ref 0–6)
ERYTHROCYTE [DISTWIDTH] IN BLOOD BY AUTOMATED COUNT: 13.1 % (ref 11.6–15.1)
HCT VFR BLD AUTO: 41.8 % (ref 34.8–46.1)
HGB BLD-MCNC: 13 G/DL (ref 11.5–15.4)
IGA SERPL-MCNC: 121 MG/DL (ref 70–400)
IGG SERPL-MCNC: 885 MG/DL (ref 700–1600)
IGM SERPL-MCNC: 30 MG/DL (ref 40–230)
IMM GRANULOCYTES # BLD AUTO: 0 THOUSAND/UL (ref 0–0.2)
IMM GRANULOCYTES NFR BLD AUTO: 0 % (ref 0–2)
LYMPHOCYTES # BLD AUTO: 2.13 THOUSANDS/ÂΜL (ref 0.6–4.47)
LYMPHOCYTES NFR BLD AUTO: 32 % (ref 14–44)
MCH RBC QN AUTO: 28.7 PG (ref 26.8–34.3)
MCHC RBC AUTO-ENTMCNC: 31.1 G/DL (ref 31.4–37.4)
MCV RBC AUTO: 92 FL (ref 82–98)
MONOCYTES # BLD AUTO: 0.52 THOUSAND/ÂΜL (ref 0.17–1.22)
MONOCYTES NFR BLD AUTO: 8 % (ref 4–12)
NEUTROPHILS # BLD AUTO: 3.61 THOUSANDS/ÂΜL (ref 1.85–7.62)
NEUTS SEG NFR BLD AUTO: 54 % (ref 43–75)
NRBC BLD AUTO-RTO: 0 /100 WBCS
PLATELET # BLD AUTO: 292 THOUSANDS/UL (ref 149–390)
PMV BLD AUTO: 9.5 FL (ref 8.9–12.7)
RBC # BLD AUTO: 4.53 MILLION/UL (ref 3.81–5.12)
WBC # BLD AUTO: 6.67 THOUSAND/UL (ref 4.31–10.16)

## 2023-02-28 RX ORDER — SODIUM CHLORIDE 9 MG/ML
20 INJECTION, SOLUTION INTRAVENOUS ONCE
Status: CANCELLED | OUTPATIENT
Start: 2023-03-09

## 2023-02-28 RX ORDER — ACETAMINOPHEN 325 MG/1
650 TABLET ORAL ONCE
Status: CANCELLED | OUTPATIENT
Start: 2023-03-09

## 2023-02-28 NOTE — TELEPHONE ENCOUNTER
Phoned pt ans let her know that we had to R/S her IVIG due to pending auth  R/S to 3/9/23 at 8 am and pt will see the time on My Chart   Messaged [employer] team about the new time

## 2023-03-01 ENCOUNTER — HOSPITAL ENCOUNTER (OUTPATIENT)
Dept: INFUSION CENTER | Facility: HOSPITAL | Age: 53
End: 2023-03-01
Attending: INTERNAL MEDICINE

## 2023-03-09 ENCOUNTER — HOSPITAL ENCOUNTER (OUTPATIENT)
Dept: INFUSION CENTER | Facility: HOSPITAL | Age: 53
End: 2023-03-09
Attending: INTERNAL MEDICINE

## 2023-03-09 VITALS
DIASTOLIC BLOOD PRESSURE: 86 MMHG | SYSTOLIC BLOOD PRESSURE: 124 MMHG | HEART RATE: 87 BPM | TEMPERATURE: 97.4 F | RESPIRATION RATE: 16 BRPM

## 2023-03-09 DIAGNOSIS — D80.1 HYPOGAMMAGLOBULINEMIA (HCC): Primary | ICD-10-CM

## 2023-03-09 RX ORDER — SODIUM CHLORIDE 9 MG/ML
20 INJECTION, SOLUTION INTRAVENOUS ONCE
OUTPATIENT
Start: 2023-03-30

## 2023-03-09 RX ORDER — ALPRAZOLAM 0.5 MG/1
TABLET ORAL
COMMUNITY
Start: 2023-01-18

## 2023-03-09 RX ORDER — ACETAMINOPHEN 325 MG/1
650 TABLET ORAL ONCE
OUTPATIENT
Start: 2023-03-30

## 2023-03-09 RX ORDER — SODIUM CHLORIDE 9 MG/ML
20 INJECTION, SOLUTION INTRAVENOUS ONCE
Status: COMPLETED | OUTPATIENT
Start: 2023-03-09 | End: 2023-03-09

## 2023-03-09 RX ORDER — LIFITEGRAST 50 MG/ML
SOLUTION/ DROPS OPHTHALMIC
COMMUNITY
Start: 2023-02-01

## 2023-03-09 RX ORDER — CYANOCOBALAMIN 1000 UG/ML
INJECTION, SOLUTION INTRAMUSCULAR; SUBCUTANEOUS
COMMUNITY
Start: 2023-02-21

## 2023-03-09 RX ORDER — ACETAMINOPHEN 325 MG/1
650 TABLET ORAL ONCE
Status: DISCONTINUED | OUTPATIENT
Start: 2023-03-09 | End: 2023-03-13 | Stop reason: HOSPADM

## 2023-03-09 RX ADMIN — SODIUM CHLORIDE 20 ML/HR: 0.9 INJECTION, SOLUTION INTRAVENOUS at 08:17

## 2023-03-09 RX ADMIN — DEXAMETHASONE SODIUM PHOSPHATE 10 MG: 10 INJECTION, SOLUTION INTRAMUSCULAR; INTRAVENOUS at 08:44

## 2023-03-09 RX ADMIN — Medication 40 G: at 10:12

## 2023-03-09 RX ADMIN — FAMOTIDINE 20 MG: 10 INJECTION INTRAVENOUS at 08:19

## 2023-03-09 RX ADMIN — SODIUM CHLORIDE 500 ML: 0.9 INJECTION, SOLUTION INTRAVENOUS at 08:20

## 2023-03-09 RX ADMIN — DIPHENHYDRAMINE HYDROCHLORIDE 25 MG: 50 INJECTION INTRAMUSCULAR; INTRAVENOUS at 09:25

## 2023-03-17 DIAGNOSIS — E53.8 B12 DEFICIENCY: Primary | ICD-10-CM

## 2023-03-17 DIAGNOSIS — D50.9 IRON DEFICIENCY ANEMIA, UNSPECIFIED IRON DEFICIENCY ANEMIA TYPE: ICD-10-CM

## 2023-04-05 DIAGNOSIS — D80.1 HYPOGAMMAGLOBULINEMIA (HCC): Primary | ICD-10-CM

## 2023-04-05 RX ORDER — SODIUM CHLORIDE 9 MG/ML
20 INJECTION, SOLUTION INTRAVENOUS ONCE
Status: CANCELLED | OUTPATIENT
Start: 2023-04-06

## 2023-04-05 RX ORDER — ACETAMINOPHEN 325 MG/1
650 TABLET ORAL ONCE
Status: CANCELLED | OUTPATIENT
Start: 2023-04-06

## 2023-04-06 ENCOUNTER — HOSPITAL ENCOUNTER (OUTPATIENT)
Dept: INFUSION CENTER | Facility: HOSPITAL | Age: 53
End: 2023-04-06
Attending: INTERNAL MEDICINE

## 2023-04-06 VITALS
DIASTOLIC BLOOD PRESSURE: 76 MMHG | RESPIRATION RATE: 16 BRPM | HEART RATE: 80 BPM | SYSTOLIC BLOOD PRESSURE: 127 MMHG | OXYGEN SATURATION: 96 % | TEMPERATURE: 96.7 F

## 2023-04-06 DIAGNOSIS — D80.1 HYPOGAMMAGLOBULINEMIA (HCC): Primary | ICD-10-CM

## 2023-04-06 DIAGNOSIS — D50.9 IRON DEFICIENCY ANEMIA, UNSPECIFIED IRON DEFICIENCY ANEMIA TYPE: ICD-10-CM

## 2023-04-06 DIAGNOSIS — E53.8 B12 DEFICIENCY: Primary | ICD-10-CM

## 2023-04-06 RX ORDER — SODIUM CHLORIDE 9 MG/ML
20 INJECTION, SOLUTION INTRAVENOUS ONCE
Status: COMPLETED | OUTPATIENT
Start: 2023-04-06 | End: 2023-04-06

## 2023-04-06 RX ORDER — ACETAMINOPHEN 325 MG/1
650 TABLET ORAL ONCE
Status: ACTIVE | OUTPATIENT
Start: 2023-04-06

## 2023-04-06 RX ORDER — SODIUM CHLORIDE 9 MG/ML
20 INJECTION, SOLUTION INTRAVENOUS ONCE
OUTPATIENT
Start: 2023-04-27

## 2023-04-06 RX ORDER — ACETAMINOPHEN 325 MG/1
650 TABLET ORAL ONCE
OUTPATIENT
Start: 2023-04-27

## 2023-04-06 RX ADMIN — Medication 40 G: at 10:00

## 2023-04-06 RX ADMIN — FAMOTIDINE 20 MG: 10 INJECTION INTRAVENOUS at 09:32

## 2023-04-06 RX ADMIN — DEXAMETHASONE SODIUM PHOSPHATE 10 MG: 10 INJECTION, SOLUTION INTRAMUSCULAR; INTRAVENOUS at 08:35

## 2023-04-06 RX ADMIN — DIPHENHYDRAMINE HYDROCHLORIDE 25 MG: 50 INJECTION, SOLUTION INTRAMUSCULAR; INTRAVENOUS at 09:11

## 2023-04-06 RX ADMIN — SODIUM CHLORIDE 500 ML: 0.9 INJECTION, SOLUTION INTRAVENOUS at 08:36

## 2023-04-06 RX ADMIN — SODIUM CHLORIDE 20 ML/HR: 0.9 INJECTION, SOLUTION INTRAVENOUS at 08:32

## 2023-04-06 NOTE — PROGRESS NOTES
Pt arrived to infusion center stating that she has been bruising that isn't going away post-treatments, is losing handfulls and clumps of hair, and has been SOB with racing HR recently (was seen by cardiologist and pt states there is nothing they are very concerned about)  Gigi Lenz RN notified and per Ashley Euceda, pt should have blood work performed and see her PCP if lab results are normal  Pt made aware and verbalized understanding  Tolerated IVIG infusion well without incident and pt discharged in stable condition  AVS provided

## 2023-04-07 ENCOUNTER — APPOINTMENT (OUTPATIENT)
Dept: LAB | Facility: CLINIC | Age: 53
End: 2023-04-07

## 2023-04-07 DIAGNOSIS — E53.8 B12 DEFICIENCY: ICD-10-CM

## 2023-04-07 DIAGNOSIS — D50.9 IRON DEFICIENCY ANEMIA, UNSPECIFIED IRON DEFICIENCY ANEMIA TYPE: ICD-10-CM

## 2023-04-07 LAB
FERRITIN SERPL-MCNC: 20 NG/ML (ref 8–388)
FOLATE SERPL-MCNC: 16.1 NG/ML (ref 3.1–17.5)
IRON SATN MFR SERPL: 21 % (ref 15–50)
IRON SERPL-MCNC: 77 UG/DL (ref 50–170)
TIBC SERPL-MCNC: 369 UG/DL (ref 250–450)
VIT B12 SERPL-MCNC: 518 PG/ML (ref 100–900)

## 2023-04-10 PROBLEM — D50.9 IRON DEFICIENCY ANEMIA, UNSPECIFIED: Status: ACTIVE | Noted: 2023-04-10

## 2023-04-18 ENCOUNTER — HOSPITAL ENCOUNTER (OUTPATIENT)
Dept: INFUSION CENTER | Facility: HOSPITAL | Age: 53
Discharge: HOME | End: 2023-04-18
Attending: INTERNAL MEDICINE

## 2023-04-18 VITALS
RESPIRATION RATE: 18 BRPM | OXYGEN SATURATION: 100 % | SYSTOLIC BLOOD PRESSURE: 159 MMHG | HEART RATE: 96 BPM | TEMPERATURE: 97.2 F | DIASTOLIC BLOOD PRESSURE: 96 MMHG

## 2023-04-18 DIAGNOSIS — D50.9 IRON DEFICIENCY ANEMIA, UNSPECIFIED IRON DEFICIENCY ANEMIA TYPE: Primary | ICD-10-CM

## 2023-04-18 RX ORDER — SODIUM CHLORIDE 9 MG/ML
20 INJECTION, SOLUTION INTRAVENOUS ONCE
Status: CANCELLED | OUTPATIENT
Start: 2023-04-25

## 2023-04-18 RX ORDER — SODIUM CHLORIDE 9 MG/ML
20 INJECTION, SOLUTION INTRAVENOUS ONCE
Status: COMPLETED | OUTPATIENT
Start: 2023-04-18 | End: 2023-04-18

## 2023-04-18 RX ADMIN — SODIUM CHLORIDE 20 ML/HR: 0.9 INJECTION, SOLUTION INTRAVENOUS at 09:29

## 2023-04-18 RX ADMIN — IRON SUCROSE 200 MG: 20 INJECTION, SOLUTION INTRAVENOUS at 09:29

## 2023-04-18 NOTE — PROGRESS NOTES
Venofer given without incident  Patient offers no complaints on D/C  AVS declined, she is aware of her next appointment

## 2023-04-27 ENCOUNTER — HOSPITAL ENCOUNTER (OUTPATIENT)
Dept: INFUSION CENTER | Facility: HOSPITAL | Age: 53
Discharge: HOME | End: 2023-04-27
Attending: INTERNAL MEDICINE

## 2023-04-27 VITALS
DIASTOLIC BLOOD PRESSURE: 88 MMHG | SYSTOLIC BLOOD PRESSURE: 134 MMHG | TEMPERATURE: 97.5 F | HEART RATE: 86 BPM | RESPIRATION RATE: 18 BRPM

## 2023-04-27 DIAGNOSIS — D50.9 IRON DEFICIENCY ANEMIA, UNSPECIFIED IRON DEFICIENCY ANEMIA TYPE: ICD-10-CM

## 2023-04-27 DIAGNOSIS — D80.1 HYPOGAMMAGLOBULINEMIA (HCC): Primary | ICD-10-CM

## 2023-04-27 RX ORDER — ACETAMINOPHEN 325 MG/1
650 TABLET ORAL ONCE
Status: DISCONTINUED | OUTPATIENT
Start: 2023-04-27 | End: 2023-05-01 | Stop reason: HOSPADM

## 2023-04-27 RX ORDER — SODIUM CHLORIDE 9 MG/ML
20 INJECTION, SOLUTION INTRAVENOUS ONCE
Status: COMPLETED | OUTPATIENT
Start: 2023-04-27 | End: 2023-04-27

## 2023-04-27 RX ORDER — SODIUM CHLORIDE 9 MG/ML
20 INJECTION, SOLUTION INTRAVENOUS ONCE
Status: CANCELLED | OUTPATIENT
Start: 2023-04-27

## 2023-04-27 RX ORDER — ACETAMINOPHEN 325 MG/1
650 TABLET ORAL ONCE
OUTPATIENT
Start: 2023-05-18

## 2023-04-27 RX ORDER — SODIUM CHLORIDE 9 MG/ML
20 INJECTION, SOLUTION INTRAVENOUS ONCE
Status: CANCELLED | OUTPATIENT
Start: 2023-05-04

## 2023-04-27 RX ORDER — SODIUM CHLORIDE 9 MG/ML
20 INJECTION, SOLUTION INTRAVENOUS ONCE
OUTPATIENT
Start: 2023-05-18

## 2023-04-27 RX ADMIN — Medication 40 G: at 11:08

## 2023-04-27 RX ADMIN — SODIUM CHLORIDE 500 ML: 0.9 INJECTION, SOLUTION INTRAVENOUS at 09:33

## 2023-04-27 RX ADMIN — DEXAMETHASONE SODIUM PHOSPHATE 10 MG: 10 INJECTION, SOLUTION INTRAMUSCULAR; INTRAVENOUS at 09:33

## 2023-04-27 RX ADMIN — FAMOTIDINE 20 MG: 10 INJECTION INTRAVENOUS at 10:31

## 2023-04-27 RX ADMIN — IRON SUCROSE 200 MG: 20 INJECTION, SOLUTION INTRAVENOUS at 08:37

## 2023-04-27 RX ADMIN — DIPHENHYDRAMINE HYDROCHLORIDE 25 MG: 50 INJECTION INTRAMUSCULAR; INTRAVENOUS at 10:08

## 2023-04-27 RX ADMIN — SODIUM CHLORIDE 20 ML/HR: 0.9 INJECTION, SOLUTION INTRAVENOUS at 08:37

## 2023-04-27 RX ADMIN — SODIUM CHLORIDE 20 ML/HR: 0.9 INJECTION, SOLUTION INTRAVENOUS at 09:32

## 2023-04-27 NOTE — PROGRESS NOTES
Pt here for Privigen and venofer infusions, offering no complaints  Left arm IV placed with positive blood return noted  Tolerated infusion without incident  PIV removed  AVS declined  Walked out in stable condition

## 2023-05-04 ENCOUNTER — HOSPITAL ENCOUNTER (OUTPATIENT)
Dept: INFUSION CENTER | Facility: HOSPITAL | Age: 53
End: 2023-05-04
Attending: INTERNAL MEDICINE

## 2023-05-04 VITALS
TEMPERATURE: 96.9 F | RESPIRATION RATE: 18 BRPM | OXYGEN SATURATION: 100 % | DIASTOLIC BLOOD PRESSURE: 88 MMHG | HEART RATE: 83 BPM | SYSTOLIC BLOOD PRESSURE: 145 MMHG

## 2023-05-04 DIAGNOSIS — D50.9 IRON DEFICIENCY ANEMIA, UNSPECIFIED IRON DEFICIENCY ANEMIA TYPE: Primary | ICD-10-CM

## 2023-05-04 RX ORDER — SODIUM CHLORIDE 9 MG/ML
20 INJECTION, SOLUTION INTRAVENOUS ONCE
Status: COMPLETED | OUTPATIENT
Start: 2023-05-04 | End: 2023-05-04

## 2023-05-04 RX ORDER — SODIUM CHLORIDE 9 MG/ML
20 INJECTION, SOLUTION INTRAVENOUS ONCE
Status: CANCELLED | OUTPATIENT
Start: 2023-05-11

## 2023-05-04 RX ADMIN — SODIUM CHLORIDE 20 ML/HR: 0.9 INJECTION, SOLUTION INTRAVENOUS at 08:58

## 2023-05-04 RX ADMIN — IRON SUCROSE 200 MG: 20 INJECTION, SOLUTION INTRAVENOUS at 08:59

## 2023-05-11 ENCOUNTER — HOSPITAL ENCOUNTER (OUTPATIENT)
Dept: INFUSION CENTER | Facility: HOSPITAL | Age: 53
Discharge: HOME | End: 2023-05-11
Attending: INTERNAL MEDICINE

## 2023-05-11 VITALS
BODY MASS INDEX: 32.37 KG/M2 | TEMPERATURE: 97.8 F | HEIGHT: 62 IN | OXYGEN SATURATION: 98 % | DIASTOLIC BLOOD PRESSURE: 91 MMHG | SYSTOLIC BLOOD PRESSURE: 141 MMHG | RESPIRATION RATE: 18 BRPM | HEART RATE: 114 BPM

## 2023-05-11 DIAGNOSIS — D50.9 IRON DEFICIENCY ANEMIA, UNSPECIFIED IRON DEFICIENCY ANEMIA TYPE: Primary | ICD-10-CM

## 2023-05-11 RX ORDER — SODIUM CHLORIDE 9 MG/ML
20 INJECTION, SOLUTION INTRAVENOUS ONCE
Status: CANCELLED | OUTPATIENT
Start: 2023-05-18

## 2023-05-11 RX ORDER — SODIUM CHLORIDE 9 MG/ML
20 INJECTION, SOLUTION INTRAVENOUS ONCE
Status: COMPLETED | OUTPATIENT
Start: 2023-05-11 | End: 2023-05-11

## 2023-05-11 RX ADMIN — IRON SUCROSE 200 MG: 20 INJECTION, SOLUTION INTRAVENOUS at 13:01

## 2023-05-11 RX ADMIN — SODIUM CHLORIDE 20 ML/HR: 0.9 INJECTION, SOLUTION INTRAVENOUS at 13:00

## 2023-05-11 NOTE — PROGRESS NOTES
Patient denies current infection or being on antibiotics  Patient tolerated IV Venofer without reaction or issues  AVS declined  Patient is aware of next appointment  Patient ambulated off unit without incident  All personal belongings taken with patient

## 2023-05-18 ENCOUNTER — HOSPITAL ENCOUNTER (OUTPATIENT)
Dept: INFUSION CENTER | Facility: HOSPITAL | Age: 53
End: 2023-05-18
Attending: INTERNAL MEDICINE

## 2023-05-18 VITALS
OXYGEN SATURATION: 99 % | SYSTOLIC BLOOD PRESSURE: 136 MMHG | RESPIRATION RATE: 18 BRPM | DIASTOLIC BLOOD PRESSURE: 83 MMHG | TEMPERATURE: 97.4 F | HEART RATE: 86 BPM

## 2023-05-18 DIAGNOSIS — D50.9 IRON DEFICIENCY ANEMIA, UNSPECIFIED IRON DEFICIENCY ANEMIA TYPE: ICD-10-CM

## 2023-05-18 DIAGNOSIS — D80.1 HYPOGAMMAGLOBULINEMIA (HCC): Primary | ICD-10-CM

## 2023-05-18 RX ORDER — SODIUM CHLORIDE 9 MG/ML
20 INJECTION, SOLUTION INTRAVENOUS ONCE
OUTPATIENT
Start: 2023-06-08

## 2023-05-18 RX ORDER — SODIUM CHLORIDE 9 MG/ML
20 INJECTION, SOLUTION INTRAVENOUS ONCE
Status: COMPLETED | OUTPATIENT
Start: 2023-05-18 | End: 2023-05-18

## 2023-05-18 RX ORDER — ACETAMINOPHEN 325 MG/1
650 TABLET ORAL ONCE
Status: COMPLETED | OUTPATIENT
Start: 2023-05-18 | End: 2023-05-18

## 2023-05-18 RX ORDER — ACETAMINOPHEN 325 MG/1
650 TABLET ORAL ONCE
OUTPATIENT
Start: 2023-06-08

## 2023-05-18 RX ORDER — SODIUM CHLORIDE 9 MG/ML
20 INJECTION, SOLUTION INTRAVENOUS ONCE
Status: CANCELLED | OUTPATIENT
Start: 2023-05-18

## 2023-05-18 RX ADMIN — Medication 40 G: at 10:05

## 2023-05-18 RX ADMIN — DIPHENHYDRAMINE HYDROCHLORIDE 25 MG: 50 INJECTION, SOLUTION INTRAMUSCULAR; INTRAVENOUS at 09:14

## 2023-05-18 RX ADMIN — SODIUM CHLORIDE 20 ML/HR: 0.9 INJECTION, SOLUTION INTRAVENOUS at 08:44

## 2023-05-18 RX ADMIN — DEXAMETHASONE SODIUM PHOSPHATE 10 MG: 10 INJECTION, SOLUTION INTRAMUSCULAR; INTRAVENOUS at 08:44

## 2023-05-18 RX ADMIN — FAMOTIDINE 20 MG: 10 INJECTION, SOLUTION INTRAVENOUS at 09:35

## 2023-05-18 RX ADMIN — SODIUM CHLORIDE 500 ML: 0.9 INJECTION, SOLUTION INTRAVENOUS at 08:45

## 2023-05-18 RX ADMIN — ACETAMINOPHEN 650 MG: 325 TABLET ORAL at 08:41

## 2023-05-18 RX ADMIN — SODIUM CHLORIDE 200 MG: 9 INJECTION, SOLUTION INTRAVENOUS at 08:07

## 2023-05-18 RX ADMIN — SODIUM CHLORIDE 20 ML/HR: 0.9 INJECTION, SOLUTION INTRAVENOUS at 08:05

## 2023-05-18 NOTE — PROGRESS NOTES
Pt tolerated venofer and IVIG infusions well without incident  Discharged in stable condition and pt aware of next infusion appointment in 3 weeks  AVS provided

## 2023-06-06 ENCOUNTER — APPOINTMENT (OUTPATIENT)
Dept: LAB | Facility: CLINIC | Age: 53
End: 2023-06-06
Payer: COMMERCIAL

## 2023-06-06 DIAGNOSIS — D80.1 HYPOGAMMAGLOBULINEMIA (HCC): ICD-10-CM

## 2023-06-06 DIAGNOSIS — Z98.84 H/O GASTRIC BYPASS: ICD-10-CM

## 2023-06-06 DIAGNOSIS — E53.8 B12 DEFICIENCY: ICD-10-CM

## 2023-06-06 LAB
ALBUMIN SERPL BCP-MCNC: 3.4 G/DL (ref 3.5–5)
ALP SERPL-CCNC: 85 U/L (ref 46–116)
ALT SERPL W P-5'-P-CCNC: 20 U/L (ref 12–78)
ANION GAP SERPL CALCULATED.3IONS-SCNC: 2 MMOL/L (ref 4–13)
AST SERPL W P-5'-P-CCNC: 16 U/L (ref 5–45)
BASOPHILS # BLD AUTO: 0.05 THOUSANDS/ÂΜL (ref 0–0.1)
BASOPHILS NFR BLD AUTO: 1 % (ref 0–1)
BILIRUB SERPL-MCNC: 0.84 MG/DL (ref 0.2–1)
BUN SERPL-MCNC: 8 MG/DL (ref 5–25)
CALCIUM ALBUM COR SERPL-MCNC: 9.5 MG/DL (ref 8.3–10.1)
CALCIUM SERPL-MCNC: 9 MG/DL (ref 8.3–10.1)
CHLORIDE SERPL-SCNC: 108 MMOL/L (ref 96–108)
CO2 SERPL-SCNC: 27 MMOL/L (ref 21–32)
CREAT SERPL-MCNC: 0.62 MG/DL (ref 0.6–1.3)
CRP SERPL QL: <3 MG/L
EOSINOPHIL # BLD AUTO: 0.15 THOUSAND/ÂΜL (ref 0–0.61)
EOSINOPHIL NFR BLD AUTO: 2 % (ref 0–6)
ERYTHROCYTE [DISTWIDTH] IN BLOOD BY AUTOMATED COUNT: 14.5 % (ref 11.6–15.1)
ERYTHROCYTE [SEDIMENTATION RATE] IN BLOOD: 21 MM/HOUR (ref 0–29)
FERRITIN SERPL-MCNC: 241 NG/ML (ref 11–307)
GFR SERPL CREATININE-BSD FRML MDRD: 104 ML/MIN/1.73SQ M
GLUCOSE P FAST SERPL-MCNC: 86 MG/DL (ref 65–99)
HCT VFR BLD AUTO: 39.4 % (ref 34.8–46.1)
HGB BLD-MCNC: 13 G/DL (ref 11.5–15.4)
IGA SERPL-MCNC: 122 MG/DL (ref 70–400)
IGG SERPL-MCNC: 866 MG/DL (ref 700–1600)
IGM SERPL-MCNC: 42 MG/DL (ref 40–230)
IMM GRANULOCYTES # BLD AUTO: 0.03 THOUSAND/UL (ref 0–0.2)
IMM GRANULOCYTES NFR BLD AUTO: 0 % (ref 0–2)
IRON SATN MFR SERPL: 33 % (ref 15–50)
IRON SERPL-MCNC: 127 UG/DL (ref 50–170)
LYMPHOCYTES # BLD AUTO: 1.84 THOUSANDS/ÂΜL (ref 0.6–4.47)
LYMPHOCYTES NFR BLD AUTO: 24 % (ref 14–44)
MAGNESIUM SERPL-MCNC: 1.9 MG/DL (ref 1.6–2.6)
MCH RBC QN AUTO: 30.2 PG (ref 26.8–34.3)
MCHC RBC AUTO-ENTMCNC: 33 G/DL (ref 31.4–37.4)
MCV RBC AUTO: 91 FL (ref 82–98)
MONOCYTES # BLD AUTO: 0.5 THOUSAND/ÂΜL (ref 0.17–1.22)
MONOCYTES NFR BLD AUTO: 6 % (ref 4–12)
NEUTROPHILS # BLD AUTO: 5.2 THOUSANDS/ÂΜL (ref 1.85–7.62)
NEUTS SEG NFR BLD AUTO: 67 % (ref 43–75)
NRBC BLD AUTO-RTO: 0 /100 WBCS
PLATELET # BLD AUTO: 312 THOUSANDS/UL (ref 149–390)
PMV BLD AUTO: 9.6 FL (ref 8.9–12.7)
POTASSIUM SERPL-SCNC: 4.2 MMOL/L (ref 3.5–5.3)
PROT SERPL-MCNC: 6.9 G/DL (ref 6.4–8.4)
RBC # BLD AUTO: 4.31 MILLION/UL (ref 3.81–5.12)
SODIUM SERPL-SCNC: 137 MMOL/L (ref 135–147)
TIBC SERPL-MCNC: 382 UG/DL (ref 250–450)
VIT B12 SERPL-MCNC: 510 PG/ML (ref 180–914)
WBC # BLD AUTO: 7.77 THOUSAND/UL (ref 4.31–10.16)

## 2023-06-06 PROCEDURE — 83540 ASSAY OF IRON: CPT

## 2023-06-06 PROCEDURE — 83550 IRON BINDING TEST: CPT

## 2023-06-06 PROCEDURE — 83735 ASSAY OF MAGNESIUM: CPT

## 2023-06-06 PROCEDURE — 82784 ASSAY IGA/IGD/IGG/IGM EACH: CPT

## 2023-06-06 PROCEDURE — 85025 COMPLETE CBC W/AUTO DIFF WBC: CPT

## 2023-06-06 PROCEDURE — 82607 VITAMIN B-12: CPT

## 2023-06-06 PROCEDURE — 85652 RBC SED RATE AUTOMATED: CPT

## 2023-06-06 PROCEDURE — 80053 COMPREHEN METABOLIC PANEL: CPT

## 2023-06-06 PROCEDURE — 82728 ASSAY OF FERRITIN: CPT

## 2023-06-06 PROCEDURE — 36415 COLL VENOUS BLD VENIPUNCTURE: CPT

## 2023-06-06 PROCEDURE — 86140 C-REACTIVE PROTEIN: CPT

## 2023-06-08 ENCOUNTER — HOSPITAL ENCOUNTER (OUTPATIENT)
Dept: INFUSION CENTER | Facility: HOSPITAL | Age: 53
End: 2023-06-08
Attending: INTERNAL MEDICINE
Payer: COMMERCIAL

## 2023-06-08 VITALS
TEMPERATURE: 96.9 F | HEART RATE: 98 BPM | RESPIRATION RATE: 18 BRPM | SYSTOLIC BLOOD PRESSURE: 146 MMHG | DIASTOLIC BLOOD PRESSURE: 86 MMHG

## 2023-06-08 DIAGNOSIS — D80.1 HYPOGAMMAGLOBULINEMIA (HCC): Primary | ICD-10-CM

## 2023-06-08 PROCEDURE — 96367 TX/PROPH/DG ADDL SEQ IV INF: CPT

## 2023-06-08 PROCEDURE — 96365 THER/PROPH/DIAG IV INF INIT: CPT

## 2023-06-08 PROCEDURE — 96375 TX/PRO/DX INJ NEW DRUG ADDON: CPT

## 2023-06-08 PROCEDURE — 96366 THER/PROPH/DIAG IV INF ADDON: CPT

## 2023-06-08 RX ORDER — SODIUM CHLORIDE 9 MG/ML
20 INJECTION, SOLUTION INTRAVENOUS ONCE
Status: COMPLETED | OUTPATIENT
Start: 2023-06-08 | End: 2023-06-08

## 2023-06-08 RX ORDER — SODIUM CHLORIDE 9 MG/ML
20 INJECTION, SOLUTION INTRAVENOUS ONCE
OUTPATIENT
Start: 2023-06-29

## 2023-06-08 RX ORDER — ACETAMINOPHEN 325 MG/1
650 TABLET ORAL ONCE
OUTPATIENT
Start: 2023-06-29

## 2023-06-08 RX ORDER — ACETAMINOPHEN 325 MG/1
650 TABLET ORAL ONCE
Status: DISCONTINUED | OUTPATIENT
Start: 2023-06-08 | End: 2023-06-12 | Stop reason: HOSPADM

## 2023-06-08 RX ADMIN — DIPHENHYDRAMINE HYDROCHLORIDE 25 MG: 50 INJECTION INTRAMUSCULAR; INTRAVENOUS at 10:20

## 2023-06-08 RX ADMIN — FAMOTIDINE 20 MG: 10 INJECTION INTRAVENOUS at 10:41

## 2023-06-08 RX ADMIN — Medication 40 G: at 11:11

## 2023-06-08 RX ADMIN — SODIUM CHLORIDE 20 ML/HR: 0.9 INJECTION, SOLUTION INTRAVENOUS at 09:50

## 2023-06-08 RX ADMIN — SODIUM CHLORIDE 500 ML: 0.9 INJECTION, SOLUTION INTRAVENOUS at 09:58

## 2023-06-08 RX ADMIN — DEXAMETHASONE SODIUM PHOSPHATE 10 MG: 10 INJECTION, SOLUTION INTRAMUSCULAR; INTRAVENOUS at 09:51

## 2023-06-08 NOTE — PROGRESS NOTES
IVIG given without incident, next appointment made  AVS declined  Patient left unit in stable condition

## 2023-06-15 ENCOUNTER — OFFICE VISIT (OUTPATIENT)
Dept: HEMATOLOGY ONCOLOGY | Facility: CLINIC | Age: 53
End: 2023-06-15
Payer: COMMERCIAL

## 2023-06-15 VITALS
RESPIRATION RATE: 18 BRPM | WEIGHT: 171 LBS | HEIGHT: 62 IN | OXYGEN SATURATION: 99 % | DIASTOLIC BLOOD PRESSURE: 78 MMHG | SYSTOLIC BLOOD PRESSURE: 132 MMHG | BODY MASS INDEX: 31.47 KG/M2 | TEMPERATURE: 97.2 F | HEART RATE: 66 BPM

## 2023-06-15 DIAGNOSIS — D80.1 HYPOGAMMAGLOBULINEMIA (HCC): Primary | ICD-10-CM

## 2023-06-15 DIAGNOSIS — E53.8 B12 DEFICIENCY: ICD-10-CM

## 2023-06-15 DIAGNOSIS — Z98.84 H/O GASTRIC BYPASS: ICD-10-CM

## 2023-06-15 DIAGNOSIS — D50.8 IRON DEFICIENCY ANEMIA SECONDARY TO INADEQUATE DIETARY IRON INTAKE: ICD-10-CM

## 2023-06-15 PROCEDURE — 99214 OFFICE O/P EST MOD 30 MIN: CPT | Performed by: NURSE PRACTITIONER

## 2023-06-15 NOTE — PROGRESS NOTES
Hematology/Oncology Outpatient Follow-up  Yovanny Liang 46 y o  female 1970 [id number]    Date:  6/15/2023      Assessment and Plan:  1  Hypogammaglobulinemia ([address] )  Patient will be continued on her current treatment with IVIG 40 mg (500 mg/kg) on an every three-week basis  She states that since her dose was adjusted to every 3 week dosing she has not had any significant/serious infections  She continues to get some mild sinus infections but are easily controlled and resolved quickly  We will continue to monitor her and her laboratory studies on a regular basis  Request she follow-up again with repeat labs in 6 months from now or sooner should the need arise     - CBC and differential; Future  - Comprehensive metabolic panel; Future  - IgG, IgA, IgM; Future    2  Iron deficiency anemia secondary to inadequate dietary iron intake  Patient is not currently anemic or iron deficient  She was recently found to have iron deficiency ferritin of 20 which was treated with 5 doses of IV Venofer April/May 2023  Etiology of her iron deficiency is likely malabsorption from her prior bariatric surgery  Will continue to monitor closely  - CBC and differential; Future  - Comprehensive metabolic panel; Future  - C-reactive protein; Future  - Sedimentation rate, automated; Future  - Vitamin B12; Future  - Iron Panel (Includes Ferritin, Iron Sat%, Iron, and TIBC); Future  - Ferritin; Future  - IgG, IgA, IgM; Future  - Folate; Future    3  B12 deficiency  Patient will continue to give herself vitamin B12 injections every 2 weeks which is maintaining her levels greater than 400     - Vitamin B12; Future    4  H/O gastric bypass  - Vitamin B12; Future  - Iron Panel (Includes Ferritin, Iron Sat%, Iron, and TIBC); Future  - Ferritin; Future  - Folate;  Future    HPI:  This is a 48-year-old female with multiple comorbid conditions including history of morbid obesity status post post gastric bypass surgery in 2009, anemia due to iron deficiency, cholecystectomy, hysterectomy, hypertension, gastroesophageal reflux disease, depression, asthma, chronic sinusitis, etc      The patient was apparently found to have hypogammaglobinemia on multiple occasions with frequent/recurrent infections mainly in the upper respiratory airway with chronic sinusitis  The patient was evaluated by the Hematology service from [facility] and [facility] on a monthly basis   She received then 3-4 sessions of IVIG which improved her chronic sinusitis and other infectious process, according to the patient, significantly   However, due to the COVID-19 pandemic she stopped doing the IVIG and decided to transfer her care to our service since she lives close by  Her immunoglobulin levels from 07/17/2019 before she had any IVIG treatment showed IgG of 554, IgA of 116 and IgM of 39 mg/dL   She was started on vitamin B12 injections every 2 weeks  Interval history:  Patient presents today for follow-up visit  She continues to get her IVIG every 3 weeks  She states that she does get mild sinus infections at times but seem to be mild and easily resolvable  She was found to have iron deficiency after her last office visit ferritin 20 which was treated with IV iron Venofer 200 mg weekly for 5 sessions total   She states initially she was feeling better after the IV iron but is again feeling fatigued and noticing significant hair loss again  Her easy bruising has definitely improved after the IV iron replacement  She states that she has seen dermatology and endocrinology regarding her ongoing hair loss; endocrinology is trialing her on a low-dose levothyroxine to see if this improves symptoms  Otherwise she has no new complaints  Her most recent laboratory studies from 6/6/2023 showed normal CBC hemoglobin 13 0  Albumin 3 4 otherwise remaining metabolic panel is appropriate  Inflammatory markers are not elevated    Her immunoglobulins are all in the normal range; IgG 866  B12 is appropriate on supplementation 510  She is no longer iron deficient iron saturation 33%, ferritin 241  ROS: Review of Systems   Constitutional: Positive for fatigue  Negative for activity change, appetite change, chills, fever and unexpected weight change  HENT: Negative for congestion, mouth sores, nosebleeds, sore throat and trouble swallowing  Eyes: Negative  Respiratory: Negative for cough, chest tightness and shortness of breath  Cardiovascular: Negative for chest pain, palpitations and leg swelling  Gastrointestinal: Positive for abdominal pain (chronic), constipation, diarrhea and nausea  Negative for abdominal distention, blood in stool and vomiting  Genitourinary: Negative for difficulty urinating, dysuria, flank pain, frequency, hematuria and urgency  Musculoskeletal: Positive for arthralgias and myalgias  Negative for back pain, gait problem and joint swelling  Skin: Negative for color change, pallor and rash  Allergic/Immunologic: Positive for environmental allergies  Neurological: Positive for dizziness (mild), numbness and headaches  Negative for weakness and light-headedness  Hematological: Negative for adenopathy  Psychiatric/Behavioral: Positive for dysphoric mood and sleep disturbance  The patient is not nervous/anxious          Past Medical History:   Diagnosis Date   • Anemia     recurrent   • Anxiety    • Asthma     allergy induced   • Contact lens overwear of both eyes    • Depression    • Diarrhea    • Environmental allergies    • Fibromyalgia    • Fibromyalgia, primary    • GERD (gastroesophageal reflux disease)    • History of transfusion     2008   • Hypertension    • [address]ogammaglobulinemia ([facility] [address] )    • Hypoglycemia after GI (gastrointestinal) surgery    • Immune deficiency disorder (HCC)     IgG deficiency   • Iron (Fe) deficiency anemia    • Kidney stone    • Lumbar herniated disc    • Migraine    • Motion sickness    • PONV (postoperative nausea and vomiting)     severe   • Seasonal allergies    • Wears glasses        Past Surgical History:   Procedure Laterality Date   • ABDOMINAL SURGERY      adhesions   • ABDOMINOPLASTY     • APPENDECTOMY     • AUGMENTATION MAMMAPLASTY     • BREAST IMPLANT REMOVAL Bilateral 2019   • BREAST SURGERY      implant removal   •  SECTION     • CHOLECYSTECTOMY     • COSMETIC SURGERY Bilateral     breast augmentation    • GASTRIC BYPASS         • GASTRIC BYPASS LAPAROSCOPIC N/A 2020    Procedure: ROBOTIC REVISION OF KENYA-EN-Y GASTRIC BYPASS, INTRAOP EGD;  Surgeon: Rosalina Chacon MD;  [facility];  Service: Bariatrics   • HIATAL HERNIA REPAIR     • HYSTERECTOMY     • KNEE ARTHROSCOPY Left    • LYMPH NODE BIOPSY  -    benign   • NERVE BLOCK     • OR ARTHRODESIS MIDTARSOMETATARSAL SINGLE JOINT Left 2016    Procedure: FRIST TMJ FUSION ;  Surgeon: Viet Jeffers DPM;  [facility];  Service: Podiatry   • OR CORRJ HALLUX VALGUS W/SESMDC W/RESCJ PROX PHAL Left 2016    Procedure: SURGICAL MODIFIED WATKINS BUNIONECTOMY FIRST MTPJ;  Surgeon: Viet Jeffers DPM;  [facility];  Service: Podiatry   • OR OSTEOT W/ [address] SHRT/CORRJ METAR XCP 1ST EA Left 2016    Procedure: FOOT SHORTENING OSTEOTOMIES 2ND AND 3RD METATARSAL WITH EXTENSER TENDON RELEASE 3RD AND 4TH TOE;  Surgeon: Viet Jeffers DPM;  [facility];  Service: Podiatry   • OR REMOVAL IMPLANT DEEP Left 2016    Procedure: REMOVAL SYMPTOMATIC  HARDWARE FIRST RAY,RELATED CONTRACTURE SECOND AND THIRD TOES WITH SUSPENSION FOURTH TOE;  Surgeon: Viet Jeffers DPM;  [facility];  Service: Podiatry   • SALPINGOOPHORECTOMY Left 2016    with removal of cervix   • TONSILLECTOMY     • WISDOM TOOTH EXTRACTION         Social History     Socioeconomic History   • Marital status: /Civil Union     Spouse name: None   • Number of children: None   • Years of education: None   • Highest education level: None   Occupational History   • None   Tobacco Use   • Smoking status: Never   • Smokeless tobacco: Never   Vaping Use   • Vaping Use: Never used   Substance and Sexual Activity   • Alcohol use: Yes     Comment: rarely   • Drug use: No   • Sexual activity: None   Other Topics Concern   • None   Social History Narrative    Consumes on average 3 cups of coffee per day     Social Determinants of Health     Financial Resource Strain: Not on file   Food Insecurity: Not on file   Transportation Needs: Not on file   Physical Activity: Not on file   Stress: Not on file   Social Connections: Not on file   Intimate Partner Violence: Not on file   Housing Stability: Not on file       Family History   Problem Relation Age of Onset   • Lymphoma Mother    • COPD Mother    • Arthritis Mother    • Heart disease Father    • Breast cancer Maternal Aunt    • Breast cancer Cousin    • No Known Problems Daughter    • No Known Problems Maternal Grandmother    • No Known Problems Maternal Grandfather    • No Known Problems Paternal Grandmother    • No Known Problems Paternal Grandfather    • Pancreatic cancer Maternal Aunt    • No Known Problems Maternal Aunt    • No Known Problems Maternal Aunt    • Multiple sclerosis Paternal Aunt    • Breast cancer Cousin        Allergies   Allergen Reactions   • Codeine GI Intolerance and Chest Pain     ABDOMINAL PAIN   • Hydrocodone Abdominal Pain and Swelling   • Pregabalin Dizziness   • Acetaminophen-Codeine Abdominal Pain   • Amoxicillin Rash         Current Outpatient Medications:   •  acetaminophen (TYLENOL) 325 mg tablet, Take 3 tablets (975 mg total) by mouth every 8 (eight) hours, Disp: 30 tablet, Rfl: 0  •  ALPRAZolam (XANAX) 0 5 mg tablet, , Disp: , Rfl:   •  ALPRAZolam (XANAX) 2 MG tablet, Take 0 5 mg by mouth daily at bedtime , Disp: , Rfl:   •  Azelastine-Fluticasone (DYMISTA NA), 2 sprays into each nostril 2 (two) times a day , Disp: , Rfl:   •  benzonatate (TESSALON) 200 MG capsule, Take 1 capsule (200 mg total) by mouth 3 (three) times a day as needed for cough, Disp: 90 capsule, Rfl: 0  •  Botulinum Toxin Type A 200 units SOLR, Inject 155 units into face and neck IM every 90 days, Disp: , Rfl:   •  budesonide-formoterol (SYMBICORT) 160-4 5 mcg/act inhaler, Inhale 2 puffs 2 (two) times a day , Disp: , Rfl:   •  buPROPion (WELLBUTRIN) 100 mg tablet, Take 100 mg by mouth 2 (two) times a day , Disp: , Rfl:   •  cholecalciferol (VITAMIN D3) 73744 units capsule, Take 5,000 Units by mouth 2 (two) times a day , Disp: , Rfl:   •  cyanocobalamin 1,000 mcg/mL, , Disp: , Rfl:   •  Cyanocobalamin 1000 MCG/ML KIT, Inject 1 mL (1,000 mcg total) as directed every 14 (fourteen) days, Disp: 6 mL, Rfl: 3  •  cyclobenzaprine (FLEXERIL) 10 mg tablet, Take 10 mg by mouth 3 (three) times a day as needed  , Disp: , Rfl:   •  Erenumab-aooe 70 MG/ML SOAJ, Inject under the skin every 28 days, Disp: , Rfl:   •  estradiol (CLIMARA) 0 06 MG/24HR, estradiol 0 06 mg/24 hr weekly transdermal patch  APPLY 1 PATCH TOPICALLY ONCE A WEEK, Disp: , Rfl:   •  fexofenadine (ALLEGRA) 180 MG tablet, Take 180 mg by mouth daily, Disp: , Rfl:   •  hyoscyamine (LEVSIN/SL) 0 125 mg SL tablet, Take 0 125 mg by mouth every 4 (four) hours as needed for cramping, Disp: , Rfl:   •  Immune Globulin, Human, (PRIVIGEN IV), Inject into a catheter in a vein every 30 (thirty) days, Disp: , Rfl:   •  losartan (COZAAR) 50 mg tablet, Take 50 mg by mouth 2 (two) times a day , Disp: , Rfl:   •  Loteprednol Etabonate (Lotemax SM) 0 38 % GEL, Lotemax SM 0 38 % eye gel drops  INSTILL 1 TWICE DAILY INTO EACH EYE, Disp: , Rfl:   •  magnesium gluconate (MAGONATE) 500 MG tablet, Take 500 mg by mouth daily, Disp: , Rfl:   •  Meribin 5 MG CAPS, Take 1 capsule by mouth daily, Disp: , Rfl:   •  metroNIDAZOLE (METROGEL) 0 75 % gel, APPLY THIN LAYER TOPICALLY TWICE DAILY TO AFFECTED AREA ON "FACE FOR ROSACEA, Disp: , Rfl:   •  minocycline (MINOCIN) 50 mg capsule, minocycline 50 mg capsule  TAKE 1 CAPSULE BY MOUTH ONCE DAILY IN THE EVENING WITH WATER AT LEAST 2 HOURS AFTER DINNER, Disp: , Rfl:   •  montelukast (SINGULAIR) 5 mg chewable tablet, Chew 1 tablet (5 mg total) 2 (two) times a day, Disp: 90 tablet, Rfl: 3  •  ondansetron (ZOFRAN-ODT) 8 mg disintegrating tablet, Take 1 tablet (8 mg total) by mouth every 8 (eight) hours as needed for nausea or vomiting, Disp: 20 tablet, Rfl: 3  •  SUMAtriptan (IMITREX) 100 mg tablet, Take 100 mg by mouth 2 (two) times a day as needed for migraine  , Disp: , Rfl:   •  Varenicline Tartrate (Tyrvaya) 0 03 MG/ACT SOLN, into each nostril, Disp: , Rfl:   •  Xiidra 5 % op solution, , Disp: , Rfl:   •  albuterol (PROVENTIL HFA,VENTOLIN HFA) 90 mcg/act inhaler, Inhale 1 puff every 6 (six) hours as needed As needed (Patient not taking: Reported on 6/9/2022), Disp: , Rfl:   •  hydroxychloroquine (PLAQUENIL) 200 mg tablet, Take 200 mg by mouth 2 (two) times a day with meals (Patient not taking: Reported on 4/25/2023), Disp: , Rfl:   •  meclizine (ANTIVERT) 25 mg tablet, Take 1 tablet (25 mg total) by mouth 3 (three) times a day as needed for dizziness for up to 12 doses (Patient not taking: Reported on 1/4/2023), Disp: 12 tablet, Rfl: 0  •  Paxlovid, 300/100, tablet therapy pack, TAKE 2 TABLETS NIRMATRELVIR AND 1 TABLET RITONAVIR BY MOUTH TWICE DAILY FOR 5 DAYS TAKE NIRMATRELVIR AND RITONAVIR TABLETS TOGETHER (Patient not taking: Reported on 11/15/2022), Disp: , Rfl:   •  sucralfate (CARAFATE) 1 g/10 mL suspension, Take 10 mL (1 g total) by mouth 4 (four) times a day, Disp: 1200 mL, Rfl: 1  •  traMADol (ULTRAM) 50 mg tablet, Take 50 mg by mouth 2 (two) times a day , Disp: , Rfl:       Physical Exam:  /78 (BP Location: Right arm, Patient Position: Sitting, Cuff Size: Adult)   Pulse 66   Temp (!) 97 2 °F (36 2 °C)   Resp 18   Ht 5' 2\" (1 575 m)   Wt 77 6 kg (171 lb)  " SpO2 99%   BMI 31 28 kg/m²     Physical Exam  Vitals reviewed  Constitutional:       General: She is not in acute distress  Appearance: She is well-developed  She is not diaphoretic  HENT:      Head: Normocephalic and atraumatic  Eyes:      General: No scleral icterus  Conjunctiva/sclera: Conjunctivae normal       Pupils: Pupils are equal, round, and reactive to light  Neck:      Thyroid: No thyromegaly  Cardiovascular:      Rate and Rhythm: Normal rate and regular rhythm  Heart sounds: Normal heart sounds  No murmur heard  Pulmonary:      Effort: Pulmonary effort is normal  No respiratory distress  Breath sounds: Normal breath sounds  Abdominal:      General: There is no distension  Palpations: Abdomen is soft  There is no hepatomegaly or splenomegaly  Tenderness: There is no abdominal tenderness  Musculoskeletal:         General: No swelling  Normal range of motion  Cervical back: Normal range of motion and neck supple  Lymphadenopathy:      Cervical: No cervical adenopathy  Upper Body:      Right upper body: No axillary adenopathy  Left upper body: No axillary adenopathy  Skin:     General: Skin is warm and dry  Findings: No erythema or rash  Neurological:      General: No focal deficit present  Mental Status: She is alert and oriented to person, place, and time  Psychiatric:         Mood and Affect: Mood and affect normal          Behavior: Behavior normal  Behavior is cooperative  Thought Content: Thought content normal          Judgment: Judgment normal            Labs:  Lab Results   Component Value Date    HCT 39 4 06/06/2023    HGB 13 0 06/06/2023    MCV 91 06/06/2023     06/06/2023    WBC 7 77 06/06/2023        Patient voiced understanding and agreement in the above discussion  Aware to contact our office with questions/symptoms in the interim  This note has been generated by voice recognition software system  Therefore, there may be spelling, grammar, and or syntax errors  Please contact if questions arise

## 2023-06-29 ENCOUNTER — TELEPHONE (OUTPATIENT)
Dept: BARIATRICS | Facility: CLINIC | Age: 53
End: 2023-06-29

## 2023-06-29 ENCOUNTER — HOSPITAL ENCOUNTER (OUTPATIENT)
Dept: INFUSION CENTER | Facility: HOSPITAL | Age: 53
End: 2023-06-29
Attending: INTERNAL MEDICINE
Payer: COMMERCIAL

## 2023-06-29 VITALS
TEMPERATURE: 97.6 F | OXYGEN SATURATION: 97 % | SYSTOLIC BLOOD PRESSURE: 138 MMHG | RESPIRATION RATE: 18 BRPM | HEART RATE: 74 BPM | DIASTOLIC BLOOD PRESSURE: 87 MMHG

## 2023-06-29 DIAGNOSIS — D80.1 HYPOGAMMAGLOBULINEMIA (HCC): Primary | ICD-10-CM

## 2023-06-29 PROCEDURE — 96375 TX/PRO/DX INJ NEW DRUG ADDON: CPT

## 2023-06-29 PROCEDURE — 96365 THER/PROPH/DIAG IV INF INIT: CPT

## 2023-06-29 PROCEDURE — 96367 TX/PROPH/DG ADDL SEQ IV INF: CPT

## 2023-06-29 PROCEDURE — 96366 THER/PROPH/DIAG IV INF ADDON: CPT

## 2023-06-29 RX ORDER — CENEGERMIN-BKBJ 20 UG/ML
SOLUTION/ DROPS OPHTHALMIC
COMMUNITY
Start: 2023-06-23

## 2023-06-29 RX ORDER — ACETAMINOPHEN 325 MG/1
650 TABLET ORAL ONCE
OUTPATIENT
Start: 2023-07-20

## 2023-06-29 RX ORDER — ACYCLOVIR 200 MG/1
CAPSULE ORAL
COMMUNITY
Start: 2023-06-22

## 2023-06-29 RX ORDER — ACETAMINOPHEN 325 MG/1
650 TABLET ORAL ONCE
Status: COMPLETED | OUTPATIENT
Start: 2023-06-29 | End: 2023-06-29

## 2023-06-29 RX ORDER — AMLODIPINE BESYLATE 2.5 MG/1
2.5 TABLET ORAL DAILY
COMMUNITY
Start: 2023-06-12

## 2023-06-29 RX ORDER — SYRINGE WITH NEEDLE, 1 ML 25GX5/8"
SYRINGE, EMPTY DISPOSABLE MISCELLANEOUS
COMMUNITY
Start: 2023-06-07

## 2023-06-29 RX ORDER — LEVOTHYROXINE SODIUM 0.03 MG/1
25 TABLET ORAL DAILY
COMMUNITY
Start: 2023-05-26

## 2023-06-29 RX ORDER — SODIUM CHLORIDE 9 MG/ML
20 INJECTION, SOLUTION INTRAVENOUS ONCE
Status: COMPLETED | OUTPATIENT
Start: 2023-06-29 | End: 2023-06-29

## 2023-06-29 RX ORDER — SODIUM CHLORIDE 9 MG/ML
20 INJECTION, SOLUTION INTRAVENOUS ONCE
OUTPATIENT
Start: 2023-07-20

## 2023-06-29 RX ADMIN — SODIUM CHLORIDE 500 ML: 0.9 INJECTION, SOLUTION INTRAVENOUS at 09:15

## 2023-06-29 RX ADMIN — DIPHENHYDRAMINE HYDROCHLORIDE 25 MG: 50 INJECTION INTRAMUSCULAR; INTRAVENOUS at 09:55

## 2023-06-29 RX ADMIN — SODIUM CHLORIDE 20 ML/HR: 0.9 INJECTION, SOLUTION INTRAVENOUS at 09:15

## 2023-06-29 RX ADMIN — FAMOTIDINE 20 MG: 10 INJECTION INTRAVENOUS at 10:26

## 2023-06-29 RX ADMIN — ACETAMINOPHEN 650 MG: 325 TABLET ORAL at 09:24

## 2023-06-29 RX ADMIN — DEXAMETHASONE SODIUM PHOSPHATE 10 MG: 10 INJECTION, SOLUTION INTRAMUSCULAR; INTRAVENOUS at 09:21

## 2023-06-29 RX ADMIN — Medication 40 G: at 11:03

## 2023-06-29 NOTE — PROGRESS NOTES
Patient denies current infection or being on antibiotics  Patient tolerated IVIG infusion without reaction or issues   AVS declined  Priya Nowak   Patient ambulated off unit without incident  All personal belongings taken with patient

## 2023-06-29 NOTE — TELEPHONE ENCOUNTER
----- Message from Doug Kanner, RN sent at 6/29/2023  6:32 AM EDT -----  Regarding: 3 year f/u appointment  Please contact patient to schedule a 3 year follow up appointment    Thank You

## 2023-07-20 ENCOUNTER — HOSPITAL ENCOUNTER (OUTPATIENT)
Dept: INFUSION CENTER | Facility: HOSPITAL | Age: 53
End: 2023-07-20
Attending: INTERNAL MEDICINE
Payer: COMMERCIAL

## 2023-07-20 VITALS
DIASTOLIC BLOOD PRESSURE: 85 MMHG | HEART RATE: 66 BPM | SYSTOLIC BLOOD PRESSURE: 145 MMHG | OXYGEN SATURATION: 98 % | RESPIRATION RATE: 18 BRPM | TEMPERATURE: 97.1 F

## 2023-07-20 DIAGNOSIS — D80.1 HYPOGAMMAGLOBULINEMIA (HCC): Primary | ICD-10-CM

## 2023-07-20 PROCEDURE — 96366 THER/PROPH/DIAG IV INF ADDON: CPT

## 2023-07-20 PROCEDURE — 96367 TX/PROPH/DG ADDL SEQ IV INF: CPT

## 2023-07-20 PROCEDURE — 96365 THER/PROPH/DIAG IV INF INIT: CPT

## 2023-07-20 RX ORDER — SODIUM CHLORIDE 9 MG/ML
20 INJECTION, SOLUTION INTRAVENOUS ONCE
Status: COMPLETED | OUTPATIENT
Start: 2023-07-20 | End: 2023-07-20

## 2023-07-20 RX ORDER — ACETAMINOPHEN 325 MG/1
650 TABLET ORAL ONCE
OUTPATIENT
Start: 2023-08-10

## 2023-07-20 RX ORDER — ACETAMINOPHEN 325 MG/1
650 TABLET ORAL ONCE
Status: COMPLETED | OUTPATIENT
Start: 2023-07-20 | End: 2023-07-20

## 2023-07-20 RX ORDER — SODIUM CHLORIDE 9 MG/ML
20 INJECTION, SOLUTION INTRAVENOUS ONCE
OUTPATIENT
Start: 2023-08-10

## 2023-07-20 RX ADMIN — SODIUM CHLORIDE 500 ML: 0.9 INJECTION, SOLUTION INTRAVENOUS at 09:03

## 2023-07-20 RX ADMIN — FAMOTIDINE 20 MG: 10 INJECTION INTRAVENOUS at 10:11

## 2023-07-20 RX ADMIN — SODIUM CHLORIDE 20 ML/HR: 0.9 INJECTION, SOLUTION INTRAVENOUS at 09:00

## 2023-07-20 RX ADMIN — Medication 40 G: at 10:37

## 2023-07-20 RX ADMIN — ACETAMINOPHEN 650 MG: 325 TABLET ORAL at 09:13

## 2023-07-20 RX ADMIN — DEXAMETHASONE SODIUM PHOSPHATE 10 MG: 10 INJECTION, SOLUTION INTRAMUSCULAR; INTRAVENOUS at 09:02

## 2023-07-20 RX ADMIN — DIPHENHYDRAMINE HYDROCHLORIDE 25 MG: 50 INJECTION, SOLUTION INTRAMUSCULAR; INTRAVENOUS at 09:35

## 2023-07-20 NOTE — PROGRESS NOTES
IVIG given without incident. Patient offers no complaints on D/C. AVS declined. Next appointment made. Patient left unit in stable condition.

## 2023-07-20 NOTE — PLAN OF CARE
Problem: Knowledge Deficit  Goal: Patient/family/caregiver demonstrates understanding of disease process, treatment plan, medications, and discharge instructions  Description: Complete learning assessment and assess knowledge base.   Interventions:  - Provide teaching at level of understanding  - Provide teaching via preferred learning methods  7/20/2023 0857 by David James RN  Outcome: Progressing  7/20/2023 0856 by David James RN  Outcome: Progressing

## 2023-08-10 ENCOUNTER — HOSPITAL ENCOUNTER (OUTPATIENT)
Dept: INFUSION CENTER | Facility: HOSPITAL | Age: 53
Discharge: HOME | End: 2023-08-10
Attending: INTERNAL MEDICINE
Payer: COMMERCIAL

## 2023-08-10 VITALS
TEMPERATURE: 96.9 F | DIASTOLIC BLOOD PRESSURE: 88 MMHG | OXYGEN SATURATION: 98 % | HEART RATE: 80 BPM | SYSTOLIC BLOOD PRESSURE: 131 MMHG | RESPIRATION RATE: 18 BRPM

## 2023-08-10 DIAGNOSIS — D80.1 HYPOGAMMAGLOBULINEMIA (HCC): Primary | ICD-10-CM

## 2023-08-10 PROCEDURE — 96365 THER/PROPH/DIAG IV INF INIT: CPT

## 2023-08-10 PROCEDURE — 96366 THER/PROPH/DIAG IV INF ADDON: CPT

## 2023-08-10 PROCEDURE — 96367 TX/PROPH/DG ADDL SEQ IV INF: CPT

## 2023-08-10 RX ORDER — ACETAMINOPHEN 325 MG/1
650 TABLET ORAL ONCE
OUTPATIENT
Start: 2023-08-31

## 2023-08-10 RX ORDER — SODIUM CHLORIDE 9 MG/ML
20 INJECTION, SOLUTION INTRAVENOUS ONCE
Status: COMPLETED | OUTPATIENT
Start: 2023-08-10 | End: 2023-08-10

## 2023-08-10 RX ORDER — SODIUM CHLORIDE 9 MG/ML
20 INJECTION, SOLUTION INTRAVENOUS ONCE
OUTPATIENT
Start: 2023-08-31

## 2023-08-10 RX ORDER — ACETAMINOPHEN 325 MG/1
650 TABLET ORAL ONCE
Status: COMPLETED | OUTPATIENT
Start: 2023-08-10 | End: 2023-08-10

## 2023-08-10 RX ADMIN — FAMOTIDINE 20 MG: 10 INJECTION, SOLUTION INTRAVENOUS at 09:53

## 2023-08-10 RX ADMIN — DIPHENHYDRAMINE HYDROCHLORIDE 25 MG: 50 INJECTION, SOLUTION INTRAMUSCULAR; INTRAVENOUS at 09:31

## 2023-08-10 RX ADMIN — Medication 40 G: at 10:23

## 2023-08-10 RX ADMIN — DEXAMETHASONE SODIUM PHOSPHATE 10 MG: 10 INJECTION, SOLUTION INTRAMUSCULAR; INTRAVENOUS at 08:58

## 2023-08-10 RX ADMIN — ACETAMINOPHEN 650 MG: 325 TABLET ORAL at 08:57

## 2023-08-10 RX ADMIN — SODIUM CHLORIDE 20 ML/HR: 0.9 INJECTION, SOLUTION INTRAVENOUS at 08:56

## 2023-08-10 RX ADMIN — SODIUM CHLORIDE 500 ML: 9 INJECTION, SOLUTION INTRAVENOUS at 08:52

## 2023-08-10 NOTE — PROGRESS NOTES
Pt tolerated treatment well with no complications. IV removed and gauze dressing applied. Pt aware of future appts.  Declines AVS.

## 2023-08-10 NOTE — PROGRESS NOTES
Pt here for IVIG. IV started with no issue and  NSS running at [facility]. Pt resting comfortably with no further questions or concerns. Call bell with in reach.

## 2023-08-22 DIAGNOSIS — D50.8 IRON DEFICIENCY ANEMIA SECONDARY TO INADEQUATE DIETARY IRON INTAKE: Primary | ICD-10-CM

## 2023-08-22 DIAGNOSIS — R42 DIZZINESS: ICD-10-CM

## 2023-08-22 DIAGNOSIS — E53.8 B12 DEFICIENCY: ICD-10-CM

## 2023-08-29 ENCOUNTER — APPOINTMENT (OUTPATIENT)
Dept: LAB | Facility: CLINIC | Age: 53
End: 2023-08-29
Payer: COMMERCIAL

## 2023-08-29 DIAGNOSIS — D50.8 IRON DEFICIENCY ANEMIA SECONDARY TO INADEQUATE DIETARY IRON INTAKE: ICD-10-CM

## 2023-08-29 DIAGNOSIS — R42 DIZZINESS: ICD-10-CM

## 2023-08-29 DIAGNOSIS — E53.8 B12 DEFICIENCY: ICD-10-CM

## 2023-08-29 LAB
BASOPHILS # BLD AUTO: 0.03 THOUSANDS/ÂΜL (ref 0–0.1)
BASOPHILS NFR BLD AUTO: 0 % (ref 0–1)
EOSINOPHIL # BLD AUTO: 0.07 THOUSAND/ÂΜL (ref 0–0.61)
EOSINOPHIL NFR BLD AUTO: 1 % (ref 0–6)
ERYTHROCYTE [DISTWIDTH] IN BLOOD BY AUTOMATED COUNT: 13.1 % (ref 11.6–15.1)
FERRITIN SERPL-MCNC: 138 NG/ML (ref 11–307)
FOLATE SERPL-MCNC: 20.1 NG/ML
HCT VFR BLD AUTO: 43.4 % (ref 34.8–46.1)
HGB BLD-MCNC: 13.7 G/DL (ref 11.5–15.4)
IMM GRANULOCYTES # BLD AUTO: 0.03 THOUSAND/UL (ref 0–0.2)
IMM GRANULOCYTES NFR BLD AUTO: 0 % (ref 0–2)
IRON SATN MFR SERPL: 32 % (ref 15–50)
IRON SERPL-MCNC: 126 UG/DL (ref 50–212)
LYMPHOCYTES # BLD AUTO: 2.56 THOUSANDS/ÂΜL (ref 0.6–4.47)
LYMPHOCYTES NFR BLD AUTO: 24 % (ref 14–44)
MCH RBC QN AUTO: 29.8 PG (ref 26.8–34.3)
MCHC RBC AUTO-ENTMCNC: 31.6 G/DL (ref 31.4–37.4)
MCV RBC AUTO: 94 FL (ref 82–98)
MONOCYTES # BLD AUTO: 0.65 THOUSAND/ÂΜL (ref 0.17–1.22)
MONOCYTES NFR BLD AUTO: 6 % (ref 4–12)
NEUTROPHILS # BLD AUTO: 7.31 THOUSANDS/ÂΜL (ref 1.85–7.62)
NEUTS SEG NFR BLD AUTO: 69 % (ref 43–75)
NRBC BLD AUTO-RTO: 0 /100 WBCS
PLATELET # BLD AUTO: 336 THOUSANDS/UL (ref 149–390)
PMV BLD AUTO: 9.2 FL (ref 8.9–12.7)
RBC # BLD AUTO: 4.6 MILLION/UL (ref 3.81–5.12)
TIBC SERPL-MCNC: 394 UG/DL (ref 250–450)
UIBC SERPL-MCNC: 268 UG/DL (ref 155–355)
VIT B12 SERPL-MCNC: 640 PG/ML (ref 180–914)
WBC # BLD AUTO: 10.65 THOUSAND/UL (ref 4.31–10.16)

## 2023-08-29 PROCEDURE — 36415 COLL VENOUS BLD VENIPUNCTURE: CPT

## 2023-08-29 PROCEDURE — 82728 ASSAY OF FERRITIN: CPT

## 2023-08-29 PROCEDURE — 85025 COMPLETE CBC W/AUTO DIFF WBC: CPT

## 2023-08-29 PROCEDURE — 83540 ASSAY OF IRON: CPT

## 2023-08-29 PROCEDURE — 82607 VITAMIN B-12: CPT

## 2023-08-29 PROCEDURE — 82746 ASSAY OF FOLIC ACID SERUM: CPT

## 2023-08-29 PROCEDURE — 83550 IRON BINDING TEST: CPT

## 2023-08-31 ENCOUNTER — HOSPITAL ENCOUNTER (OUTPATIENT)
Dept: INFUSION CENTER | Facility: HOSPITAL | Age: 53
End: 2023-08-31
Attending: INTERNAL MEDICINE
Payer: COMMERCIAL

## 2023-08-31 VITALS
TEMPERATURE: 97.6 F | DIASTOLIC BLOOD PRESSURE: 71 MMHG | RESPIRATION RATE: 18 BRPM | SYSTOLIC BLOOD PRESSURE: 131 MMHG | HEART RATE: 85 BPM | OXYGEN SATURATION: 99 %

## 2023-08-31 DIAGNOSIS — D80.1 HYPOGAMMAGLOBULINEMIA (HCC): Primary | ICD-10-CM

## 2023-08-31 PROCEDURE — 96365 THER/PROPH/DIAG IV INF INIT: CPT

## 2023-08-31 PROCEDURE — 96366 THER/PROPH/DIAG IV INF ADDON: CPT

## 2023-08-31 PROCEDURE — 96375 TX/PRO/DX INJ NEW DRUG ADDON: CPT

## 2023-08-31 PROCEDURE — 96367 TX/PROPH/DG ADDL SEQ IV INF: CPT

## 2023-08-31 RX ORDER — ACETAMINOPHEN 325 MG/1
650 TABLET ORAL ONCE
OUTPATIENT
Start: 2023-09-21

## 2023-08-31 RX ORDER — SODIUM CHLORIDE 9 MG/ML
20 INJECTION, SOLUTION INTRAVENOUS ONCE
OUTPATIENT
Start: 2023-09-21

## 2023-08-31 RX ORDER — ACETAMINOPHEN 325 MG/1
650 TABLET ORAL ONCE
Status: COMPLETED | OUTPATIENT
Start: 2023-08-31 | End: 2023-08-31

## 2023-08-31 RX ORDER — SODIUM CHLORIDE 9 MG/ML
20 INJECTION, SOLUTION INTRAVENOUS ONCE
Status: COMPLETED | OUTPATIENT
Start: 2023-08-31 | End: 2023-08-31

## 2023-08-31 RX ADMIN — DEXAMETHASONE SODIUM PHOSPHATE 10 MG: 10 INJECTION, SOLUTION INTRAMUSCULAR; INTRAVENOUS at 08:57

## 2023-08-31 RX ADMIN — DIPHENHYDRAMINE HYDROCHLORIDE 25 MG: 50 INJECTION INTRAMUSCULAR; INTRAVENOUS at 09:31

## 2023-08-31 RX ADMIN — Medication 40 G: at 10:28

## 2023-08-31 RX ADMIN — ACETAMINOPHEN 650 MG: 325 TABLET ORAL at 08:55

## 2023-08-31 RX ADMIN — FAMOTIDINE 20 MG: 10 INJECTION, SOLUTION INTRAVENOUS at 09:54

## 2023-08-31 RX ADMIN — SODIUM CHLORIDE 20 ML/HR: 0.9 INJECTION, SOLUTION INTRAVENOUS at 08:49

## 2023-08-31 RX ADMIN — SODIUM CHLORIDE 500 ML: 0.9 INJECTION, SOLUTION INTRAVENOUS at 08:51

## 2023-08-31 NOTE — PROGRESS NOTES
Patient denies any recent infection or being on antibiotics. Patient tolerated IVIG without reaction or issues. AVS declined. Patient has Mychart. Patient ambulated off unit without incident. All personal belongings taken with patient.

## 2023-09-21 ENCOUNTER — HOSPITAL ENCOUNTER (OUTPATIENT)
Dept: INFUSION CENTER | Facility: HOSPITAL | Age: 53
End: 2023-09-21
Attending: INTERNAL MEDICINE
Payer: COMMERCIAL

## 2023-09-21 VITALS
SYSTOLIC BLOOD PRESSURE: 127 MMHG | OXYGEN SATURATION: 98 % | RESPIRATION RATE: 18 BRPM | TEMPERATURE: 97.7 F | HEART RATE: 84 BPM | DIASTOLIC BLOOD PRESSURE: 87 MMHG

## 2023-09-21 DIAGNOSIS — D80.1 HYPOGAMMAGLOBULINEMIA (HCC): Primary | ICD-10-CM

## 2023-09-21 PROCEDURE — 96366 THER/PROPH/DIAG IV INF ADDON: CPT

## 2023-09-21 PROCEDURE — 96375 TX/PRO/DX INJ NEW DRUG ADDON: CPT

## 2023-09-21 PROCEDURE — 96365 THER/PROPH/DIAG IV INF INIT: CPT

## 2023-09-21 PROCEDURE — 96367 TX/PROPH/DG ADDL SEQ IV INF: CPT

## 2023-09-21 RX ORDER — ACETAMINOPHEN 325 MG/1
650 TABLET ORAL ONCE
OUTPATIENT
Start: 2023-10-12

## 2023-09-21 RX ORDER — ACETAMINOPHEN 325 MG/1
650 TABLET ORAL ONCE
Status: COMPLETED | OUTPATIENT
Start: 2023-09-21 | End: 2023-09-21

## 2023-09-21 RX ORDER — SODIUM CHLORIDE 9 MG/ML
20 INJECTION, SOLUTION INTRAVENOUS ONCE
Status: COMPLETED | OUTPATIENT
Start: 2023-09-21 | End: 2023-09-21

## 2023-09-21 RX ORDER — SODIUM CHLORIDE 9 MG/ML
20 INJECTION, SOLUTION INTRAVENOUS ONCE
OUTPATIENT
Start: 2023-10-12

## 2023-09-21 RX ADMIN — DIPHENHYDRAMINE HYDROCHLORIDE 25 MG: 50 INJECTION INTRAMUSCULAR; INTRAVENOUS at 09:03

## 2023-09-21 RX ADMIN — DEXAMETHASONE SODIUM PHOSPHATE 10 MG: 10 INJECTION, SOLUTION INTRAMUSCULAR; INTRAVENOUS at 08:28

## 2023-09-21 RX ADMIN — ACETAMINOPHEN 650 MG: 325 TABLET ORAL at 08:27

## 2023-09-21 RX ADMIN — SODIUM CHLORIDE 20 ML/HR: 0.9 INJECTION, SOLUTION INTRAVENOUS at 08:25

## 2023-09-21 RX ADMIN — Medication 40 G: at 10:08

## 2023-09-21 RX ADMIN — SODIUM CHLORIDE 500 ML: 0.9 INJECTION, SOLUTION INTRAVENOUS at 08:25

## 2023-09-21 RX ADMIN — FAMOTIDINE 20 MG: 10 INJECTION INTRAVENOUS at 09:36

## 2023-09-21 NOTE — PROGRESS NOTES
Patient denies any recent infection or being on antibiotics. Patient tolerated IVIG without reaction or issues. AVS declined. Patient has My chart. Patient ambulated off unit without incident. All personal belongings taken with patient.

## 2023-09-29 ENCOUNTER — OFFICE VISIT (OUTPATIENT)
Dept: BARIATRICS | Facility: CLINIC | Age: 53
End: 2023-09-29
Payer: COMMERCIAL

## 2023-09-29 VITALS
DIASTOLIC BLOOD PRESSURE: 90 MMHG | HEIGHT: 62 IN | OXYGEN SATURATION: 99 % | WEIGHT: 169.5 LBS | BODY MASS INDEX: 31.19 KG/M2 | HEART RATE: 89 BPM | TEMPERATURE: 95.6 F | SYSTOLIC BLOOD PRESSURE: 140 MMHG

## 2023-09-29 DIAGNOSIS — K91.2 POSTSURGICAL MALABSORPTION: ICD-10-CM

## 2023-09-29 DIAGNOSIS — E66.9 OBESITY, CLASS I, BMI 30-34.9: ICD-10-CM

## 2023-09-29 DIAGNOSIS — R10.13 EPIGASTRIC PAIN: ICD-10-CM

## 2023-09-29 DIAGNOSIS — Z98.84 BARIATRIC SURGERY STATUS: ICD-10-CM

## 2023-09-29 DIAGNOSIS — K21.9 GASTROESOPHAGEAL REFLUX DISEASE: ICD-10-CM

## 2023-09-29 DIAGNOSIS — Z48.815 ENCOUNTER FOR SURGICAL AFTERCARE FOLLOWING SURGERY OF DIGESTIVE SYSTEM: Primary | ICD-10-CM

## 2023-09-29 PROCEDURE — 99214 OFFICE O/P EST MOD 30 MIN: CPT | Performed by: NURSE PRACTITIONER

## 2023-09-29 RX ORDER — AMLODIPINE BESYLATE 2.5 MG/1
2.5 TABLET ORAL DAILY
COMMUNITY
Start: 2023-06-12 | End: 2024-06-11

## 2023-09-29 RX ORDER — ERENUMAB-AOOE 140 MG/ML
INJECTION, SOLUTION SUBCUTANEOUS
COMMUNITY
Start: 2023-09-08

## 2023-09-29 RX ORDER — LEVOTHYROXINE SODIUM 0.05 MG/1
TABLET ORAL
COMMUNITY
Start: 2023-07-03

## 2023-09-29 RX ORDER — BUDESONIDE AND FORMOTEROL FUMARATE DIHYDRATE 160; 4.5 UG/1; UG/1
2 AEROSOL RESPIRATORY (INHALATION) 2 TIMES DAILY
COMMUNITY

## 2023-09-29 RX ORDER — OMEPRAZOLE 20 MG/1
20 CAPSULE, DELAYED RELEASE ORAL 2 TIMES DAILY
Qty: 180 CAPSULE | Refills: 0 | Status: SHIPPED | OUTPATIENT
Start: 2023-09-29 | End: 2023-12-28

## 2023-09-29 RX ORDER — OMEPRAZOLE 20 MG/1
20 CAPSULE, DELAYED RELEASE ORAL DAILY
Qty: 90 CAPSULE | Refills: 1 | Status: SHIPPED | OUTPATIENT
Start: 2023-09-29 | End: 2023-09-29

## 2023-09-29 RX ORDER — SUCRALFATE ORAL 1 G/10ML
1 SUSPENSION ORAL 4 TIMES DAILY
Qty: 1200 ML | Refills: 3 | Status: SHIPPED | OUTPATIENT
Start: 2023-09-29 | End: 2023-10-02

## 2023-09-29 RX ORDER — AZELASTINE HYDROCHLORIDE, FLUTICASONE PROPIONATE 137; 50 UG/1; UG/1
SPRAY, METERED NASAL
COMMUNITY
Start: 2023-09-26

## 2023-09-29 NOTE — PROGRESS NOTES
Assessment/Plan:     Patient ID: Sara Hutton is a 46 y.o. female. Bariatric Surgery Status/Epigastric pain/GERD    -s/p Eveline-En-Y Gastric Bypass with Dr. Denise Marinelli in [place] done in 2008 and have a revision of RNYGB with PEHR with Dr. Amy Lopez on 02/11/2020. Presents to the office today for OD annual visit. Overall doing poorly. Epigastric pain - started a few weeks ago - was occurring intermittently at first and now it is now more constant. It worsens with food intake. Has associated heartburn and nausea however no vomiting. Deals with chronic constipation and diarrhea but is seeing GI for further work up of endoscopy and colonoscopy. She was on omeprazole but has been taken off by an unknown provider; this has been a long time ago. Denies smoking, ETOH use, NSAIDs use. She does report getting multiple joint point leading to frequent steroid shots and she also gets steroid infusion prior to her privigen infusion. PLAN:     - due to multiple steroid use and autoimmune hx, concern for gastritis and and ulcer.   - will start on omeprazole 20 mg PO BID and carafate 4 times per day. Continue with this for now and follow up in 4-6 weeks. - UGI order to evaluate further. continue to f/u with GI for EGD and colonoscopy - will review her EGD findings to rule out bariatric etiology. - Continue with healthy lifestyle, adequate protein intake of 60 gm, fluid intake of at least 64 oz. - Continue with MVI daily. She had stopped her vitamins due to her pain - recommended to restart once her epigastric pain is better and to obtain labs in 1 month after consistently taking them. - Activity as tolerated. - Labs ordered and will adjust accordingly if any deficiency. - Follow up with RD and SW as needed. · Continued/Maintain healthy weight loss with good nutrition intakes. · Adequate hydration with at least 64oz. fluid intake. · Follow diet as discussed.   · Follow vitamin and mineral recommendations as reviewed with you. · Exercise as tolerated. · Colonoscopy referral made: UTD but will be having     · Follow-up in 4-6 weeks for pain f/u. We kindly ask that your arrive 15 minutes before your scheduled appointment time with your provider to allow our staff to room you, get your vital signs and update your chart. · Get lab work done prior to annual visit. Please call the office if you need a script. It is recommended to check with your insurance BEFORE getting labs done to make sure they are covered by your policy. · Call our office if you have any problems with abdominal pain especially associated with fever, chills, nausea, vomiting or any other concerns. · All  Post-bariatric surgery patients should be aware that very small quantities of any alcohol can cause impairment and it is very possible not to feel the effect. The effect can be in the system for several hours. It is also a stomach irritant. · It is advised to AVOID alcohol, Nonsteroidal antiinflammatory drugs (NSAIDS) and nicotine of all forms . Any of these can cause stomach irritation/pain. · Discussed the effects of alcohol on a bariatric patient and the increased impairment risk. · Keep up the good work! Postsurgical Malabsorption   -At risk for malabsorption of vitamins/minerals secondary to malabsorption and restriction of intake from bariatric surgery  -NOT Currently taking adequate postop bariatric surgery vitamin supplementation  -Next set of bariatric labs ordered for approximately 1 month  -Patient received education about the importance of adhering to a lifelong supplementation regimen to avoid vitamin/mineral deficiencies      Diagnoses and all orders for this visit:    Encounter for surgical aftercare following surgery of digestive system  -     Discontinue: omeprazole (PriLOSEC) 20 mg delayed release capsule;  Take 1 capsule (20 mg total) by mouth daily  -     Zinc; Future  -     Vitamin D 25 hydroxy; Future  -     Vitamin B1, whole blood; Future  -     Vitamin A; Future  -     PTH, intact; Future  -     Comprehensive metabolic panel; Future  -     Phosphorus; Future  -     omeprazole (PriLOSEC) 20 mg delayed release capsule; Take 1 capsule (20 mg total) by mouth 2 (two) times a day    Bariatric surgery status  -     sucralfate (CARAFATE) 1 g/10 mL suspension; Take 10 mL (1 g total) by mouth 4 (four) times a day  -     Discontinue: omeprazole (PriLOSEC) 20 mg delayed release capsule; Take 1 capsule (20 mg total) by mouth daily  -     Zinc; Future  -     Vitamin D 25 hydroxy; Future  -     Vitamin B1, whole blood; Future  -     Vitamin A; Future  -     PTH, intact; Future  -     Comprehensive metabolic panel; Future  -     Phosphorus; Future  -     FL UPPER GI UGI; Future  -     omeprazole (PriLOSEC) 20 mg delayed release capsule; Take 1 capsule (20 mg total) by mouth 2 (two) times a day    Postsurgical malabsorption  -     Zinc; Future  -     Vitamin D 25 hydroxy; Future  -     Vitamin B1, whole blood; Future  -     Vitamin A; Future  -     PTH, intact; Future  -     Comprehensive metabolic panel; Future  -     Phosphorus; Future    Obesity, Class I, BMI 30-34.9  -     Zinc; Future  -     Vitamin D 25 hydroxy; Future  -     Vitamin B1, whole blood; Future  -     Vitamin A; Future  -     PTH, intact; Future  -     Comprehensive metabolic panel; Future  -     Phosphorus; Future    Epigastric pain  -     sucralfate (CARAFATE) 1 g/10 mL suspension; Take 10 mL (1 g total) by mouth 4 (four) times a day  -     Discontinue: omeprazole (PriLOSEC) 20 mg delayed release capsule; Take 1 capsule (20 mg total) by mouth daily  -     Zinc; Future  -     Vitamin D 25 hydroxy; Future  -     Vitamin B1, whole blood; Future  -     Vitamin A; Future  -     PTH, intact; Future  -     Comprehensive metabolic panel; Future  -     Phosphorus;  Future  -     FL UPPER GI UGI; Future  -     omeprazole (PriLOSEC) 20 mg delayed release capsule; Take 1 capsule (20 mg total) by mouth 2 (two) times a day    Gastroesophageal reflux disease    Other orders  -     budesonide-formoterol (Symbicort) 160-4.5 mcg/act inhaler; Take 2 puffs by mouth 2 (two) times a day (Patient not taking: Reported on 9/29/2023)  -     amLODIPine (NORVASC) 2.5 mg tablet; Take 2.5 mg by mouth daily (Patient not taking: Reported on 9/29/2023)  -     estradiol (CLIMARA) 0.1 mg/24 hr; APPLY 1 PATCH TOPICALLY ONCE A WEEK  -     levothyroxine 50 mcg tablet  -     B-D 3CC LUER-NAKUL SYR 25GX5/8" 25G X 5/8" 3 ML MISC  -     Azelastine-Fluticasone 137-50 MCG/ACT SUSP;  (Patient not taking: Reported on 9/29/2023)  -     Aimovig 140 MG/ML SOAJ; Use as directed         Subjective:      Patient ID: Shilo Gupta is a 46 y.o. female. -s/p Eveline-En-Y Gastric Bypass with Dr. Jarocho Hobson in [place] done in 2008 and have a revision of RNYGB with PEHR with Dr. Steven Mccollum on 02/11/2020. Presents to the office today for OD annual visit. Overall doing poorly. Epigastric pain - started a few weeks ago - was occurring intermittently at first and now it is now more constant. It worsens with food intake. Has associated heartburn and nausea however no vomiting. Deals with chronic constipation and diarrhea but is seeing GI for further work up of endoscopy and colonoscopy. She was on omeprazole but has been taken off by an unknown provider; this has been a long time ago. Denies smoking, ETOH use, NSAIDs use. She does report getting multiple joint point leading to frequent steroid shots and she also gets steroid infusion prior to her privigen infusion. Initial: 178 lbs (prior to revision)  Current: 169.5 lbs  EWL: (Weight loss is ahead of schedule at this post surgical period.)  Jay: 162 lbs  Current BMI is Body mass index is 31 kg/m².     · Tolerating a regular diet-yes  · Eating at least 60 grams of protein per day-yes  · Following 30/60 minute rule with liquids-yes  · Drinking at least 64 ounces of fluid per day-yes  · Drinking carbonated beverages-no  · Sufficient exercise-no  · Using NSAIDs regularly-no  · Using nicotine-no  · Using alcohol-no  · Supplements: none - Gets IV iron infusions. · EWL is 20%, which places the patient ahead of schedule for expected post surgical weight loss at this time. The following portions of the patient's history were reviewed and updated as appropriate: allergies, current medications, past family history, past medical history, past social history, past surgical history and problem list.    Review of Systems   Constitutional: Positive for fatigue. Respiratory: Negative. Cardiovascular: Positive for palpitations (went to see her cardiologist.). Gastrointestinal: Positive for abdominal pain (epigastric pain with heartburn), constipation, diarrhea and nausea. Neurological: Negative. Objective:    /90 (BP Location: Left arm, Patient Position: Sitting, Cuff Size: Large)   Pulse 89   Temp (!) 95.6 °F (35.3 °C) (Tympanic)   Ht 5' 2" (1.575 m)   Wt 76.9 kg (169 lb 8 oz)   SpO2 99%   BMI 31.00 kg/m²      Physical Exam  Vitals and nursing note reviewed. Constitutional:       Appearance: Normal appearance. She is obese. Cardiovascular:      Rate and Rhythm: Normal rate and regular rhythm. Pulses: Normal pulses. Heart sounds: Normal heart sounds. Pulmonary:      Effort: Pulmonary effort is normal.      Breath sounds: Normal breath sounds. Abdominal:      General: Bowel sounds are normal.      Palpations: Abdomen is soft. Tenderness: There is no abdominal tenderness. Musculoskeletal:         General: Normal range of motion. Skin:     General: Skin is warm and dry. Neurological:      General: No focal deficit present. Mental Status: She is alert and oriented to person, place, and time.    Psychiatric:         Mood and Affect: Mood normal.         Behavior: Behavior normal.         Thought Content: Thought content normal.         Judgment: Judgment normal.

## 2023-10-02 RX ORDER — SUCRALFATE 1 G/1
1 TABLET ORAL 4 TIMES DAILY
Qty: 360 TABLET | Refills: 1 | Status: SHIPPED | OUTPATIENT
Start: 2023-10-02

## 2023-10-06 ENCOUNTER — HOSPITAL ENCOUNTER (OUTPATIENT)
Dept: RADIOLOGY | Facility: HOSPITAL | Age: 53
End: 2023-10-06
Payer: COMMERCIAL

## 2023-10-06 DIAGNOSIS — Z98.84 BARIATRIC SURGERY STATUS: ICD-10-CM

## 2023-10-06 DIAGNOSIS — R10.13 EPIGASTRIC PAIN: ICD-10-CM

## 2023-10-06 PROCEDURE — 74240 X-RAY XM UPR GI TRC 1CNTRST: CPT

## 2023-10-12 ENCOUNTER — HOSPITAL ENCOUNTER (OUTPATIENT)
Dept: INFUSION CENTER | Facility: HOSPITAL | Age: 53
End: 2023-10-12
Attending: INTERNAL MEDICINE
Payer: COMMERCIAL

## 2023-10-12 VITALS
TEMPERATURE: 98.3 F | HEART RATE: 97 BPM | OXYGEN SATURATION: 97 % | SYSTOLIC BLOOD PRESSURE: 146 MMHG | RESPIRATION RATE: 18 BRPM | DIASTOLIC BLOOD PRESSURE: 79 MMHG

## 2023-10-12 DIAGNOSIS — D80.1 HYPOGAMMAGLOBULINEMIA (HCC): Primary | ICD-10-CM

## 2023-10-12 PROCEDURE — 96366 THER/PROPH/DIAG IV INF ADDON: CPT

## 2023-10-12 PROCEDURE — 96365 THER/PROPH/DIAG IV INF INIT: CPT

## 2023-10-12 PROCEDURE — 96375 TX/PRO/DX INJ NEW DRUG ADDON: CPT

## 2023-10-12 PROCEDURE — 96367 TX/PROPH/DG ADDL SEQ IV INF: CPT

## 2023-10-12 RX ORDER — ACETAMINOPHEN 325 MG/1
650 TABLET ORAL ONCE
Status: COMPLETED | OUTPATIENT
Start: 2023-10-12 | End: 2023-10-12

## 2023-10-12 RX ORDER — SODIUM CHLORIDE 9 MG/ML
20 INJECTION, SOLUTION INTRAVENOUS ONCE
OUTPATIENT
Start: 2023-11-02

## 2023-10-12 RX ORDER — ACETAMINOPHEN 325 MG/1
650 TABLET ORAL ONCE
OUTPATIENT
Start: 2023-11-02

## 2023-10-12 RX ORDER — SODIUM CHLORIDE 9 MG/ML
20 INJECTION, SOLUTION INTRAVENOUS ONCE
Status: COMPLETED | OUTPATIENT
Start: 2023-10-12 | End: 2023-10-12

## 2023-10-12 RX ADMIN — Medication 40 G: at 10:06

## 2023-10-12 RX ADMIN — SODIUM CHLORIDE 500 ML: 0.9 INJECTION, SOLUTION INTRAVENOUS at 08:15

## 2023-10-12 RX ADMIN — ACETAMINOPHEN 650 MG: 325 TABLET ORAL at 09:05

## 2023-10-12 RX ADMIN — DIPHENHYDRAMINE HYDROCHLORIDE 25 MG: 50 INJECTION INTRAMUSCULAR; INTRAVENOUS at 09:04

## 2023-10-12 RX ADMIN — FAMOTIDINE 20 MG: 10 INJECTION INTRAVENOUS at 09:30

## 2023-10-12 RX ADMIN — DEXAMETHASONE SODIUM PHOSPHATE 10 MG: 10 INJECTION, SOLUTION INTRAMUSCULAR; INTRAVENOUS at 08:26

## 2023-10-12 RX ADMIN — SODIUM CHLORIDE 20 ML/HR: 0.9 INJECTION, SOLUTION INTRAVENOUS at 08:26

## 2023-10-12 NOTE — PROGRESS NOTES
Pt offers no complaints. Tolerated IVIG infusion well without incident. Next infusion appointment scheduled in 4 weeks per pt request - AVS declined and pt discharged in stable condition.

## 2023-11-08 ENCOUNTER — OFFICE VISIT (OUTPATIENT)
Dept: BARIATRICS | Facility: CLINIC | Age: 53
End: 2023-11-08
Payer: COMMERCIAL

## 2023-11-08 VITALS
WEIGHT: 173.5 LBS | TEMPERATURE: 95.1 F | HEART RATE: 90 BPM | SYSTOLIC BLOOD PRESSURE: 122 MMHG | HEIGHT: 62 IN | BODY MASS INDEX: 31.93 KG/M2 | DIASTOLIC BLOOD PRESSURE: 86 MMHG

## 2023-11-08 DIAGNOSIS — Z48.815 ENCOUNTER FOR SURGICAL AFTERCARE FOLLOWING SURGERY OF DIGESTIVE SYSTEM: Primary | ICD-10-CM

## 2023-11-08 DIAGNOSIS — R10.13 EPIGASTRIC PAIN: ICD-10-CM

## 2023-11-08 DIAGNOSIS — Z98.84 BARIATRIC SURGERY STATUS: ICD-10-CM

## 2023-11-08 PROCEDURE — 99213 OFFICE O/P EST LOW 20 MIN: CPT | Performed by: NURSE PRACTITIONER

## 2023-11-08 RX ORDER — OMEPRAZOLE 20 MG/1
20 CAPSULE, DELAYED RELEASE ORAL 2 TIMES DAILY
Qty: 180 CAPSULE | Refills: 1 | Status: SHIPPED | OUTPATIENT
Start: 2023-11-08

## 2023-11-08 NOTE — PROGRESS NOTES
Assessment/Plan:    No problem-specific Assessment & Plan notes found for this encounter. Diagnoses and all orders for this visit:    Encounter for surgical aftercare following surgery of digestive system  -     omeprazole (PriLOSEC) 20 mg delayed release capsule; Take 1 capsule (20 mg total) by mouth 2 (two) times a day    Epigastric pain  -     omeprazole (PriLOSEC) 20 mg delayed release capsule; Take 1 capsule (20 mg total) by mouth 2 (two) times a day    Bariatric surgery status  -     omeprazole (PriLOSEC) 20 mg delayed release capsule; Take 1 capsule (20 mg total) by mouth 2 (two) times a day        - recommended to cont'd with current medications for now until burning improves. - can stop carafate and eventually wean omeprazole to once daily.   - GERD precautions discussed. - follow up in Oct 2024 for routine annual. Come into office sooner if developing or worsening problem. Advised to obtain labs since she switched to bariatric multivitamins.   - Of note, c/o of weight gain - refer to MW for consult. Subjective:      Patient ID: Sofia Dela Cruz is a 46 y.o. female. HPI  -s/p Eveline-En-Y Gastric Bypass with Dr. Elissa Manrique in [place] done in 2008 and have a revision of RNYGB with PEHR with Dr. Edwige Baldwin on 02/11/2020  here for f/u of epigastric pain. Pain has improved overall but she continues to have burning sensation. Has not figured out if certain foods are triggering her symptoms. Taking omeprazole 20 mg PO BID and carafate up to 4 times a day when she is able to. Feels symptoms improved since starting this regimen. Denies NSAIDs, smoking. ETOH use. Has occasional steroid use in the past, gets steroid injections in multiple joints. Had an EGD done through GI showing ". There was no bile or undigested food present in the pouch. The GJ anastomosis was widely patent with no ulcerations present. The jejunum was unremarkable.  The gastric pouch was also unremarkable with no inflammatory changes or ulcers noted. The GE junction was present at 38 cm. Mild superficial erosions of the distal esophagus were noted consistent with reflux esophagitis. A small sliding hiatal hernia was also noted. Remaining portions of the esophagus were unremarkable." Biopsy report was unremarkable and negative for H. Pylori. The following portions of the patient's history were reviewed and updated as appropriate: allergies, current medications, past family history, past medical history, past social history, past surgical history, and problem list.    Review of Systems   Constitutional: Negative. Respiratory: Negative. Cardiovascular: Negative. Gastrointestinal:  Positive for diarrhea. Heartburn           Objective:      /86   Pulse 90   Temp (!) 95.1 °F (35.1 °C) (Tympanic)   Ht 5' 2" (1.575 m)   Wt 78.7 kg (173 lb 8 oz)   BMI 31.73 kg/m²          Physical Exam  Vitals and nursing note reviewed. Constitutional:       Appearance: Normal appearance. She is obese. Cardiovascular:      Rate and Rhythm: Normal rate and regular rhythm. Pulses: Normal pulses. Heart sounds: Normal heart sounds. Pulmonary:      Effort: Pulmonary effort is normal.      Breath sounds: Normal breath sounds. Abdominal:      General: Bowel sounds are normal.      Palpations: Abdomen is soft. Tenderness: There is no abdominal tenderness. Musculoskeletal:         General: Normal range of motion. Skin:     General: Skin is warm and dry. Neurological:      General: No focal deficit present. Mental Status: She is alert and oriented to person, place, and time. Psychiatric:         Mood and Affect: Mood normal.         Behavior: Behavior normal.         Thought Content:  Thought content normal.         Judgment: Judgment normal.

## 2023-11-09 ENCOUNTER — HOSPITAL ENCOUNTER (OUTPATIENT)
Dept: INFUSION CENTER | Facility: HOSPITAL | Age: 53
End: 2023-11-09
Attending: INTERNAL MEDICINE
Payer: COMMERCIAL

## 2023-11-09 VITALS
WEIGHT: 173.5 LBS | HEART RATE: 91 BPM | BODY MASS INDEX: 31.73 KG/M2 | DIASTOLIC BLOOD PRESSURE: 77 MMHG | TEMPERATURE: 98.1 F | SYSTOLIC BLOOD PRESSURE: 130 MMHG | RESPIRATION RATE: 18 BRPM | OXYGEN SATURATION: 97 %

## 2023-11-09 DIAGNOSIS — D80.1 HYPOGAMMAGLOBULINEMIA (HCC): Primary | ICD-10-CM

## 2023-11-09 PROCEDURE — 96366 THER/PROPH/DIAG IV INF ADDON: CPT

## 2023-11-09 PROCEDURE — 96367 TX/PROPH/DG ADDL SEQ IV INF: CPT

## 2023-11-09 PROCEDURE — 96365 THER/PROPH/DIAG IV INF INIT: CPT

## 2023-11-09 RX ORDER — ACETAMINOPHEN 325 MG/1
650 TABLET ORAL ONCE
OUTPATIENT
Start: 2023-11-23

## 2023-11-09 RX ORDER — SODIUM CHLORIDE 9 MG/ML
20 INJECTION, SOLUTION INTRAVENOUS ONCE
OUTPATIENT
Start: 2023-11-23

## 2023-11-09 RX ORDER — ACETAMINOPHEN 325 MG/1
650 TABLET ORAL ONCE
Status: COMPLETED | OUTPATIENT
Start: 2023-11-09 | End: 2023-11-09

## 2023-11-09 RX ORDER — SODIUM CHLORIDE 9 MG/ML
20 INJECTION, SOLUTION INTRAVENOUS ONCE
Status: COMPLETED | OUTPATIENT
Start: 2023-11-09 | End: 2023-11-09

## 2023-11-09 RX ADMIN — SODIUM CHLORIDE 20 ML/HR: 9 INJECTION, SOLUTION INTRAVENOUS at 08:20

## 2023-11-09 RX ADMIN — FAMOTIDINE 20 MG: 10 INJECTION INTRAVENOUS at 09:25

## 2023-11-09 RX ADMIN — Medication 40 G: at 09:56

## 2023-11-09 RX ADMIN — DEXAMETHASONE SODIUM PHOSPHATE 10 MG: 10 INJECTION, SOLUTION INTRAMUSCULAR; INTRAVENOUS at 08:31

## 2023-11-09 RX ADMIN — DIPHENHYDRAMINE HYDROCHLORIDE 25 MG: 50 INJECTION INTRAMUSCULAR; INTRAVENOUS at 09:05

## 2023-11-09 RX ADMIN — ACETAMINOPHEN 650 MG: 325 TABLET ORAL at 08:22

## 2023-11-09 RX ADMIN — SODIUM CHLORIDE 500 ML: 0.9 INJECTION, SOLUTION INTRAVENOUS at 08:23

## 2023-11-09 NOTE — PROGRESS NOTES
IVIG tolerated without incident. PIV removed. Next appt scheduled & reviewed. Discharged in stable condition.

## 2023-11-27 DIAGNOSIS — Z48.815 ENCOUNTER FOR SURGICAL AFTERCARE FOLLOWING SURGERY OF DIGESTIVE SYSTEM: ICD-10-CM

## 2023-11-27 DIAGNOSIS — Z98.84 BARIATRIC SURGERY STATUS: ICD-10-CM

## 2023-11-27 DIAGNOSIS — R10.13 EPIGASTRIC PAIN: ICD-10-CM

## 2023-11-28 RX ORDER — OMEPRAZOLE 20 MG/1
40 CAPSULE, DELAYED RELEASE ORAL DAILY
Qty: 30 CAPSULE | Refills: 5 | Status: SHIPPED | OUTPATIENT
Start: 2023-11-28 | End: 2023-12-06 | Stop reason: SDUPTHER

## 2023-11-30 ENCOUNTER — HOSPITAL ENCOUNTER (OUTPATIENT)
Dept: INFUSION CENTER | Facility: HOSPITAL | Age: 53
End: 2023-11-30
Attending: INTERNAL MEDICINE
Payer: COMMERCIAL

## 2023-11-30 VITALS
HEART RATE: 90 BPM | TEMPERATURE: 96.2 F | SYSTOLIC BLOOD PRESSURE: 152 MMHG | RESPIRATION RATE: 18 BRPM | DIASTOLIC BLOOD PRESSURE: 82 MMHG

## 2023-11-30 DIAGNOSIS — D80.1 HYPOGAMMAGLOBULINEMIA (HCC): Primary | ICD-10-CM

## 2023-11-30 PROCEDURE — 96375 TX/PRO/DX INJ NEW DRUG ADDON: CPT

## 2023-11-30 PROCEDURE — 96366 THER/PROPH/DIAG IV INF ADDON: CPT

## 2023-11-30 PROCEDURE — 96365 THER/PROPH/DIAG IV INF INIT: CPT

## 2023-11-30 PROCEDURE — 96367 TX/PROPH/DG ADDL SEQ IV INF: CPT

## 2023-11-30 RX ORDER — ACETAMINOPHEN 325 MG/1
650 TABLET ORAL ONCE
Status: COMPLETED | OUTPATIENT
Start: 2023-11-30 | End: 2023-11-30

## 2023-11-30 RX ORDER — SODIUM CHLORIDE 9 MG/ML
20 INJECTION, SOLUTION INTRAVENOUS ONCE
Status: COMPLETED | OUTPATIENT
Start: 2023-11-30 | End: 2023-11-30

## 2023-11-30 RX ORDER — SODIUM CHLORIDE 9 MG/ML
20 INJECTION, SOLUTION INTRAVENOUS ONCE
OUTPATIENT
Start: 2023-12-21

## 2023-11-30 RX ORDER — ACETAMINOPHEN 325 MG/1
650 TABLET ORAL ONCE
OUTPATIENT
Start: 2023-12-21

## 2023-11-30 RX ADMIN — DEXAMETHASONE SODIUM PHOSPHATE 10 MG: 10 INJECTION, SOLUTION INTRAMUSCULAR; INTRAVENOUS at 08:47

## 2023-11-30 RX ADMIN — SODIUM CHLORIDE 20 ML/HR: 9 INJECTION, SOLUTION INTRAVENOUS at 08:47

## 2023-11-30 RX ADMIN — FAMOTIDINE 20 MG: 10 INJECTION INTRAVENOUS at 09:44

## 2023-11-30 RX ADMIN — Medication 40 G: at 10:14

## 2023-11-30 RX ADMIN — SODIUM CHLORIDE 500 ML: 0.9 INJECTION, SOLUTION INTRAVENOUS at 08:49

## 2023-11-30 RX ADMIN — ACETAMINOPHEN 650 MG: 325 TABLET ORAL at 08:47

## 2023-11-30 RX ADMIN — DIPHENHYDRAMINE HYDROCHLORIDE 25 MG: 50 INJECTION, SOLUTION INTRAMUSCULAR; INTRAVENOUS at 09:20

## 2023-11-30 NOTE — PROGRESS NOTES
IVIG tolerated without incident. PIV removed. Next appt scheduled for 12/21 @ 8 am, reviewed with pt. Discharged in stable condition.

## 2023-12-06 ENCOUNTER — APPOINTMENT (OUTPATIENT)
Dept: LAB | Facility: CLINIC | Age: 53
End: 2023-12-06
Payer: COMMERCIAL

## 2023-12-06 DIAGNOSIS — K91.2 POSTSURGICAL MALABSORPTION: ICD-10-CM

## 2023-12-06 DIAGNOSIS — E66.9 OBESITY, CLASS I, BMI 30-34.9: ICD-10-CM

## 2023-12-06 DIAGNOSIS — D50.8 IRON DEFICIENCY ANEMIA SECONDARY TO INADEQUATE DIETARY IRON INTAKE: ICD-10-CM

## 2023-12-06 DIAGNOSIS — Z48.815 ENCOUNTER FOR SURGICAL AFTERCARE FOLLOWING SURGERY OF DIGESTIVE SYSTEM: ICD-10-CM

## 2023-12-06 DIAGNOSIS — Z98.84 BARIATRIC SURGERY STATUS: ICD-10-CM

## 2023-12-06 DIAGNOSIS — E53.8 B12 DEFICIENCY: ICD-10-CM

## 2023-12-06 DIAGNOSIS — R10.13 EPIGASTRIC PAIN: ICD-10-CM

## 2023-12-06 DIAGNOSIS — D80.1 HYPOGAMMAGLOBULINEMIA (HCC): ICD-10-CM

## 2023-12-06 DIAGNOSIS — Z98.84 H/O GASTRIC BYPASS: ICD-10-CM

## 2023-12-06 LAB
25(OH)D3 SERPL-MCNC: 63 NG/ML (ref 30–100)
ALBUMIN SERPL BCP-MCNC: 4.1 G/DL (ref 3.5–5)
ALP SERPL-CCNC: 91 U/L (ref 34–104)
ALT SERPL W P-5'-P-CCNC: 18 U/L (ref 7–52)
ANION GAP SERPL CALCULATED.3IONS-SCNC: 7 MMOL/L
AST SERPL W P-5'-P-CCNC: 21 U/L (ref 13–39)
BASOPHILS # BLD AUTO: 0.05 THOUSANDS/ÂΜL (ref 0–0.1)
BASOPHILS NFR BLD AUTO: 1 % (ref 0–1)
BILIRUB SERPL-MCNC: 0.58 MG/DL (ref 0.2–1)
BUN SERPL-MCNC: 8 MG/DL (ref 5–25)
CALCIUM SERPL-MCNC: 9.5 MG/DL (ref 8.4–10.2)
CHLORIDE SERPL-SCNC: 103 MMOL/L (ref 96–108)
CO2 SERPL-SCNC: 28 MMOL/L (ref 21–32)
CREAT SERPL-MCNC: 0.61 MG/DL (ref 0.6–1.3)
CRP SERPL QL: 11.9 MG/L
EOSINOPHIL # BLD AUTO: 0.12 THOUSAND/ÂΜL (ref 0–0.61)
EOSINOPHIL NFR BLD AUTO: 2 % (ref 0–6)
ERYTHROCYTE [DISTWIDTH] IN BLOOD BY AUTOMATED COUNT: 12.8 % (ref 11.6–15.1)
ERYTHROCYTE [SEDIMENTATION RATE] IN BLOOD: 49 MM/HOUR (ref 0–29)
FERRITIN SERPL-MCNC: 142 NG/ML (ref 11–307)
FOLATE SERPL-MCNC: >22.3 NG/ML
GFR SERPL CREATININE-BSD FRML MDRD: 103 ML/MIN/1.73SQ M
GLUCOSE P FAST SERPL-MCNC: 85 MG/DL (ref 65–99)
HCT VFR BLD AUTO: 45 % (ref 34.8–46.1)
HGB BLD-MCNC: 14.2 G/DL (ref 11.5–15.4)
IGA SERPL-MCNC: 119 MG/DL (ref 66–433)
IGG SERPL-MCNC: 1211 MG/DL (ref 635–1741)
IGM SERPL-MCNC: 32 MG/DL (ref 45–281)
IMM GRANULOCYTES # BLD AUTO: 0.02 THOUSAND/UL (ref 0–0.2)
IMM GRANULOCYTES NFR BLD AUTO: 0 % (ref 0–2)
IRON SATN MFR SERPL: 30 % (ref 15–50)
IRON SERPL-MCNC: 132 UG/DL (ref 50–212)
LYMPHOCYTES # BLD AUTO: 1.79 THOUSANDS/ÂΜL (ref 0.6–4.47)
LYMPHOCYTES NFR BLD AUTO: 26 % (ref 14–44)
MCH RBC QN AUTO: 29.4 PG (ref 26.8–34.3)
MCHC RBC AUTO-ENTMCNC: 31.6 G/DL (ref 31.4–37.4)
MCV RBC AUTO: 93 FL (ref 82–98)
MONOCYTES # BLD AUTO: 0.48 THOUSAND/ÂΜL (ref 0.17–1.22)
MONOCYTES NFR BLD AUTO: 7 % (ref 4–12)
NEUTROPHILS # BLD AUTO: 4.56 THOUSANDS/ÂΜL (ref 1.85–7.62)
NEUTS SEG NFR BLD AUTO: 64 % (ref 43–75)
NRBC BLD AUTO-RTO: 0 /100 WBCS
PHOSPHATE SERPL-MCNC: 3.5 MG/DL (ref 2.7–4.5)
PLATELET # BLD AUTO: 313 THOUSANDS/UL (ref 149–390)
PMV BLD AUTO: 8.9 FL (ref 8.9–12.7)
POTASSIUM SERPL-SCNC: 4.6 MMOL/L (ref 3.5–5.3)
PROT SERPL-MCNC: 6.8 G/DL (ref 6.4–8.4)
PTH-INTACT SERPL-MCNC: 86.2 PG/ML (ref 12–88)
RBC # BLD AUTO: 4.83 MILLION/UL (ref 3.81–5.12)
SODIUM SERPL-SCNC: 138 MMOL/L (ref 135–147)
TIBC SERPL-MCNC: 444 UG/DL (ref 250–450)
UIBC SERPL-MCNC: 312 UG/DL (ref 155–355)
VIT B12 SERPL-MCNC: 678 PG/ML (ref 180–914)
WBC # BLD AUTO: 7.02 THOUSAND/UL (ref 4.31–10.16)

## 2023-12-06 PROCEDURE — 84590 ASSAY OF VITAMIN A: CPT

## 2023-12-06 PROCEDURE — 83550 IRON BINDING TEST: CPT

## 2023-12-06 PROCEDURE — 84425 ASSAY OF VITAMIN B-1: CPT

## 2023-12-06 PROCEDURE — 83970 ASSAY OF PARATHORMONE: CPT

## 2023-12-06 PROCEDURE — 82607 VITAMIN B-12: CPT

## 2023-12-06 PROCEDURE — 82728 ASSAY OF FERRITIN: CPT

## 2023-12-06 PROCEDURE — 84100 ASSAY OF PHOSPHORUS: CPT

## 2023-12-06 PROCEDURE — 85025 COMPLETE CBC W/AUTO DIFF WBC: CPT

## 2023-12-06 PROCEDURE — 82784 ASSAY IGA/IGD/IGG/IGM EACH: CPT

## 2023-12-06 PROCEDURE — 85652 RBC SED RATE AUTOMATED: CPT

## 2023-12-06 PROCEDURE — 84630 ASSAY OF ZINC: CPT

## 2023-12-06 PROCEDURE — 83540 ASSAY OF IRON: CPT

## 2023-12-06 PROCEDURE — 86140 C-REACTIVE PROTEIN: CPT

## 2023-12-06 PROCEDURE — 36415 COLL VENOUS BLD VENIPUNCTURE: CPT

## 2023-12-06 PROCEDURE — 82306 VITAMIN D 25 HYDROXY: CPT

## 2023-12-06 PROCEDURE — 82746 ASSAY OF FOLIC ACID SERUM: CPT

## 2023-12-06 PROCEDURE — 80053 COMPREHEN METABOLIC PANEL: CPT

## 2023-12-06 RX ORDER — OMEPRAZOLE 40 MG/1
40 CAPSULE, DELAYED RELEASE ORAL DAILY
Qty: 90 CAPSULE | Refills: 1 | Status: SHIPPED | OUTPATIENT
Start: 2023-12-06

## 2023-12-11 LAB
VIT A SERPL-MCNC: 67.8 UG/DL (ref 20.1–62)
VIT B1 BLD-SCNC: 158.4 NMOL/L (ref 66.5–200)

## 2023-12-14 ENCOUNTER — TELEPHONE (OUTPATIENT)
Dept: BARIATRICS | Facility: CLINIC | Age: 53
End: 2023-12-14

## 2023-12-14 DIAGNOSIS — E67.0 HIGH VITAMIN A LEVEL: ICD-10-CM

## 2023-12-14 DIAGNOSIS — Z98.84 BARIATRIC SURGERY STATUS: Primary | ICD-10-CM

## 2023-12-14 LAB — ZINC SERPL-MCNC: 86 UG/DL (ref 44–115)

## 2023-12-14 NOTE — TELEPHONE ENCOUNTER
----- Message from Bobby Morrisr sent at 12/14/2023  8:47 AM EST -----  Please call pt to let her know her vitamin A level is slightly elevated. Your vitamin A level is HIGH. High levels of vitamin A can be toxic to the liver. Symptoms of high vitamin A include nausea, vomiting, blurred vision, headache and vertigo. If you are experiencing these symptoms please let us know or make an appointment with your family doctor. It is important you stop all vitamins and minerals that contain vitamin A for 4 weeks. Avoid supplements like "hair skin and nails" which contain extra vitamin A. Avoid vitamin jacob and other supplements that contain extra vitamin A. Avoid anti-wrinkle creams that contain retinol in the ingredient panel. Alcohol intake can also raise vitamin A levels. While holding you multivitamin, recommend you take the following supplements:  One super B complex that contains thiamine  500 mg calcium citrate with vitamin d (three times a day)  One 2000 IU vitamin D3 daily   Extra iron if you are a menstruating female     Will recheck you vitamin A in 3 months. A lab slip is enclosed. The rest of her vitamins are within limits.

## 2023-12-19 ENCOUNTER — PATIENT OUTREACH (OUTPATIENT)
Dept: BARIATRICS | Facility: CLINIC | Age: 53
End: 2023-12-19

## 2023-12-19 ENCOUNTER — OFFICE VISIT (OUTPATIENT)
Dept: HEMATOLOGY ONCOLOGY | Facility: CLINIC | Age: 53
End: 2023-12-19
Payer: COMMERCIAL

## 2023-12-19 ENCOUNTER — TELEPHONE (OUTPATIENT)
Dept: HEMATOLOGY ONCOLOGY | Facility: CLINIC | Age: 53
End: 2023-12-19

## 2023-12-19 VITALS
SYSTOLIC BLOOD PRESSURE: 130 MMHG | HEART RATE: 85 BPM | TEMPERATURE: 98 F | WEIGHT: 177 LBS | HEIGHT: 62 IN | DIASTOLIC BLOOD PRESSURE: 80 MMHG | OXYGEN SATURATION: 98 % | BODY MASS INDEX: 32.57 KG/M2 | RESPIRATION RATE: 18 BRPM

## 2023-12-19 DIAGNOSIS — D50.8 IRON DEFICIENCY ANEMIA SECONDARY TO INADEQUATE DIETARY IRON INTAKE: ICD-10-CM

## 2023-12-19 DIAGNOSIS — E53.8 B12 DEFICIENCY: ICD-10-CM

## 2023-12-19 DIAGNOSIS — D80.1 HYPOGAMMAGLOBULINEMIA (HCC): Primary | ICD-10-CM

## 2023-12-19 DIAGNOSIS — Z98.84 H/O GASTRIC BYPASS: ICD-10-CM

## 2023-12-19 PROCEDURE — 99214 OFFICE O/P EST MOD 30 MIN: CPT | Performed by: INTERNAL MEDICINE

## 2023-12-19 NOTE — PROGRESS NOTES
"Relevant Medical History: GERD, bariatric surgery, post-op blindloop syndrome, IBS, SIBO, depression, hyperparathyroidism, reported dry mouth  Relevant Labs: 12/6/23 labs WNL except Vit A elevated at 67.8  Relevant Medications: Xanax, Prilosec, Carafate, Vit B 12 injection, Vit D3, Mg, Levothyroxine    Weight History     Self Reported Current Wt: 177#   Self Reported Current Ht:62\"  Highest Weight: 238-240# pre-op in 2009  Lowest Weight: 120#  BMI: BMI 30-34.9    Past Attempts at Weight Loss: Commerical Programs ([employer], [employer], [employer] etc); gastric bypass in 2009 with revision 2019 she believes    Food Recall  Breakfast: 7 AM- coffee- ~16 oz, 8-8:30- AM- Greek yogurt ([employer])  Snack: none reported  Lunch: 12-1 PM- leftovers OR salad with cheese  Snack:rarely, but may have PB and banana or apples, if out of control, may have chips; biggest weakness is popcorn  Dinner: protein, starch, vegetable (may not have as much protein as should if it is difficult to swallow); last night had taco salad  Snack: popcorn    Frequency of Dining Out: once monthly (has dumping syndrome)  Beverages: coffee, decaf iced tea ([employer])- 60-0 oz  Volume of Beverage Intake:    Would the patient benefit from visit with BH specialist?:No; Stress Eating and Mindless Snacking       Physical Activity Intake  Activity: No formal exercise routine, but 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

## 2023-12-19 NOTE — PROGRESS NOTES
Hematology/Oncology Outpatient Follow-up  Ester Velazquez 53 y.o. female 1970 [id number]    Date:  12/19/2023    Assessment and Plan:  1. Hypogammaglobulinemia (HCC)  The patient will be continued on the IVIG on every 3-week basis which is preventing upper airway infections.  - IgG, IgA, IgM; Future    2. H/O gastric bypass  Can cause decreased absorption of iron and other elements like vitamin B12.    3. Iron deficiency anemia secondary to inadequate dietary iron intake  At this point she is not anemic or iron deficient.  We will monitor her closely.  - CBC and differential; Future  - Comprehensive metabolic panel; Future  - C-reactive protein; Future  - Sedimentation rate, automated; Future  - Ferritin; Future  - Iron Panel (Includes Ferritin, Iron Sat%, Iron, and TIBC); Future    4. B12 deficiency  Vitamin B12 is within normal range.  - Vitamin B12; Future      HPI:  This is a 53-year-old female with multiple comorbid conditions including history of morbid obesity status post post gastric bypass surgery in 2009, anemia due to iron deficiency, cholecystectomy, hysterectomy, hypertension, gastroesophageal reflux disease, depression, asthma, chronic sinusitis, etc.     The patient was apparently found to have hypogammaglobinemia on multiple occasions with frequent/recurrent infections mainly in the upper respiratory airway with chronic sinusitis.  The patient was evaluated by the Hematology service from [facility] and was started on IVIG around December of 2019 on a monthly basis.  She received then 3-4 sessions of IVIG which improved her chronic sinusitis and other infectious process, according to the patient, significantly.  However, due to the COVID-19 pandemic she stopped doing the IVIG and decided to transfer her care to our service since she lives close by. Her immunoglobulin levels from 07/17/2019 before she had any IVIG treatment showed IgG of 554, IgA of 116 and IgM of 39 mg/dL.  She was started on  vitamin B12 injections every 2 weeks.             Interval history:  The patient came today for follow-up visit.  She continues to be on IVIG on every 3-week basis which is preventing upper airway infections.  She did complain about some arthralgia and moderate fatigue.  Recent blood work on 12/6/2023 showed hemoglobin of 14.2 with normal white cells and platelets.  Creatinine 0.6 with normal calcium and liver enzymes.  C-reactive protein 11.9 with sed rate of 49.  Vitamin B12 678.  Immunoglobulins showed low IgM of 32 otherwise normal IgG.  Folate was normal.  Her iron panel and vitamin B12 were within normal range.  She had a recent bone density scan which showed osteopenia.      ROS: Review of Systems   Constitutional:  Positive for fatigue. Negative for chills and fever.   HENT:  Negative for ear pain and sore throat.    Eyes:  Negative for pain and visual disturbance.   Respiratory:  Positive for shortness of breath. Negative for cough.    Cardiovascular:  Negative for chest pain and palpitations.   Gastrointestinal:  Positive for constipation and diarrhea. Negative for abdominal pain and vomiting.   Genitourinary:  Positive for dysuria. Negative for hematuria.   Musculoskeletal:  Negative for arthralgias and back pain.   Skin:  Negative for color change and rash.   Neurological:  Positive for dizziness and numbness. Negative for seizures and syncope.   Hematological:  Bruises/bleeds easily.   Psychiatric/Behavioral:  Positive for sleep disturbance.    All other systems reviewed and are negative.      Past Medical History:   Diagnosis Date    Anemia     recurrent    Anxiety     Asthma     allergy induced    Contact lens overwear of both eyes     Depression     Diarrhea     Environmental allergies     Fibromyalgia     Fibromyalgia, primary     GERD (gastroesophageal reflux disease)     History of transfusion     2008    Hypertension     Hypogammaglobulinemia (HCC)     Hypoglycemia after GI (gastrointestinal)  surgery     Immune deficiency disorder (HCC)     IgG deficiency    Iron (Fe) deficiency anemia     Kidney stone     Lumbar herniated disc     Migraine     Motion sickness     PONV (postoperative nausea and vomiting)     severe    Seasonal allergies     Wears glasses        Past Surgical History:   Procedure Laterality Date    ABDOMINAL SURGERY      adhesions    ABDOMINOPLASTY      APPENDECTOMY      AUGMENTATION MAMMAPLASTY  2003    BREAST IMPLANT REMOVAL Bilateral 2019    BREAST SURGERY      implant removal     SECTION      CHOLECYSTECTOMY      COSMETIC SURGERY Bilateral     breast augmentation 2004    GASTRIC BYPASS      2009    GASTRIC BYPASS LAPAROSCOPIC N/A 2020    Procedure: ROBOTIC REVISION OF KENYA-EN-Y GASTRIC BYPASS, INTRAOP EGD;  Surgeon: Adeline Adler MD;  Location: [facility];  Service: Bariatrics    HERNIA REPAIR      HIATAL HERNIA REPAIR      HYSTERECTOMY  2015    KNEE ARTHROSCOPY Left     LYMPH NODE BIOPSY  200-    benign    NERVE BLOCK      NE ARTHRODESIS MIDTARSOMETATARSAL SINGLE JOINT Left 2016    Procedure: FRIST TMJ FUSION ;  Surgeon: Tadeo Coelho DPM;  [facility];  Service: Podiatry    NE CORRJ HALLUX VALGUS W/SESMDC W/RESCJ PROX PHAL Left 2016    Procedure: SURGICAL MODIFIED WATKINS BUNIONECTOMY FIRST MTPJ;  Surgeon: Tadeo Coelho DPM;  [facility];  Service: Podiatry    NE OSTEOT W/WO LNGTH SHRT/CORRJ METAR XCP 1ST EA Left 2016    Procedure: FOOT SHORTENING OSTEOTOMIES 2ND AND 3RD METATARSAL WITH EXTENSER TENDON RELEASE 3RD AND 4TH TOE;  Surgeon: Tadeo Coelho DPM;  [facility];  Service: Podiatry    NE REMOVAL IMPLANT DEEP Left 2016    Procedure: REMOVAL SYMPTOMATIC  HARDWARE FIRST RAY,RELATED CONTRACTURE SECOND AND THIRD TOES WITH SUSPENSION FOURTH TOE;  Surgeon: Tadeo Coelho DPM;  [facility];  Service: Podiatry    SALPINGOOPHORECTOMY Left 2016    with removal of cervix     TONSILLECTOMY      WISDOM TOOTH EXTRACTION         Social History     Socioeconomic History    Marital status: /Civil Union     Spouse name: None    Number of children: None    Years of education: None    Highest education level: None   Occupational History    None   Tobacco Use    Smoking status: Never    Smokeless tobacco: Never   Vaping Use    Vaping status: Never Used   Substance and Sexual Activity    Alcohol use: Not Currently     Comment: rarely    Drug use: No    Sexual activity: Yes     Partners: Male     Birth control/protection: Surgical   Other Topics Concern    None   Social History Narrative    Consumes on average 3 cups of coffee per day     Social Determinants of Health     Financial Resource Strain: Not on file   Food Insecurity: Not on file   Transportation Needs: Not on file   Physical Activity: Not on file   Stress: Not on file   Social Connections: Not on file   Intimate Partner Violence: Not on file   Housing Stability: Not on file       Family History   Problem Relation Age of Onset    Lymphoma Mother     COPD Mother     Arthritis Mother     Asthma Mother     Cancer Mother     Hyperlipidemia Mother     Rheum arthritis Mother     Heart disease Father     Early death Father     Breast cancer Maternal Aunt     Breast cancer Cousin     Lupus Cousin     No Known Problems Daughter     Stroke Maternal Grandmother     Mental illness Maternal Grandfather     No Known Problems Paternal Grandmother     No Known Problems Paternal Grandfather     Pancreatic cancer Maternal Aunt     No Known Problems Maternal Aunt     No Known Problems Maternal Aunt     Multiple sclerosis Paternal Aunt     Breast cancer Cousin        Allergies   Allergen Reactions    Codeine GI Intolerance and Chest Pain     ABDOMINAL PAIN    Hydrocodone Abdominal Pain, Swelling, Itching and Rash    Pregabalin Dizziness    Trazodone Dizziness    Acetaminophen-Codeine Abdominal Pain    Amoxicillin Rash    Gabapentin Dizziness      "Dizziness/mental fog (states the same as lyrcia)          Current Outpatient Medications:     acetaminophen (TYLENOL) 325 mg tablet, Take 3 tablets (975 mg total) by mouth every 8 (eight) hours, Disp: 30 tablet, Rfl: 0    acyclovir (ZOVIRAX) 200 mg capsule, , Disp: , Rfl:     Aimovig 140 MG/ML SOAJ, Use as directed, Disp: , Rfl:     albuterol (PROVENTIL HFA,VENTOLIN HFA) 90 mcg/act inhaler, Inhale 1 puff every 6 (six) hours as needed As needed, Disp: , Rfl:     ALPRAZolam (XANAX) 0.5 mg tablet, , Disp: , Rfl:     amLODIPine (NORVASC) 2.5 mg tablet, Take 2.5 mg by mouth daily, Disp: , Rfl:     Azelastine-Fluticasone (DYMISTA NA), 2 sprays into each nostril 2 (two) times a day , Disp: , Rfl:     Azelastine-Fluticasone 137-50 MCG/ACT SUSP, , Disp: , Rfl:     B-D 3CC LUER-NAKUL SYR 25GX1\" 25G X 1\" 3 ML MISC, , Disp: , Rfl:     benzonatate (TESSALON) 200 MG capsule, Take 1 capsule (200 mg total) by mouth 3 (three) times a day as needed for cough, Disp: 90 capsule, Rfl: 0    Botulinum Toxin Type A 200 units SOLR, Inject 155 units into face and neck IM every 90 days, Disp: , Rfl:     budesonide-formoterol (SYMBICORT) 160-4.5 mcg/act inhaler, Inhale 2 puffs 2 (two) times a day , Disp: , Rfl:     buPROPion (WELLBUTRIN) 100 mg tablet, Take 100 mg by mouth 2 (two) times a day , Disp: , Rfl:     cholecalciferol (VITAMIN D3) 19605 units capsule, Take 5,000 Units by mouth 2 (two) times a day , Disp: , Rfl:     cyanocobalamin 1,000 mcg/mL, , Disp: , Rfl:     cyclobenzaprine (FLEXERIL) 10 mg tablet, Take 10 mg by mouth 3 (three) times a day as needed  , Disp: , Rfl:     Erenumab-aooe 70 MG/ML SOAJ, Inject 140 mg/mL under the skin every 28 days, Disp: , Rfl:     estradiol (CLIMARA) 0.06 MG/24HR, , Disp: , Rfl:     estradiol (CLIMARA) 0.1 mg/24 hr, APPLY 1 PATCH TOPICALLY ONCE A WEEK, Disp: , Rfl:     fexofenadine (ALLEGRA) 180 MG tablet, Take 180 mg by mouth daily, Disp: , Rfl:     hyoscyamine (LEVSIN/SL) 0.125 mg SL tablet, Take " 0.125 mg by mouth every 4 (four) hours as needed for cramping, Disp: , Rfl:     Immune Globulin, Human, (PRIVIGEN IV), Inject into a catheter in a vein every 30 (thirty) days, Disp: , Rfl:     levothyroxine 50 mcg tablet, , Disp: , Rfl:     losartan (COZAAR) 50 mg tablet, Take 50 mg by mouth 2 (two) times a day., Disp: , Rfl:     Loteprednol Etabonate (Lotemax SM) 0.38 % GEL, , Disp: , Rfl:     magnesium gluconate (MAGONATE) 500 MG tablet, Take 500 mg by mouth daily, Disp: , Rfl:     meclizine (ANTIVERT) 25 mg tablet, Take 1 tablet (25 mg total) by mouth 3 (three) times a day as needed for dizziness for up to 12 doses, Disp: 12 tablet, Rfl: 0    Meribin 5 MG CAPS, Take 1 capsule by mouth daily, Disp: , Rfl:     metroNIDAZOLE (METROGEL) 0.75 % gel, , Disp: , Rfl:     minocycline (MINOCIN) 50 mg capsule, minocycline 50 mg capsule  TAKE 1 CAPSULE BY MOUTH ONCE DAILY IN THE EVENING WITH WATER AT LEAST 2 HOURS AFTER DINNER, Disp: , Rfl:     montelukast (SINGULAIR) 5 mg chewable tablet, Chew 1 tablet (5 mg total) 2 (two) times a day, Disp: 90 tablet, Rfl: 3    omeprazole (PriLOSEC) 40 MG capsule, Take 1 capsule (40 mg total) by mouth daily, Disp: 90 capsule, Rfl: 1    ondansetron (ZOFRAN-ODT) 8 mg disintegrating tablet, Take 1 tablet (8 mg total) by mouth every 8 (eight) hours as needed for nausea or vomiting, Disp: 20 tablet, Rfl: 3    sucralfate (CARAFATE) 1 g tablet, Take 1 tablet (1 g total) by mouth 4 (four) times a day Please melt tablet in 15 ml of water to take as a suspension., Disp: 360 tablet, Rfl: 1    SUMAtriptan (IMITREX) 100 mg tablet, Take 100 mg by mouth 2 (two) times a day as needed for migraine  , Disp: , Rfl:     traMADol (ULTRAM) 50 mg tablet, Take 50 mg by mouth 2 (two) times a day., Disp: , Rfl:     Varenicline Tartrate (Tyrvaya) 0.03 MG/ACT SOLN, into each nostril, Disp: , Rfl:     Xiidra 5 % op solution, , Disp: , Rfl:     ALPRAZolam (XANAX) 2 MG tablet, Take 0.5 mg by mouth daily at bedtime   "(Patient not taking: Reported on 11/8/2023), Disp: , Rfl:     amLODIPine (NORVASC) 2.5 mg tablet, Take 2.5 mg by mouth daily (Patient not taking: Reported on 9/29/2023), Disp: , Rfl:     B-D 3CC LUER-NAKUL SYR 25GX5/8\" 25G X 5/8\" 3 ML MISC, , Disp: , Rfl:     budesonide-formoterol (Symbicort) 160-4.5 mcg/act inhaler, Take 2 puffs by mouth 2 (two) times a day (Patient not taking: Reported on 9/29/2023), Disp: , Rfl:     Cyanocobalamin 1000 MCG/ML KIT, Inject 1 mL (1,000 mcg total) as directed every 14 (fourteen) days (Patient not taking: Reported on 12/19/2023), Disp: 6 mL, Rfl: 3    levothyroxine 25 mcg tablet, Take 25 mcg by mouth daily (Patient not taking: Reported on 9/29/2023), Disp: , Rfl:     Oxervate 0.002 % SOLN, , Disp: , Rfl:       Physical Exam:  /80 (BP Location: Left arm, Patient Position: Sitting, Cuff Size: Adult)   Pulse 85   Temp 98 °F (36.7 °C)   Resp 18   Ht 5' 2\" (1.575 m)   Wt 80.3 kg (177 lb)   SpO2 98%   BMI 32.37 kg/m²     Physical Exam  Constitutional:       General: She is not in acute distress.     Appearance: She is well-developed. She is not diaphoretic.   HENT:      Head: Normocephalic and atraumatic.      Nose: Nose normal.   Eyes:      General: No scleral icterus.        Right eye: No discharge.         Left eye: No discharge.      Conjunctiva/sclera: Conjunctivae normal.      Pupils: Pupils are equal, round, and reactive to light.   Neck:      Thyroid: No thyromegaly.      Vascular: No JVD.      Trachea: No tracheal deviation.   Cardiovascular:      Rate and Rhythm: Normal rate and regular rhythm.      Heart sounds: Normal heart sounds. No murmur heard.     No friction rub.   Pulmonary:      Effort: Pulmonary effort is normal. No respiratory distress.      Breath sounds: Normal breath sounds. No stridor. No wheezing or rales.   Chest:      Chest wall: No tenderness.   Abdominal:      General: There is no distension.      Palpations: Abdomen is soft. There is no " hepatomegaly or splenomegaly.      Tenderness: There is no abdominal tenderness. There is no guarding or rebound.   Musculoskeletal:         General: No tenderness or deformity. Normal range of motion.      Cervical back: Normal range of motion and neck supple.   Lymphadenopathy:      Cervical: No cervical adenopathy.   Skin:     General: Skin is warm and dry.      Coloration: Skin is not pale.      Findings: No erythema or rash.   Neurological:      Mental Status: She is alert and oriented to person, place, and time.      Cranial Nerves: No cranial nerve deficit.      Coordination: Coordination normal.      Deep Tendon Reflexes: Reflexes are normal and symmetric.   Psychiatric:         Behavior: Behavior normal.         Thought Content: Thought content normal.         Judgment: Judgment normal.           Labs:  Lab Results   Component Value Date    WBC 7.02 12/06/2023    HGB 14.2 12/06/2023    HCT 45.0 12/06/2023    MCV 93 12/06/2023     12/06/2023     Lab Results   Component Value Date    K 4.6 12/06/2023     12/06/2023    CO2 28 12/06/2023    BUN 8 12/06/2023    CREATININE 0.61 12/06/2023    GLUF 85 12/06/2023    CALCIUM 9.5 12/06/2023    CORRECTEDCA 9.5 06/06/2023    AST 21 12/06/2023    ALT 18 12/06/2023    ALKPHOS 91 12/06/2023    EGFR 103 12/06/2023       Patient voiced understanding and agreement in the above discussion. Aware to contact our office with questions/symptoms in the interim.

## 2023-12-21 ENCOUNTER — HOSPITAL ENCOUNTER (OUTPATIENT)
Dept: INFUSION CENTER | Facility: HOSPITAL | Age: 53
End: 2023-12-21
Attending: INTERNAL MEDICINE
Payer: COMMERCIAL

## 2023-12-21 DIAGNOSIS — D80.1 HYPOGAMMAGLOBULINEMIA (HCC): Primary | ICD-10-CM

## 2023-12-21 PROCEDURE — 96375 TX/PRO/DX INJ NEW DRUG ADDON: CPT

## 2023-12-21 PROCEDURE — 96367 TX/PROPH/DG ADDL SEQ IV INF: CPT

## 2023-12-21 PROCEDURE — 96365 THER/PROPH/DIAG IV INF INIT: CPT

## 2023-12-21 PROCEDURE — 96366 THER/PROPH/DIAG IV INF ADDON: CPT

## 2023-12-21 RX ORDER — SODIUM CHLORIDE 9 MG/ML
20 INJECTION, SOLUTION INTRAVENOUS ONCE
Status: COMPLETED | OUTPATIENT
Start: 2023-12-21 | End: 2023-12-21

## 2023-12-21 RX ORDER — SODIUM CHLORIDE 9 MG/ML
20 INJECTION, SOLUTION INTRAVENOUS ONCE
OUTPATIENT
Start: 2024-01-11

## 2023-12-21 RX ORDER — ACETAMINOPHEN 325 MG/1
650 TABLET ORAL ONCE
OUTPATIENT
Start: 2024-01-11

## 2023-12-21 RX ORDER — ACETAMINOPHEN 325 MG/1
650 TABLET ORAL ONCE
Status: COMPLETED | OUTPATIENT
Start: 2023-12-21 | End: 2023-12-21

## 2023-12-21 RX ADMIN — SODIUM CHLORIDE 500 ML: 0.9 INJECTION, SOLUTION INTRAVENOUS at 08:15

## 2023-12-21 RX ADMIN — FAMOTIDINE 20 MG: 10 INJECTION INTRAVENOUS at 09:42

## 2023-12-21 RX ADMIN — DIPHENHYDRAMINE HYDROCHLORIDE 25 MG: 50 INJECTION, SOLUTION INTRAMUSCULAR; INTRAVENOUS at 09:18

## 2023-12-21 RX ADMIN — DEXAMETHASONE SODIUM PHOSPHATE 10 MG: 10 INJECTION, SOLUTION INTRAMUSCULAR; INTRAVENOUS at 08:43

## 2023-12-21 RX ADMIN — ACETAMINOPHEN 650 MG: 325 TABLET ORAL at 08:17

## 2023-12-21 RX ADMIN — Medication 40 G: at 10:46

## 2023-12-21 RX ADMIN — SODIUM CHLORIDE 20 ML/HR: 0.9 INJECTION, SOLUTION INTRAVENOUS at 08:15

## 2023-12-21 NOTE — PROGRESS NOTES
Order added to tx plan by Loretta Meredith RN for correction titration.  IVIG infusion tolerated without incident.  PIV removed.  Next appt reviewed for 1/11 @ 9 am.  AVS declined.  Discharged in stable condition.

## 2024-01-11 ENCOUNTER — HOSPITAL ENCOUNTER (OUTPATIENT)
Dept: INFUSION CENTER | Facility: HOSPITAL | Age: 54
End: 2024-01-11
Attending: INTERNAL MEDICINE
Payer: COMMERCIAL

## 2024-01-11 VITALS
HEART RATE: 83 BPM | TEMPERATURE: 97.6 F | OXYGEN SATURATION: 96 % | DIASTOLIC BLOOD PRESSURE: 76 MMHG | RESPIRATION RATE: 18 BRPM | SYSTOLIC BLOOD PRESSURE: 131 MMHG

## 2024-01-11 DIAGNOSIS — D80.1 HYPOGAMMAGLOBULINEMIA (HCC): Primary | ICD-10-CM

## 2024-01-11 PROCEDURE — 96366 THER/PROPH/DIAG IV INF ADDON: CPT

## 2024-01-11 PROCEDURE — 96367 TX/PROPH/DG ADDL SEQ IV INF: CPT

## 2024-01-11 PROCEDURE — 96365 THER/PROPH/DIAG IV INF INIT: CPT

## 2024-01-11 RX ORDER — SODIUM CHLORIDE 9 MG/ML
20 INJECTION, SOLUTION INTRAVENOUS ONCE
Status: COMPLETED | OUTPATIENT
Start: 2024-01-11 | End: 2024-01-11

## 2024-01-11 RX ORDER — ACETAMINOPHEN 325 MG/1
650 TABLET ORAL ONCE
OUTPATIENT
Start: 2024-02-01

## 2024-01-11 RX ORDER — SODIUM CHLORIDE 9 MG/ML
20 INJECTION, SOLUTION INTRAVENOUS ONCE
OUTPATIENT
Start: 2024-02-01

## 2024-01-11 RX ORDER — ACETAMINOPHEN 325 MG/1
650 TABLET ORAL ONCE
Status: COMPLETED | OUTPATIENT
Start: 2024-01-11 | End: 2024-01-11

## 2024-01-11 RX ADMIN — ACETAMINOPHEN 650 MG: 325 TABLET ORAL at 09:08

## 2024-01-11 RX ADMIN — Medication 40 G: at 10:31

## 2024-01-11 RX ADMIN — SODIUM CHLORIDE 500 ML: 0.9 INJECTION, SOLUTION INTRAVENOUS at 09:07

## 2024-01-11 RX ADMIN — FAMOTIDINE 20 MG: 10 INJECTION INTRAVENOUS at 10:03

## 2024-01-11 RX ADMIN — DEXAMETHASONE SODIUM PHOSPHATE 10 MG: 10 INJECTION, SOLUTION INTRAMUSCULAR; INTRAVENOUS at 09:08

## 2024-01-11 RX ADMIN — DIPHENHYDRAMINE HYDROCHLORIDE 25 MG: 50 INJECTION, SOLUTION INTRAMUSCULAR; INTRAVENOUS at 09:41

## 2024-01-11 RX ADMIN — SODIUM CHLORIDE 20 ML/HR: 0.9 INJECTION, SOLUTION INTRAVENOUS at 09:06

## 2024-01-11 NOTE — PROGRESS NOTES
IVIG tolerated without incident.  PIV removed.  Next appt scheduled for 2/1 @ 8 am, reviewed with pt.  Discharged in stable condition. AVS deferred.

## 2024-01-13 DIAGNOSIS — Z00.6 ENCOUNTER FOR EXAMINATION FOR NORMAL COMPARISON OR CONTROL IN CLINICAL RESEARCH PROGRAM: ICD-10-CM

## 2024-01-23 ENCOUNTER — OFFICE VISIT (OUTPATIENT)
Dept: BARIATRICS | Facility: CLINIC | Age: 54
End: 2024-01-23
Payer: COMMERCIAL

## 2024-01-23 ENCOUNTER — TELEPHONE (OUTPATIENT)
Dept: ENDOCRINOLOGY | Facility: CLINIC | Age: 54
End: 2024-01-23

## 2024-01-23 VITALS
TEMPERATURE: 98 F | OXYGEN SATURATION: 98 % | SYSTOLIC BLOOD PRESSURE: 138 MMHG | DIASTOLIC BLOOD PRESSURE: 82 MMHG | BODY MASS INDEX: 32.61 KG/M2 | HEART RATE: 84 BPM | WEIGHT: 177.2 LBS | RESPIRATION RATE: 20 BRPM | HEIGHT: 62 IN

## 2024-01-23 DIAGNOSIS — E66.9 OBESITY, CLASS I, BMI 30-34.9: Primary | ICD-10-CM

## 2024-01-23 DIAGNOSIS — D80.1 HYPOGAMMAGLOBULINEMIA (HCC): ICD-10-CM

## 2024-01-23 DIAGNOSIS — R63.5 ABNORMAL WEIGHT GAIN: ICD-10-CM

## 2024-01-23 DIAGNOSIS — I10 BENIGN ESSENTIAL HTN: ICD-10-CM

## 2024-01-23 DIAGNOSIS — Z98.84 H/O GASTRIC BYPASS: ICD-10-CM

## 2024-01-23 PROCEDURE — 99215 OFFICE O/P EST HI 40 MIN: CPT | Performed by: PHYSICIAN ASSISTANT

## 2024-01-23 RX ORDER — ESTRADIOL 2 MG/1
2 TABLET ORAL DAILY
COMMUNITY

## 2024-01-23 RX ORDER — LEVOFLOXACIN 500 MG/1
TABLET, FILM COATED ORAL
COMMUNITY
Start: 2024-01-16

## 2024-01-23 RX ORDER — METHYLPREDNISOLONE 4 MG/1
TABLET ORAL
COMMUNITY
Start: 2024-01-16

## 2024-01-23 RX ORDER — PREDNISONE 5 MG/1
TABLET ORAL
COMMUNITY
Start: 2023-12-21

## 2024-01-23 NOTE — PROGRESS NOTES
Assessment/Plan:    H/O gastric bypass  -s/p Eveline-En-Y Gastric Bypass with Dr. Rubin in [place] done in 2008 and have a revision of RNYGB with PEHR with Dr. Luis Miguel Adler on 02/11/2020     Initial 2008: 237 lbs prior to bypass  Jay 135 lbs after the first year       Initial: 178 lbs (prior to revision)  Jay: 162 lbs    Obesity, Class I, BMI 30-34.9  -Discussed options of HealthyCORE-Intensive Lifestyle Intervention Program, Very Low Calorie Diet-VLCD, and Conservative Program and the role of weight loss medications.  -Initial weight loss goal of 5-10% weight loss for improved health  -Screening labs: reviewed CMP, TSH, T4   -Patient is interested in pursuing conservative program  -Calorie goals, sample menu, portion size guidelines, and food logging reviewed with the patient.    -patient defers meeting with RD  -Patient also has interest in AOM. Discussed importance of consistent dietary and lifestyle changes for long term success. Patient is not a candidate for Phentermine (hx palpitations) or Topamax (hx kidney stones. She is already on Wellbtrin. Can consider GLP-1 if covered. Discussed supply issues. She does have baseline chronic abdominal discomfort, GERD and cramping and has had recent UGI and EGD revealing mild reflux esophagitis and small hiatal hernia.. Would likely trial Saxenda first as it is generally better tolerated. Denies personal hx of pancreatitis and family hx MEN2 or MTC. She does report long standing hx since her bypass surgery of episodic hypoglycemia. Per patient this happened one month ago. She does have glucometer but does not check her glucose. She does eat sporadically at times and does not have always have a balanced meal containing carb and protein. Advised she would need to monitor BS on Saxenda and also check coverage.    Benign essential HTN  -On Norvasc  -can improve with weight loss    Hypogammaglobulinemia (HCC)  -On Privigen infusions    Goals:    Food log (ie.)  [email or web address],etc.   No sugary beverages. At least 64oz of water daily.  Increase physical activity by 10 minutes daily. Gradually increase physical activity to a goal of 5 days per week for 30 minutes of MODERATE intensity PLUS 2 days per week of FULL BODY resistance training  3164-9429 calories per day  5-10 servings of fruits and vegetables per day  25-35 grams of dietary fiber per day  Saxenda    Follow up in approximately 3 months with Non-Surgical Physician/Advanced Practitioner.    Diagnoses and all orders for this visit:    Obesity, Class I, BMI 30-34.9    Abnormal weight gain  Comments:  see plan under Class 1 obesity    H/O gastric bypass    Benign essential HTN    Hypogammaglobulinemia (HCC)    Other orders  -     levofloxacin (LEVAQUIN) 500 mg tablet; TAKE 1 TABLET BY MOUTH ONCE DAILY FOR 10 DAYS  -     methylPREDNISolone 4 MG tablet therapy pack; TAKE BY MOUTH AS DIRECTED ON INSIDE OF PACKAGE  -     predniSONE 5 mg tablet; TAKE 3 TABLETS BY MOUTH ONCE DAILY USE FOR PREDNISONE TAPER  -     estradiol (ESTRACE) 2 MG tablet; Take 2 mg by mouth daily Take 1/2 tab po BID          Subjective:   Chief Complaint   Patient presents with    Consult       Patient ID: Ester Velazquez  is a 53 y.o. female with excess weight/obesity here to pursue weight managment.    Past Medical History:   Diagnosis Date    Anemia     recurrent    Anxiety     Asthma     allergy induced    Contact lens overwear of both eyes     Depression     Diarrhea     Environmental allergies     Fibromyalgia     Fibromyalgia, primary     GERD (gastroesophageal reflux disease)     History of transfusion     2008    Hypertension     Hypogammaglobulinemia (HCC)     Hypoglycemia after GI (gastrointestinal) surgery     Immune deficiency disorder (HCC)     IgG deficiency    Iron (Fe) deficiency anemia     Kidney stone     Lumbar herniated disc     Migraine     Motion sickness     PONV (postoperative  nausea and vomiting)     severe    Seasonal allergies     Wears glasses          HPI:  Obesity/Excess Weight:  Severity: Mild  Onset:  childhood  Modifiers:  RNYGB, VLCD , weight watchers, Keto, Paleo, fad diets  Contributing factors:  not feeling well, limited exercise capacity , cravings  Associated symptoms: increased joint pain    Goals: 140 lbs    Hydration: water very minimal, 2 cups Coffee + half nd half and splenda, crystal light  ETOH: denies  Exercise: denies; reports she gets dizzy and ears feel funny and gets winded. unable to lift weights due to reports she has hypermobile joints  Sleep: varies as she has problems with pain, broken sleep. Takes Xanax and Tramadol  Smoking: denies  Occupation: [employer]    Initial weight:2008 237 lbs prior to bypass  Jay 135 lbs after the first year      Food Recall  Breakfast: 7 AM- coffee- ~16 oz, 8-8:30- AM- Greek yogurt (Two Good)  Snack: none reported  Lunch: 12-1 PM- leftovers OR salad with cheese  Snack:rarely, but may have PB and banana or apples, if out of control, may have chips; biggest weakness is popcorn  Dinner: protein, starch, vegetable (may not have as much protein as should if it is difficult to swallow); last night had taco salad  Snack: popcorn    Colonoscopy: up to date    The following portions of the patient's history were reviewed and updated as appropriate: allergies, current medications, past family history, past medical history, past social history, past surgical history, and problem list.    Review of Systems   Constitutional:  Negative for chills and fever.   Respiratory:  Positive for cough (recently getting over RSV) and shortness of breath.    Cardiovascular:  Positive for palpitations (occurs after eating). Negative for chest pain.   Gastrointestinal:  Positive for abdominal pain (epigastric pain since initial bypass 2008), diarrhea and nausea (after IVIG). Negative for vomiting.   Genitourinary:  Negative for dysuria.   Musculoskeletal:   "Positive for arthralgias and myalgias.   Skin:  Negative for rash.   Neurological:  Positive for dizziness and headaches (Hx Migraine).   Psychiatric/Behavioral:  Positive for dysphoric mood (past 6 months). Negative for suicidal ideas. The patient is nervous/anxious.        Objective:    /82 (BP Location: Left arm, Patient Position: Sitting, Cuff Size: Large)   Pulse 84   Temp 98 °F (36.7 °C)   Resp 20   Ht 5' 2\" (1.575 m)   Wt 80.4 kg (177 lb 3.2 oz)   SpO2 98%   BMI 32.41 kg/m²     Physical Exam  Vitals and nursing note reviewed.      Constitutional   General appearance: Abnormal.  well developed and obese.   Eyes No conjunctival pallor.   Ears, Nose, Mouth, and Throat Oral mucosa moist.   Pulmonary   Respiratory effort: No increased work of breathing or signs of respiratory distress.    Auscultation of lungs: Clear to auscultation, equal breath sounds bilaterally, no wheezes, no rales, no rhonci.    Cardiovascular   Auscultation of heart: Normal rate and rhythm, normal S1 and S2, without murmurs.    Examination of extremities for edema and/or varicosities: Normal.  no edema.   Abdomen   Abdomen: Abnormal.  The abdomen was obese. Bowel sounds were normal. The abdomen was soft and nontender.   Musculoskeletal   Gait and station: Normal.    Psychiatric   Orientation to person, place and time: Normal.    Affect: appropriate   "

## 2024-01-23 NOTE — TELEPHONE ENCOUNTER
Patient called and said her insurance told her for the saxenda it would need a prior authorization. Patient wants the saxenda sent to [facility]

## 2024-01-23 NOTE — ASSESSMENT & PLAN NOTE
-s/p Eveline-En-Y Gastric Bypass with Dr. Rubin in [place] done in 2008 and have a revision of RNYGB with PEHR with Dr. Luis Miguel Adler on 02/11/2020     Initial 2008: 237 lbs prior to bypass  Jay 135 lbs after the first year       Initial: 178 lbs (prior to revision)  Jay: 162 lbs

## 2024-01-23 NOTE — PATIENT INSTRUCTIONS
Goals:    Food log (ie.) [email or web address],etc.   No sugary beverages. At least 64oz of water daily.  Increase physical activity by 10 minutes daily. Gradually increase physical activity to a goal of 5 days per week for 30 minutes of MODERATE intensity PLUS 2 days per week of FULL BODY resistance training  8073-0782 calories per day  5-10 servings of fruits and vegetables per day  25-35 grams of dietary fiber per day  Saxenda

## 2024-01-24 NOTE — ASSESSMENT & PLAN NOTE
-Discussed options of Healthy[facility]-Intensive Lifestyle Intervention Program, Very Low Calorie Diet-VLCD, and Conservative Program and the role of weight loss medications.  -Initial weight loss goal of 5-10% weight loss for improved health  -Screening labs: reviewed CMP, TSH, T4   -Patient is interested in pursuing conservative program  -Calorie goals, sample menu, portion size guidelines, and food logging reviewed with the patient.    -patient defers meeting with RD  -Patient also has interest in AOM. Discussed importance of consistent dietary and lifestyle changes for long term success. Patient is not a candidate for Phentermine (hx palpitations) or Topamax (hx kidney stones. She is already on Wellbtrin. Can consider GLP-1 if covered. Discussed supply issues. She does have baseline chronic abdominal discomfort, GERD and cramping and has had recent UGI and EGD revealing mild reflux esophagitis and small hiatal hernia.. Would likely trial Saxenda first as it is generally better tolerated. Denies personal hx of pancreatitis and family hx MEN2 or MTC. She does report long standing hx since her bypass surgery of episodic hypoglycemia. Per patient this happened one month ago. She does have glucometer but does not check her glucose. She does eat sporadically at times and does not have always have a balanced meal containing carb and protein. Advised she would need to monitor BS on Saxenda and also check coverage.

## 2024-01-25 DIAGNOSIS — E66.9 OBESITY, CLASS I, BMI 30-34.9: Primary | ICD-10-CM

## 2024-01-25 NOTE — TELEPHONE ENCOUNTER
Sent.    VISIT [email or web address]  INJECT SAXENDA DAILY SUBCUTANEOUSLY.  -WEEK 1: 0.6mg daily  -if tolerated WEEK 2: 1.2mg daily  -if tolerated WEEK 3: 1.8mg daily  -if tolerated WEEK 4: 2.4mg daily  -if tolerated WEEK 5: 3mg daily  -IF NAUSEA/VOMITING DEVELOP STOP MEDICATION FOR A FEW DAYS AND DECREASE TO PREVIOUSLY TOLERATED DOSE. STAY HYDRATED.  -IF YOU DEVELOP SEVERE ABDOMINAL PAIN WHICH MAY RADIATE TO THE BACK, SOMETIMES ASSOCIATED WITH FEVER, AND VOMITING, STOP MEDICATION AND SEEK URGENT CARE AS THIS MAY BE A SIGN OF PANCREATITIS.

## 2024-01-29 NOTE — TELEPHONE ENCOUNTER
Received fax for patient auth through [email or web address]     Requires provider signature - provider in office Thursday. Will have sign and fax back

## 2024-02-01 ENCOUNTER — HOSPITAL ENCOUNTER (OUTPATIENT)
Dept: INFUSION CENTER | Facility: HOSPITAL | Age: 54
End: 2024-02-01
Attending: INTERNAL MEDICINE
Payer: COMMERCIAL

## 2024-02-01 VITALS
TEMPERATURE: 97.2 F | RESPIRATION RATE: 16 BRPM | HEART RATE: 76 BPM | DIASTOLIC BLOOD PRESSURE: 91 MMHG | SYSTOLIC BLOOD PRESSURE: 147 MMHG

## 2024-02-01 DIAGNOSIS — D80.1 HYPOGAMMAGLOBULINEMIA (HCC): Primary | ICD-10-CM

## 2024-02-01 PROCEDURE — 96367 TX/PROPH/DG ADDL SEQ IV INF: CPT

## 2024-02-01 PROCEDURE — 96366 THER/PROPH/DIAG IV INF ADDON: CPT

## 2024-02-01 PROCEDURE — 96365 THER/PROPH/DIAG IV INF INIT: CPT

## 2024-02-01 RX ORDER — SODIUM CHLORIDE 9 MG/ML
20 INJECTION, SOLUTION INTRAVENOUS ONCE
OUTPATIENT
Start: 2024-02-22

## 2024-02-01 RX ORDER — ACETAMINOPHEN 325 MG/1
650 TABLET ORAL ONCE
OUTPATIENT
Start: 2024-02-22

## 2024-02-01 RX ORDER — ACETAMINOPHEN 325 MG/1
650 TABLET ORAL ONCE
Status: COMPLETED | OUTPATIENT
Start: 2024-02-01 | End: 2024-02-01

## 2024-02-01 RX ORDER — SODIUM CHLORIDE 9 MG/ML
20 INJECTION, SOLUTION INTRAVENOUS ONCE
Status: COMPLETED | OUTPATIENT
Start: 2024-02-01 | End: 2024-02-01

## 2024-02-01 RX ADMIN — DEXAMETHASONE SODIUM PHOSPHATE 10 MG: 10 INJECTION, SOLUTION INTRAMUSCULAR; INTRAVENOUS at 08:29

## 2024-02-01 RX ADMIN — SODIUM CHLORIDE 20 ML/HR: 0.9 INJECTION, SOLUTION INTRAVENOUS at 08:16

## 2024-02-01 RX ADMIN — SODIUM CHLORIDE 500 ML: 0.9 INJECTION, SOLUTION INTRAVENOUS at 08:17

## 2024-02-01 RX ADMIN — Medication 40 G: at 09:53

## 2024-02-01 RX ADMIN — DIPHENHYDRAMINE HYDROCHLORIDE 25 MG: 50 INJECTION INTRAMUSCULAR; INTRAVENOUS at 09:02

## 2024-02-01 RX ADMIN — FAMOTIDINE 20 MG: 10 INJECTION INTRAVENOUS at 09:27

## 2024-02-01 RX ADMIN — ACETAMINOPHEN 650 MG: 325 TABLET ORAL at 08:16

## 2024-02-01 NOTE — PROGRESS NOTES
Ester Velazquez  tolerated IG treatment well with no complications.      Ester Velazquez is aware of future appt on 2/22 at 8AM.     AVS printed and given to Ester Velazquez: No (Declined by Ester Velazquez).

## 2024-02-12 ENCOUNTER — TELEPHONE (OUTPATIENT)
Dept: HEMATOLOGY ONCOLOGY | Facility: CLINIC | Age: 54
End: 2024-02-12

## 2024-02-12 NOTE — TELEPHONE ENCOUNTER
Patient Call    Who are you speaking with? Patient    If it is not the patient, are they listed on an active communication consent form? Yes   What is the reason for this call? I wanted to bring to your attention that my pre-certification for [facility] runs out 2/28/2024. My next infusion will not be affected by this since it is scheduled for 2/22/2024 but my subsequent infusions will be delayed if the pre-certification is not submitted and approved.    Please forward the necessary paperwork to whoever does the billing/pre-certification for my [employer].   Does this require a call back? Yes   If a call back is required, please list best call back number [phone]    If a call back is required, advise that a message will be forwarded to their care team and someone will return their call as soon as possible.   Did you relay this information to the patient? Yes

## 2024-02-16 ENCOUNTER — OFFICE VISIT (OUTPATIENT)
Dept: CARDIOLOGY CLINIC | Facility: CLINIC | Age: 54
End: 2024-02-16
Payer: COMMERCIAL

## 2024-02-16 VITALS
DIASTOLIC BLOOD PRESSURE: 90 MMHG | BODY MASS INDEX: 32.39 KG/M2 | SYSTOLIC BLOOD PRESSURE: 150 MMHG | WEIGHT: 176 LBS | HEART RATE: 77 BPM | HEIGHT: 62 IN

## 2024-02-16 DIAGNOSIS — R07.9 CHEST PAIN, UNSPECIFIED TYPE: ICD-10-CM

## 2024-02-16 DIAGNOSIS — I10 BENIGN ESSENTIAL HTN: ICD-10-CM

## 2024-02-16 DIAGNOSIS — R00.2 PALPITATIONS: Primary | ICD-10-CM

## 2024-02-16 PROCEDURE — 93000 ELECTROCARDIOGRAM COMPLETE: CPT | Performed by: NURSE PRACTITIONER

## 2024-02-16 PROCEDURE — 99214 OFFICE O/P EST MOD 30 MIN: CPT | Performed by: NURSE PRACTITIONER

## 2024-02-16 RX ORDER — AMLODIPINE BESYLATE 5 MG/1
5 TABLET ORAL DAILY
Qty: 30 TABLET | Refills: 30 | Status: SHIPPED | OUTPATIENT
Start: 2024-02-16

## 2024-02-16 NOTE — PROGRESS NOTES
Patient ID: Ester Velazquez is a 53 y.o. female.        Plan:      Benign essential HTN  Not optimally controlled  Increase amlodipine to 5 mg daily  Continue losartan 50 mg bid    Palpitations  Stable  No significant ectopy on ambulatory monitor     Chest pain  Check stress test       Follow up Plan/Summary Comments:  Blood pressure is not adequately controlled.  Will increase amlodipine to 5 mg daily.  We discussed dietary and lifestyle modification including low sodium diet and increasing her physical activity.    I have also ordered a regular exercise stress test for her report of chest tightness.  It does seem to be associated with stress, however she has multiple risk factors and ischemia should be excluded.    Follow up in 4 weeks for BP check and to review testing results.  If BP not adequately controlled, will add diuretic to regimen.    HPI: Ester is seen in the office today for a routine evaluation.     She was evaluated one year ago for a report of palpitations and had an echo and ambulatory monitor performed; both essentially unremarkable.    Recently, she was started on amlodipine for worsening htn.  She notes that her palpitations have decreased in frequency since starting this medication.      She notes that she has been experiencing some chest tightness on occasion.  Symptoms are not provoked or worsened by activity; improve with deep breathing.  She attributes her symptoms to stress related to caring for her mother with early dementia.      She occasionally has dizziness, worse with turning her head, but not with position changes.  She denies any lightheadedness or near syncope or syncope.      She has a family history of heart disease in both sides of her family.     Review of Systems   10  point ROS  was otherwise non pertinent or negative except as per HPI or as below.   Gait: Normal      Most recent or relevant cardiac/vascular testing:    Echo 1/23/2023  EF 60%  Mild DD  Mild LAE  Mildly  "dilated ascending aorta, 3.6 cm    Ambulatory monitor 1/17/2023   Basic mechanism was sinus with rates ranging from 53 - 133 beats per minute while in sinus rhythm.  The average heart rate was 83 beats per minute.  Atrial ectopy was rare.  Ventricular ectopy was rare.  No pauses were present.  No symptoms were reported.    Results for orders placed or performed in visit on 02/16/24   POCT ECG    Impression    NSR, possible LAE, NSSTW changes           Objective:     /90   Pulse 77   Ht 5' 2\" (1.575 m)   Wt 79.8 kg (176 lb)   BMI 32.19 kg/m²     PHYSICAL EXAM:    General:  Normal appearance, no acute distress  Eyes:  Anicteric.  Oral mucosa:  Moist.  Neck:  No JVD. Carotid upstrokes are brisk without bruits.  No masses.  Chest:  Clear to auscultation   Cardiac:  No palpable PMI.  Normal S1 and S2.  No murmur gallop or rub.  Abdomen:  Soft and nontender. No palpable organomegaly or aortic enlargement.  Extremities:  No peripheral edema.  Musculoskeletal:  Symmetric.   Vascular:    Pedal pulses are intact.  Neuro:  Grossly symmetric.  Psych:  Alert and oriented x3.    Allergies   Allergen Reactions    Codeine GI Intolerance and Chest Pain     ABDOMINAL PAIN    Hydrocodone Abdominal Pain, Swelling, Itching and Rash    Pregabalin Dizziness    Trazodone Dizziness    Acetaminophen-Codeine Abdominal Pain    Amoxicillin Rash    Gabapentin Dizziness     Dizziness/mental fog (states the same as lyrcia)        Current Outpatient Medications:     acetaminophen (TYLENOL) 325 mg tablet, Take 3 tablets (975 mg total) by mouth every 8 (eight) hours, Disp: 30 tablet, Rfl: 0    acyclovir (ZOVIRAX) 200 mg capsule, , Disp: , Rfl:     Aimovig 140 MG/ML SOAJ, Use as directed, Disp: , Rfl:     albuterol (PROVENTIL HFA,VENTOLIN HFA) 90 mcg/act inhaler, Inhale 1 puff every 6 (six) hours as needed As needed, Disp: , Rfl:     ALPRAZolam (XANAX) 0.5 mg tablet, , Disp: , Rfl:     amLODIPine (NORVASC) 5 mg tablet, Take 1 tablet (5 mg " "total) by mouth daily, Disp: 30 tablet, Rfl: 30    Azelastine-Fluticasone (DYMISTA NA), 2 sprays into each nostril 2 (two) times a day , Disp: , Rfl:     B-D 3CC LUER-NAKUL SYR 25GX1\" 25G X 1\" 3 ML MISC, , Disp: , Rfl:     benzonatate (TESSALON) 200 MG capsule, Take 1 capsule (200 mg total) by mouth 3 (three) times a day as needed for cough, Disp: 90 capsule, Rfl: 0    budesonide-formoterol (SYMBICORT) 160-4.5 mcg/act inhaler, Inhale 2 puffs 2 (two) times a day , Disp: , Rfl:     buPROPion (WELLBUTRIN) 100 mg tablet, Take 100 mg by mouth 2 (two) times a day , Disp: , Rfl:     cholecalciferol (VITAMIN D3) 65106 units capsule, Take 5,000 Units by mouth 2 (two) times a day , Disp: , Rfl:     cyanocobalamin 1,000 mcg/mL, , Disp: , Rfl:     cyclobenzaprine (FLEXERIL) 10 mg tablet, Take 10 mg by mouth 3 (three) times a day as needed  , Disp: , Rfl:     Erenumab-aooe 70 MG/ML SOAJ, Inject 140 mg/mL under the skin every 28 days, Disp: , Rfl:     estradiol (ESTRACE) 2 MG tablet, Take 2 mg by mouth daily Take 1/2 tab po BID, Disp: , Rfl:     fexofenadine (ALLEGRA) 180 MG tablet, Take 180 mg by mouth daily, Disp: , Rfl:     hyoscyamine (LEVSIN/SL) 0.125 mg SL tablet, Take 0.125 mg by mouth every 4 (four) hours as needed for cramping, Disp: , Rfl:     Immune Globulin, Human, (PRIVIGEN IV), Inject into a catheter in a vein every 30 (thirty) days, Disp: , Rfl:     Insulin Pen Needle 32G X 4 MM MISC, Use daily as directed, Disp: 30 each, Rfl: 1    levofloxacin (LEVAQUIN) 500 mg tablet, TAKE 1 TABLET BY MOUTH ONCE DAILY FOR 10 DAYS, Disp: , Rfl:     levothyroxine 50 mcg tablet, , Disp: , Rfl:     liraglutide (SAXENDA) injection, Inject 0.6 mg subcutaneously once per day for the first week. Increase in weekly intervals by 0.6 mg until a dose of 3 mg is reached, Disp: 15 mL, Rfl: 1    losartan (COZAAR) 50 mg tablet, Take 50 mg by mouth 2 (two) times a day., Disp: , Rfl:     magnesium gluconate (MAGONATE) 500 MG tablet, Take 500 mg by " "mouth daily, Disp: , Rfl:     meclizine (ANTIVERT) 25 mg tablet, Take 1 tablet (25 mg total) by mouth 3 (three) times a day as needed for dizziness for up to 12 doses, Disp: 12 tablet, Rfl: 0    minocycline (MINOCIN) 50 mg capsule, minocycline 50 mg capsule  TAKE 1 CAPSULE BY MOUTH ONCE DAILY IN THE EVENING WITH WATER AT LEAST 2 HOURS AFTER DINNER, Disp: , Rfl:     montelukast (SINGULAIR) 5 mg chewable tablet, Chew 1 tablet (5 mg total) 2 (two) times a day, Disp: 90 tablet, Rfl: 3    omeprazole (PriLOSEC) 40 MG capsule, Take 1 capsule (40 mg total) by mouth daily, Disp: 90 capsule, Rfl: 1    ondansetron (ZOFRAN-ODT) 8 mg disintegrating tablet, Take 1 tablet (8 mg total) by mouth every 8 (eight) hours as needed for nausea or vomiting, Disp: 20 tablet, Rfl: 3    sucralfate (CARAFATE) 1 g tablet, Take 1 tablet (1 g total) by mouth 4 (four) times a day Please melt tablet in 15 ml of water to take as a suspension., Disp: 360 tablet, Rfl: 1    SUMAtriptan (IMITREX) 100 mg tablet, Take 100 mg by mouth 2 (two) times a day as needed for migraine, Disp: , Rfl:     traMADol (ULTRAM) 50 mg tablet, Take 50 mg by mouth 2 (two) times a day., Disp: , Rfl:     Varenicline Tartrate (Tyrvaya) 0.03 MG/ACT SOLN, into each nostril, Disp: , Rfl:     Xiidra 5 % op solution, , Disp: , Rfl:     B-D 3CC LUER-NAKUL SYR 25GX5/8\" 25G X 5/8\" 3 ML MISC, , Disp: , Rfl:     Botulinum Toxin Type A 200 units SOLR, Inject 155 units into face and neck IM every 90 days (Patient not taking: Reported on 2/16/2024), Disp: , Rfl:     Meribin 5 MG CAPS, Take 1 capsule by mouth daily (Patient not taking: Reported on 2/16/2024), Disp: , Rfl:     Oxervate 0.002 % SOLN, , Disp: , Rfl:   Past Medical History:   Diagnosis Date    Anemia     recurrent    Anxiety     Asthma     allergy induced    Contact lens overwear of both eyes     Depression     Diarrhea     Environmental allergies     Fibromyalgia     Fibromyalgia, primary     GERD (gastroesophageal reflux " disease)     History of transfusion         Hypertension     Hypogammaglobulinemia (HCC)     Hypoglycemia after GI (gastrointestinal) surgery     Immune deficiency disorder (HCC)     IgG deficiency    Iron (Fe) deficiency anemia     Kidney stone     Lumbar herniated disc     Migraine     Motion sickness     PONV (postoperative nausea and vomiting)     severe    Seasonal allergies     Wears glasses      Past Surgical History:   Procedure Laterality Date    ABDOMINAL SURGERY      adhesions    ABDOMINOPLASTY      APPENDECTOMY      AUGMENTATION MAMMAPLASTY  2003    BREAST IMPLANT REMOVAL Bilateral 2019    BREAST SURGERY      implant removal     SECTION      CHOLECYSTECTOMY      COSMETIC SURGERY Bilateral     breast augmentation 2004    GASTRIC BYPASS      2009    GASTRIC BYPASS LAPAROSCOPIC N/A 2020    Procedure: ROBOTIC REVISION OF KENYA-EN-Y GASTRIC BYPASS, INTRAOP EGD;  Surgeon: Adeline Adler MD;  Location: [facility];  Service: Bariatrics    HERNIA REPAIR      HIATAL HERNIA REPAIR      HYSTERECTOMY  2015    KNEE ARTHROSCOPY Left     LYMPH NODE BIOPSY  -    benign    NERVE BLOCK      PA ARTHRODESIS MIDTARSOMETATARSAL SINGLE JOINT Left 2016    Procedure: FRIST TMJ FUSION ;  Surgeon: Tadeo Coelho DPM;  Location: [facility];  Service: Podiatry    PA CORRJ HLX VLGS BNCTY SESMDC RESCJ PROX PHLX BASE Left 2016    Procedure: SURGICAL MODIFIED WATKINS BUNIONECTOMY FIRST MTPJ;  Surgeon: Tadeo Coelho DPM;  Location: [facility];  Service: Podiatry    PA OSTEOT W/WO LNGTH SHRT/CORRJ METAR XCP 1ST EA Left 2016    Procedure: FOOT SHORTENING OSTEOTOMIES 2ND AND 3RD METATARSAL WITH EXTENSER TENDON RELEASE 3RD AND 4TH TOE;  Surgeon: Tadeo Coelho DPM;  Location: [facility];  Service: Podiatry    PA REMOVAL IMPLANT DEEP Left 2016    Procedure: REMOVAL SYMPTOMATIC  HARDWARE FIRST RAY,RELATED CONTRACTURE SECOND AND THIRD TOES WITH SUSPENSION FOURTH TOE;  Surgeon:  Tadeo Coelho DPM;  Location: [facility];  Service: Podiatry    SALPINGOOPHORECTOMY Left 2016    with removal of cervix    TONSILLECTOMY      WISDOM TOOTH EXTRACTION         CMP:   Lab Results   Component Value Date    K 4.6 12/06/2023    K 4.1 04/03/2023     12/06/2023     04/03/2023    CO2 28 12/06/2023    CO2 29 04/03/2023    BUN 8 12/06/2023    BUN 13 04/03/2023    CREATININE 0.61 12/06/2023    CREATININE 0.74 04/03/2023    EGFR 103 12/06/2023    EGFR 97 04/03/2023    EGFR 105 08/06/2020     Lipid Profile:    Lab Results   Component Value Date    TRIG 168 (H) 04/22/2022    HDL 68 04/22/2022         Social History     Tobacco Use   Smoking Status Never   Smokeless Tobacco Never

## 2024-02-22 ENCOUNTER — HOSPITAL ENCOUNTER (OUTPATIENT)
Dept: INFUSION CENTER | Facility: HOSPITAL | Age: 54
End: 2024-02-22
Attending: INTERNAL MEDICINE
Payer: COMMERCIAL

## 2024-02-22 ENCOUNTER — HOSPITAL ENCOUNTER (OUTPATIENT)
Dept: NON INVASIVE DIAGNOSTICS | Facility: CLINIC | Age: 54
Discharge: HOME | End: 2024-02-22
Payer: COMMERCIAL

## 2024-02-22 VITALS
BODY MASS INDEX: 32.39 KG/M2 | SYSTOLIC BLOOD PRESSURE: 130 MMHG | DIASTOLIC BLOOD PRESSURE: 80 MMHG | RESPIRATION RATE: 20 BRPM | HEIGHT: 62 IN | WEIGHT: 176 LBS | HEART RATE: 90 BPM | OXYGEN SATURATION: 99 %

## 2024-02-22 VITALS
RESPIRATION RATE: 16 BRPM | HEART RATE: 74 BPM | SYSTOLIC BLOOD PRESSURE: 127 MMHG | DIASTOLIC BLOOD PRESSURE: 85 MMHG | TEMPERATURE: 97.2 F

## 2024-02-22 DIAGNOSIS — D80.1 HYPOGAMMAGLOBULINEMIA (HCC): Primary | ICD-10-CM

## 2024-02-22 DIAGNOSIS — R07.9 CHEST PAIN, UNSPECIFIED TYPE: ICD-10-CM

## 2024-02-22 LAB
ARRHY DURING EX: NORMAL
CHEST PAIN STATEMENT: NORMAL
MAX DIASTOLIC BP: 90 MMHG
MAX HR PERCENT: 90 %
MAX HR: 150 BPM
MAX PREDICTED HEART RATE: 167 BPM
PROTOCOL NAME: NORMAL
RATE PRESSURE PRODUCT: NORMAL
SL CV STRESS RECOVERY BP: NORMAL MMHG
SL CV STRESS RECOVERY HR: 108 BPM
SL CV STRESS RECOVERY O2 SAT: 99 %
SL CV STRESS STAGE REACHED: 3
STRESS ANGINA INDEX: 1
STRESS BASELINE BP: NORMAL MMHG
STRESS BASELINE HR: 90 BPM
STRESS O2 SAT REST: 99 %
STRESS PEAK HR: 150 BPM
STRESS POST ESTIMATED WORKLOAD: 10.1 METS
STRESS POST EXERCISE DUR MIN: 8 MIN
STRESS POST EXERCISE DUR MIN: 8 MIN
STRESS POST EXERCISE DUR SEC: 0 SEC
STRESS POST EXERCISE DUR SEC: 0 SEC
STRESS POST O2 SAT PEAK: 99 %
STRESS POST PEAK BP: 172 MMHG
STRESS POST PEAK HR: 173 BPM
STRESS POST PEAK SYSTOLIC BP: 172 MMHG
TARGET HR FORMULA: NORMAL
TEST INDICATION: NORMAL

## 2024-02-22 PROCEDURE — 93350 STRESS TTE ONLY: CPT

## 2024-02-22 PROCEDURE — 96365 THER/PROPH/DIAG IV INF INIT: CPT

## 2024-02-22 PROCEDURE — 96367 TX/PROPH/DG ADDL SEQ IV INF: CPT

## 2024-02-22 PROCEDURE — 93350 STRESS TTE ONLY: CPT | Performed by: INTERNAL MEDICINE

## 2024-02-22 PROCEDURE — 96366 THER/PROPH/DIAG IV INF ADDON: CPT

## 2024-02-22 RX ORDER — SODIUM CHLORIDE 9 MG/ML
20 INJECTION, SOLUTION INTRAVENOUS ONCE
Status: COMPLETED | OUTPATIENT
Start: 2024-02-22 | End: 2024-02-22

## 2024-02-22 RX ORDER — ACETAMINOPHEN 325 MG/1
650 TABLET ORAL ONCE
Status: COMPLETED | OUTPATIENT
Start: 2024-02-22 | End: 2024-02-22

## 2024-02-22 RX ORDER — SODIUM CHLORIDE 9 MG/ML
20 INJECTION, SOLUTION INTRAVENOUS ONCE
OUTPATIENT
Start: 2024-03-14

## 2024-02-22 RX ORDER — ACETAMINOPHEN 325 MG/1
650 TABLET ORAL ONCE
OUTPATIENT
Start: 2024-03-14

## 2024-02-22 RX ADMIN — FAMOTIDINE 20 MG: 10 INJECTION INTRAVENOUS at 09:26

## 2024-02-22 RX ADMIN — DIPHENHYDRAMINE HYDROCHLORIDE 25 MG: 50 INJECTION INTRAMUSCULAR; INTRAVENOUS at 09:05

## 2024-02-22 RX ADMIN — ACETAMINOPHEN 650 MG: 325 TABLET ORAL at 08:35

## 2024-02-22 RX ADMIN — SODIUM CHLORIDE 20 ML/HR: 0.9 INJECTION, SOLUTION INTRAVENOUS at 08:20

## 2024-02-22 RX ADMIN — SODIUM CHLORIDE 500 ML: 0.9 INJECTION, SOLUTION INTRAVENOUS at 08:29

## 2024-02-22 RX ADMIN — DEXAMETHASONE SODIUM PHOSPHATE 10 MG: 100 INJECTION INTRAMUSCULAR; INTRAVENOUS at 08:30

## 2024-02-22 RX ADMIN — Medication 40 G: at 10:10

## 2024-02-22 NOTE — PROGRESS NOTES
Ester Velazquez  tolerated IV IG treatment well with no complications.      Ester Velazquez is aware of future appt on 3/14 at 8:30AM.     AVS printed and given to Ester Velazquez: No (Declined by Ester Velazquez).

## 2024-02-27 DIAGNOSIS — Z98.84 H/O GASTRIC BYPASS: Primary | ICD-10-CM

## 2024-02-27 RX ORDER — SYRINGE WITH NEEDLE, 1 ML 25GX5/8"
SYRINGE, EMPTY DISPOSABLE MISCELLANEOUS
Qty: 2 EACH | Refills: 11 | Status: SHIPPED | OUTPATIENT
Start: 2024-02-27

## 2024-02-27 RX ORDER — CYANOCOBALAMIN 1000 UG/ML
1000 INJECTION, SOLUTION INTRAMUSCULAR; SUBCUTANEOUS
Qty: 2 ML | Refills: 11 | Status: SHIPPED | OUTPATIENT
Start: 2024-02-27

## 2024-03-12 ENCOUNTER — APPOINTMENT (OUTPATIENT)
Dept: LAB | Facility: CLINIC | Age: 54
End: 2024-03-12
Payer: COMMERCIAL

## 2024-03-12 DIAGNOSIS — Z98.84 BARIATRIC SURGERY STATUS: ICD-10-CM

## 2024-03-12 DIAGNOSIS — E67.0 HIGH VITAMIN A LEVEL: ICD-10-CM

## 2024-03-12 DIAGNOSIS — Z00.6 ENCOUNTER FOR EXAMINATION FOR NORMAL COMPARISON OR CONTROL IN CLINICAL RESEARCH PROGRAM: ICD-10-CM

## 2024-03-12 PROCEDURE — 84590 ASSAY OF VITAMIN A: CPT

## 2024-03-12 PROCEDURE — 36415 COLL VENOUS BLD VENIPUNCTURE: CPT

## 2024-03-14 ENCOUNTER — HOSPITAL ENCOUNTER (OUTPATIENT)
Dept: INFUSION CENTER | Facility: HOSPITAL | Age: 54
End: 2024-03-14
Attending: INTERNAL MEDICINE
Payer: COMMERCIAL

## 2024-03-14 VITALS
SYSTOLIC BLOOD PRESSURE: 133 MMHG | HEART RATE: 77 BPM | DIASTOLIC BLOOD PRESSURE: 86 MMHG | TEMPERATURE: 96.8 F | RESPIRATION RATE: 18 BRPM

## 2024-03-14 DIAGNOSIS — D80.1 HYPOGAMMAGLOBULINEMIA (HCC): Primary | ICD-10-CM

## 2024-03-14 PROCEDURE — 96365 THER/PROPH/DIAG IV INF INIT: CPT

## 2024-03-14 PROCEDURE — 96367 TX/PROPH/DG ADDL SEQ IV INF: CPT

## 2024-03-14 PROCEDURE — 96366 THER/PROPH/DIAG IV INF ADDON: CPT

## 2024-03-14 RX ORDER — ACETAMINOPHEN 325 MG/1
650 TABLET ORAL ONCE
OUTPATIENT
Start: 2024-04-04

## 2024-03-14 RX ORDER — SODIUM CHLORIDE 9 MG/ML
20 INJECTION, SOLUTION INTRAVENOUS ONCE
OUTPATIENT
Start: 2024-04-04

## 2024-03-14 RX ORDER — SODIUM CHLORIDE 9 MG/ML
20 INJECTION, SOLUTION INTRAVENOUS ONCE
Status: COMPLETED | OUTPATIENT
Start: 2024-03-14 | End: 2024-03-14

## 2024-03-14 RX ORDER — ACETAMINOPHEN 325 MG/1
650 TABLET ORAL ONCE
Status: COMPLETED | OUTPATIENT
Start: 2024-03-14 | End: 2024-03-14

## 2024-03-14 RX ADMIN — SODIUM CHLORIDE 20 ML/HR: 0.9 INJECTION, SOLUTION INTRAVENOUS at 08:51

## 2024-03-14 RX ADMIN — FAMOTIDINE 20 MG: 10 INJECTION INTRAVENOUS at 09:51

## 2024-03-14 RX ADMIN — Medication 40 G: at 10:22

## 2024-03-14 RX ADMIN — DIPHENHYDRAMINE HYDROCHLORIDE 25 MG: 50 INJECTION INTRAMUSCULAR; INTRAVENOUS at 09:30

## 2024-03-14 RX ADMIN — ACETAMINOPHEN 650 MG: 325 TABLET ORAL at 08:51

## 2024-03-14 RX ADMIN — DEXAMETHASONE SODIUM PHOSPHATE 10 MG: 100 INJECTION INTRAMUSCULAR; INTRAVENOUS at 08:57

## 2024-03-14 RX ADMIN — SODIUM CHLORIDE 500 ML: 0.9 INJECTION, SOLUTION INTRAVENOUS at 08:52

## 2024-03-14 NOTE — PROGRESS NOTES
Ester Velazquez  tolerated IV IG treatment well with no complications.      Ester Velazquez is aware of future appt on 4/4 at 8:30AM.     AVS printed and given to Ester Velazquez.

## 2024-03-17 LAB — VIT A SERPL-MCNC: 58 UG/DL (ref 20.1–62)

## 2024-03-24 LAB
APOB+LDLR+PCSK9 GENE MUT ANL BLD/T: NOT DETECTED
BRCA1+BRCA2 DEL+DUP + FULL MUT ANL BLD/T: NOT DETECTED
MLH1+MSH2+MSH6+PMS2 GN DEL+DUP+FUL M: NOT DETECTED

## 2024-03-27 ENCOUNTER — TELEPHONE (OUTPATIENT)
Dept: HEMATOLOGY ONCOLOGY | Facility: CLINIC | Age: 54
End: 2024-03-27

## 2024-03-27 NOTE — TELEPHONE ENCOUNTER
Appointment Schedule   Who are you speaking with? Patient   If it is not the patient, are they listed on an active communication consent form? N/A   Which provider is the appointment scheduled with? Dr. Benedict   At which location is the appointment scheduled for? [place]   When is the appointment scheduled?  Please list date and time 03/28/2024 @ 10:40AM    What is the reason for this appointment? PCP wants the patient to fu with Hem.    Did patient voice understanding of the details of this appointment? Yes   Was the no show policy reviewed with patient? Yes

## 2024-03-28 ENCOUNTER — OFFICE VISIT (OUTPATIENT)
Dept: HEMATOLOGY ONCOLOGY | Facility: CLINIC | Age: 54
End: 2024-03-28
Payer: COMMERCIAL

## 2024-03-28 ENCOUNTER — APPOINTMENT (OUTPATIENT)
Dept: LAB | Facility: CLINIC | Age: 54
End: 2024-03-28
Payer: COMMERCIAL

## 2024-03-28 VITALS
WEIGHT: 164 LBS | RESPIRATION RATE: 17 BRPM | HEART RATE: 98 BPM | HEIGHT: 62 IN | SYSTOLIC BLOOD PRESSURE: 124 MMHG | OXYGEN SATURATION: 98 % | TEMPERATURE: 97.5 F | BODY MASS INDEX: 30.18 KG/M2 | DIASTOLIC BLOOD PRESSURE: 80 MMHG

## 2024-03-28 DIAGNOSIS — R10.9 LEFT FLANK PAIN: ICD-10-CM

## 2024-03-28 DIAGNOSIS — Z98.84 H/O GASTRIC BYPASS: ICD-10-CM

## 2024-03-28 DIAGNOSIS — D80.1 HYPOGAMMAGLOBULINEMIA (HCC): Primary | ICD-10-CM

## 2024-03-28 DIAGNOSIS — E53.8 B12 DEFICIENCY: ICD-10-CM

## 2024-03-28 DIAGNOSIS — D80.1 HYPOGAMMAGLOBULINEMIA (HCC): ICD-10-CM

## 2024-03-28 DIAGNOSIS — R23.3 EASY BRUISING: ICD-10-CM

## 2024-03-28 DIAGNOSIS — D50.8 IRON DEFICIENCY ANEMIA SECONDARY TO INADEQUATE DIETARY IRON INTAKE: ICD-10-CM

## 2024-03-28 LAB
ALBUMIN SERPL BCP-MCNC: 4.1 G/DL (ref 3.5–5)
ALP SERPL-CCNC: 92 U/L (ref 34–104)
ALT SERPL W P-5'-P-CCNC: 13 U/L (ref 7–52)
ANION GAP SERPL CALCULATED.3IONS-SCNC: 9 MMOL/L (ref 4–13)
APTT PPP: 26 SECONDS (ref 23–37)
AST SERPL W P-5'-P-CCNC: 18 U/L (ref 13–39)
BASOPHILS # BLD AUTO: 0.05 THOUSANDS/ÂΜL (ref 0–0.1)
BASOPHILS NFR BLD AUTO: 1 % (ref 0–1)
BILIRUB SERPL-MCNC: 0.55 MG/DL (ref 0.2–1)
BILIRUB UR QL STRIP: NEGATIVE
BUN SERPL-MCNC: 10 MG/DL (ref 5–25)
CALCIUM SERPL-MCNC: 9.3 MG/DL (ref 8.4–10.2)
CHLORIDE SERPL-SCNC: 103 MMOL/L (ref 96–108)
CLARITY UR: CLEAR
CO2 SERPL-SCNC: 28 MMOL/L (ref 21–32)
COLOR UR: NORMAL
CREAT SERPL-MCNC: 0.65 MG/DL (ref 0.6–1.3)
EOSINOPHIL # BLD AUTO: 0.23 THOUSAND/ÂΜL (ref 0–0.61)
EOSINOPHIL NFR BLD AUTO: 4 % (ref 0–6)
ERYTHROCYTE [DISTWIDTH] IN BLOOD BY AUTOMATED COUNT: 13 % (ref 11.6–15.1)
FIBRINOGEN PPP-MCNC: 398 MG/DL (ref 207–520)
GFR SERPL CREATININE-BSD FRML MDRD: 101 ML/MIN/1.73SQ M
GLUCOSE SERPL-MCNC: 78 MG/DL (ref 65–140)
GLUCOSE UR STRIP-MCNC: NEGATIVE MG/DL
HCT VFR BLD AUTO: 44.1 % (ref 34.8–46.1)
HGB BLD-MCNC: 14.1 G/DL (ref 11.5–15.4)
HGB UR QL STRIP.AUTO: NEGATIVE
IGA SERPL-MCNC: 142 MG/DL (ref 66–433)
IGG SERPL-MCNC: 1057 MG/DL (ref 635–1741)
IGM SERPL-MCNC: 42 MG/DL (ref 45–281)
IMM GRANULOCYTES # BLD AUTO: 0.01 THOUSAND/UL (ref 0–0.2)
IMM GRANULOCYTES NFR BLD AUTO: 0 % (ref 0–2)
KETONES UR STRIP-MCNC: NEGATIVE MG/DL
LEUKOCYTE ESTERASE UR QL STRIP: NEGATIVE
LYMPHOCYTES # BLD AUTO: 1.26 THOUSANDS/ÂΜL (ref 0.6–4.47)
LYMPHOCYTES NFR BLD AUTO: 20 % (ref 14–44)
MCH RBC QN AUTO: 29.4 PG (ref 26.8–34.3)
MCHC RBC AUTO-ENTMCNC: 32 G/DL (ref 31.4–37.4)
MCV RBC AUTO: 92 FL (ref 82–98)
MONOCYTES # BLD AUTO: 0.44 THOUSAND/ÂΜL (ref 0.17–1.22)
MONOCYTES NFR BLD AUTO: 7 % (ref 4–12)
NEUTROPHILS # BLD AUTO: 4.45 THOUSANDS/ÂΜL (ref 1.85–7.62)
NEUTS SEG NFR BLD AUTO: 68 % (ref 43–75)
NITRITE UR QL STRIP: NEGATIVE
NRBC BLD AUTO-RTO: 0 /100 WBCS
PH UR STRIP.AUTO: 6 [PH]
PLATELET # BLD AUTO: 262 THOUSANDS/UL (ref 149–390)
PMV BLD AUTO: 10.5 FL (ref 8.9–12.7)
POTASSIUM SERPL-SCNC: 4.1 MMOL/L (ref 3.5–5.3)
PROT SERPL-MCNC: 7.1 G/DL (ref 6.4–8.4)
PROT UR STRIP-MCNC: NEGATIVE MG/DL
RBC # BLD AUTO: 4.79 MILLION/UL (ref 3.81–5.12)
SODIUM SERPL-SCNC: 140 MMOL/L (ref 135–147)
SP GR UR STRIP.AUTO: 1.01 (ref 1–1.03)
URATE SERPL-MCNC: 2.4 MG/DL (ref 2–7.5)
UROBILINOGEN UR STRIP-ACNC: <2 MG/DL
WBC # BLD AUTO: 6.44 THOUSAND/UL (ref 4.31–10.16)

## 2024-03-28 PROCEDURE — 36415 COLL VENOUS BLD VENIPUNCTURE: CPT

## 2024-03-28 PROCEDURE — 84550 ASSAY OF BLOOD/URIC ACID: CPT

## 2024-03-28 PROCEDURE — 85245 CLOT FACTOR VIII VW RISTOCTN: CPT

## 2024-03-28 PROCEDURE — 85384 FIBRINOGEN ACTIVITY: CPT

## 2024-03-28 PROCEDURE — 82784 ASSAY IGA/IGD/IGG/IGM EACH: CPT

## 2024-03-28 PROCEDURE — 84165 PROTEIN E-PHORESIS SERUM: CPT

## 2024-03-28 PROCEDURE — 85025 COMPLETE CBC W/AUTO DIFF WBC: CPT

## 2024-03-28 PROCEDURE — 83521 IG LIGHT CHAINS FREE EACH: CPT

## 2024-03-28 PROCEDURE — 85240 CLOT FACTOR VIII AHG 1 STAGE: CPT

## 2024-03-28 PROCEDURE — 85246 CLOT FACTOR VIII VW ANTIGEN: CPT

## 2024-03-28 PROCEDURE — 99214 OFFICE O/P EST MOD 30 MIN: CPT | Performed by: INTERNAL MEDICINE

## 2024-03-28 PROCEDURE — 85247 CLOT FACTOR VIII MULTIMETRIC: CPT

## 2024-03-28 PROCEDURE — 85730 THROMBOPLASTIN TIME PARTIAL: CPT

## 2024-03-28 PROCEDURE — 81003 URINALYSIS AUTO W/O SCOPE: CPT

## 2024-03-28 PROCEDURE — 80053 COMPREHEN METABOLIC PANEL: CPT

## 2024-03-28 NOTE — PROGRESS NOTES
Hematology/Oncology Outpatient Follow-up  Ester Velazquez 53 y.o. female 1970 [id number]    Date:  3/28/2024    Assessment and Plan:  1. Hypogammaglobulinemia (HCC)  The patient will be continued on the IVIG on every 3-week basis which is preventing upper airway infections.   - CBC and differential; Future  - Comprehensive metabolic panel; Future  - IgG, IgA, IgM; Future  - Immunoglobulin free LT chains blood; Future  - Protein, Total and Protein Electrophoresis with Immunofixation; Future    2. H/O gastric bypass      3. Iron deficiency anemia secondary to inadequate dietary iron intake  The patient does not seem to be anemic at this point.  Recent iron panel from February was normal.    4. B12 deficiency  She should continue with supplements.    5. Left flank pain  She did complain about occasional left flank pain.  There is a tender spot where the left kidney is located.  Will pursue an ultrasound of the kidneys and bladder.  Also UA and uric acid will be checked for completeness sake.  - UA (URINE) with reflex to Scope; Future  - Uric acid; Future  - US kidney and bladder with pvr; Future    6. Easy bruising  She seems to have a easy bruising and occasional epistaxis.  She denied bear bleeding from other sites.  It is unlikely that we are dealing with a bleeding disorder.  Her INR was normal.  Will pursue further workup before her next visit.  - APTT; Future  - Von Willebrand Comprehensive Panel; Future  - Fibrinogen; Future      HPI:    This is a 53-year-old female with multiple comorbid conditions including history of morbid obesity status post post gastric bypass surgery in 2009, anemia due to iron deficiency, cholecystectomy, hysterectomy, hypertension, gastroesophageal reflux disease, depression, asthma, chronic sinusitis, etc.     The patient was apparently found to have hypogammaglobinemia on multiple occasions with frequent/recurrent infections mainly in the upper respiratory airway with chronic  sinusitis.  The patient was evaluated by the Hematology service from [facility] and was started on IVIG around December of 2019 on a monthly basis.  She received then 3-4 sessions of IVIG which improved her chronic sinusitis and other infectious process, according to the patient, significantly.  However, due to the COVID-19 pandemic she stopped doing the IVIG and decided to transfer her care to our service since she lives close by. Her immunoglobulin levels from 07/17/2019 before she had any IVIG treatment showed IgG of 554, IgA of 116 and IgM of 39 mg/dL.  She was started on vitamin B12 injections every 2 weeks.        Interval history:  The patient came in earlier today to discuss easy bruising and occasional epistaxis.  She continues to be on IVIG every 3-week basis which is preventing upper airway infections.  She did complain about easy bruising of the upper extremities after carrying a box.  She also complained about left flank pain which seems to be intermittently and started just recently.  She denied hematuria or blood per rectum.  She did mention occasional bleeding from her fissures.  ROS: Review of Systems   Constitutional:  Positive for fatigue. Negative for chills and fever.   HENT:  Negative for ear pain and sore throat.    Eyes:  Negative for pain and visual disturbance.   Respiratory:  Negative for cough and shortness of breath.    Cardiovascular:  Negative for chest pain and palpitations.   Gastrointestinal:  Positive for constipation. Negative for abdominal pain and vomiting.   Genitourinary:  Positive for flank pain. Negative for dysuria and hematuria.   Musculoskeletal:  Positive for myalgias. Negative for arthralgias and back pain.   Skin:  Negative for color change and rash.   Neurological:  Positive for numbness and headaches. Negative for seizures and syncope.   Psychiatric/Behavioral:  Positive for sleep disturbance.    All other systems reviewed and are negative.      Past Medical History:    Diagnosis Date    Anemia     recurrent    Anxiety     Asthma     allergy induced    Chest pain     Contact lens overwear of both eyes     Depression     Diarrhea     Environmental allergies     Fibromyalgia     Fibromyalgia, primary     GERD (gastroesophageal reflux disease)     History of transfusion         Hypertension     Hypogammaglobulinemia (HCC)     Hypoglycemia after GI (gastrointestinal) surgery     Immune deficiency disorder (HCC)     IgG deficiency    Iron (Fe) deficiency anemia     Kidney stone     Lumbar herniated disc     Migraine     Motion sickness     Palpitations     PONV (postoperative nausea and vomiting)     severe    Seasonal allergies     Wears glasses        Past Surgical History:   Procedure Laterality Date    ABDOMINAL SURGERY      adhesions    ABDOMINOPLASTY      APPENDECTOMY      AUGMENTATION MAMMAPLASTY  2003    BREAST IMPLANT REMOVAL Bilateral 2019    BREAST SURGERY      implant removal     SECTION      CHOLECYSTECTOMY      COSMETIC SURGERY Bilateral     breast augmentation 2004    GASTRIC BYPASS      2009    GASTRIC BYPASS LAPAROSCOPIC N/A 2020    Procedure: ROBOTIC REVISION OF KENYA-EN-Y GASTRIC BYPASS, INTRAOP EGD;  Surgeon: Adeline Adler MD;  [facility]: [facility];  Service: Bariatrics    HERNIA REPAIR      HIATAL HERNIA REPAIR      HYSTERECTOMY  2015    KNEE ARTHROSCOPY Left     LYMPH NODE BIOPSY  200-2001    benign    NERVE BLOCK      AZ ARTHRODESIS MIDTARSOMETATARSAL SINGLE JOINT Left 2016    Procedure: FRIST TMJ FUSION ;  Surgeon: Tadeo Coelho DPM;  [facility];  Service: Podiatry    AZ CORRJ HLX VLGS BNCTY SESMDC RESCJ PROX PHLX BASE Left 2016    Procedure: SURGICAL MODIFIED WATKINS BUNIONECTOMY FIRST MTPJ;  Surgeon: Tadeo Coelho DPM;  [facility];  Service: Podiatry    AZ OSTEOT W/WO LNGTH SHRT/CORRJ METAR XCP 1ST EA Left 2016    Procedure: FOOT SHORTENING OSTEOTOMIES 2ND AND 3RD METATARSAL WITH EXTENSER  TENDON RELEASE 3RD AND 4TH TOE;  Surgeon: Tadeo Coelho DPM;  [facility];  Service: Podiatry    MI REMOVAL IMPLANT DEEP Left 12/07/2016    Procedure: REMOVAL SYMPTOMATIC  HARDWARE FIRST RAY,RELATED CONTRACTURE SECOND AND THIRD TOES WITH SUSPENSION FOURTH TOE;  Surgeon: Tadeo Coelho DPM;  [facility];  Service: Podiatry    SALPINGOOPHORECTOMY Left 2016    with removal of cervix    TONSILLECTOMY      WISDOM TOOTH EXTRACTION         Social History     Socioeconomic History    Marital status: /Civil Union     Spouse name: None    Number of children: None    Years of education: None    Highest education level: None   Occupational History    None   Tobacco Use    Smoking status: Never    Smokeless tobacco: Never   Vaping Use    Vaping status: Never Used   Substance and Sexual Activity    Alcohol use: Not Currently     Comment: rarely    Drug use: No    Sexual activity: Yes     Partners: Male     Birth control/protection: Surgical   Other Topics Concern    None   Social History Narrative    Consumes on average 3 cups of coffee per day     Social Determinants of Health     Financial Resource Strain: Not on file   Food Insecurity: Not on file   Transportation Needs: Not on file   Physical Activity: Not on file   Stress: Not on file   Social Connections: Not on file   Intimate Partner Violence: Not on file   Housing Stability: Not on file       Family History   Problem Relation Age of Onset    Lymphoma Mother     COPD Mother     Arthritis Mother     Asthma Mother     Cancer Mother     Hyperlipidemia Mother     Rheum arthritis Mother     Heart disease Father     Early death Father     Breast cancer Maternal Aunt     Breast cancer Cousin     Lupus Cousin     No Known Problems Daughter     Stroke Maternal Grandmother     Mental illness Maternal Grandfather     No Known Problems Paternal Grandmother     No Known Problems Paternal Grandfather     Pancreatic cancer Maternal Aunt     No Known  "Problems Maternal Aunt     No Known Problems Maternal Aunt     Multiple sclerosis Paternal Aunt     Breast cancer Cousin        Allergies   Allergen Reactions    Codeine GI Intolerance and Chest Pain     ABDOMINAL PAIN    Hydrocodone Abdominal Pain, Swelling, Itching and Rash    Pregabalin Dizziness    Trazodone Dizziness    Acetaminophen-Codeine Abdominal Pain    Amoxicillin Rash    Gabapentin Dizziness     Dizziness/mental fog (states the same as lyrcia)          Current Outpatient Medications:     acetaminophen (TYLENOL) 325 mg tablet, Take 3 tablets (975 mg total) by mouth every 8 (eight) hours, Disp: 30 tablet, Rfl: 0    acyclovir (ZOVIRAX) 200 mg capsule, , Disp: , Rfl:     Aimovig 140 MG/ML SOAJ, Use as directed, Disp: , Rfl:     ALPRAZolam (XANAX) 0.5 mg tablet, , Disp: , Rfl:     amLODIPine (NORVASC) 5 mg tablet, Take 1 tablet (5 mg total) by mouth daily, Disp: 30 tablet, Rfl: 30    Azelastine-Fluticasone (DYMISTA NA), 2 sprays into each nostril 2 (two) times a day , Disp: , Rfl:     B-D 3CC LUER-NAKUL SYR 25GX1\" 25G X 1\" 3 ML MISC, , Disp: , Rfl:     B-D 3CC LUER-NAKUL SYR 25GX5/8\" 25G X 5/8\" 3 ML MISC, Inject into a muscle every 14 (fourteen) days, Disp: 2 each, Rfl: 11    budesonide-formoterol (SYMBICORT) 160-4.5 mcg/act inhaler, Inhale 2 puffs 2 (two) times a day , Disp: , Rfl:     buPROPion (WELLBUTRIN) 100 mg tablet, Take 100 mg by mouth 2 (two) times a day , Disp: , Rfl:     cholecalciferol (VITAMIN D3) 87861 units capsule, Take 5,000 Units by mouth 2 (two) times a day , Disp: , Rfl:     cyanocobalamin 1,000 mcg/mL, Inject 1 mL (1,000 mcg total) into a muscle every 14 (fourteen) days, Disp: 2 mL, Rfl: 11    cyclobenzaprine (FLEXERIL) 10 mg tablet, Take 10 mg by mouth 3 (three) times a day as needed  , Disp: , Rfl:     Erenumab-aooe 70 MG/ML SOAJ, Inject 140 mg/mL under the skin every 28 days, Disp: , Rfl:     estradiol (ESTRACE) 2 MG tablet, Take 2 mg by mouth daily Take 1/2 tab po BID, Disp: , Rfl:     " fexofenadine (ALLEGRA) 180 MG tablet, Take 180 mg by mouth daily, Disp: , Rfl:     hyoscyamine (LEVSIN/SL) 0.125 mg SL tablet, Take 0.125 mg by mouth every 4 (four) hours as needed for cramping, Disp: , Rfl:     Immune Globulin, Human, (PRIVIGEN IV), Inject into a catheter in a vein every 30 (thirty) days, Disp: , Rfl:     Insulin Pen Needle 32G X 4 MM MISC, Use daily as directed, Disp: 30 each, Rfl: 1    levofloxacin (LEVAQUIN) 500 mg tablet, TAKE 1 TABLET BY MOUTH ONCE DAILY FOR 10 DAYS, Disp: , Rfl:     levothyroxine 50 mcg tablet, , Disp: , Rfl:     liraglutide (SAXENDA) injection, Inject 0.6 mg subcutaneously once per day for the first week. Increase in weekly intervals by 0.6 mg until a dose of 3 mg is reached, Disp: 15 mL, Rfl: 1    losartan (COZAAR) 50 mg tablet, Take 50 mg by mouth 2 (two) times a day., Disp: , Rfl:     magnesium gluconate (MAGONATE) 500 MG tablet, Take 500 mg by mouth daily, Disp: , Rfl:     minocycline (MINOCIN) 50 mg capsule, minocycline 50 mg capsule  TAKE 1 CAPSULE BY MOUTH ONCE DAILY IN THE EVENING WITH WATER AT LEAST 2 HOURS AFTER DINNER, Disp: , Rfl:     montelukast (SINGULAIR) 5 mg chewable tablet, Chew 1 tablet (5 mg total) 2 (two) times a day, Disp: 90 tablet, Rfl: 3    omeprazole (PriLOSEC) 40 MG capsule, Take 1 capsule (40 mg total) by mouth daily, Disp: 90 capsule, Rfl: 1    ondansetron (ZOFRAN-ODT) 8 mg disintegrating tablet, Take 1 tablet (8 mg total) by mouth every 8 (eight) hours as needed for nausea or vomiting, Disp: 20 tablet, Rfl: 3    sucralfate (CARAFATE) 1 g tablet, Take 1 tablet (1 g total) by mouth 4 (four) times a day Please melt tablet in 15 ml of water to take as a suspension., Disp: 360 tablet, Rfl: 1    traMADol (ULTRAM) 50 mg tablet, Take 50 mg by mouth 2 (two) times a day., Disp: , Rfl:     Varenicline Tartrate (Tyrvaya) 0.03 MG/ACT SOLN, into each nostril, Disp: , Rfl:     Xiidra 5 % op solution, , Disp: , Rfl:     albuterol (PROVENTIL HFA,VENTOLIN HFA) 90  "mcg/act inhaler, Inhale 1 puff every 6 (six) hours as needed As needed, Disp: , Rfl:     benzonatate (TESSALON) 200 MG capsule, Take 1 capsule (200 mg total) by mouth 3 (three) times a day as needed for cough (Patient not taking: Reported on 2/22/2024), Disp: 90 capsule, Rfl: 0    Botulinum Toxin Type A 200 units SOLR, Inject 155 units into face and neck IM every 90 days (Patient not taking: Reported on 2/16/2024), Disp: , Rfl:     meclizine (ANTIVERT) 25 mg tablet, Take 1 tablet (25 mg total) by mouth 3 (three) times a day as needed for dizziness for up to 12 doses, Disp: 12 tablet, Rfl: 0    Meribin 5 MG CAPS, Take 1 capsule by mouth daily (Patient not taking: Reported on 2/16/2024), Disp: , Rfl:     Oxervate 0.002 % SOLN, , Disp: , Rfl:     SUMAtriptan (IMITREX) 100 mg tablet, Take 100 mg by mouth 2 (two) times a day as needed for migraine, Disp: , Rfl:       Physical Exam:  /80 (BP Location: Right arm, Patient Position: Sitting, Cuff Size: Adult)   Pulse 98   Temp 97.5 °F (36.4 °C)   Resp 17   Ht 5' 2\" (1.575 m)   Wt 74.4 kg (164 lb)   SpO2 98%   BMI 30.00 kg/m²     Physical Exam  Constitutional:       General: She is not in acute distress.     Appearance: She is well-developed. She is not diaphoretic.   HENT:      Head: Normocephalic and atraumatic.      Nose: Nose normal.   Eyes:      General: No scleral icterus.        Right eye: No discharge.         Left eye: No discharge.      Conjunctiva/sclera: Conjunctivae normal.      Pupils: Pupils are equal, round, and reactive to light.   Neck:      Thyroid: No thyromegaly.      Vascular: No JVD.      Trachea: No tracheal deviation.   Cardiovascular:      Rate and Rhythm: Normal rate and regular rhythm.      Heart sounds: Normal heart sounds. No murmur heard.     No friction rub.   Pulmonary:      Effort: Pulmonary effort is normal. No respiratory distress.      Breath sounds: Normal breath sounds. No stridor. No wheezing or rales.   Chest:      Chest " wall: No tenderness.   Abdominal:      General: There is no distension.      Palpations: Abdomen is soft. There is no hepatomegaly or splenomegaly.      Tenderness: There is no abdominal tenderness. There is no guarding or rebound.      Comments: Right flank pain on palpation.   Musculoskeletal:         General: No tenderness or deformity. Normal range of motion.      Cervical back: Normal range of motion and neck supple.   Lymphadenopathy:      Cervical: No cervical adenopathy.   Skin:     General: Skin is warm and dry.      Coloration: Skin is not pale.      Findings: No erythema or rash.   Neurological:      Mental Status: She is alert and oriented to person, place, and time.      Cranial Nerves: No cranial nerve deficit.      Coordination: Coordination normal.      Deep Tendon Reflexes: Reflexes are normal and symmetric.   Psychiatric:         Behavior: Behavior normal.         Thought Content: Thought content normal.         Judgment: Judgment normal.           Labs:  Lab Results   Component Value Date    WBC 7.02 12/06/2023    HGB 14.2 12/06/2023    HCT 45.0 12/06/2023    MCV 93 12/06/2023     12/06/2023     Lab Results   Component Value Date    K 4.2 02/29/2024     02/29/2024    CO2 26 02/29/2024    BUN 13 02/29/2024    CREATININE 0.69 02/29/2024    GLUF 85 12/06/2023    CALCIUM 9.4 02/29/2024    CORRECTEDCA 9.5 06/06/2023    AST 16 02/29/2024    ALT 10 02/29/2024    ALKPHOS 79 02/29/2024    EGFR 104 02/29/2024       Patient voiced understanding and agreement in the above discussion. Aware to contact our office with questions/symptoms in the interim.

## 2024-03-29 LAB
ALBUMIN SERPL ELPH-MCNC: 3.89 G/DL (ref 3.2–5.1)
ALBUMIN SERPL ELPH-MCNC: 58 % (ref 48–70)
ALPHA1 GLOB SERPL ELPH-MCNC: 0.33 G/DL (ref 0.15–0.47)
ALPHA1 GLOB SERPL ELPH-MCNC: 4.9 % (ref 1.8–7)
ALPHA2 GLOB SERPL ELPH-MCNC: 0.76 G/DL (ref 0.42–1.04)
ALPHA2 GLOB SERPL ELPH-MCNC: 11.3 % (ref 5.9–14.9)
BETA GLOB ABNORMAL SERPL ELPH-MCNC: 0.5 G/DL (ref 0.31–0.57)
BETA1 GLOB SERPL ELPH-MCNC: 7.4 % (ref 4.7–7.7)
BETA2 GLOB SERPL ELPH-MCNC: 4.7 % (ref 3.1–7.9)
BETA2+GAMMA GLOB SERPL ELPH-MCNC: 0.31 G/DL (ref 0.2–0.58)
GAMMA GLOB ABNORMAL SERPL ELPH-MCNC: 0.92 G/DL (ref 0.4–1.66)
GAMMA GLOB SERPL ELPH-MCNC: 13.7 % (ref 6.9–22.3)
IGG/ALB SER: 1.38 {RATIO} (ref 1.1–1.8)
KAPPA LC FREE SER-MCNC: 14.9 MG/L (ref 3.3–19.4)
KAPPA LC FREE/LAMBDA FREE SER: 1.23 {RATIO} (ref 0.26–1.65)
LAMBDA LC FREE SERPL-MCNC: 12.1 MG/L (ref 5.7–26.3)
PROT PATTERN SERPL ELPH-IMP: NORMAL
PROT SERPL-MCNC: 6.7 G/DL (ref 6.4–8.2)

## 2024-03-29 PROCEDURE — 84165 PROTEIN E-PHORESIS SERUM: CPT | Performed by: PATHOLOGY

## 2024-03-30 LAB
FACT XIIIA PPP-ACNC: 134 % (ref 56–140)
VWF:RCO ACT/NOR PPP PL AGG: 126 % (ref 50–200)

## 2024-04-04 ENCOUNTER — HOSPITAL ENCOUNTER (OUTPATIENT)
Dept: INFUSION CENTER | Facility: HOSPITAL | Age: 54
End: 2024-04-04
Attending: INTERNAL MEDICINE
Payer: COMMERCIAL

## 2024-04-04 VITALS
HEART RATE: 94 BPM | RESPIRATION RATE: 18 BRPM | TEMPERATURE: 98 F | SYSTOLIC BLOOD PRESSURE: 150 MMHG | DIASTOLIC BLOOD PRESSURE: 83 MMHG

## 2024-04-04 DIAGNOSIS — D80.1 HYPOGAMMAGLOBULINEMIA (HCC): Primary | ICD-10-CM

## 2024-04-04 LAB
FACT VIII AG ACT/NOR PPP IA: 144 %
VWF MULTIMERS PPP IB: NORMAL

## 2024-04-04 RX ORDER — ACETAMINOPHEN 325 MG/1
650 TABLET ORAL ONCE
OUTPATIENT
Start: 2024-04-25

## 2024-04-04 RX ORDER — ACETAMINOPHEN 325 MG/1
650 TABLET ORAL ONCE
Status: COMPLETED | OUTPATIENT
Start: 2024-04-04 | End: 2024-04-04

## 2024-04-04 RX ORDER — SODIUM CHLORIDE 9 MG/ML
20 INJECTION, SOLUTION INTRAVENOUS ONCE
OUTPATIENT
Start: 2024-04-25

## 2024-04-04 RX ORDER — SODIUM CHLORIDE 9 MG/ML
20 INJECTION, SOLUTION INTRAVENOUS ONCE
Status: COMPLETED | OUTPATIENT
Start: 2024-04-04 | End: 2024-04-04

## 2024-04-04 RX ADMIN — SODIUM CHLORIDE 20 ML/HR: 0.9 INJECTION, SOLUTION INTRAVENOUS at 08:25

## 2024-04-04 RX ADMIN — SODIUM CHLORIDE 500 ML: 0.9 INJECTION, SOLUTION INTRAVENOUS at 08:25

## 2024-04-04 RX ADMIN — FAMOTIDINE 20 MG: 10 INJECTION INTRAVENOUS at 09:23

## 2024-04-04 RX ADMIN — DIPHENHYDRAMINE HYDROCHLORIDE 25 MG: 50 INJECTION INTRAMUSCULAR; INTRAVENOUS at 09:02

## 2024-04-04 RX ADMIN — DEXAMETHASONE SODIUM PHOSPHATE 10 MG: 100 INJECTION INTRAMUSCULAR; INTRAVENOUS at 08:31

## 2024-04-04 RX ADMIN — Medication 40 G: at 09:51

## 2024-04-04 RX ADMIN — ACETAMINOPHEN 650 MG: 325 TABLET ORAL at 08:31

## 2024-04-04 NOTE — PROGRESS NOTES
Ester Velazquez tolerated IVIG infusion well with no complications.      Ester Velazquez is aware of future appt on 4/25/24 at 8AM.    AVS declined by Ester Velazquez.

## 2024-04-12 ENCOUNTER — TELEPHONE (OUTPATIENT)
Age: 54
End: 2024-04-12

## 2024-04-16 ENCOUNTER — OFFICE VISIT (OUTPATIENT)
Dept: BARIATRICS | Facility: CLINIC | Age: 54
End: 2024-04-16
Payer: COMMERCIAL

## 2024-04-16 VITALS
DIASTOLIC BLOOD PRESSURE: 76 MMHG | TEMPERATURE: 98 F | SYSTOLIC BLOOD PRESSURE: 132 MMHG | OXYGEN SATURATION: 99 % | HEIGHT: 62 IN | BODY MASS INDEX: 31.36 KG/M2 | HEART RATE: 103 BPM | WEIGHT: 170.4 LBS | RESPIRATION RATE: 20 BRPM

## 2024-04-16 DIAGNOSIS — R63.5 ABNORMAL WEIGHT GAIN: Primary | ICD-10-CM

## 2024-04-16 DIAGNOSIS — I10 BENIGN ESSENTIAL HTN: ICD-10-CM

## 2024-04-16 DIAGNOSIS — E66.9 OBESITY, CLASS I, BMI 30-34.9: ICD-10-CM

## 2024-04-16 DIAGNOSIS — Z98.84 H/O GASTRIC BYPASS: ICD-10-CM

## 2024-04-16 PROCEDURE — 99214 OFFICE O/P EST MOD 30 MIN: CPT | Performed by: PHYSICIAN ASSISTANT

## 2024-04-16 NOTE — ASSESSMENT & PLAN NOTE
-s/p Eveline-En-Y Gastric Bypass  -Patient is pursuing Conservative Program  -Initial weight loss goal of 5-10% weight loss for improved health  -Screening labs: up to date  -Already on Wellbutrin  -Not a candidate for Topamax due to kidney stones. Likely avoid Phentermine due to hx of palpitations  -Dietary recall reviewed. Encouraged adequate protein intake, not skipping meals, avoiding refined cabohydrates  -Currently on Saxenda. Could not tolerate max dose due hypogylcemia. Now on 0.6mg dose. Will continue for 2 more weeks and then advance to 1.2mg. Has CGM.     Initial: 177.2 lbs  Current: 170.4 lbs  Change: -6.8 lbs  Goal: 140 lbs

## 2024-04-16 NOTE — PATIENT INSTRUCTIONS
Goals:    Food log (ie.) www.[email or web address],Miradore.[email or web address],[employer].[email or web address],Adimab.com,etc.   No sugary beverages. At least 64oz of water daily.  Increase physical activity by 10 minutes daily. Gradually increase physical activity to a goal of 5 days per week for 30 minutes of MODERATE intensity PLUS 2 days per week of FULL BODY resistance training  6386-2667 calories per day  5-10 servings of fruits and vegetables per day  25-35 grams of dietary fiber per day  Continue Saxenda 0.6mg for 2 more weeks and then increase to 1.2mg  My Net Diary  At least 60 grams of protein per day

## 2024-04-16 NOTE — PROGRESS NOTES
Assessment/Plan:    Obesity, Class I, BMI 30-34.9  -s/p Eveline-En-Y Gastric Bypass  -Patient is pursuing Conservative Program  -Initial weight loss goal of 5-10% weight loss for improved health  -Screening labs: up to date  -Already on Wellbutrin  -Not a candidate for Topamax due to kidney stones. Likely avoid Phentermine due to hx of palpitations  -Dietary recall reviewed. Encouraged adequate protein intake, not skipping meals, avoiding refined cabohydrates  -Currently on Saxenda. Could not tolerate max dose due hypogylcemia. Now on 0.6mg dose. Will continue for 2 more weeks and then advance to 1.2mg. Has CGM.     Initial: 177.2 lbs  Current: 170.4 lbs  Change: -6.8 lbs  Goal: 140 lbs    H/O gastric bypass  -s/p Eveline-En-Y Gastric Bypass with Dr. Rubin in [place] done in 2008 and have a revision of RNYGB with PEHR with Dr. Luis Miguel Adler on 02/11/2020     Initial 2008: 237 lbs prior to bypass  Jay 135 lbs after the first year       Initial: 178 lbs (prior to revision)  Jay: 162 lbs    Benign essential HTN  -On Norvasc and losartan  -can improve with weight loss    Goals:    Food log (ie.) [email or web address],[email or web address],[email or web address],Gopeers.com,etc.   No sugary beverages. At least 64oz of water daily.  Increase physical activity by 10 minutes daily. Gradually increase physical activity to a goal of 5 days per week for 30 minutes of MODERATE intensity PLUS 2 days per week of FULL BODY resistance training  2251-1975 calories per day  5-10 servings of fruits and vegetables per day  25-35 grams of dietary fiber per day  Continue Saxenda 0.6mg for 2 more weeks and then increase to 1.2mg  My Net Diary  At least 60 grams of protein per day    Follow up in approximately 3 months with Non-Surgical Physician/Advanced Practitioner.     Diagnoses and all orders for this visit:    Abnormal weight gain  Comments:  see plan under Class 1obesity    Obesity, Class I, BMI 30-34.9  -     liraglutide (SAXENDA) injection; Inject 0.6  mg subcutaneously once per day for the first week. Increase in weekly intervals by 0.6 mg until a dose of 3 mg is reached  -     Insulin Pen Needle 32G X 4 MM MISC; Use daily as directed    H/O gastric bypass    Benign essential HTN          Subjective:   Chief Complaint   Patient presents with    Follow-up        Patient ID: Ester Velazquez  is a 53 y.o. female with excess weight/obesity here to pursue weight managment.  Patient is pursuing Conservative Program.     HPI Patient presents for MW follow up. On Saxenda 0.6mg currently. SHe did get up to max dose, but was experiencing a lot of hypoglycemic episodes mostly in the middle of the night. Since backing off to lowest dose, this has resolved. She denies any adverse SE from Saxenda with her bowels, nausea, abdominal upset.     Hydration: crystal light 60oz, 2 cups of coffee + half and half + splenda, minimal plain water    B: 1 cup of cottage cheese + grape tomato + 6 MG crackers  S: skips  L: Salad with egg or chicken or tuna + shredded cheese + Light italian  S: 2 mandarin orange + string cheese  D: Breaded chicken + cheese+  Marinara + white pasta  S: popcorn    Wt Readings from Last 10 Encounters:   04/16/24 77.3 kg (170 lb 6.4 oz)   03/28/24 74.4 kg (164 lb)   02/22/24 79.8 kg (176 lb)   02/16/24 79.8 kg (176 lb)   01/23/24 80.4 kg (177 lb 3.2 oz)   12/19/23 80.3 kg (177 lb)   11/09/23 78.7 kg (173 lb 8 oz)   11/08/23 78.7 kg (173 lb 8 oz)   09/29/23 76.9 kg (169 lb 8 oz)   06/15/23 77.6 kg (171 lb)          The following portions of the patient's history were reviewed and updated as appropriate: allergies, current medications, past family history, past medical history, past social history, past surgical history, and problem list.    Review of Systems   Gastrointestinal:  Negative for abdominal pain, nausea and vomiting.   Neurological:  Negative for headaches.       Objective:    /76 (BP Location: Left arm, Patient Position: Sitting, Cuff Size:  "Standard)   Pulse 103   Temp 98 °F (36.7 °C)   Resp 20   Ht 5' 2\" (1.575 m)   Wt 77.3 kg (170 lb 6.4 oz)   SpO2 99%   BMI 31.17 kg/m²      Physical Exam  Vitals and nursing note reviewed.   Constitutional:       General: She is not in acute distress.     Appearance: She is obese. She is not ill-appearing or toxic-appearing.   HENT:      Mouth/Throat:      Mouth: Mucous membranes are moist.   Eyes:      General: No scleral icterus.  Pulmonary:      Effort: Pulmonary effort is normal. No respiratory distress.   Musculoskeletal:         General: Normal range of motion.   Skin:     General: Skin is dry.      Coloration: Skin is not jaundiced.   Neurological:      Mental Status: She is alert and oriented to person, place, and time. Mental status is at baseline.   Psychiatric:         Mood and Affect: Mood normal.         Thought Content: Thought content normal.         Judgment: Judgment normal.        "

## 2024-04-16 NOTE — ASSESSMENT & PLAN NOTE
-s/p Eveline-En-Y Gastric Bypass with Dr. Rubin in [place] done in 2008 and have a revision of RNYGB with PEHR with Dr. Luis Miguel Adler on 02/11/2020     Initial 2008: 237 lbs prior to bypass  Jay 135 lbs after the first year       Initial: 178 lbs (prior to revision)  Jay: 162 lbs

## 2024-04-23 ENCOUNTER — TELEPHONE (OUTPATIENT)
Age: 54
End: 2024-04-23

## 2024-04-23 NOTE — TELEPHONE ENCOUNTER
Regarding: Saxenda pre authorization about to   Contact: [phone]  ----- Message from Lillie Cole MA sent at 2024  8:00 AM EDT -----       ----- Message sent from Lillie Cole MA to Ester Velazquez at 2024  7:58 AM -----   Ke Norman,    I will reach out to the auth team for your renewal.    Thank you!      ----- Message -----       From:Ester Velazquez       Sent:2024  7:46 AM EDT         To:Annemarie Khalil PA-C    Subject:Saxenda pre authorization about to     Hello,  I got a letter from [employer]ph[employer] stating my pre authorization for  Saxenda needs to be renewed.  If there is anything you need from me please let me know.   Thank you,  Ester

## 2024-04-25 ENCOUNTER — HOSPITAL ENCOUNTER (OUTPATIENT)
Dept: INFUSION CENTER | Facility: HOSPITAL | Age: 54
End: 2024-04-25
Attending: INTERNAL MEDICINE
Payer: COMMERCIAL

## 2024-04-25 VITALS
RESPIRATION RATE: 16 BRPM | SYSTOLIC BLOOD PRESSURE: 146 MMHG | HEART RATE: 93 BPM | TEMPERATURE: 97.8 F | DIASTOLIC BLOOD PRESSURE: 70 MMHG

## 2024-04-25 DIAGNOSIS — D80.1 HYPOGAMMAGLOBULINEMIA (HCC): Primary | ICD-10-CM

## 2024-04-25 PROCEDURE — 96366 THER/PROPH/DIAG IV INF ADDON: CPT

## 2024-04-25 PROCEDURE — 96365 THER/PROPH/DIAG IV INF INIT: CPT

## 2024-04-25 PROCEDURE — 96367 TX/PROPH/DG ADDL SEQ IV INF: CPT

## 2024-04-25 RX ORDER — SODIUM CHLORIDE 9 MG/ML
20 INJECTION, SOLUTION INTRAVENOUS ONCE
OUTPATIENT
Start: 2024-05-16

## 2024-04-25 RX ORDER — ACETAMINOPHEN 325 MG/1
650 TABLET ORAL ONCE
OUTPATIENT
Start: 2024-05-16

## 2024-04-25 RX ORDER — SODIUM CHLORIDE 9 MG/ML
20 INJECTION, SOLUTION INTRAVENOUS ONCE
Status: COMPLETED | OUTPATIENT
Start: 2024-04-25 | End: 2024-04-25

## 2024-04-25 RX ORDER — ACETAMINOPHEN 325 MG/1
650 TABLET ORAL ONCE
Status: COMPLETED | OUTPATIENT
Start: 2024-04-25 | End: 2024-04-25

## 2024-04-25 RX ADMIN — SODIUM CHLORIDE 20 ML/HR: 0.9 INJECTION, SOLUTION INTRAVENOUS at 08:18

## 2024-04-25 RX ADMIN — SODIUM CHLORIDE 500 ML: 0.9 INJECTION, SOLUTION INTRAVENOUS at 08:19

## 2024-04-25 RX ADMIN — FAMOTIDINE 20 MG: 10 INJECTION INTRAVENOUS at 09:13

## 2024-04-25 RX ADMIN — Medication 40 G: at 09:57

## 2024-04-25 RX ADMIN — DEXAMETHASONE SODIUM PHOSPHATE 10 MG: 100 INJECTION INTRAMUSCULAR; INTRAVENOUS at 08:25

## 2024-04-25 RX ADMIN — DIPHENHYDRAMINE HYDROCHLORIDE 25 MG: 50 INJECTION INTRAMUSCULAR; INTRAVENOUS at 08:53

## 2024-04-25 RX ADMIN — ACETAMINOPHEN 650 MG: 325 TABLET ORAL at 08:18

## 2024-04-25 NOTE — PROGRESS NOTES
Ester Velazquez  tolerated IV IG treatment well with no complications.      Ester Velazquez is aware of future appt on 5/16 at 8AM.     AVS printed and given to Ester Velazquez: No (Declined by Ester Velazquez).

## 2024-05-03 NOTE — TELEPHONE ENCOUNTER
INÉS ferrara    Submitted via    []CMM-KEY    [][employer]-Case ID #    [x]Faxed to plan [phone]  []Other website    []Phone call Case ID #      Office notes sent, clinical questions answered. Awaiting determination    Turnaround time for your insurance to make a decision on your Prior Authorization can take 7-21 business days.

## 2024-05-06 ENCOUNTER — HOSPITAL ENCOUNTER (OUTPATIENT)
Dept: ULTRASOUND IMAGING | Facility: HOSPITAL | Age: 54
Discharge: HOME | End: 2024-05-06
Attending: INTERNAL MEDICINE
Payer: COMMERCIAL

## 2024-05-06 DIAGNOSIS — R10.9 LEFT FLANK PAIN: ICD-10-CM

## 2024-05-06 PROCEDURE — 76775 US EXAM ABDO BACK WALL LIM: CPT

## 2024-05-09 NOTE — TELEPHONE ENCOUNTER
PA for Lexii Approved     Date(s) approved through 8/23/2024    Case #    Patient advised by          [x] MyChart Message  [] Phone call   []LMOM  []L/M to call office as no active Communication consent on file  []Unable to leave detailed message as VM not approved on Communication consent       Pharmacy advised by    [x]Fax  []Phone call    Approval letter scanned into [employer] Yes

## 2024-05-16 ENCOUNTER — HOSPITAL ENCOUNTER (OUTPATIENT)
Dept: INFUSION CENTER | Facility: HOSPITAL | Age: 54
End: 2024-05-16
Attending: INTERNAL MEDICINE
Payer: COMMERCIAL

## 2024-05-16 VITALS
SYSTOLIC BLOOD PRESSURE: 123 MMHG | OXYGEN SATURATION: 97 % | HEART RATE: 101 BPM | TEMPERATURE: 98.1 F | DIASTOLIC BLOOD PRESSURE: 76 MMHG | BODY MASS INDEX: 30.16 KG/M2 | RESPIRATION RATE: 16 BRPM | WEIGHT: 164.9 LBS

## 2024-05-16 DIAGNOSIS — D80.1 HYPOGAMMAGLOBULINEMIA (HCC): Primary | ICD-10-CM

## 2024-05-16 PROCEDURE — 96365 THER/PROPH/DIAG IV INF INIT: CPT

## 2024-05-16 PROCEDURE — 96366 THER/PROPH/DIAG IV INF ADDON: CPT

## 2024-05-16 PROCEDURE — 96375 TX/PRO/DX INJ NEW DRUG ADDON: CPT

## 2024-05-16 PROCEDURE — 96367 TX/PROPH/DG ADDL SEQ IV INF: CPT

## 2024-05-16 RX ORDER — ACETAMINOPHEN 325 MG/1
650 TABLET ORAL ONCE
OUTPATIENT
Start: 2024-06-06

## 2024-05-16 RX ORDER — SODIUM CHLORIDE 9 MG/ML
20 INJECTION, SOLUTION INTRAVENOUS ONCE
OUTPATIENT
Start: 2024-06-06

## 2024-05-16 RX ORDER — SODIUM CHLORIDE 9 MG/ML
20 INJECTION, SOLUTION INTRAVENOUS ONCE
Status: COMPLETED | OUTPATIENT
Start: 2024-05-16 | End: 2024-05-16

## 2024-05-16 RX ORDER — ACETAMINOPHEN 325 MG/1
650 TABLET ORAL ONCE
Status: COMPLETED | OUTPATIENT
Start: 2024-05-16 | End: 2024-05-16

## 2024-05-16 RX ADMIN — FAMOTIDINE 20 MG: 10 INJECTION INTRAVENOUS at 09:23

## 2024-05-16 RX ADMIN — Medication 40 G: at 09:53

## 2024-05-16 RX ADMIN — SODIUM CHLORIDE 20 ML/HR: 0.9 INJECTION, SOLUTION INTRAVENOUS at 08:14

## 2024-05-16 RX ADMIN — DEXAMETHASONE SODIUM PHOSPHATE 10 MG: 100 INJECTION INTRAMUSCULAR; INTRAVENOUS at 08:36

## 2024-05-16 RX ADMIN — SODIUM CHLORIDE 500 ML: 0.9 INJECTION, SOLUTION INTRAVENOUS at 08:10

## 2024-05-16 RX ADMIN — ACETAMINOPHEN 650 MG: 325 TABLET ORAL at 08:36

## 2024-05-16 RX ADMIN — DIPHENHYDRAMINE HYDROCHLORIDE 25 MG: 50 INJECTION INTRAMUSCULAR; INTRAVENOUS at 09:00

## 2024-05-16 NOTE — PROGRESS NOTES
Pt here for IVIG infusion. Pt offered no acute complaints today. Vitals stable. Pt resting comfortably in chair with call bell in place.

## 2024-06-04 DIAGNOSIS — E66.9 OBESITY, CLASS I, BMI 30-34.9: ICD-10-CM

## 2024-06-05 RX ORDER — PEN NEEDLE, DIABETIC 32GX 5/32"
NEEDLE, DISPOSABLE MISCELLANEOUS
Qty: 100 EACH | Refills: 1 | Status: SHIPPED | OUTPATIENT
Start: 2024-06-05

## 2024-06-06 ENCOUNTER — HOSPITAL ENCOUNTER (OUTPATIENT)
Dept: INFUSION CENTER | Facility: HOSPITAL | Age: 54
End: 2024-06-06
Attending: INTERNAL MEDICINE
Payer: COMMERCIAL

## 2024-06-06 VITALS
HEART RATE: 105 BPM | WEIGHT: 162.7 LBS | RESPIRATION RATE: 16 BRPM | BODY MASS INDEX: 29.76 KG/M2 | TEMPERATURE: 97.3 F | SYSTOLIC BLOOD PRESSURE: 145 MMHG | DIASTOLIC BLOOD PRESSURE: 76 MMHG

## 2024-06-06 DIAGNOSIS — D80.1 HYPOGAMMAGLOBULINEMIA (HCC): Primary | ICD-10-CM

## 2024-06-06 PROCEDURE — 96366 THER/PROPH/DIAG IV INF ADDON: CPT

## 2024-06-06 PROCEDURE — 96367 TX/PROPH/DG ADDL SEQ IV INF: CPT

## 2024-06-06 PROCEDURE — 96365 THER/PROPH/DIAG IV INF INIT: CPT

## 2024-06-06 PROCEDURE — 96375 TX/PRO/DX INJ NEW DRUG ADDON: CPT

## 2024-06-06 RX ORDER — SODIUM CHLORIDE 9 MG/ML
20 INJECTION, SOLUTION INTRAVENOUS ONCE
OUTPATIENT
Start: 2024-06-27

## 2024-06-06 RX ORDER — ACETAMINOPHEN 325 MG/1
650 TABLET ORAL ONCE
OUTPATIENT
Start: 2024-06-27

## 2024-06-06 RX ORDER — ACETAMINOPHEN 325 MG/1
650 TABLET ORAL ONCE
Status: COMPLETED | OUTPATIENT
Start: 2024-06-06 | End: 2024-06-06

## 2024-06-06 RX ORDER — SODIUM CHLORIDE 9 MG/ML
20 INJECTION, SOLUTION INTRAVENOUS ONCE
Status: COMPLETED | OUTPATIENT
Start: 2024-06-06 | End: 2024-06-06

## 2024-06-06 RX ADMIN — FAMOTIDINE 20 MG: 10 INJECTION INTRAVENOUS at 12:40

## 2024-06-06 RX ADMIN — ACETAMINOPHEN 650 MG: 325 TABLET ORAL at 11:32

## 2024-06-06 RX ADMIN — SODIUM CHLORIDE 20 ML/HR: 0.9 INJECTION, SOLUTION INTRAVENOUS at 11:27

## 2024-06-06 RX ADMIN — DIPHENHYDRAMINE HYDROCHLORIDE 25 MG: 50 INJECTION INTRAMUSCULAR; INTRAVENOUS at 12:18

## 2024-06-06 RX ADMIN — Medication 40 G: at 13:22

## 2024-06-06 RX ADMIN — SODIUM CHLORIDE 500 ML: 0.9 INJECTION, SOLUTION INTRAVENOUS at 11:31

## 2024-06-06 RX ADMIN — DEXAMETHASONE SODIUM PHOSPHATE 10 MG: 10 INJECTION, SOLUTION INTRAMUSCULAR; INTRAVENOUS at 11:33

## 2024-06-06 NOTE — PROGRESS NOTES
Patient denies any recent infection or being on antibiotics.  Patient tolerated IVIG without reaction or issues.      Ester Velazquez is aware of future appt on 6/27/24 at 0800.     AVS -  No (Declined by Ester Velazquez) Patient has Mychart.    Patient ambulated off unit without incident.  All personal belongings taken with patient.

## 2024-06-06 NOTE — PLAN OF CARE
Problem: Potential for Falls  Goal: Patient will remain free of falls  Description: INTERVENTIONS:  - Educate patient/family on patient safety including physical limitations  - Instruct patient to call for assistance with activity   - Consult OT/PT to assist with strengthening/mobility   - Keep Call bell within reach  - Keep bed low and locked with side rails adjusted as appropriate  - Keep care items and personal belongings within reach  - Initiate and maintain comfort rounds  - Make Fall Risk Sign visible to staff  - Apply yellow socks and bracelet for high fall risk patients  - Consider moving patient to room near nurses station  6/6/2024 1521 by Laura Garcia, [facility]  Outcome: Progressing  6/6/2024 1125 by Laura Garcia, [facility]  Outcome: Progressing

## 2024-06-12 DIAGNOSIS — E66.9 OBESITY, CLASS I, BMI 30-34.9: ICD-10-CM

## 2024-06-12 RX ORDER — PEN NEEDLE, DIABETIC 32GX 5/32"
NEEDLE, DISPOSABLE MISCELLANEOUS
Qty: 100 EACH | Refills: 0 | OUTPATIENT
Start: 2024-06-12

## 2024-06-13 RX ORDER — LIRAGLUTIDE 6 MG/ML
INJECTION, SOLUTION SUBCUTANEOUS
Qty: 15 ML | Refills: 1 | Status: SHIPPED | OUTPATIENT
Start: 2024-06-13 | End: 2024-06-14 | Stop reason: SDUPTHER

## 2024-06-14 RX ORDER — LIRAGLUTIDE 6 MG/ML
INJECTION, SOLUTION SUBCUTANEOUS
Qty: 15 ML | Refills: 1 | Status: SHIPPED | OUTPATIENT
Start: 2024-06-14

## 2024-06-26 ENCOUNTER — TELEPHONE (OUTPATIENT)
Age: 54
End: 2024-06-26

## 2024-06-26 DIAGNOSIS — E66.9 OBESITY, CLASS I, BMI 30-34.9: ICD-10-CM

## 2024-06-26 RX ORDER — LIRAGLUTIDE 6 MG/ML
INJECTION, SOLUTION SUBCUTANEOUS
Qty: 15 ML | Refills: 1 | Status: SHIPPED | OUTPATIENT
Start: 2024-06-26

## 2024-06-26 NOTE — TELEPHONE ENCOUNTER
Patient was calling to get a script for the needles she needs for the liraglutide (Saxenda) injection    Can this be written for her?    The BD needles already in her chart, she uses for the B12 shots. She needs needles for the medication shot

## 2024-06-26 NOTE — TELEPHONE ENCOUNTER
Patient was calling because the pharmacy has still not received the script for refill. It was sent back on 6/14 according to the Medications list. But the pharmacy told the patient they have not received the script.     Can this please be resent to the pharmacy?    Patient has not had the refilled since April

## 2024-06-27 ENCOUNTER — HOSPITAL ENCOUNTER (OUTPATIENT)
Dept: INFUSION CENTER | Facility: HOSPITAL | Age: 54
End: 2024-06-27
Attending: INTERNAL MEDICINE
Payer: COMMERCIAL

## 2024-06-27 ENCOUNTER — TELEPHONE (OUTPATIENT)
Dept: HEMATOLOGY ONCOLOGY | Facility: CLINIC | Age: 54
End: 2024-06-27

## 2024-06-27 VITALS
TEMPERATURE: 97.6 F | SYSTOLIC BLOOD PRESSURE: 122 MMHG | DIASTOLIC BLOOD PRESSURE: 87 MMHG | RESPIRATION RATE: 18 BRPM | HEART RATE: 77 BPM

## 2024-06-27 DIAGNOSIS — D80.1 HYPOGAMMAGLOBULINEMIA (HCC): Primary | ICD-10-CM

## 2024-06-27 PROCEDURE — 96365 THER/PROPH/DIAG IV INF INIT: CPT

## 2024-06-27 PROCEDURE — 96367 TX/PROPH/DG ADDL SEQ IV INF: CPT

## 2024-06-27 PROCEDURE — 96366 THER/PROPH/DIAG IV INF ADDON: CPT

## 2024-06-27 PROCEDURE — 96375 TX/PRO/DX INJ NEW DRUG ADDON: CPT

## 2024-06-27 RX ORDER — SODIUM CHLORIDE 9 MG/ML
20 INJECTION, SOLUTION INTRAVENOUS ONCE
OUTPATIENT
Start: 2024-07-18

## 2024-06-27 RX ORDER — ACETAMINOPHEN 325 MG/1
650 TABLET ORAL ONCE
Status: COMPLETED | OUTPATIENT
Start: 2024-06-27 | End: 2024-06-27

## 2024-06-27 RX ORDER — SODIUM CHLORIDE 9 MG/ML
20 INJECTION, SOLUTION INTRAVENOUS ONCE
Status: COMPLETED | OUTPATIENT
Start: 2024-06-27 | End: 2024-06-27

## 2024-06-27 RX ORDER — ACETAMINOPHEN 325 MG/1
650 TABLET ORAL ONCE
OUTPATIENT
Start: 2024-07-18

## 2024-06-27 RX ADMIN — Medication 40 G: at 09:50

## 2024-06-27 RX ADMIN — DEXAMETHASONE SODIUM PHOSPHATE 10 MG: 100 INJECTION INTRAMUSCULAR; INTRAVENOUS at 08:25

## 2024-06-27 RX ADMIN — SODIUM CHLORIDE 20 ML/HR: 9 INJECTION, SOLUTION INTRAVENOUS at 08:23

## 2024-06-27 RX ADMIN — ACETAMINOPHEN 650 MG: 325 TABLET ORAL at 08:23

## 2024-06-27 RX ADMIN — FAMOTIDINE 20 MG: 10 INJECTION INTRAVENOUS at 09:22

## 2024-06-27 RX ADMIN — SODIUM CHLORIDE 500 ML: 0.9 INJECTION, SOLUTION INTRAVENOUS at 08:23

## 2024-06-27 RX ADMIN — DIPHENHYDRAMINE HYDROCHLORIDE 25 MG: 50 INJECTION INTRAMUSCULAR; INTRAVENOUS at 08:59

## 2024-06-27 NOTE — PROGRESS NOTES
Ester Velazquez  tolerated treatment well with no complications.      Ester Velazquez is aware of future appt on 7/18 at 8:30 am.     AVS declined by Ester Velazquez.

## 2024-06-27 NOTE — TELEPHONE ENCOUNTER
Let message to let patient know her appointment with dr hernandez has been changed from 08/27/2024 to 09/25/2024 @ 2:20 provided sasha

## 2024-06-28 DIAGNOSIS — R11.0 NAUSEA: ICD-10-CM

## 2024-07-01 RX ORDER — ONDANSETRON 8 MG/1
TABLET, ORALLY DISINTEGRATING ORAL
Qty: 20 TABLET | Refills: 0 | Status: SHIPPED | OUTPATIENT
Start: 2024-07-01

## 2024-07-18 ENCOUNTER — HOSPITAL ENCOUNTER (OUTPATIENT)
Dept: INFUSION CENTER | Facility: HOSPITAL | Age: 54
End: 2024-07-18
Attending: INTERNAL MEDICINE
Payer: COMMERCIAL

## 2024-07-18 VITALS
RESPIRATION RATE: 16 BRPM | SYSTOLIC BLOOD PRESSURE: 127 MMHG | OXYGEN SATURATION: 97 % | DIASTOLIC BLOOD PRESSURE: 78 MMHG | TEMPERATURE: 97.8 F | HEART RATE: 104 BPM

## 2024-07-18 DIAGNOSIS — D80.1 HYPOGAMMAGLOBULINEMIA (HCC): Primary | ICD-10-CM

## 2024-07-18 PROCEDURE — 96367 TX/PROPH/DG ADDL SEQ IV INF: CPT

## 2024-07-18 PROCEDURE — 96366 THER/PROPH/DIAG IV INF ADDON: CPT

## 2024-07-18 PROCEDURE — 96365 THER/PROPH/DIAG IV INF INIT: CPT

## 2024-07-18 RX ORDER — ACETAMINOPHEN 325 MG/1
650 TABLET ORAL ONCE
OUTPATIENT
Start: 2024-08-08

## 2024-07-18 RX ORDER — SODIUM CHLORIDE 9 MG/ML
20 INJECTION, SOLUTION INTRAVENOUS ONCE
Status: COMPLETED | OUTPATIENT
Start: 2024-07-18 | End: 2024-07-18

## 2024-07-18 RX ORDER — SODIUM CHLORIDE 9 MG/ML
20 INJECTION, SOLUTION INTRAVENOUS ONCE
OUTPATIENT
Start: 2024-08-08

## 2024-07-18 RX ORDER — ACETAMINOPHEN 325 MG/1
650 TABLET ORAL ONCE
Status: COMPLETED | OUTPATIENT
Start: 2024-07-18 | End: 2024-07-18

## 2024-07-18 RX ADMIN — DEXAMETHASONE SODIUM PHOSPHATE 10 MG: 10 INJECTION, SOLUTION INTRAMUSCULAR; INTRAVENOUS at 08:29

## 2024-07-18 RX ADMIN — ACETAMINOPHEN 650 MG: 325 TABLET ORAL at 08:29

## 2024-07-18 RX ADMIN — Medication 40 G: at 09:43

## 2024-07-18 RX ADMIN — DIPHENHYDRAMINE HYDROCHLORIDE 25 MG: 50 INJECTION INTRAMUSCULAR; INTRAVENOUS at 08:54

## 2024-07-18 RX ADMIN — SODIUM CHLORIDE 20 ML/HR: 0.9 INJECTION, SOLUTION INTRAVENOUS at 08:15

## 2024-07-18 RX ADMIN — FAMOTIDINE 20 MG: 10 INJECTION INTRAVENOUS at 09:11

## 2024-07-18 RX ADMIN — SODIUM CHLORIDE 500 ML: 0.9 INJECTION, SOLUTION INTRAVENOUS at 08:29

## 2024-07-18 NOTE — PROGRESS NOTES
Ester Velazquez  tolerated IVIG well with no complications.      Ester Velazquez is aware of future appt on 8-8-24 at 0800    AVS declined by Ester Velazquez:

## 2024-07-23 ENCOUNTER — OFFICE VISIT (OUTPATIENT)
Dept: BARIATRICS | Facility: CLINIC | Age: 54
End: 2024-07-23
Payer: COMMERCIAL

## 2024-07-23 VITALS
OXYGEN SATURATION: 98 % | HEIGHT: 62 IN | RESPIRATION RATE: 20 BRPM | DIASTOLIC BLOOD PRESSURE: 72 MMHG | HEART RATE: 89 BPM | BODY MASS INDEX: 28.56 KG/M2 | TEMPERATURE: 98.2 F | WEIGHT: 155.2 LBS | SYSTOLIC BLOOD PRESSURE: 122 MMHG

## 2024-07-23 DIAGNOSIS — E66.3 OVERWEIGHT: Primary | ICD-10-CM

## 2024-07-23 DIAGNOSIS — Z98.84 H/O GASTRIC BYPASS: ICD-10-CM

## 2024-07-23 PROCEDURE — 99214 OFFICE O/P EST MOD 30 MIN: CPT | Performed by: PHYSICIAN ASSISTANT

## 2024-07-23 NOTE — ASSESSMENT & PLAN NOTE
-s/p Eveline-En-Y Gastric Bypass  -Patient is pursuing Conservative Program  -Initial weight loss goal of 5-10% weight loss for improved health - met  -Screening labs: up to date  -Already on Wellbutrin  -Not a candidate for Topamax due to kidney stones. Likely avoid Phentermine due to hx of palpitations  -Dietary recall reviewed. Encouraged adequate protein intake, not skipping meals, avoiding refined carbohydrates which she is doing well with  -Currently on Saxenda 3mg and tolerating well with no further episodes of hypogylcemia since eating a larger dinner meal  -dietary recall reviewed    Initial: 177.2 lbs  Current: 155.2 lbs  Change: -18 lbs  Goal: 140 lbs

## 2024-07-23 NOTE — ASSESSMENT & PLAN NOTE
-s/p Eveline-En-Y Gastric Bypass with Dr. Rubin in [place] done in 2008 and have a revision of RNYGB with [facility] with Dr. Luis Miguel Adler on 02/11/2020     Initial 2008: 237 lbs prior to bypass  Jay 135 lbs after the first year       Initial: 178 lbs (prior to revision)  Jay: 162 lbs

## 2024-07-23 NOTE — PATIENT INSTRUCTIONS
Goals:    Food log (ie.) [email or web address],etc.   No sugary beverages. At least 64oz of water daily.  Increase physical activity by 10 minutes daily. Gradually increase physical activity to a goal of 5 days per week for 30 minutes of MODERATE intensity PLUS 2 days per week of FULL BODY resistance training  6640-4067 calories per day  5-10 servings of fruits and vegetables per day  25-35 grams of dietary fiber per day  At least 60 grams of protein per day

## 2024-07-23 NOTE — PROGRESS NOTES
Assessment/Plan:    Overweight  -s/p Eveline-En-Y Gastric Bypass  -Patient is pursuing Conservative Program  -Initial weight loss goal of 5-10% weight loss for improved health - met  -Screening labs: up to date  -Already on Wellbutrin  -Not a candidate for Topamax due to kidney stones. Likely avoid Phentermine due to hx of palpitations  -Dietary recall reviewed. Encouraged adequate protein intake, not skipping meals, avoiding refined carbohydrates which she is doing well with  -Currently on Saxenda 3mg and tolerating well with no further episodes of hypogylcemia since eating a larger dinner meal  -dietary recall reviewed    Initial: 177.2 lbs  Current: 155.2 lbs  Change: -18 lbs  Goal: 140 lbs    H/O gastric bypass  -s/p Eveline-En-Y Gastric Bypass with Dr. Rubin in [place] done in 2008 and have a revision of RNYGB with [facility] with Dr. Luis Miguel Adler on 02/11/2020     Initial 2008: 237 lbs prior to bypass  Jay 135 lbs after the first year       Initial: 178 lbs (prior to revision)  Jay: 162 lbs    Goals:    Food log (ie.) [email or web address],etc.   No sugary beverages. At least 64oz of water daily.  Increase physical activity by 10 minutes daily. Gradually increase physical activity to a goal of 5 days per week for 30 minutes of MODERATE intensity PLUS 2 days per week of FULL BODY resistance training  1399-8500 calories per day  5-10 servings of fruits and vegetables per day  25-35 grams of dietary fiber per day  At least 60 grams of protein per day    Follow up in approximately 3 months with Non-Surgical Physician/Advanced Practitioner.     Diagnoses and all orders for this visit:    Overweight    BMI 28.0-28.9,adult    H/O gastric bypass          Subjective:   Chief Complaint   Patient presents with    Follow-up        Patient ID: Ester Velazquez  is a 53 y.o. female with excess weight/obesity here to pursue weight managment.  Patient is pursuing Conservative Program.     HPI  "Patient presents for [facility] follow up. Currently on Saxenda 3mg dose and tolerating well. No further hypoglycemic episodes since increasing portion of dinner meal. Has noticed a great reduction in appetite and cravings    Hydration: crystal light 60-90oz, 4 cup of coffee + splenda + half and half  ETOH: denies  Exercise: denies, swims 2-3x per week, always active. Reports rarely resting  SLeeP: 9 hours    B: 2 slices Multigrain toast + PB   L: Cottage cheese + tomato OR Salad with egg or chicken or tuna + light italian  S: cheese + MG crackers OR berries  D: Meat + veggie + starch OR chicken fajitas  S: popcorn Or fruit      Wt Readings from Last 10 Encounters:   07/23/24 70.4 kg (155 lb 3.2 oz)   06/06/24 73.8 kg (162 lb 11.2 oz)   05/16/24 74.8 kg (164 lb 14.5 oz)   04/16/24 77.3 kg (170 lb 6.4 oz)   03/28/24 74.4 kg (164 lb)   02/22/24 79.8 kg (176 lb)   02/16/24 79.8 kg (176 lb)   01/23/24 80.4 kg (177 lb 3.2 oz)   12/19/23 80.3 kg (177 lb)   11/09/23 78.7 kg (173 lb 8 oz)        The following portions of the patient's history were reviewed and updated as appropriate: allergies, current medications, past family history, past medical history, past social history, past surgical history, and problem list.    Review of Systems   Cardiovascular:  Positive for palpitations (occasionally, when taking steroid, no recent changes). Negative for chest pain.   Gastrointestinal:  Negative for abdominal pain, nausea and vomiting.   Musculoskeletal:  Positive for arthralgias and myalgias.       Objective:    /72 (BP Location: Left arm, Patient Position: Sitting, Cuff Size: Large)   Pulse 89   Temp 98.2 °F (36.8 °C)   Resp 20   Ht 5' 2\" (1.575 m)   Wt 70.4 kg (155 lb 3.2 oz)   SpO2 98%   BMI 28.39 kg/m²      Physical Exam  Vitals and nursing note reviewed.      Constitutional   General appearance: Abnormal.  well developed and overweight.   Eyes No conjunctival pallor.   Ears, Nose, Mouth, and Throat Oral mucosa " moist.   Pulmonary   Respiratory effort: No increased work of breathing or signs of respiratory distress.    Auscultation of lungs: Clear to auscultation, equal breath sounds bilaterally, no wheezes, no rales, no rhonci.    Cardiovascular   Auscultation of heart: Normal rate and rhythm, normal S1 and S2, without murmurs.    Examination of extremities for edema and/or varicosities: Normal.  no edema.   Abdomen   Abdomen: Abnormal.  Bowel sounds were normal. The abdomen was soft and nontender.   Musculoskeletal   Gait and station: Normal.    Psychiatric   Orientation to person, place and time: Normal.    Affect: appropriate

## 2024-08-08 ENCOUNTER — HOSPITAL ENCOUNTER (OUTPATIENT)
Dept: INFUSION CENTER | Facility: HOSPITAL | Age: 54
End: 2024-08-08
Attending: INTERNAL MEDICINE
Payer: COMMERCIAL

## 2024-08-08 VITALS
RESPIRATION RATE: 16 BRPM | TEMPERATURE: 98.2 F | OXYGEN SATURATION: 99 % | DIASTOLIC BLOOD PRESSURE: 79 MMHG | HEART RATE: 88 BPM | SYSTOLIC BLOOD PRESSURE: 114 MMHG

## 2024-08-08 DIAGNOSIS — D80.1 HYPOGAMMAGLOBULINEMIA (HCC): Primary | ICD-10-CM

## 2024-08-08 DIAGNOSIS — E66.9 OBESITY, CLASS I, BMI 30-34.9: ICD-10-CM

## 2024-08-08 PROCEDURE — 96367 TX/PROPH/DG ADDL SEQ IV INF: CPT

## 2024-08-08 PROCEDURE — 96365 THER/PROPH/DIAG IV INF INIT: CPT

## 2024-08-08 PROCEDURE — 96366 THER/PROPH/DIAG IV INF ADDON: CPT

## 2024-08-08 RX ORDER — ACETAMINOPHEN 325 MG/1
650 TABLET ORAL ONCE
OUTPATIENT
Start: 2024-08-29

## 2024-08-08 RX ORDER — ACETAMINOPHEN 325 MG/1
650 TABLET ORAL ONCE
Status: COMPLETED | OUTPATIENT
Start: 2024-08-08 | End: 2024-08-08

## 2024-08-08 RX ORDER — SODIUM CHLORIDE 9 MG/ML
20 INJECTION, SOLUTION INTRAVENOUS ONCE
Status: COMPLETED | OUTPATIENT
Start: 2024-08-08 | End: 2024-08-08

## 2024-08-08 RX ORDER — LIRAGLUTIDE 6 MG/ML
INJECTION, SOLUTION SUBCUTANEOUS
Qty: 15 ML | Refills: 1 | Status: SHIPPED | OUTPATIENT
Start: 2024-08-08

## 2024-08-08 RX ORDER — SODIUM CHLORIDE 9 MG/ML
20 INJECTION, SOLUTION INTRAVENOUS ONCE
OUTPATIENT
Start: 2024-08-29

## 2024-08-08 RX ADMIN — SODIUM CHLORIDE 20 ML/HR: 0.9 INJECTION, SOLUTION INTRAVENOUS at 08:34

## 2024-08-08 RX ADMIN — Medication 40 G: at 09:51

## 2024-08-08 RX ADMIN — ACETAMINOPHEN 650 MG: 325 TABLET ORAL at 08:18

## 2024-08-08 RX ADMIN — DIPHENHYDRAMINE HYDROCHLORIDE 25 MG: 50 INJECTION, SOLUTION INTRAMUSCULAR; INTRAVENOUS at 08:57

## 2024-08-08 RX ADMIN — SODIUM CHLORIDE 500 ML: 0.9 INJECTION, SOLUTION INTRAVENOUS at 08:18

## 2024-08-08 RX ADMIN — DEXAMETHASONE SODIUM PHOSPHATE 10 MG: 10 INJECTION, SOLUTION INTRAMUSCULAR; INTRAVENOUS at 08:34

## 2024-08-08 RX ADMIN — FAMOTIDINE 20 MG: 10 INJECTION INTRAVENOUS at 09:21

## 2024-08-08 NOTE — PROGRESS NOTES
Ester Velazquez  tolerated IVIG well with no complications.      Ester Velazquez is aware of future appt on 8-29-24 at 800    AVS declined by Ester Velazquez

## 2024-08-29 ENCOUNTER — HOSPITAL ENCOUNTER (OUTPATIENT)
Dept: INFUSION CENTER | Facility: HOSPITAL | Age: 54
End: 2024-08-29
Attending: INTERNAL MEDICINE
Payer: COMMERCIAL

## 2024-08-29 VITALS
OXYGEN SATURATION: 98 % | SYSTOLIC BLOOD PRESSURE: 115 MMHG | RESPIRATION RATE: 16 BRPM | DIASTOLIC BLOOD PRESSURE: 77 MMHG | TEMPERATURE: 97.6 F | HEART RATE: 88 BPM

## 2024-08-29 DIAGNOSIS — D80.1 HYPOGAMMAGLOBULINEMIA (HCC): Primary | ICD-10-CM

## 2024-08-29 PROCEDURE — 96366 THER/PROPH/DIAG IV INF ADDON: CPT

## 2024-08-29 PROCEDURE — 96367 TX/PROPH/DG ADDL SEQ IV INF: CPT

## 2024-08-29 PROCEDURE — 96365 THER/PROPH/DIAG IV INF INIT: CPT

## 2024-08-29 PROCEDURE — 96375 TX/PRO/DX INJ NEW DRUG ADDON: CPT

## 2024-08-29 RX ORDER — ACETAMINOPHEN 325 MG/1
650 TABLET ORAL ONCE
Status: COMPLETED | OUTPATIENT
Start: 2024-08-29 | End: 2024-08-29

## 2024-08-29 RX ORDER — ACETAMINOPHEN 325 MG/1
650 TABLET ORAL ONCE
OUTPATIENT
Start: 2024-09-19

## 2024-08-29 RX ORDER — SODIUM CHLORIDE 9 MG/ML
20 INJECTION, SOLUTION INTRAVENOUS ONCE
Status: COMPLETED | OUTPATIENT
Start: 2024-08-29 | End: 2024-08-29

## 2024-08-29 RX ORDER — SODIUM CHLORIDE 9 MG/ML
20 INJECTION, SOLUTION INTRAVENOUS ONCE
OUTPATIENT
Start: 2024-09-19

## 2024-08-29 RX ADMIN — SODIUM CHLORIDE 20 ML/HR: 0.9 INJECTION, SOLUTION INTRAVENOUS at 08:13

## 2024-08-29 RX ADMIN — DEXAMETHASONE SODIUM PHOSPHATE 10 MG: 10 INJECTION, SOLUTION INTRAMUSCULAR; INTRAVENOUS at 08:26

## 2024-08-29 RX ADMIN — ACETAMINOPHEN 650 MG: 325 TABLET ORAL at 08:22

## 2024-08-29 RX ADMIN — DIPHENHYDRAMINE HYDROCHLORIDE 25 MG: 50 INJECTION, SOLUTION INTRAMUSCULAR; INTRAVENOUS at 08:58

## 2024-08-29 RX ADMIN — Medication 40 G: at 09:55

## 2024-08-29 RX ADMIN — FAMOTIDINE 20 MG: 10 INJECTION INTRAVENOUS at 09:20

## 2024-08-29 RX ADMIN — SODIUM CHLORIDE 500 ML: 0.9 INJECTION, SOLUTION INTRAVENOUS at 08:17

## 2024-08-29 NOTE — PROGRESS NOTES
Patient denies any recent infection or being on antibiotics.  Patient tolerated IVIG without reaction or issues.        Ester Velazquez is aware of future appt on 9/19/24 at 0930.     AVS -  No (Declined by Ester Velazquez) Patient has MYCHART    Patient ambulated off unit without incident.  All personal belongings taken with patient.

## 2024-09-17 ENCOUNTER — APPOINTMENT (OUTPATIENT)
Dept: LAB | Facility: CLINIC | Age: 54
End: 2024-09-17
Payer: COMMERCIAL

## 2024-09-17 DIAGNOSIS — D80.1 HYPOGAMMAGLOBULINEMIA (HCC): ICD-10-CM

## 2024-09-17 DIAGNOSIS — D50.8 IRON DEFICIENCY ANEMIA SECONDARY TO INADEQUATE DIETARY IRON INTAKE: ICD-10-CM

## 2024-09-17 DIAGNOSIS — E53.8 B12 DEFICIENCY: ICD-10-CM

## 2024-09-17 LAB
ALBUMIN SERPL BCG-MCNC: 3.7 G/DL (ref 3.5–5)
ALP SERPL-CCNC: 72 U/L (ref 34–104)
ALT SERPL W P-5'-P-CCNC: 12 U/L (ref 7–52)
ANION GAP SERPL CALCULATED.3IONS-SCNC: 8 MMOL/L (ref 4–13)
AST SERPL W P-5'-P-CCNC: 17 U/L (ref 13–39)
BASOPHILS # BLD AUTO: 0.05 THOUSANDS/ΜL (ref 0–0.1)
BASOPHILS NFR BLD AUTO: 1 % (ref 0–1)
BILIRUB SERPL-MCNC: 0.46 MG/DL (ref 0.2–1)
BUN SERPL-MCNC: 8 MG/DL (ref 5–25)
CALCIUM SERPL-MCNC: 9 MG/DL (ref 8.4–10.2)
CHLORIDE SERPL-SCNC: 106 MMOL/L (ref 96–108)
CO2 SERPL-SCNC: 27 MMOL/L (ref 21–32)
CREAT SERPL-MCNC: 0.6 MG/DL (ref 0.6–1.3)
CRP SERPL QL: 3.5 MG/L
EOSINOPHIL # BLD AUTO: 0.09 THOUSAND/ΜL (ref 0–0.61)
EOSINOPHIL NFR BLD AUTO: 1 % (ref 0–6)
ERYTHROCYTE [DISTWIDTH] IN BLOOD BY AUTOMATED COUNT: 12.8 % (ref 11.6–15.1)
ERYTHROCYTE [SEDIMENTATION RATE] IN BLOOD: 22 MM/HOUR (ref 0–29)
FERRITIN SERPL-MCNC: 110 NG/ML (ref 11–307)
GFR SERPL CREATININE-BSD FRML MDRD: 104 ML/MIN/1.73SQ M
GLUCOSE P FAST SERPL-MCNC: 77 MG/DL (ref 65–99)
HCT VFR BLD AUTO: 40.9 % (ref 34.8–46.1)
HGB BLD-MCNC: 13.2 G/DL (ref 11.5–15.4)
IGA SERPL-MCNC: 112 MG/DL (ref 66–433)
IGG SERPL-MCNC: 769 MG/DL (ref 635–1741)
IGM SERPL-MCNC: 22 MG/DL (ref 45–281)
IMM GRANULOCYTES # BLD AUTO: 0.02 THOUSAND/UL (ref 0–0.2)
IMM GRANULOCYTES NFR BLD AUTO: 0 % (ref 0–2)
IRON SATN MFR SERPL: 22 % (ref 15–50)
IRON SERPL-MCNC: 96 UG/DL (ref 50–212)
LYMPHOCYTES # BLD AUTO: 1.59 THOUSANDS/ΜL (ref 0.6–4.47)
LYMPHOCYTES NFR BLD AUTO: 23 % (ref 14–44)
MCH RBC QN AUTO: 30.4 PG (ref 26.8–34.3)
MCHC RBC AUTO-ENTMCNC: 32.3 G/DL (ref 31.4–37.4)
MCV RBC AUTO: 94 FL (ref 82–98)
MONOCYTES # BLD AUTO: 0.52 THOUSAND/ΜL (ref 0.17–1.22)
MONOCYTES NFR BLD AUTO: 7 % (ref 4–12)
NEUTROPHILS # BLD AUTO: 4.72 THOUSANDS/ΜL (ref 1.85–7.62)
NEUTS SEG NFR BLD AUTO: 68 % (ref 43–75)
NRBC BLD AUTO-RTO: 0 /100 WBCS
PLATELET # BLD AUTO: 336 THOUSANDS/UL (ref 149–390)
PMV BLD AUTO: 9.6 FL (ref 8.9–12.7)
POTASSIUM SERPL-SCNC: 3.9 MMOL/L (ref 3.5–5.3)
PROT SERPL-MCNC: 6.4 G/DL (ref 6.4–8.4)
RBC # BLD AUTO: 4.34 MILLION/UL (ref 3.81–5.12)
SODIUM SERPL-SCNC: 141 MMOL/L (ref 135–147)
TIBC SERPL-MCNC: 430 UG/DL (ref 250–450)
UIBC SERPL-MCNC: 334 UG/DL (ref 155–355)
VIT B12 SERPL-MCNC: 367 PG/ML (ref 180–914)
WBC # BLD AUTO: 6.99 THOUSAND/UL (ref 4.31–10.16)

## 2024-09-17 PROCEDURE — 85652 RBC SED RATE AUTOMATED: CPT

## 2024-09-17 PROCEDURE — 82607 VITAMIN B-12: CPT

## 2024-09-17 PROCEDURE — 80053 COMPREHEN METABOLIC PANEL: CPT

## 2024-09-17 PROCEDURE — 83540 ASSAY OF IRON: CPT

## 2024-09-17 PROCEDURE — 82784 ASSAY IGA/IGD/IGG/IGM EACH: CPT

## 2024-09-17 PROCEDURE — 83550 IRON BINDING TEST: CPT

## 2024-09-17 PROCEDURE — 36415 COLL VENOUS BLD VENIPUNCTURE: CPT

## 2024-09-17 PROCEDURE — 85025 COMPLETE CBC W/AUTO DIFF WBC: CPT

## 2024-09-17 PROCEDURE — 82728 ASSAY OF FERRITIN: CPT

## 2024-09-17 PROCEDURE — 86140 C-REACTIVE PROTEIN: CPT

## 2024-09-19 ENCOUNTER — HOSPITAL ENCOUNTER (OUTPATIENT)
Dept: INFUSION CENTER | Facility: HOSPITAL | Age: 54
End: 2024-09-19
Attending: INTERNAL MEDICINE
Payer: COMMERCIAL

## 2024-09-19 VITALS
HEART RATE: 98 BPM | RESPIRATION RATE: 17 BRPM | OXYGEN SATURATION: 98 % | DIASTOLIC BLOOD PRESSURE: 73 MMHG | TEMPERATURE: 97.7 F | SYSTOLIC BLOOD PRESSURE: 125 MMHG

## 2024-09-19 DIAGNOSIS — D80.1 HYPOGAMMAGLOBULINEMIA (HCC): Primary | ICD-10-CM

## 2024-09-19 PROCEDURE — 96367 TX/PROPH/DG ADDL SEQ IV INF: CPT

## 2024-09-19 PROCEDURE — 96365 THER/PROPH/DIAG IV INF INIT: CPT

## 2024-09-19 PROCEDURE — 96366 THER/PROPH/DIAG IV INF ADDON: CPT

## 2024-09-19 RX ORDER — SODIUM CHLORIDE 9 MG/ML
20 INJECTION, SOLUTION INTRAVENOUS ONCE
Status: COMPLETED | OUTPATIENT
Start: 2024-09-19 | End: 2024-09-19

## 2024-09-19 RX ORDER — ACETAMINOPHEN 325 MG/1
650 TABLET ORAL ONCE
Status: COMPLETED | OUTPATIENT
Start: 2024-09-19 | End: 2024-09-19

## 2024-09-19 RX ORDER — ACETAMINOPHEN 325 MG/1
650 TABLET ORAL ONCE
OUTPATIENT
Start: 2024-10-10

## 2024-09-19 RX ORDER — SODIUM CHLORIDE 9 MG/ML
20 INJECTION, SOLUTION INTRAVENOUS ONCE
OUTPATIENT
Start: 2024-10-10

## 2024-09-19 RX ADMIN — DEXAMETHASONE SODIUM PHOSPHATE 10 MG: 10 INJECTION, SOLUTION INTRAMUSCULAR; INTRAVENOUS at 09:40

## 2024-09-19 RX ADMIN — FAMOTIDINE 20 MG: 10 INJECTION INTRAVENOUS at 10:28

## 2024-09-19 RX ADMIN — SODIUM CHLORIDE 20 ML/HR: 0.9 INJECTION, SOLUTION INTRAVENOUS at 09:38

## 2024-09-19 RX ADMIN — Medication 40 G: at 11:00

## 2024-09-19 RX ADMIN — DIPHENHYDRAMINE HYDROCHLORIDE 25 MG: 50 INJECTION, SOLUTION INTRAMUSCULAR; INTRAVENOUS at 10:09

## 2024-09-19 RX ADMIN — SODIUM CHLORIDE 500 ML: 0.9 INJECTION, SOLUTION INTRAVENOUS at 09:38

## 2024-09-19 RX ADMIN — ACETAMINOPHEN 650 MG: 325 TABLET ORAL at 09:40

## 2024-09-19 NOTE — PROGRESS NOTES
Ester Velazquez  tolerated IVIG well with no complications.      Ester Velazquez is aware of future appt on 10-10-24 at 930    AVS declined.

## 2024-09-25 ENCOUNTER — OFFICE VISIT (OUTPATIENT)
Dept: HEMATOLOGY ONCOLOGY | Facility: CLINIC | Age: 54
End: 2024-09-25
Payer: COMMERCIAL

## 2024-09-25 ENCOUNTER — APPOINTMENT (OUTPATIENT)
Dept: LAB | Facility: CLINIC | Age: 54
End: 2024-09-25
Payer: COMMERCIAL

## 2024-09-25 ENCOUNTER — TELEPHONE (OUTPATIENT)
Dept: HEMATOLOGY ONCOLOGY | Facility: CLINIC | Age: 54
End: 2024-09-25

## 2024-09-25 VITALS
RESPIRATION RATE: 18 BRPM | OXYGEN SATURATION: 99 % | BODY MASS INDEX: 27.23 KG/M2 | WEIGHT: 148 LBS | HEIGHT: 62 IN | TEMPERATURE: 98.1 F | DIASTOLIC BLOOD PRESSURE: 80 MMHG | HEART RATE: 82 BPM | SYSTOLIC BLOOD PRESSURE: 124 MMHG

## 2024-09-25 DIAGNOSIS — Z98.84 H/O GASTRIC BYPASS: ICD-10-CM

## 2024-09-25 DIAGNOSIS — D50.8 IRON DEFICIENCY ANEMIA SECONDARY TO INADEQUATE DIETARY IRON INTAKE: Primary | ICD-10-CM

## 2024-09-25 DIAGNOSIS — D80.1 HYPOGAMMAGLOBULINEMIA (HCC): ICD-10-CM

## 2024-09-25 DIAGNOSIS — K52.9 NONINFECTIOUS GASTROENTERITIS, UNSPECIFIED TYPE: ICD-10-CM

## 2024-09-25 DIAGNOSIS — R19.7 DIARRHEA, UNSPECIFIED TYPE: ICD-10-CM

## 2024-09-25 DIAGNOSIS — E53.8 B12 DEFICIENCY: ICD-10-CM

## 2024-09-25 DIAGNOSIS — R10.84 GENERALIZED ABDOMINAL PAIN: ICD-10-CM

## 2024-09-25 LAB — IGA SERPL-MCNC: 117 MG/DL (ref 66–433)

## 2024-09-25 PROCEDURE — 82784 ASSAY IGA/IGD/IGG/IGM EACH: CPT

## 2024-09-25 PROCEDURE — 99214 OFFICE O/P EST MOD 30 MIN: CPT | Performed by: INTERNAL MEDICINE

## 2024-09-25 PROCEDURE — 86258 DGP ANTIBODY EACH IG CLASS: CPT

## 2024-09-25 PROCEDURE — 86364 TISS TRNSGLTMNASE EA IG CLAS: CPT

## 2024-09-25 PROCEDURE — 36415 COLL VENOUS BLD VENIPUNCTURE: CPT

## 2024-09-25 PROCEDURE — 86671 FUNGUS NES ANTIBODY: CPT

## 2024-09-25 NOTE — PROGRESS NOTES
Hematology/Oncology Outpatient Follow-up  Ester Velazquez 53 y.o. female 1970 [id number]    Date:  9/25/2024        Assessment and Plan:  1. Iron deficiency anemia secondary to inadequate dietary iron intake  She does not seem to be anemic or iron deficient according to the most recent blood work.  Due to the gastric bypass surgery, she most likely will need iron IV at 1 point in the future.  - CBC and differential; Future  - Comprehensive metabolic panel; Future  - Iron Panel (Includes Ferritin, Iron Sat%, Iron, and TIBC); Future  - Ferritin; Future  - C-reactive protein; Future  - Magnesium; Future  - LD,Blood; Future  - Sedimentation rate, automated; Future    2. B12 deficiency  She continues to have borderline low normal vitamin B12 level even though she is on vitamin B12 injections on every other week basis.  She was asked to consider oral sublingual vitamin B12 supplements on a daily basis.    - CBC and differential; Future  - Comprehensive metabolic panel; Future  - C-reactive protein; Future  - Magnesium; Future  - LD,Blood; Future  - Vitamin B12; Future  - Sedimentation rate, automated; Future    3. H/O gastric bypass  As above.    4. Hypogammaglobulinemia (HCC)  The patient will be continued on the IVIG on every 3-week basis which is preventing upper airway infections.   - IgG, IgA, IgM; Future        HPI:    This is a 53-year-old female with multiple comorbid conditions including history of morbid obesity status post post gastric bypass surgery in 2009, anemia due to iron deficiency, cholecystectomy, hysterectomy, hypertension, gastroesophageal reflux disease, depression, asthma, chronic sinusitis, etc.     The patient was apparently found to have hypogammaglobinemia on multiple occasions with frequent/recurrent infections mainly in the upper respiratory airway with chronic sinusitis.  The patient was evaluated by the Hematology service from [facility] and was started on IVIG around December of 2019  on a monthly basis.  She received then 3-4 sessions of IVIG which improved her chronic sinusitis and other infectious process, according to the patient, significantly.  However, due to the COVID-19 pandemic she stopped doing the IVIG and decided to transfer her care to our service since she lives close by. Her immunoglobulin levels from 07/17/2019 before she had any IVIG treatment showed IgG of 554, IgA of 116 and IgM of 39 mg/dL.  She was started on vitamin B12 injections every 2 weeks.       Interval history:  The patient came today for a follow-up visit.  She did complain today about significant abdominal pain and diarrhea for couple of weeks.  She is under the GI team who is pursuing extensive evaluation.  She did have a CT scan of the abdomen pelvis on 9/19/2024 which showed left abdominal enteritis and right sided 2 mm nonobstructing calculus of the kidney.  Recent blood work on 9/17/2024 showed hemoglobin of 13.2 with normal white cells and platelets.  CMP was entirely normal.  C-reactive protein 3.5 with sed rate of 22.  Vitamin B12 was 367.  Immunoglobulins showed low IgM level.  Iron panel showed saturation of 22% with ferritin of 110.  She denied bear bleeding from any sites.  She continues to complain about easy bruising.  She had extensive workup around the end of March of this year which was negative for any obvious hint of von Willebrand disorder.  Fibrinogen, PT and PTT were within normal range.    ROS: Review of Systems   Constitutional:  Negative for chills and fever.   HENT:  Negative for ear pain and sore throat.    Eyes:  Negative for pain and visual disturbance.   Respiratory:  Positive for shortness of breath. Negative for cough.    Cardiovascular:  Negative for chest pain and palpitations.   Gastrointestinal:  Positive for abdominal pain, constipation, diarrhea and nausea. Negative for vomiting.   Genitourinary:  Negative for dysuria and hematuria.   Musculoskeletal:  Negative for  arthralgias and back pain.   Skin:  Negative for color change and rash.   Neurological:  Positive for dizziness, numbness and headaches. Negative for seizures and syncope.   Psychiatric/Behavioral:  Positive for sleep disturbance.    All other systems reviewed and are negative.      Past Medical History:   Diagnosis Date    Anemia     recurrent    Anxiety     Asthma     allergy induced    Chest pain     Contact lens overwear of both eyes     Depression     Diarrhea     Environmental allergies     Fibromyalgia     Fibromyalgia, primary     GERD (gastroesophageal reflux disease)     History of transfusion         Hypertension     Hypogammaglobulinemia (HCC)     Hypoglycemia after GI (gastrointestinal) surgery     Immune deficiency disorder (HCC)     IgG deficiency    Iron (Fe) deficiency anemia     Kidney stone     Lumbar herniated disc     Migraine     Motion sickness     Palpitations     PONV (postoperative nausea and vomiting)     severe    Seasonal allergies     Wears glasses        Past Surgical History:   Procedure Laterality Date    ABDOMINAL SURGERY      adhesions    ABDOMINOPLASTY      APPENDECTOMY      AUGMENTATION MAMMAPLASTY  2003    BREAST IMPLANT REMOVAL Bilateral 2019    BREAST SURGERY      implant removal     SECTION      CHOLECYSTECTOMY      COSMETIC SURGERY Bilateral     breast augmentation 2004    GASTRIC BYPASS      2009    GASTRIC BYPASS LAPAROSCOPIC N/A 2020    Procedure: ROBOTIC REVISION OF KENYA-EN-Y GASTRIC BYPASS, INTRAOP EGD;  Surgeon: Adeline Adler MD;  [facility];  Service: Bariatrics    HERNIA REPAIR      HIATAL HERNIA REPAIR      HYSTERECTOMY  2015    KNEE ARTHROSCOPY Left     LYMPH NODE BIOPSY  200-    benign    NERVE BLOCK      NM ARTHRODESIS MIDTARSOMETATARSAL SINGLE JOINT Left 2016    Procedure: FRIST TMJ FUSION ;  Surgeon: Tadeo Coelho DPM;  [facility];  Service: Podiatry    NM CORRJ HLX VLGS BNCTY SESMDC RESCJ PROX PHLX  BASE Left 09/09/2016    Procedure: SURGICAL MODIFIED WATKINS BUNIONECTOMY FIRST MTPJ;  Surgeon: Tadeo Coelho DPM;  [facility];  Service: Podiatry    VA OSTEOT W/WO LNGTH SHRT/CORRJ METAR XCP 1ST EA Left 09/09/2016    Procedure: FOOT SHORTENING OSTEOTOMIES 2ND AND 3RD METATARSAL WITH EXTENSER TENDON RELEASE 3RD AND 4TH TOE;  Surgeon: Tadeo Coelho DPM;  [facility];  Service: Podiatry    VA REMOVAL IMPLANT DEEP Left 12/07/2016    Procedure: REMOVAL SYMPTOMATIC  HARDWARE FIRST RAY,RELATED CONTRACTURE SECOND AND THIRD TOES WITH SUSPENSION FOURTH TOE;  Surgeon: Tadeo Coelho DPM;  [facility];  Service: Podiatry    SALPINGOOPHORECTOMY Left 2016    with removal of cervix    TONSILLECTOMY      WISDOM TOOTH EXTRACTION         Social History     Socioeconomic History    Marital status: /Civil Union     Spouse name: None    Number of children: None    Years of education: None    Highest education level: None   Occupational History    None   Tobacco Use    Smoking status: Never    Smokeless tobacco: Never   Vaping Use    Vaping status: Never Used   Substance and Sexual Activity    Alcohol use: Not Currently     Comment: rarely    Drug use: No    Sexual activity: Yes     Partners: Male     Birth control/protection: Surgical   Other Topics Concern    None   Social History Narrative    Consumes on average 3 cups of coffee per day     Social Determinants of Health     Financial Resource Strain: Not on file   Food Insecurity: Not on file   Transportation Needs: Not on file   Physical Activity: Not on file   Stress: Not on file   Social Connections: Unknown (6/18/2024)    Received from [facility]    Social Connections     How often do you feel lonely or isolated from those around you? (Adult - for ages 18 years and over): Not on file   Intimate Partner Violence: Not on file   Housing Stability: Not on file       Family History   Problem Relation Age of Onset    Lymphoma Mother   "   COPD Mother     Arthritis Mother     Asthma Mother     Cancer Mother     Hyperlipidemia Mother     Rheum arthritis Mother     Heart disease Father     Early death Father     Breast cancer Maternal Aunt     Breast cancer Cousin     Lupus Cousin     No Known Problems Daughter     Stroke Maternal Grandmother     Mental illness Maternal Grandfather     No Known Problems Paternal Grandmother     No Known Problems Paternal Grandfather     Pancreatic cancer Maternal Aunt     No Known Problems Maternal Aunt     No Known Problems Maternal Aunt     Multiple sclerosis Paternal Aunt     Breast cancer Cousin        Allergies   Allergen Reactions    Codeine GI Intolerance and Chest Pain     ABDOMINAL PAIN    Hydrocodone Abdominal Pain, Swelling, Itching and Rash    Pregabalin Dizziness    Trazodone Dizziness    Acetaminophen-Codeine Abdominal Pain    Amoxicillin Rash    Gabapentin Dizziness     Dizziness/mental fog (states the same as lyrcia)          Current Outpatient Medications:     acetaminophen (TYLENOL) 325 mg tablet, Take 3 tablets (975 mg total) by mouth every 8 (eight) hours, Disp: 30 tablet, Rfl: 0    acyclovir (ZOVIRAX) 200 mg capsule, , Disp: , Rfl:     Aimovig 140 MG/ML SOAJ, Use as directed, Disp: , Rfl:     albuterol (PROVENTIL HFA,VENTOLIN HFA) 90 mcg/act inhaler, Inhale 1 puff every 6 (six) hours as needed As needed, Disp: , Rfl:     ALPRAZolam (XANAX) 0.5 mg tablet, , Disp: , Rfl:     amLODIPine (NORVASC) 5 mg tablet, Take 1 tablet (5 mg total) by mouth daily, Disp: 30 tablet, Rfl: 30    Azelastine-Fluticasone (DYMISTA NA), 2 sprays into each nostril 2 (two) times a day , Disp: , Rfl:     B-D 3CC LUER-NAKUL SYR 25GX1\" 25G X 1\" 3 ML MISC, , Disp: , Rfl:     B-D 3CC LUER-NAKUL SYR 25GX5/8\" 25G X 5/8\" 3 ML MISC, Inject into a muscle every 14 (fourteen) days, Disp: 2 each, Rfl: 11    budesonide-formoterol (SYMBICORT) 160-4.5 mcg/act inhaler, Inhale 2 puffs 2 (two) times a day , Disp: , Rfl:     buPROPion " (WELLBUTRIN) 100 mg tablet, Take 100 mg by mouth 2 (two) times a day , Disp: , Rfl:     cholecalciferol (VITAMIN D3) 66516 units capsule, Take 5,000 Units by mouth 2 (two) times a day , Disp: , Rfl:     cyanocobalamin 1,000 mcg/mL, Inject 1 mL (1,000 mcg total) into a muscle every 14 (fourteen) days, Disp: 2 mL, Rfl: 11    cyclobenzaprine (FLEXERIL) 10 mg tablet, Take 10 mg by mouth 3 (three) times a day as needed  , Disp: , Rfl:     estradiol (ESTRACE) 2 MG tablet, Take 2 mg by mouth daily Take 1/2 tab po BID, Disp: , Rfl:     fexofenadine (ALLEGRA) 180 MG tablet, Take 180 mg by mouth daily, Disp: , Rfl:     hyoscyamine (LEVSIN/SL) 0.125 mg SL tablet, Take 0.125 mg by mouth every 4 (four) hours as needed for cramping, Disp: , Rfl:     Immune Globulin, Human, (PRIVIGEN IV), Inject into a catheter in a vein every 30 (thirty) days, Disp: , Rfl:     Insulin Pen Needle (Sure Comfort Pen Needles) 32G X 4 MM MISC, Use daily as directed, Disp: 100 each, Rfl: 1    liraglutide (Saxenda) injection, Inject 3 mg subcutaneously once daily, Disp: 15 mL, Rfl: 1    losartan (COZAAR) 50 mg tablet, Take 50 mg by mouth 2 (two) times a day., Disp: , Rfl:     magnesium gluconate (MAGONATE) 500 MG tablet, Take 500 mg by mouth daily, Disp: , Rfl:     meclizine (ANTIVERT) 25 mg tablet, Take 1 tablet (25 mg total) by mouth 3 (three) times a day as needed for dizziness for up to 12 doses, Disp: 12 tablet, Rfl: 0    minocycline (MINOCIN) 50 mg capsule, minocycline 50 mg capsule  TAKE 1 CAPSULE BY MOUTH ONCE DAILY IN THE EVENING WITH WATER AT LEAST 2 HOURS AFTER DINNER, Disp: , Rfl:     montelukast (SINGULAIR) 5 mg chewable tablet, Chew 1 tablet (5 mg total) 2 (two) times a day, Disp: 90 tablet, Rfl: 3    ondansetron (ZOFRAN-ODT) 8 mg disintegrating tablet, DISSOLVE 1 TABLET IN MOUTH EVERY 8 HOURS AS NEEDED FOR NAUSEA OR VOMITING, Disp: 20 tablet, Rfl: 0    sucralfate (CARAFATE) 1 g tablet, Take 1 tablet (1 g total) by mouth 4 (four) times a  "day Please melt tablet in 15 ml of water to take as a suspension., Disp: 360 tablet, Rfl: 1    SUMAtriptan (IMITREX) 100 mg tablet, Take 100 mg by mouth 2 (two) times a day as needed for migraine, Disp: , Rfl:     traMADol (ULTRAM) 50 mg tablet, Take 50 mg by mouth 2 (two) times a day., Disp: , Rfl:     Varenicline Tartrate (Tyrvaya) 0.03 MG/ACT SOLN, into each nostril, Disp: , Rfl:     Xiidra 5 % op solution, , Disp: , Rfl:     benzonatate (TESSALON) 200 MG capsule, Take 1 capsule (200 mg total) by mouth 3 (three) times a day as needed for cough (Patient not taking: Reported on 2/22/2024), Disp: 90 capsule, Rfl: 0    Botulinum Toxin Type A 200 units SOLR, Inject 155 units into face and neck IM every 90 days (Patient not taking: Reported on 2/16/2024), Disp: , Rfl:     Erenumab-aooe 70 MG/ML SOAJ, Inject 140 mg/mL under the skin every 28 days (Patient not taking: Reported on 9/25/2024), Disp: , Rfl:     levofloxacin (LEVAQUIN) 500 mg tablet, TAKE 1 TABLET BY MOUTH ONCE DAILY FOR 10 DAYS (Patient not taking: Reported on 9/25/2024), Disp: , Rfl:     levothyroxine 50 mcg tablet, , Disp: , Rfl:     Meribin 5 MG CAPS, Take 1 capsule by mouth daily (Patient not taking: Reported on 2/16/2024), Disp: , Rfl:     omeprazole (PriLOSEC) 40 MG capsule, Take 1 capsule (40 mg total) by mouth daily (Patient not taking: Reported on 9/25/2024), Disp: 90 capsule, Rfl: 1    Oxervate 0.002 % SOLN, , Disp: , Rfl:       Physical Exam:  /80 (BP Location: Left arm, Patient Position: Sitting, Cuff Size: Adult)   Pulse 82   Temp 98.1 °F (36.7 °C)   Resp 18   Ht 5' 2\" (1.575 m)   Wt 67.1 kg (148 lb)   SpO2 99%   BMI 27.07 kg/m²     Physical Exam  Constitutional:       General: She is not in acute distress.     Appearance: She is well-developed. She is not diaphoretic.   HENT:      Head: Normocephalic and atraumatic.      Nose: Nose normal.   Eyes:      General: No scleral icterus.        Right eye: No discharge.         Left eye: " No discharge.      Conjunctiva/sclera: Conjunctivae normal.      Pupils: Pupils are equal, round, and reactive to light.   Neck:      Thyroid: No thyromegaly.      Vascular: No JVD.      Trachea: No tracheal deviation.   Cardiovascular:      Rate and Rhythm: Normal rate and regular rhythm.      Heart sounds: Normal heart sounds. No murmur heard.     No friction rub.   Pulmonary:      Effort: Pulmonary effort is normal. No respiratory distress.      Breath sounds: Normal breath sounds. No stridor. No wheezing or rales.   Chest:      Chest wall: No tenderness.   Abdominal:      General: There is no distension.      Palpations: Abdomen is soft. There is no hepatomegaly or splenomegaly.      Tenderness: There is abdominal tenderness (To mild to palpation all over the abdomen.). There is no guarding or rebound.   Musculoskeletal:         General: No tenderness or deformity. Normal range of motion.      Cervical back: Normal range of motion and neck supple.   Lymphadenopathy:      Cervical: No cervical adenopathy.   Skin:     General: Skin is warm and dry.      Coloration: Skin is not pale.      Findings: No erythema or rash.   Neurological:      Mental Status: She is alert and oriented to person, place, and time.      Cranial Nerves: No cranial nerve deficit.      Coordination: Coordination normal.      Deep Tendon Reflexes: Reflexes are normal and symmetric.   Psychiatric:         Behavior: Behavior normal.         Thought Content: Thought content normal.         Judgment: Judgment normal.           Labs:  Lab Results   Component Value Date    WBC 6.99 09/17/2024    HGB 13.2 09/17/2024    HCT 40.9 09/17/2024    MCV 94 09/17/2024     09/17/2024     Lab Results   Component Value Date    K 3.9 09/17/2024     09/17/2024    CO2 27 09/17/2024    BUN 8 09/17/2024    CREATININE 0.60 09/17/2024    GLUF 77 09/17/2024    CALCIUM 9.0 09/17/2024    CORRECTEDCA 9.5 06/06/2023    AST 17 09/17/2024    ALT 12 09/17/2024     ALKPHOS 72 09/17/2024    EGFR 104 09/17/2024     Lab Results   Component Value Date    TSH 3.40 08/05/2024       Patient voiced understanding and agreement in the above discussion. Aware to contact our office with questions/symptoms in the interim.

## 2024-09-26 ENCOUNTER — APPOINTMENT (OUTPATIENT)
Dept: LAB | Facility: CLINIC | Age: 54
End: 2024-09-26
Payer: COMMERCIAL

## 2024-09-26 DIAGNOSIS — K52.9 NONINFECTIOUS GASTROENTERITIS, UNSPECIFIED TYPE: ICD-10-CM

## 2024-09-26 DIAGNOSIS — R10.84 GENERALIZED ABDOMINAL PAIN: ICD-10-CM

## 2024-09-26 DIAGNOSIS — R19.7 DIARRHEA, UNSPECIFIED TYPE: ICD-10-CM

## 2024-09-26 LAB
GLIADIN PEPTIDE IGA SER-ACNC: 1 U/ML
GLIADIN PEPTIDE IGA SER-ACNC: NEGATIVE
GLIADIN PEPTIDE IGG SER-ACNC: 1.5 U/ML
GLIADIN PEPTIDE IGG SER-ACNC: NEGATIVE
TTG IGA SER-ACNC: <0.5 U/ML
TTG IGA SER-ACNC: NEGATIVE
TTG IGG SER-ACNC: <0.8 U/ML
TTG IGG SER-ACNC: NEGATIVE

## 2024-09-26 PROCEDURE — 87177 OVA AND PARASITES SMEARS: CPT

## 2024-09-26 PROCEDURE — 87209 SMEAR COMPLEX STAIN: CPT

## 2024-09-26 PROCEDURE — 83993 ASSAY FOR CALPROTECTIN FECAL: CPT

## 2024-09-26 PROCEDURE — 87505 NFCT AGENT DETECTION GI: CPT

## 2024-09-27 LAB
C COLI+JEJUNI TUF STL QL NAA+PROBE: NEGATIVE
C DIFF TOX GENS STL QL NAA+PROBE: NEGATIVE
CALPROTECTIN STL-MCNC: 47.1 ΜG/G
EC STX1+STX2 GENES STL QL NAA+PROBE: NEGATIVE
SALMONELLA SP SPAO STL QL NAA+PROBE: NEGATIVE
SHIGELLA SP+EIEC IPAH STL QL NAA+PROBE: NEGATIVE

## 2024-10-01 LAB — MISCELLANEOUS LAB TEST RESULT: NORMAL

## 2024-10-10 ENCOUNTER — HOSPITAL ENCOUNTER (OUTPATIENT)
Dept: INFUSION CENTER | Facility: HOSPITAL | Age: 54
End: 2024-10-10
Attending: INTERNAL MEDICINE
Payer: COMMERCIAL

## 2024-10-10 VITALS — TEMPERATURE: 96.8 F | SYSTOLIC BLOOD PRESSURE: 130 MMHG | RESPIRATION RATE: 18 BRPM | DIASTOLIC BLOOD PRESSURE: 82 MMHG

## 2024-10-10 DIAGNOSIS — D80.1 HYPOGAMMAGLOBULINEMIA (HCC): Primary | ICD-10-CM

## 2024-10-10 PROCEDURE — 96366 THER/PROPH/DIAG IV INF ADDON: CPT

## 2024-10-10 PROCEDURE — 96365 THER/PROPH/DIAG IV INF INIT: CPT

## 2024-10-10 PROCEDURE — 96367 TX/PROPH/DG ADDL SEQ IV INF: CPT

## 2024-10-10 RX ORDER — ACETAMINOPHEN 325 MG/1
650 TABLET ORAL ONCE
OUTPATIENT
Start: 2024-10-31

## 2024-10-10 RX ORDER — SODIUM CHLORIDE 9 MG/ML
20 INJECTION, SOLUTION INTRAVENOUS ONCE
OUTPATIENT
Start: 2024-10-31

## 2024-10-10 RX ORDER — ACETAMINOPHEN 325 MG/1
650 TABLET ORAL ONCE
Status: COMPLETED | OUTPATIENT
Start: 2024-10-10 | End: 2024-10-10

## 2024-10-10 RX ORDER — SODIUM CHLORIDE 9 MG/ML
20 INJECTION, SOLUTION INTRAVENOUS ONCE
Status: COMPLETED | OUTPATIENT
Start: 2024-10-10 | End: 2024-10-10

## 2024-10-10 RX ADMIN — FAMOTIDINE 20 MG: 10 INJECTION INTRAVENOUS at 10:41

## 2024-10-10 RX ADMIN — DIPHENHYDRAMINE HYDROCHLORIDE 25 MG: 50 INJECTION, SOLUTION INTRAMUSCULAR; INTRAVENOUS at 10:14

## 2024-10-10 RX ADMIN — SODIUM CHLORIDE 20 ML/HR: 0.9 INJECTION, SOLUTION INTRAVENOUS at 09:42

## 2024-10-10 RX ADMIN — DEXAMETHASONE SODIUM PHOSPHATE 10 MG: 10 INJECTION, SOLUTION INTRAMUSCULAR; INTRAVENOUS at 09:43

## 2024-10-10 RX ADMIN — SODIUM CHLORIDE 500 ML: 0.9 INJECTION, SOLUTION INTRAVENOUS at 09:41

## 2024-10-10 RX ADMIN — ACETAMINOPHEN 650 MG: 325 TABLET ORAL at 09:43

## 2024-10-10 RX ADMIN — Medication 40 G: at 11:05

## 2024-10-10 NOTE — PROGRESS NOTES
Ester Velazquez  tolerated IVIG well with no complications.      Ester Velazquez is aware of future appt on 10-31-24 at 830    AVS declined.

## 2024-10-25 ENCOUNTER — TELEPHONE (OUTPATIENT)
Dept: BARIATRICS | Facility: CLINIC | Age: 54
End: 2024-10-25

## 2024-10-25 DIAGNOSIS — E66.811 OBESITY, CLASS I, BMI 30-34.9: ICD-10-CM

## 2024-10-29 RX ORDER — LIRAGLUTIDE 6 MG/ML
INJECTION, SOLUTION SUBCUTANEOUS
Qty: 15 ML | Refills: 2 | Status: SHIPPED | OUTPATIENT
Start: 2024-10-29

## 2024-10-31 ENCOUNTER — HOSPITAL ENCOUNTER (OUTPATIENT)
Dept: INFUSION CENTER | Facility: HOSPITAL | Age: 54
End: 2024-10-31
Attending: INTERNAL MEDICINE
Payer: COMMERCIAL

## 2024-10-31 VITALS
OXYGEN SATURATION: 100 % | RESPIRATION RATE: 16 BRPM | HEART RATE: 95 BPM | DIASTOLIC BLOOD PRESSURE: 84 MMHG | SYSTOLIC BLOOD PRESSURE: 133 MMHG | TEMPERATURE: 96.9 F

## 2024-10-31 DIAGNOSIS — D80.1 HYPOGAMMAGLOBULINEMIA (HCC): Primary | ICD-10-CM

## 2024-10-31 PROCEDURE — 96375 TX/PRO/DX INJ NEW DRUG ADDON: CPT

## 2024-10-31 PROCEDURE — 96366 THER/PROPH/DIAG IV INF ADDON: CPT

## 2024-10-31 PROCEDURE — 96365 THER/PROPH/DIAG IV INF INIT: CPT

## 2024-10-31 PROCEDURE — 96367 TX/PROPH/DG ADDL SEQ IV INF: CPT

## 2024-10-31 RX ORDER — SODIUM CHLORIDE 9 MG/ML
20 INJECTION, SOLUTION INTRAVENOUS ONCE
OUTPATIENT
Start: 2024-11-21

## 2024-10-31 RX ORDER — DICYCLOMINE HYDROCHLORIDE 10 MG/1
10 CAPSULE ORAL
COMMUNITY

## 2024-10-31 RX ORDER — ACETAMINOPHEN 325 MG/1
650 TABLET ORAL ONCE
OUTPATIENT
Start: 2024-11-21

## 2024-10-31 RX ORDER — SODIUM CHLORIDE 9 MG/ML
20 INJECTION, SOLUTION INTRAVENOUS ONCE
Status: COMPLETED | OUTPATIENT
Start: 2024-10-31 | End: 2024-10-31

## 2024-10-31 RX ORDER — ACETAMINOPHEN 325 MG/1
650 TABLET ORAL ONCE
Status: COMPLETED | OUTPATIENT
Start: 2024-10-31 | End: 2024-10-31

## 2024-10-31 RX ORDER — BUDESONIDE 3 MG/1
3 CAPSULE, COATED PELLETS ORAL DAILY
COMMUNITY

## 2024-10-31 RX ADMIN — DIPHENHYDRAMINE HYDROCHLORIDE 25 MG: 50 INJECTION, SOLUTION INTRAMUSCULAR; INTRAVENOUS at 09:19

## 2024-10-31 RX ADMIN — ACETAMINOPHEN 650 MG: 325 TABLET ORAL at 08:27

## 2024-10-31 RX ADMIN — SODIUM CHLORIDE 20 ML/HR: 0.9 INJECTION, SOLUTION INTRAVENOUS at 08:26

## 2024-10-31 RX ADMIN — FAMOTIDINE 20 MG: 10 INJECTION, SOLUTION INTRAVENOUS at 09:44

## 2024-10-31 RX ADMIN — DEXAMETHASONE SODIUM PHOSPHATE 10 MG: 10 INJECTION, SOLUTION INTRAMUSCULAR; INTRAVENOUS at 08:53

## 2024-10-31 RX ADMIN — Medication 40 G: at 10:15

## 2024-10-31 RX ADMIN — SODIUM CHLORIDE 500 ML: 0.9 INJECTION, SOLUTION INTRAVENOUS at 08:27

## 2024-10-31 NOTE — PROGRESS NOTES
Ester Velazquez  tolerated IV IG treatment well with no complications.      Ester Velazquez is aware of future appt on 11/21 at 9AM.     AVS printed and given to Ester Velazquez: No (Declined by Ester Velazquez).

## 2024-11-01 ENCOUNTER — TELEPHONE (OUTPATIENT)
Dept: BARIATRICS | Facility: CLINIC | Age: 54
End: 2024-11-01

## 2024-11-21 ENCOUNTER — HOSPITAL ENCOUNTER (OUTPATIENT)
Dept: INFUSION CENTER | Facility: HOSPITAL | Age: 54
End: 2024-11-21
Attending: INTERNAL MEDICINE
Payer: COMMERCIAL

## 2024-11-21 DIAGNOSIS — D80.1 HYPOGAMMAGLOBULINEMIA (HCC): Primary | ICD-10-CM

## 2024-11-21 PROCEDURE — 96365 THER/PROPH/DIAG IV INF INIT: CPT

## 2024-11-21 PROCEDURE — 96366 THER/PROPH/DIAG IV INF ADDON: CPT

## 2024-11-21 PROCEDURE — 96367 TX/PROPH/DG ADDL SEQ IV INF: CPT

## 2024-11-21 RX ORDER — ACETAMINOPHEN 325 MG/1
650 TABLET ORAL ONCE
OUTPATIENT
Start: 2024-12-12

## 2024-11-21 RX ORDER — SODIUM CHLORIDE 9 MG/ML
20 INJECTION, SOLUTION INTRAVENOUS ONCE
Status: COMPLETED | OUTPATIENT
Start: 2024-11-21 | End: 2024-11-21

## 2024-11-21 RX ORDER — SODIUM CHLORIDE 9 MG/ML
20 INJECTION, SOLUTION INTRAVENOUS ONCE
OUTPATIENT
Start: 2024-12-12

## 2024-11-21 RX ORDER — ACETAMINOPHEN 325 MG/1
650 TABLET ORAL ONCE
Status: COMPLETED | OUTPATIENT
Start: 2024-11-21 | End: 2024-11-21

## 2024-11-21 RX ADMIN — DIPHENHYDRAMINE HYDROCHLORIDE 25 MG: 50 INJECTION INTRAMUSCULAR; INTRAVENOUS at 09:39

## 2024-11-21 RX ADMIN — DEXAMETHASONE SODIUM PHOSPHATE 10 MG: 100 INJECTION INTRAMUSCULAR; INTRAVENOUS at 08:59

## 2024-11-21 RX ADMIN — ACETAMINOPHEN 650 MG: 325 TABLET ORAL at 08:59

## 2024-11-21 RX ADMIN — SODIUM CHLORIDE 500 ML: 0.9 INJECTION, SOLUTION INTRAVENOUS at 09:05

## 2024-11-21 RX ADMIN — Medication 40 G: at 10:44

## 2024-11-21 RX ADMIN — FAMOTIDINE 20 MG: 10 INJECTION, SOLUTION INTRAVENOUS at 10:03

## 2024-11-21 RX ADMIN — SODIUM CHLORIDE 20 ML/HR: 0.9 INJECTION, SOLUTION INTRAVENOUS at 09:00

## 2024-11-21 NOTE — PROGRESS NOTES
Ester Velazquez  tolerated her IVIG treatment well with no complications.  Pt also tolerated her premeds well and was AAOx3 at time of discharge.     Pt denied any recent infections and or antibiotic use at this time.   Ester Velazquez is aware of future appt on 12/12/24 at 0900.     AVS No (Declined by Ester Velazquez) pt states she can see her apts on [employer].

## 2024-11-25 ENCOUNTER — HOSPITAL ENCOUNTER (OUTPATIENT)
Dept: MRI IMAGING | Facility: HOSPITAL | Age: 54
Discharge: HOME | End: 2024-11-25
Attending: PHYSICIAN ASSISTANT
Payer: COMMERCIAL

## 2024-11-25 DIAGNOSIS — K52.9 NONINFECTIOUS GASTROENTERITIS, UNSPECIFIED TYPE: ICD-10-CM

## 2024-11-25 PROCEDURE — 72197 MRI PELVIS W/O & W/DYE: CPT

## 2024-11-25 PROCEDURE — 74183 MRI ABD W/O CNTR FLWD CNTR: CPT

## 2024-11-25 PROCEDURE — A9585 GADOBUTROL INJECTION: HCPCS | Performed by: PHYSICIAN ASSISTANT

## 2024-11-25 RX ORDER — GADOBUTROL 604.72 MG/ML
7 INJECTION INTRAVENOUS
Status: COMPLETED | OUTPATIENT
Start: 2024-11-25 | End: 2024-11-25

## 2024-11-25 RX ADMIN — GLUCAGON 1 MG: KIT at 14:07

## 2024-11-25 RX ADMIN — GADOBUTROL 7 ML: 604.72 INJECTION INTRAVENOUS at 09:26

## 2024-12-10 ENCOUNTER — OFFICE VISIT (OUTPATIENT)
Dept: BARIATRICS | Facility: CLINIC | Age: 54
End: 2024-12-10
Payer: COMMERCIAL

## 2024-12-10 VITALS
HEIGHT: 62 IN | WEIGHT: 144 LBS | OXYGEN SATURATION: 99 % | BODY MASS INDEX: 26.5 KG/M2 | HEART RATE: 103 BPM | DIASTOLIC BLOOD PRESSURE: 84 MMHG | SYSTOLIC BLOOD PRESSURE: 130 MMHG | TEMPERATURE: 97.9 F | RESPIRATION RATE: 16 BRPM

## 2024-12-10 DIAGNOSIS — Z98.84 STATUS POST BARIATRIC SURGERY: ICD-10-CM

## 2024-12-10 DIAGNOSIS — E66.3 OVERWEIGHT: Primary | ICD-10-CM

## 2024-12-10 PROCEDURE — 99214 OFFICE O/P EST MOD 30 MIN: CPT | Performed by: PHYSICIAN ASSISTANT

## 2024-12-10 RX ORDER — LIRAGLUTIDE 6 MG/ML
INJECTION, SOLUTION SUBCUTANEOUS
Qty: 15 ML | Refills: 3 | Status: SHIPPED | OUTPATIENT
Start: 2024-12-10

## 2024-12-10 RX ORDER — PEN NEEDLE, DIABETIC 32GX 5/32"
NEEDLE, DISPOSABLE MISCELLANEOUS
Qty: 100 EACH | Refills: 1 | Status: SHIPPED | OUTPATIENT
Start: 2024-12-10

## 2024-12-10 NOTE — PROGRESS NOTES
Assessment/Plan:    Overweight  -s/p Eveline-En-Y Gastric Bypass  -Patient is pursuing Conservative Program  -Initial weight loss goal of 5-10% weight loss for improved health - met  -Screening labs: up to date  -Already on Wellbutrin  -Not a candidate for Topamax due to kidney stones. Likely avoid Phentermine due to hx of palpitations  -Dietary recall reviewed. Encouraged adequate protein intake, not skipping meals, avoiding refined carbohydrates which she is doing well with  -Currently on Saxenda 3mg and tolerating well with no further episodes of hypogylcemia. She will continue Saxenda at this time  -dietary recall reviewed    Initial: 177.2 lbs  Current: 144 lbs  Change: -33.2 lbs  Goal: 140 lbs    Status post bariatric surgery  -s/p Eveline-En-Y Gastric Bypass with Dr. Rubin in [place] done in 2008 and have a revision of RNYGB with PEHR with Dr. Luis Miguel Adler on 02/11/2020. Patient overdue for annual follow up. Patient to follow up with surgical provider for vitamin labs    Initial 2008: 237 lbs prior to bypass  Jay 135 lbs after the first year     Initial: 178 lbs (prior to revision)  Jay: 162 lbs    Goals:    Food log (ie.) [email or web address],[email or web address],[email or web address],etc.   No sugary beverages. At least 64oz of water daily.  Increase physical activity by 10 minutes daily. Gradually increase physical activity to a goal of 5 days per week for 30 minutes of MODERATE intensity PLUS 2 days per week of FULL BODY resistance training  5-10 servings of fruits and vegetables per day  25-35 grams of dietary fiber per day  Goal protein intake of 60-80 grams per day      Follow up in approximately 3 months with Surgical Advanced Practitioner.     Diagnoses and all orders for this visit:    Overweight  -     liraglutide (Saxenda) injection; Inject 3 mg subcutaneously once daily  -     Insulin Pen Needle (Sure Comfort Pen Needles) 32G X 4 MM MISC; Use daily as directed    BMI 26.0-26.9,adult    Status post  "bariatric surgery          Subjective:   Chief Complaint   Patient presents with    Follow-up        Patient ID: Ester Velazquez  is a 54 y.o. female with excess weight/obesity here to pursue weight managment.  Patient is pursuing Conservative Program.     HPI Patient presents for [facility] follow up. Remains on Saxenda 3mg and tolerating well. Denies issues with hypogylcemia, Less joint pain    Hydration: meeting water goals, crystal light  ETOH: just on vacation once per year  SLeep: takes Xanax  to sleep due to pain, 8 hours  Exercise: walking 3-4x days per week for 1 hour    B: 1/2 bagel or toast multigrain+ cream cheese  S: cheese stick  L: Salad with chicken or tuna + light italian  S: skips  D: beef tacos on whole grain wrap + sour cream + cheese, hot dog with mac and cheese, usually carrots with butter       Wt Readings from Last 10 Encounters:   12/10/24 65.3 kg (144 lb)   09/25/24 67.1 kg (148 lb)   07/23/24 70.4 kg (155 lb 3.2 oz)   06/06/24 73.8 kg (162 lb 11.2 oz)   05/16/24 74.8 kg (164 lb 14.5 oz)   04/16/24 77.3 kg (170 lb 6.4 oz)   03/28/24 74.4 kg (164 lb)   02/22/24 79.8 kg (176 lb)   02/16/24 79.8 kg (176 lb)   01/23/24 80.4 kg (177 lb 3.2 oz)      Colonoscopy: up to date in Oct 2024    The following portions of the patient's history were reviewed and updated as appropriate: allergies, current medications, past family history, past medical history, past social history, past surgical history, and problem list.    Review of Systems   Cardiovascular:  Negative for chest pain.   Gastrointestinal:  Positive for diarrhea (hx IBS, currently being worked up for Chron's). Negative for constipation, nausea and vomiting.       Objective:    /84   Pulse 103   Temp 97.9 °F (36.6 °C)   Resp 16   Ht 5' 2\" (1.575 m)   Wt 65.3 kg (144 lb)   SpO2 99%   BMI 26.34 kg/m²      Physical Exam  Vitals and nursing note reviewed.   Constitutional:       General: She is not in acute distress.     Appearance: She is " not ill-appearing or toxic-appearing.   HENT:      Head: Normocephalic and atraumatic.      Nose: Nose normal.      Mouth/Throat:      Mouth: Mucous membranes are moist.   Eyes:      General: No scleral icterus.  Cardiovascular:      Comments: Well perfused  Pulmonary:      Effort: Pulmonary effort is normal. No respiratory distress.   Musculoskeletal:         General: Normal range of motion.      Cervical back: Normal range of motion.   Skin:     General: Skin is dry.      Coloration: Skin is not jaundiced.   Neurological:      General: No focal deficit present.      Mental Status: She is alert and oriented to person, place, and time. Mental status is at baseline.   Psychiatric:         Mood and Affect: Mood normal.         Behavior: Behavior normal.         Thought Content: Thought content normal.         Judgment: Judgment normal.

## 2024-12-10 NOTE — ASSESSMENT & PLAN NOTE
-s/p Eveline-En-Y Gastric Bypass with Dr. Rubin in [place] done in 2008 and have a revision of RNYGB with PEHR with Dr. Luis Miguel Adler on 02/11/2020. Patient overdue for annual follow up. Patient to follow up with surgical provider for vitamin labs    Initial 2008: 237 lbs prior to bypass  Jay 135 lbs after the first year     Initial: 178 lbs (prior to revision)  Jay: 162 lbs

## 2024-12-10 NOTE — PATIENT INSTRUCTIONS
Goals:    Food log (ie.) [email or web address],[email or web address],popexpert.[email or web address],etc.   No sugary beverages. At least 64oz of water daily.  Increase physical activity by 10 minutes daily. Gradually increase physical activity to a goal of 5 days per week for 30 minutes of MODERATE intensity PLUS 2 days per week of FULL BODY resistance training  5-10 servings of fruits and vegetables per day  25-35 grams of dietary fiber per day  Goal protein intake of 60-80 grams per day

## 2024-12-10 NOTE — ASSESSMENT & PLAN NOTE
-s/p Eveline-En-Y Gastric Bypass  -Patient is pursuing Conservative Program  -Initial weight loss goal of 5-10% weight loss for improved health - met  -Screening labs: up to date  -Already on Wellbutrin  -Not a candidate for Topamax due to kidney stones. Likely avoid Phentermine due to hx of palpitations  -Dietary recall reviewed. Encouraged adequate protein intake, not skipping meals, avoiding refined carbohydrates which she is doing well with  -Currently on Saxenda 3mg and tolerating well with no further episodes of hypogylcemia. She will continue Saxenda at this time  -dietary recall reviewed    Initial: 177.2 lbs  Current: 144 lbs  Change: -33.2 lbs  Goal: 140 lbs

## 2024-12-11 ENCOUNTER — APPOINTMENT (OUTPATIENT)
Dept: LAB | Facility: CLINIC | Age: 54
End: 2024-12-11
Payer: COMMERCIAL

## 2024-12-11 DIAGNOSIS — K50.80 CROHN'S DISEASE OF BOTH SMALL AND LARGE INTESTINE WITHOUT COMPLICATION (HCC): ICD-10-CM

## 2024-12-11 LAB — HBV SURFACE AB SER-ACNC: >500 MIU/ML

## 2024-12-11 PROCEDURE — 86480 TB TEST CELL IMMUN MEASURE: CPT

## 2024-12-11 PROCEDURE — 36415 COLL VENOUS BLD VENIPUNCTURE: CPT

## 2024-12-11 PROCEDURE — 86706 HEP B SURFACE ANTIBODY: CPT

## 2024-12-11 PROCEDURE — 80074 ACUTE HEPATITIS PANEL: CPT

## 2024-12-12 ENCOUNTER — HOSPITAL ENCOUNTER (OUTPATIENT)
Dept: INFUSION CENTER | Facility: HOSPITAL | Age: 54
End: 2024-12-12
Attending: INTERNAL MEDICINE
Payer: COMMERCIAL

## 2024-12-12 VITALS
HEART RATE: 85 BPM | TEMPERATURE: 96.9 F | RESPIRATION RATE: 17 BRPM | DIASTOLIC BLOOD PRESSURE: 78 MMHG | SYSTOLIC BLOOD PRESSURE: 134 MMHG | OXYGEN SATURATION: 100 %

## 2024-12-12 DIAGNOSIS — D80.1 HYPOGAMMAGLOBULINEMIA (HCC): Primary | ICD-10-CM

## 2024-12-12 LAB
GAMMA INTERFERON BACKGROUND BLD IA-ACNC: 0.03 IU/ML
HAV IGM SER QL: NORMAL
HBV CORE IGM SER QL: NORMAL
HBV SURFACE AG SER QL: NORMAL
HCV AB SER QL: NORMAL
M TB IFN-G BLD-IMP: NEGATIVE
M TB IFN-G CD4+ BCKGRND COR BLD-ACNC: -0.01 IU/ML
M TB IFN-G CD4+ BCKGRND COR BLD-ACNC: 0.01 IU/ML
MITOGEN IGNF BCKGRD COR BLD-ACNC: 9.96 IU/ML

## 2024-12-12 PROCEDURE — 96367 TX/PROPH/DG ADDL SEQ IV INF: CPT

## 2024-12-12 PROCEDURE — 96365 THER/PROPH/DIAG IV INF INIT: CPT

## 2024-12-12 PROCEDURE — 96366 THER/PROPH/DIAG IV INF ADDON: CPT

## 2024-12-12 PROCEDURE — 96375 TX/PRO/DX INJ NEW DRUG ADDON: CPT

## 2024-12-12 RX ORDER — SODIUM CHLORIDE 9 MG/ML
20 INJECTION, SOLUTION INTRAVENOUS ONCE
OUTPATIENT
Start: 2025-01-02

## 2024-12-12 RX ORDER — ACETAMINOPHEN 325 MG/1
650 TABLET ORAL ONCE
OUTPATIENT
Start: 2025-01-02

## 2024-12-12 RX ORDER — SODIUM CHLORIDE 9 MG/ML
20 INJECTION, SOLUTION INTRAVENOUS ONCE
Status: COMPLETED | OUTPATIENT
Start: 2024-12-12 | End: 2024-12-12

## 2024-12-12 RX ORDER — ACETAMINOPHEN 325 MG/1
650 TABLET ORAL ONCE
Status: COMPLETED | OUTPATIENT
Start: 2024-12-12 | End: 2024-12-12

## 2024-12-12 RX ADMIN — ACETAMINOPHEN 650 MG: 325 TABLET ORAL at 09:06

## 2024-12-12 RX ADMIN — DEXAMETHASONE SODIUM PHOSPHATE 10 MG: 100 INJECTION INTRAMUSCULAR; INTRAVENOUS at 09:07

## 2024-12-12 RX ADMIN — DIPHENHYDRAMINE HYDROCHLORIDE 25 MG: 50 INJECTION INTRAMUSCULAR; INTRAVENOUS at 09:26

## 2024-12-12 RX ADMIN — Medication 40 G: at 10:26

## 2024-12-12 RX ADMIN — SODIUM CHLORIDE 20 ML/HR: 0.9 INJECTION, SOLUTION INTRAVENOUS at 09:04

## 2024-12-12 RX ADMIN — FAMOTIDINE 20 MG: 10 INJECTION, SOLUTION INTRAVENOUS at 09:46

## 2024-12-12 RX ADMIN — SODIUM CHLORIDE 500 ML: 0.9 INJECTION, SOLUTION INTRAVENOUS at 09:04

## 2024-12-12 NOTE — PROGRESS NOTES
Ester Velazquez  tolerated IV IG treatment well with no complications.      Ester Velazquez is aware of future appt on 1/2 at 9AM.     AVS printed and given to Ester Velazquez: No (Declined by Ester Velazquez).

## 2024-12-26 DIAGNOSIS — Z98.84 H/O GASTRIC BYPASS: ICD-10-CM

## 2024-12-26 DIAGNOSIS — E66.3 OVERWEIGHT: ICD-10-CM

## 2024-12-26 DIAGNOSIS — E53.8 B12 DEFICIENCY: Primary | ICD-10-CM

## 2024-12-26 RX ORDER — CYANOCOBALAMIN 1000 UG/ML
INJECTION, SOLUTION INTRAMUSCULAR; SUBCUTANEOUS
Qty: 6 ML | Refills: 0 | Status: SHIPPED | OUTPATIENT
Start: 2024-12-26

## 2024-12-26 RX ORDER — LIRAGLUTIDE 6 MG/ML
3 INJECTION, SOLUTION SUBCUTANEOUS DAILY
Qty: 45 ML | OUTPATIENT
Start: 2024-12-26

## 2025-01-09 ENCOUNTER — HOSPITAL ENCOUNTER (OUTPATIENT)
Dept: INFUSION CENTER | Facility: HOSPITAL | Age: 55
End: 2025-01-09
Attending: INTERNAL MEDICINE
Payer: COMMERCIAL

## 2025-01-09 VITALS
TEMPERATURE: 96.9 F | RESPIRATION RATE: 17 BRPM | SYSTOLIC BLOOD PRESSURE: 137 MMHG | OXYGEN SATURATION: 100 % | HEART RATE: 111 BPM | DIASTOLIC BLOOD PRESSURE: 89 MMHG

## 2025-01-09 DIAGNOSIS — D80.1 HYPOGAMMAGLOBULINEMIA (HCC): Primary | ICD-10-CM

## 2025-01-09 PROCEDURE — 96367 TX/PROPH/DG ADDL SEQ IV INF: CPT

## 2025-01-09 PROCEDURE — 96366 THER/PROPH/DIAG IV INF ADDON: CPT

## 2025-01-09 PROCEDURE — 96365 THER/PROPH/DIAG IV INF INIT: CPT

## 2025-01-09 RX ORDER — SODIUM CHLORIDE 9 MG/ML
20 INJECTION, SOLUTION INTRAVENOUS ONCE
Status: COMPLETED | OUTPATIENT
Start: 2025-01-09 | End: 2025-01-09

## 2025-01-09 RX ORDER — SODIUM CHLORIDE 9 MG/ML
20 INJECTION, SOLUTION INTRAVENOUS ONCE
OUTPATIENT
Start: 2025-01-23

## 2025-01-09 RX ORDER — ACETAMINOPHEN 325 MG/1
650 TABLET ORAL ONCE
Status: DISCONTINUED | OUTPATIENT
Start: 2025-01-09 | End: 2025-01-13 | Stop reason: HOSPADM

## 2025-01-09 RX ORDER — ACETAMINOPHEN 325 MG/1
650 TABLET ORAL ONCE
OUTPATIENT
Start: 2025-01-23

## 2025-01-09 RX ADMIN — FAMOTIDINE 20 MG: 10 INJECTION, SOLUTION INTRAVENOUS at 13:36

## 2025-01-09 RX ADMIN — Medication 40 G: at 13:59

## 2025-01-09 RX ADMIN — DIPHENHYDRAMINE HYDROCHLORIDE 25 MG: 50 INJECTION, SOLUTION INTRAMUSCULAR; INTRAVENOUS at 13:03

## 2025-01-09 RX ADMIN — SODIUM CHLORIDE 500 ML: 0.9 INJECTION, SOLUTION INTRAVENOUS at 11:40

## 2025-01-09 RX ADMIN — SODIUM CHLORIDE 20 ML/HR: 0.9 INJECTION, SOLUTION INTRAVENOUS at 12:30

## 2025-01-09 RX ADMIN — DEXAMETHASONE SODIUM PHOSPHATE 10 MG: 100 INJECTION INTRAMUSCULAR; INTRAVENOUS at 12:03

## 2025-01-09 NOTE — PROGRESS NOTES
Ester Velazquez  tolerated IVIG treatment well with no complications.  Pt was AAOx3 at time of discharge.  PT had all belongings at time of discharge.     Ester Velazquez is aware of future appt on 01/30/25 at 1000.     AVS  No (Declined by Ester Velazquez) pt has mychart for appointment reminders

## 2025-01-30 ENCOUNTER — HOSPITAL ENCOUNTER (OUTPATIENT)
Dept: INFUSION CENTER | Facility: HOSPITAL | Age: 55
End: 2025-01-30
Attending: INTERNAL MEDICINE
Payer: COMMERCIAL

## 2025-01-30 VITALS
RESPIRATION RATE: 17 BRPM | OXYGEN SATURATION: 99 % | DIASTOLIC BLOOD PRESSURE: 91 MMHG | TEMPERATURE: 96.9 F | SYSTOLIC BLOOD PRESSURE: 156 MMHG | HEART RATE: 112 BPM

## 2025-01-30 DIAGNOSIS — D80.1 HYPOGAMMAGLOBULINEMIA (HCC): Primary | ICD-10-CM

## 2025-01-30 PROCEDURE — 96375 TX/PRO/DX INJ NEW DRUG ADDON: CPT

## 2025-01-30 PROCEDURE — 96367 TX/PROPH/DG ADDL SEQ IV INF: CPT

## 2025-01-30 PROCEDURE — 96365 THER/PROPH/DIAG IV INF INIT: CPT

## 2025-01-30 PROCEDURE — 96366 THER/PROPH/DIAG IV INF ADDON: CPT

## 2025-01-30 RX ORDER — ACETAMINOPHEN 325 MG/1
650 TABLET ORAL ONCE
Status: COMPLETED | OUTPATIENT
Start: 2025-01-30 | End: 2025-01-30

## 2025-01-30 RX ORDER — ACETAMINOPHEN 325 MG/1
650 TABLET ORAL ONCE
OUTPATIENT
Start: 2025-02-20

## 2025-01-30 RX ORDER — SODIUM CHLORIDE 9 MG/ML
20 INJECTION, SOLUTION INTRAVENOUS ONCE
Status: COMPLETED | OUTPATIENT
Start: 2025-01-30 | End: 2025-01-30

## 2025-01-30 RX ORDER — SODIUM CHLORIDE 9 MG/ML
20 INJECTION, SOLUTION INTRAVENOUS ONCE
OUTPATIENT
Start: 2025-02-20

## 2025-01-30 RX ADMIN — FAMOTIDINE 20 MG: 10 INJECTION, SOLUTION INTRAVENOUS at 11:02

## 2025-01-30 RX ADMIN — SODIUM CHLORIDE 500 ML: 0.9 INJECTION, SOLUTION INTRAVENOUS at 10:08

## 2025-01-30 RX ADMIN — Medication 40 G: at 11:26

## 2025-01-30 RX ADMIN — DIPHENHYDRAMINE HYDROCHLORIDE 25 MG: 50 INJECTION, SOLUTION INTRAMUSCULAR; INTRAVENOUS at 10:40

## 2025-01-30 RX ADMIN — SODIUM CHLORIDE 20 ML/HR: 0.9 INJECTION, SOLUTION INTRAVENOUS at 10:05

## 2025-01-30 RX ADMIN — ACETAMINOPHEN 650 MG: 325 TABLET ORAL at 10:05

## 2025-01-30 RX ADMIN — DEXAMETHASONE SODIUM PHOSPHATE 10 MG: 100 INJECTION INTRAMUSCULAR; INTRAVENOUS at 10:11

## 2025-01-30 NOTE — PROGRESS NOTES
Ester Velazquez  tolerated IVIG well with no complications.      Ester Velazquez is aware of future appt on 2-20-25 at 9 am    AVS declined.

## 2025-02-03 DIAGNOSIS — E53.8 B12 DEFICIENCY: ICD-10-CM

## 2025-02-03 DIAGNOSIS — Z98.84 H/O GASTRIC BYPASS: ICD-10-CM

## 2025-02-04 RX ORDER — CYANOCOBALAMIN 1000 UG/ML
INJECTION, SOLUTION INTRAMUSCULAR; SUBCUTANEOUS
Qty: 6 ML | Refills: 11 | Status: SHIPPED | OUTPATIENT
Start: 2025-02-04

## 2025-02-13 ENCOUNTER — TELEPHONE (OUTPATIENT)
Age: 55
End: 2025-02-13

## 2025-02-13 NOTE — TELEPHONE ENCOUNTER
Patient is calling regarding a message that she sent on 2/3 about a pre-certification for [employer] that runs out 2/27/25.  She just wanted to follow up and get a status update on it, can someone please call her with an update.

## 2025-02-20 ENCOUNTER — HOSPITAL ENCOUNTER (OUTPATIENT)
Dept: INFUSION CENTER | Facility: HOSPITAL | Age: 55
End: 2025-02-20
Attending: INTERNAL MEDICINE
Payer: COMMERCIAL

## 2025-02-20 VITALS
RESPIRATION RATE: 17 BRPM | HEART RATE: 105 BPM | OXYGEN SATURATION: 99 % | DIASTOLIC BLOOD PRESSURE: 82 MMHG | TEMPERATURE: 97.3 F | SYSTOLIC BLOOD PRESSURE: 157 MMHG

## 2025-02-20 DIAGNOSIS — D80.1 HYPOGAMMAGLOBULINEMIA (HCC): Primary | ICD-10-CM

## 2025-02-20 PROCEDURE — 96365 THER/PROPH/DIAG IV INF INIT: CPT

## 2025-02-20 PROCEDURE — 96366 THER/PROPH/DIAG IV INF ADDON: CPT

## 2025-02-20 PROCEDURE — 96367 TX/PROPH/DG ADDL SEQ IV INF: CPT

## 2025-02-20 RX ORDER — SODIUM CHLORIDE 9 MG/ML
20 INJECTION, SOLUTION INTRAVENOUS ONCE
Status: COMPLETED | OUTPATIENT
Start: 2025-02-20 | End: 2025-02-20

## 2025-02-20 RX ORDER — ACETAMINOPHEN 325 MG/1
650 TABLET ORAL ONCE
Status: COMPLETED | OUTPATIENT
Start: 2025-02-20 | End: 2025-02-20

## 2025-02-20 RX ORDER — SODIUM CHLORIDE 9 MG/ML
20 INJECTION, SOLUTION INTRAVENOUS ONCE
OUTPATIENT
Start: 2025-03-13

## 2025-02-20 RX ORDER — ACETAMINOPHEN 325 MG/1
650 TABLET ORAL ONCE
OUTPATIENT
Start: 2025-03-13

## 2025-02-20 RX ADMIN — ACETAMINOPHEN 650 MG: 325 TABLET ORAL at 09:04

## 2025-02-20 RX ADMIN — Medication 40 G: at 10:25

## 2025-02-20 RX ADMIN — FAMOTIDINE 20 MG: 10 INJECTION, SOLUTION INTRAVENOUS at 09:59

## 2025-02-20 RX ADMIN — DEXAMETHASONE SODIUM PHOSPHATE 10 MG: 100 INJECTION INTRAMUSCULAR; INTRAVENOUS at 09:02

## 2025-02-20 RX ADMIN — SODIUM CHLORIDE 500 ML: 0.9 INJECTION, SOLUTION INTRAVENOUS at 09:00

## 2025-02-20 RX ADMIN — SODIUM CHLORIDE 20 ML/HR: 0.9 INJECTION, SOLUTION INTRAVENOUS at 09:00

## 2025-02-20 RX ADMIN — DIPHENHYDRAMINE HYDROCHLORIDE 25 MG: 50 INJECTION INTRAMUSCULAR; INTRAVENOUS at 09:38

## 2025-02-20 NOTE — PROGRESS NOTES
Ester Velazquez  tolerated IVIG infusion well with no complications.      Ester Velazquez is aware of future appt on 3/13 at 9AM.     AVS declined by Ester Velazquez.   Increase Dapsone 25 mg 4 tablets daily    Decrease Prednisone to 3 tablets daily

## 2025-03-12 ENCOUNTER — APPOINTMENT (OUTPATIENT)
Dept: LAB | Facility: CLINIC | Age: 55
End: 2025-03-12
Payer: COMMERCIAL

## 2025-03-12 DIAGNOSIS — D50.8 IRON DEFICIENCY ANEMIA SECONDARY TO INADEQUATE DIETARY IRON INTAKE: ICD-10-CM

## 2025-03-12 DIAGNOSIS — E53.8 B12 DEFICIENCY: ICD-10-CM

## 2025-03-12 DIAGNOSIS — D80.1 HYPOGAMMAGLOBULINEMIA (HCC): ICD-10-CM

## 2025-03-12 LAB
ALBUMIN SERPL BCG-MCNC: 3.9 G/DL (ref 3.5–5)
ALP SERPL-CCNC: 78 U/L (ref 34–104)
ALT SERPL W P-5'-P-CCNC: 21 U/L (ref 7–52)
ANION GAP SERPL CALCULATED.3IONS-SCNC: 10 MMOL/L (ref 4–13)
AST SERPL W P-5'-P-CCNC: 19 U/L (ref 13–39)
BASOPHILS # BLD AUTO: 0.03 THOUSANDS/ÂΜL (ref 0–0.1)
BASOPHILS NFR BLD AUTO: 0 % (ref 0–1)
BILIRUB SERPL-MCNC: 0.67 MG/DL (ref 0.2–1)
BUN SERPL-MCNC: 11 MG/DL (ref 5–25)
CALCIUM SERPL-MCNC: 9.3 MG/DL (ref 8.4–10.2)
CHLORIDE SERPL-SCNC: 105 MMOL/L (ref 96–108)
CO2 SERPL-SCNC: 27 MMOL/L (ref 21–32)
CREAT SERPL-MCNC: 0.63 MG/DL (ref 0.6–1.3)
CRP SERPL QL: 1.2 MG/L
EOSINOPHIL # BLD AUTO: 0.1 THOUSAND/ÂΜL (ref 0–0.61)
EOSINOPHIL NFR BLD AUTO: 1 % (ref 0–6)
ERYTHROCYTE [DISTWIDTH] IN BLOOD BY AUTOMATED COUNT: 13.5 % (ref 11.6–15.1)
ERYTHROCYTE [SEDIMENTATION RATE] IN BLOOD: 11 MM/HOUR (ref 0–29)
FERRITIN SERPL-MCNC: 57 NG/ML (ref 11–307)
GFR SERPL CREATININE-BSD FRML MDRD: 102 ML/MIN/1.73SQ M
GLUCOSE P FAST SERPL-MCNC: 79 MG/DL (ref 65–99)
HCT VFR BLD AUTO: 40.5 % (ref 34.8–46.1)
HGB BLD-MCNC: 12.6 G/DL (ref 11.5–15.4)
IGA SERPL-MCNC: 96 MG/DL (ref 66–433)
IGG SERPL-MCNC: 813 MG/DL (ref 635–1741)
IGM SERPL-MCNC: 26 MG/DL (ref 45–281)
IMM GRANULOCYTES # BLD AUTO: 0.03 THOUSAND/UL (ref 0–0.2)
IMM GRANULOCYTES NFR BLD AUTO: 0 % (ref 0–2)
IRON SATN MFR SERPL: 31 % (ref 15–50)
IRON SERPL-MCNC: 146 UG/DL (ref 50–212)
LDH SERPL-CCNC: 139 U/L (ref 140–271)
LYMPHOCYTES # BLD AUTO: 2.57 THOUSANDS/ÂΜL (ref 0.6–4.47)
LYMPHOCYTES NFR BLD AUTO: 32 % (ref 14–44)
MAGNESIUM SERPL-MCNC: 2.2 MG/DL (ref 1.9–2.7)
MCH RBC QN AUTO: 29 PG (ref 26.8–34.3)
MCHC RBC AUTO-ENTMCNC: 31.1 G/DL (ref 31.4–37.4)
MCV RBC AUTO: 93 FL (ref 82–98)
MONOCYTES # BLD AUTO: 0.61 THOUSAND/ÂΜL (ref 0.17–1.22)
MONOCYTES NFR BLD AUTO: 8 % (ref 4–12)
NEUTROPHILS # BLD AUTO: 4.73 THOUSANDS/ÂΜL (ref 1.85–7.62)
NEUTS SEG NFR BLD AUTO: 59 % (ref 43–75)
NRBC BLD AUTO-RTO: 0 /100 WBCS
PLATELET # BLD AUTO: 340 THOUSANDS/UL (ref 149–390)
PMV BLD AUTO: 9 FL (ref 8.9–12.7)
POTASSIUM SERPL-SCNC: 4.1 MMOL/L (ref 3.5–5.3)
PROT SERPL-MCNC: 6 G/DL (ref 6.4–8.4)
RBC # BLD AUTO: 4.35 MILLION/UL (ref 3.81–5.12)
SODIUM SERPL-SCNC: 142 MMOL/L (ref 135–147)
TIBC SERPL-MCNC: 464.8 UG/DL (ref 250–450)
TRANSFERRIN SERPL-MCNC: 332 MG/DL (ref 203–362)
UIBC SERPL-MCNC: 319 UG/DL (ref 155–355)
VIT B12 SERPL-MCNC: 1479 PG/ML (ref 180–914)
WBC # BLD AUTO: 8.07 THOUSAND/UL (ref 4.31–10.16)

## 2025-03-12 PROCEDURE — 86140 C-REACTIVE PROTEIN: CPT

## 2025-03-12 PROCEDURE — 80053 COMPREHEN METABOLIC PANEL: CPT

## 2025-03-12 PROCEDURE — 85025 COMPLETE CBC W/AUTO DIFF WBC: CPT

## 2025-03-12 PROCEDURE — 83550 IRON BINDING TEST: CPT

## 2025-03-12 PROCEDURE — 83615 LACTATE (LD) (LDH) ENZYME: CPT

## 2025-03-12 PROCEDURE — 82784 ASSAY IGA/IGD/IGG/IGM EACH: CPT

## 2025-03-12 PROCEDURE — 85652 RBC SED RATE AUTOMATED: CPT

## 2025-03-12 PROCEDURE — 82607 VITAMIN B-12: CPT

## 2025-03-12 PROCEDURE — 83735 ASSAY OF MAGNESIUM: CPT

## 2025-03-12 PROCEDURE — 36415 COLL VENOUS BLD VENIPUNCTURE: CPT

## 2025-03-12 PROCEDURE — 82728 ASSAY OF FERRITIN: CPT

## 2025-03-12 PROCEDURE — 83540 ASSAY OF IRON: CPT

## 2025-03-13 ENCOUNTER — HOSPITAL ENCOUNTER (OUTPATIENT)
Dept: INFUSION CENTER | Facility: HOSPITAL | Age: 55
End: 2025-03-13
Attending: INTERNAL MEDICINE
Payer: COMMERCIAL

## 2025-03-13 VITALS
OXYGEN SATURATION: 100 % | HEART RATE: 87 BPM | DIASTOLIC BLOOD PRESSURE: 72 MMHG | BODY MASS INDEX: 27.34 KG/M2 | WEIGHT: 148.59 LBS | TEMPERATURE: 98.3 F | HEIGHT: 62 IN | RESPIRATION RATE: 17 BRPM | SYSTOLIC BLOOD PRESSURE: 132 MMHG

## 2025-03-13 DIAGNOSIS — D80.1 HYPOGAMMAGLOBULINEMIA (HCC): Primary | ICD-10-CM

## 2025-03-13 PROCEDURE — 96366 THER/PROPH/DIAG IV INF ADDON: CPT

## 2025-03-13 PROCEDURE — 96375 TX/PRO/DX INJ NEW DRUG ADDON: CPT

## 2025-03-13 PROCEDURE — 96367 TX/PROPH/DG ADDL SEQ IV INF: CPT

## 2025-03-13 PROCEDURE — 96365 THER/PROPH/DIAG IV INF INIT: CPT

## 2025-03-13 RX ORDER — ACETAMINOPHEN 325 MG/1
650 TABLET ORAL ONCE
OUTPATIENT
Start: 2025-04-03

## 2025-03-13 RX ORDER — SODIUM CHLORIDE 9 MG/ML
20 INJECTION, SOLUTION INTRAVENOUS ONCE
OUTPATIENT
Start: 2025-04-03

## 2025-03-13 RX ORDER — ACETAMINOPHEN 325 MG/1
650 TABLET ORAL ONCE
Status: DISCONTINUED | OUTPATIENT
Start: 2025-03-13 | End: 2025-03-17 | Stop reason: HOSPADM

## 2025-03-13 RX ORDER — SODIUM CHLORIDE 9 MG/ML
20 INJECTION, SOLUTION INTRAVENOUS ONCE
Status: COMPLETED | OUTPATIENT
Start: 2025-03-13 | End: 2025-03-13

## 2025-03-13 RX ADMIN — FAMOTIDINE 20 MG: 10 INJECTION, SOLUTION INTRAVENOUS at 09:47

## 2025-03-13 RX ADMIN — SODIUM CHLORIDE 500 ML: 0.9 INJECTION, SOLUTION INTRAVENOUS at 09:24

## 2025-03-13 RX ADMIN — DEXAMETHASONE SODIUM PHOSPHATE 10 MG: 10 INJECTION, SOLUTION INTRAMUSCULAR; INTRAVENOUS at 10:09

## 2025-03-13 RX ADMIN — SODIUM CHLORIDE 20 ML/HR: 0.9 INJECTION, SOLUTION INTRAVENOUS at 09:24

## 2025-03-13 RX ADMIN — Medication 40 G: at 10:41

## 2025-03-13 RX ADMIN — DIPHENHYDRAMINE HYDROCHLORIDE 25 MG: 50 INJECTION INTRAMUSCULAR; INTRAVENOUS at 09:24

## 2025-03-13 NOTE — PROGRESS NOTES
Pt tolerated infusion well no adverse reactions noted. Pt aware of next appt 4/3 at 9am declined avs

## 2025-03-14 ENCOUNTER — OFFICE VISIT (OUTPATIENT)
Dept: BARIATRICS | Facility: CLINIC | Age: 55
End: 2025-03-14
Payer: COMMERCIAL

## 2025-03-14 VITALS
DIASTOLIC BLOOD PRESSURE: 72 MMHG | HEIGHT: 62 IN | RESPIRATION RATE: 18 BRPM | SYSTOLIC BLOOD PRESSURE: 120 MMHG | OXYGEN SATURATION: 99 % | BODY MASS INDEX: 27.34 KG/M2 | HEART RATE: 59 BPM | TEMPERATURE: 98.2 F | WEIGHT: 148.6 LBS

## 2025-03-14 DIAGNOSIS — E66.3 OVERWEIGHT: Primary | ICD-10-CM

## 2025-03-14 DIAGNOSIS — Z98.84 H/O GASTRIC BYPASS: ICD-10-CM

## 2025-03-14 DIAGNOSIS — Z98.84 STATUS POST BARIATRIC SURGERY: ICD-10-CM

## 2025-03-14 DIAGNOSIS — K91.2 POSTSURGICAL MALABSORPTION: ICD-10-CM

## 2025-03-14 DIAGNOSIS — R10.13 EPIGASTRIC PAIN: ICD-10-CM

## 2025-03-14 PROCEDURE — 99214 OFFICE O/P EST MOD 30 MIN: CPT | Performed by: PHYSICIAN ASSISTANT

## 2025-03-14 RX ORDER — LIRAGLUTIDE 6 MG/ML
INJECTION, SOLUTION SUBCUTANEOUS
Qty: 15 ML | Refills: 3 | Status: SHIPPED | OUTPATIENT
Start: 2025-03-14

## 2025-03-14 RX ORDER — PEN NEEDLE, DIABETIC 32GX 5/32"
NEEDLE, DISPOSABLE MISCELLANEOUS
Qty: 100 EACH | Refills: 1 | Status: SHIPPED | OUTPATIENT
Start: 2025-03-14

## 2025-03-14 NOTE — PROGRESS NOTES
"POST-OP OFFICE VISIT - BARIATRIC SURGERY  Ester Velazquez 54 y.o. female MRN: [id number]  Unit/Bed#:  Encounter: [id number]      HPI:  Ester Velazquez is a 54 y.o. female status post Laparoscopic Eveline-En-Y Gastric Bypass with Dr. Rubin in [place] done in 2008 and have a revision of RNYGB with [facility] with Dr. Luis Miguel Adler on 02/11/2020 presents to office for ANNUAL visit and follow up for Saxenda.     Subjective     Patient presents to the office for post-op visit.  Tolerating regular diet: Yes    Nausea/Vomiting/Constipation/Diarrhea: Hx of Chrohn's with intermittent diarrhea and constipation, gets infusions, following with [facility]  Eating at least 60 g of protein: Yes  Following 30/60 minute role with liquids: Yes  Drinking at least 64 oz of fluid: Yes  Taking vitamin/mineral supplements: Takes famotidine, starting carafate from GI  Taking omeprazole: No   Reviewed and advised patient on vitamins and supplements.  Exercising: Moving around the house, exercise encouraged.    She continues to have epigastric pain. Pain has improved overall but she continues to have burning sensation and had one occurrence of regurgitation last week. Has not figured out if certain foods are triggering her symptoms. Taking famotidine 20 mg PO BID and starting carafate. Denies NSAIDs, smoking. ETOH use.      Had an EGD done on 2/3/21 with Dr. Luis Miguel Adler that was normal and one through GI in 2023 (can't find report in media today) but previous notes reads\". There was no bile or undigested food present in the pouch. The GJ anastomosis was widely patent with no ulcerations present. The jejunum was unremarkable. The gastric pouch was also unremarkable with no inflammatory changes or ulcers noted. The GE junction was present at 38 cm. Mild superficial erosions of the distal esophagus were noted consistent with reflux esophagitis. A small sliding hiatal hernia was also noted. Remaining portions of the esophagus were unremarkable.\" Biopsy " "report was unremarkable and negative for H. Pylori.\"    Doing well on Saxenda daily. Currently on max dose of 3mg. She was previously struggling with late dumping and since Saxenda this has improved.     Current weight:  148 lb  BMI:  27.18  Starting weight: 178lb before Saxenda    Recent labs done showing high B12, CBC WNL, CMP low protein, TIBC high, iron WNL  Following with Hematology    Review of Systems   Cardiovascular:  Negative for chest pain.   Gastrointestinal:  Positive for abdominal pain, constipation and diarrhea (hx IBS). Negative for nausea and vomiting.       Historical Information   Past Medical History:   Diagnosis Date    Anemia     recurrent    Anxiety     Asthma     allergy induced    Chest pain     Contact lens overwear of both eyes     Crohn's disease (HCC) 2024    Depression     Diarrhea     Environmental allergies     Fibromyalgia     Fibromyalgia, primary     GERD (gastroesophageal reflux disease)     History of transfusion         Hypertension     Hypogammaglobulinemia (HCC)     Hypoglycemia after GI (gastrointestinal) surgery     Immune deficiency disorder (HCC)     IgG deficiency    Iron (Fe) deficiency anemia     Kidney stone     Lumbar herniated disc     Migraine     Motion sickness     Palpitations     PONV (postoperative nausea and vomiting)     severe    Seasonal allergies     Wears glasses      Past Surgical History:   Procedure Laterality Date    ABDOMINAL SURGERY      adhesions    ABDOMINOPLASTY      APPENDECTOMY      AUGMENTATION MAMMAPLASTY  2003    BREAST IMPLANT REMOVAL Bilateral 2019    BREAST SURGERY      implant removal     SECTION      CHOLECYSTECTOMY      COSMETIC SURGERY Bilateral     breast augmentation 2004    GASTRIC BYPASS      2009    GASTRIC BYPASS LAPAROSCOPIC N/A 2020    Procedure: ROBOTIC REVISION OF KENYA-EN-Y GASTRIC BYPASS, INTRAOP EGD;  Surgeon: Adeline Adler MD;  Location: [facility];  Service: Bariatrics    HERNIA REPAIR      " "HIATAL HERNIA REPAIR      HYSTERECTOMY  2015    KNEE ARTHROSCOPY Left     LYMPH NODE BIOPSY  200-2001    benign    NERVE BLOCK      LA ARTHRODESIS MIDTARSOMETATARSAL SINGLE JOINT Left 09/09/2016    Procedure: FRIST TMJ FUSION ;  Surgeon: Tadeo Coelho DPM;  Location: [facility];  Service: Podiatry    LA CORRJ HLX VLGS BNCTY SESMDC RESCJ PROX PHLX BASE Left 09/09/2016    Procedure: SURGICAL MODIFIED WATKINS BUNIONECTOMY FIRST MTPJ;  Surgeon: Tadeo Coelho DPM;  Location: [facility];  Service: Podiatry    LA OSTEOT W/WO LNGTH SHRT/CORRJ METAR XCP 1ST EA Left 09/09/2016    Procedure: FOOT SHORTENING OSTEOTOMIES 2ND AND 3RD METATARSAL WITH EXTENSER TENDON RELEASE 3RD AND 4TH TOE;  Surgeon: Tadeo Coelho DPM;  Location: [facility];  Service: Podiatry    LA REMOVAL IMPLANT DEEP Left 12/07/2016    Procedure: REMOVAL SYMPTOMATIC  HARDWARE FIRST RAY,RELATED CONTRACTURE SECOND AND THIRD TOES WITH SUSPENSION FOURTH TOE;  Surgeon: Tadeo Coelho DPM;  Location: [facility];  Service: Podiatry    SALPINGOOPHORECTOMY Left 2016    with removal of cervix    TONSILLECTOMY      WISDOM TOOTH EXTRACTION       Social History   Social History     Substance and Sexual Activity   Alcohol Use Not Currently    Comment: rarely     Social History     Substance and Sexual Activity   Drug Use No     Social History     Tobacco Use   Smoking Status Never    Passive exposure: Past   Smokeless Tobacco Never       Objective       Current Vitals:   Blood Pressure: 120/72 (03/14/25 1104)  Pulse: 59 (03/14/25 1104)  Temperature: 98.2 °F (36.8 °C) (03/14/25 1104)  Temp Source: Temporal (03/14/25 1104)  Respirations: 18 (03/14/25 1104)  Height: 5' 2\" (157.5 cm) (03/14/25 1104)  Weight - Scale: 67.4 kg (148 lb 9.6 oz) (03/14/25 1104)  SpO2: 99 % (03/14/25 1104)    Invasive Devices       None                   Physical Exam  Constitutional:       Appearance: Normal appearance.   HENT:      Head: Normocephalic and atraumatic.      Nose: " Nose normal.      Mouth/Throat:      Mouth: Mucous membranes are moist.   Eyes:      Extraocular Movements: Extraocular movements intact.      Pupils: Pupils are equal, round, and reactive to light.   Cardiovascular:      Rate and Rhythm: Normal rate and regular rhythm.      Pulses: Normal pulses.      Heart sounds: Normal heart sounds.   Pulmonary:      Effort: Pulmonary effort is normal.      Breath sounds: Normal breath sounds.   Abdominal:      Palpations: Abdomen is soft.   Musculoskeletal:      Cervical back: Normal range of motion.   Skin:     General: Skin is warm.   Neurological:      General: No focal deficit present.      Mental Status: She is alert and oriented to person, place, and time.   Psychiatric:         Mood and Affect: Mood normal.         Behavior: Behavior normal.             Assessment/PLAN:      Ester Velazquez is a 54 y.o. female status post Laparoscopic Eveline-En-Y Gastric Bypass with Dr. Rubin in [place] done in 2008 and have a revision of RNYGB with [facility] with Dr. Luis Miguel Adler on 02/11/2020 presents to office for ANNUAL visit and follow up for Saxenda.         Continue to follow with hematology and GI    Start carafate from GI and continue to monitor symptoms for improvement. Previous EGDs normal in findings     Complete labs before next visit.    Refill for saxenda with 3 refills given    Start resistance training, increase protein    If you have gotten a lab slip at this visit, please note that most labs are FASTING-but you need to drink water the night before and the morning before your labs are done. It is HIGHLY RECOMMENDED that you check with your insurance to make sure all the labs ordered are covered by your individual insurance policy.  This is especially important if you also get labs done by other providers outside of [facility].  You want to avoid having duplicate labs done.    Follow diet as discussed.   Follow vitamin and mineral recommendations as reviewed with you. Bariatric  vitamins are highly recommended. Vitamins are important for a life-time to avoid low levels which can lead to other medical problems.  Exercise as tolerated.  Call the office if you have any problems with abdominal pain especially associated with fever, chills, nausea, vomiting or any other concerns.    All Post-bariatric surgery patients should be aware that very small quantities of any alcohol can cause impairment and it is very possible not to feel the effect. The effect can be in the system for several hours and it is also a stomach irritant.  It is advised to AVOID alcohol, Nonsteroidal anti-inflammatory drugs (NSAIDS) and nicotine of all forms. Any of these can cause stomach irritation/pain.    Most, if not all, post-gastric surgery patients would benefit from having a regular counselor for at least 2 years post-op to help with need for multiple life-style changes. If you are interested in this and need help finding a regular counselor, you can also make a follow-up with our  to help you find one.    Follow-up in 3 months.  We kindly ask that you arrive 15 minutes before appointment time to allow for our staff to room you and check your vital signs and update your chart. We thank you for your patience at your visit.      Yris Louise PA-C  Bariatric Surgery  3/14/2025  12:32 PM

## 2025-03-26 ENCOUNTER — OFFICE VISIT (OUTPATIENT)
Dept: HEMATOLOGY ONCOLOGY | Facility: CLINIC | Age: 55
End: 2025-03-26
Payer: COMMERCIAL

## 2025-03-26 VITALS
OXYGEN SATURATION: 99 % | WEIGHT: 146 LBS | TEMPERATURE: 97.3 F | HEIGHT: 62 IN | SYSTOLIC BLOOD PRESSURE: 122 MMHG | BODY MASS INDEX: 26.87 KG/M2 | DIASTOLIC BLOOD PRESSURE: 76 MMHG | RESPIRATION RATE: 18 BRPM | HEART RATE: 82 BPM

## 2025-03-26 DIAGNOSIS — D50.9 IRON DEFICIENCY ANEMIA, UNSPECIFIED IRON DEFICIENCY ANEMIA TYPE: ICD-10-CM

## 2025-03-26 DIAGNOSIS — D50.8 IRON DEFICIENCY ANEMIA SECONDARY TO INADEQUATE DIETARY IRON INTAKE: Primary | ICD-10-CM

## 2025-03-26 DIAGNOSIS — Z98.84 H/O GASTRIC BYPASS: ICD-10-CM

## 2025-03-26 DIAGNOSIS — D80.1 HYPOGAMMAGLOBULINEMIA (HCC): ICD-10-CM

## 2025-03-26 DIAGNOSIS — E53.8 B12 DEFICIENCY: ICD-10-CM

## 2025-03-26 PROCEDURE — 99214 OFFICE O/P EST MOD 30 MIN: CPT | Performed by: INTERNAL MEDICINE

## 2025-03-26 RX ORDER — SODIUM CHLORIDE 9 MG/ML
20 INJECTION, SOLUTION INTRAVENOUS ONCE
OUTPATIENT
Start: 2025-04-02

## 2025-03-26 NOTE — PROGRESS NOTES
Name: Ester Velazquez      : 1970      MRN: [id number]  Encounter Provider: Zayra Benedict MD  Encounter Date: 3/26/2025   Encounter department: [facility] [place]  :  Assessment & Plan  Iron deficiency anemia secondary to inadequate dietary iron intake  The patient is not anemic.  Her ferritin level is trending down.  With the history of gastric bypass she is most likely going to need iron IV sooner later.  She did complain about hair fall.  We decided to pursue 2 doses of Venofer IV and repeat her iron panel prior to her next visit in 6 months.  Orders:    CBC and differential; Future    Comprehensive metabolic panel; Future    Magnesium; Future    Iron Panel (Includes Ferritin, Iron Sat%, Iron, and TIBC); Future    Ferritin; Future    B12 deficiency  She stated that she is giving herself vitamin B12 injections twice some month  Orders:    Vitamin B12; Future    H/O gastric bypass         Hypogammaglobulinemia (HCC)  The patient will be continued on the IVIG on every 3-week basis which is preventing upper airway infections.   Orders:    IgG, IgA, IgM; Future    Iron deficiency anemia, unspecified iron deficiency anemia type             Return in about 6 months (around 2025) for Labs, Infusion, Office Visit 20 min.    History of Present Illness   Chief Complaint   Patient presents with    Follow-up   HPI:     This is a 54-year-old female with multiple comorbid conditions including history of morbid obesity status post post gastric bypass surgery in , anemia due to iron deficiency, cholecystectomy, hysterectomy, hypertension, gastroesophageal reflux disease, depression, asthma, chronic sinusitis, etc.     The patient was apparently found to have hypogammaglobinemia on multiple occasions with frequent/recurrent infections mainly in the upper respiratory airway with chronic sinusitis.  The patient was evaluated by the Hematology service from [facility] and was started  on IVIG around December of 2019 on a monthly basis.  She received then 3-4 sessions of IVIG which improved her chronic sinusitis and other infectious process, according to the patient, significantly.  However, due to the COVID-19 pandemic she stopped doing the IVIG and decided to transfer her care to our service since she lives close by. Her immunoglobulin levels from 07/17/2019 before she had any IVIG treatment showed IgG of 554, IgA of 116 and IgM of 39 mg/dL.  She was started on vitamin B12 injections every 2 weeks.        Interval history:  The patient came today for a follow-up visit.  She continues to complain about easy bruising.  She stated that she was recently diagnosed with Crohn's disease.  She is currently on budesonide.  Blood work from 3/12/2025 was reviewed with the patient.  Her hemoglobin was 12.6 with normal white cells and platelets.  CMP was normal.  Inflammation markers were within normal range.  Vitamin B12 1479.  The immunoglobulin level showed low IgM iron panel showed saturation of 31% with ferritin of 57.         Review of Systems   Constitutional:  Positive for fatigue. Negative for chills and fever.   HENT:  Negative for ear pain and sore throat.    Eyes:  Negative for pain and visual disturbance.   Respiratory:  Positive for shortness of breath. Negative for cough.    Cardiovascular:  Negative for chest pain and palpitations.   Gastrointestinal:  Positive for abdominal pain, constipation, diarrhea and nausea. Negative for vomiting.   Genitourinary:  Negative for dysuria and hematuria.   Musculoskeletal:  Negative for arthralgias and back pain.   Skin:  Negative for color change and rash.   Neurological:  Positive for dizziness, numbness and headaches. Negative for seizures and syncope.   Psychiatric/Behavioral:  Positive for sleep disturbance.    All other systems reviewed and are negative.    Medical History Reviewed by provider this encounter:  Tobacco  Allergies  Meds  Problems   "Med Hx  Surg Hx  Fam Hx     .  Current Outpatient Medications on File Prior to Visit   Medication Sig Dispense Refill    acetaminophen (TYLENOL) 325 mg tablet Take 3 tablets (975 mg total) by mouth every 8 (eight) hours 30 tablet 0    Aimovig 140 MG/ML SOAJ Use as directed      albuterol (PROVENTIL HFA,VENTOLIN HFA) 90 mcg/act inhaler Inhale 1 puff every 6 (six) hours as needed As needed      ALPRAZolam (XANAX) 0.5 mg tablet       amLODIPine (NORVASC) 5 mg tablet Take 1 tablet (5 mg total) by mouth daily 30 tablet 30    Azelastine-Fluticasone (DYMISTA NA) 2 sprays into each nostril 2 (two) times a day       B-D 3CC LUER-NAKUL SYR 25GX1\" 25G X 1\" 3 ML MISC       B-D 3CC LUER-NAKUL SYR 25GX5/8\" 25G X 5/8\" 3 ML MISC Inject into a muscle every 14 (fourteen) days 2 each 11    Botulinum Toxin Type A 200 units SOLR       budesonide (ENTOCORT EC) 3 MG capsule Take 3 mg by mouth daily For 1 month (tapered dose)      Budesonide ER 6 MG CP24 Take 6 mg by mouth in the morning For 1 month (tapered dose)      Budesonide ER 9 MG CP24 Take 9 mg by mouth in the morning Take for 1 month      budesonide-formoterol (SYMBICORT) 160-4.5 mcg/act inhaler Inhale 2 puffs 2 (two) times a day       buPROPion (WELLBUTRIN) 100 mg tablet Take 100 mg by mouth 2 (two) times a day       cholecalciferol (VITAMIN D3) 07658 units capsule Take 5,000 Units by mouth 2 (two) times a day       cyanocobalamin 1,000 mcg/mL Inject 1 mL (1,000 mcg) into a muscle every 14 days 6 mL 11    cyclobenzaprine (FLEXERIL) 10 mg tablet Take 10 mg by mouth 3 (three) times a day as needed        dicyclomine (BENTYL) 10 mg capsule Take 10 mg by mouth 4 (four) times a day (before meals and at bedtime)      Erenumab-aooe 70 MG/ML SOAJ Inject 140 mg/mL under the skin every 28 days      fexofenadine (ALLEGRA) 180 MG tablet Take 180 mg by mouth daily      hyoscyamine (LEVSIN/SL) 0.125 mg SL tablet Take 0.125 mg by mouth every 4 (four) hours as needed for cramping      Immune " Globulin, Human, (PRIVIGEN IV) Inject into a catheter in a vein every 30 (thirty) days      Insulin Pen Needle (Sure Comfort Pen Needles) 32G X 4 MM MISC Use daily as directed 100 each 1    liraglutide (Saxenda) injection Inject 3 mg subcutaneously once daily 15 mL 3    losartan (COZAAR) 50 mg tablet Take 50 mg by mouth 2 (two) times a day.      magnesium gluconate (MAGONATE) 500 MG tablet Take 500 mg by mouth daily      minocycline (MINOCIN) 50 mg capsule minocycline 50 mg capsule   TAKE 1 CAPSULE BY MOUTH ONCE DAILY IN THE EVENING WITH WATER AT LEAST 2 HOURS AFTER DINNER      montelukast (SINGULAIR) 5 mg chewable tablet Chew 1 tablet (5 mg total) 2 (two) times a day 90 tablet 3    ondansetron (ZOFRAN-ODT) 8 mg disintegrating tablet DISSOLVE 1 TABLET IN MOUTH EVERY 8 HOURS AS NEEDED FOR NAUSEA OR VOMITING 20 tablet 0    sucralfate (CARAFATE) 1 g tablet Take 1 tablet (1 g total) by mouth 4 (four) times a day Please melt tablet in 15 ml of water to take as a suspension. 360 tablet 1    traMADol (ULTRAM) 50 mg tablet Take 50 mg by mouth 2 (two) times a day.      Varenicline Tartrate (Tyrvaya) 0.03 MG/ACT SOLN into each nostril      Xiidra 5 % op solution       meclizine (ANTIVERT) 25 mg tablet Take 1 tablet (25 mg total) by mouth 3 (three) times a day as needed for dizziness for up to 12 doses (Patient not taking: Reported on 3/14/2025) 12 tablet 0    Meribin 5 MG CAPS Take 1 capsule by mouth daily (Patient not taking: Reported on 2/16/2024)      SUMAtriptan (IMITREX) 100 mg tablet Take 100 mg by mouth 2 (two) times a day as needed for migraine (Patient not taking: Reported on 3/14/2025)       No current facility-administered medications on file prior to visit.      Social History     Tobacco Use    Smoking status: Never     Passive exposure: Past    Smokeless tobacco: Never   Vaping Use    Vaping status: Never Used   Substance and Sexual Activity    Alcohol use: Not Currently     Comment: rarely    Drug use: No     "Sexual activity: Yes     Partners: Male     Birth control/protection: Surgical         Objective   /76 (BP Location: Left arm, Patient Position: Sitting, Cuff Size: Adult)   Pulse 82   Temp (!) 97.3 °F (36.3 °C)   Resp 18   Ht 5' 2\" (1.575 m)   Wt 66.2 kg (146 lb)   SpO2 99%   BMI 26.70 kg/m²     Physical Exam  Constitutional:       General: She is not in acute distress.     Appearance: She is well-developed. She is not diaphoretic.   HENT:      Head: Normocephalic and atraumatic.      Nose: Nose normal.   Eyes:      General: No scleral icterus.        Right eye: No discharge.         Left eye: No discharge.      Conjunctiva/sclera: Conjunctivae normal.      Pupils: Pupils are equal, round, and reactive to light.   Neck:      Thyroid: No thyromegaly.      Vascular: No JVD.      Trachea: No tracheal deviation.   Cardiovascular:      Rate and Rhythm: Normal rate and regular rhythm.      Heart sounds: Normal heart sounds. No murmur heard.     No friction rub.   Pulmonary:      Effort: Pulmonary effort is normal. No respiratory distress.      Breath sounds: Normal breath sounds. No stridor. No wheezing or rales.   Chest:      Chest wall: No tenderness.   Abdominal:      General: There is no distension.      Palpations: Abdomen is soft. There is no hepatomegaly or splenomegaly.      Tenderness: There is no abdominal tenderness. There is no guarding or rebound.   Musculoskeletal:         General: No tenderness or deformity. Normal range of motion.      Cervical back: Normal range of motion and neck supple.   Lymphadenopathy:      Cervical: No cervical adenopathy.   Skin:     General: Skin is warm and dry.      Coloration: Skin is not pale.      Findings: No erythema or rash.   Neurological:      Mental Status: She is alert and oriented to person, place, and time.      Cranial Nerves: No cranial nerve deficit.      Coordination: Coordination normal.      Deep Tendon Reflexes: Reflexes are normal and symmetric. "   Psychiatric:         Behavior: Behavior normal.         Thought Content: Thought content normal.         Judgment: Judgment normal.         Labs: I have reviewed the following labs:  Results for orders placed or performed in visit on 03/12/25   CBC and differential   Result Value Ref Range    WBC 8.07 4.31 - 10.16 Thousand/uL    RBC 4.35 3.81 - 5.12 Million/uL    Hemoglobin 12.6 11.5 - 15.4 g/dL    Hematocrit 40.5 34.8 - 46.1 %    MCV 93 82 - 98 fL    MCH 29.0 26.8 - 34.3 pg    MCHC 31.1 (L) 31.4 - 37.4 g/dL    RDW 13.5 11.6 - 15.1 %    MPV 9.0 8.9 - 12.7 fL    Platelets 340 149 - 390 Thousands/uL    nRBC 0 /100 WBCs    Segmented % 59 43 - 75 %    Immature Grans % 0 0 - 2 %    Lymphocytes % 32 14 - 44 %    Monocytes % 8 4 - 12 %    Eosinophils Relative 1 0 - 6 %    Basophils Relative 0 0 - 1 %    Absolute Neutrophils 4.73 1.85 - 7.62 Thousands/µL    Absolute Immature Grans 0.03 0.00 - 0.20 Thousand/uL    Absolute Lymphocytes 2.57 0.60 - 4.47 Thousands/µL    Absolute Monocytes 0.61 0.17 - 1.22 Thousand/µL    Eosinophils Absolute 0.10 0.00 - 0.61 Thousand/µL    Basophils Absolute 0.03 0.00 - 0.10 Thousands/µL   Comprehensive metabolic panel   Result Value Ref Range    Sodium 142 135 - 147 mmol/L    Potassium 4.1 3.5 - 5.3 mmol/L    Chloride 105 96 - 108 mmol/L    CO2 27 21 - 32 mmol/L    ANION GAP 10 4 - 13 mmol/L    BUN 11 5 - 25 mg/dL    Creatinine 0.63 0.60 - 1.30 mg/dL    Glucose, Fasting 79 65 - 99 mg/dL    Calcium 9.3 8.4 - 10.2 mg/dL    AST 19 13 - 39 U/L    ALT 21 7 - 52 U/L    Alkaline Phosphatase 78 34 - 104 U/L    Total Protein 6.0 (L) 6.4 - 8.4 g/dL    Albumin 3.9 3.5 - 5.0 g/dL    Total Bilirubin 0.67 0.20 - 1.00 mg/dL    eGFR 102 ml/min/1.73sq m   Result Value Ref Range    CRP 1.2 <3.0 mg/L   Result Value Ref Range    Magnesium 2.2 1.9 - 2.7 mg/dL   LD,Blood   Result Value Ref Range     (L) 140 - 271 U/L   Vitamin B12   Result Value Ref Range    Vitamin B-12 1,479 (H) 180 - 914 pg/mL    Sedimentation rate, automated   Result Value Ref Range    Sed Rate 11 0 - 29 mm/hour   IgG, IgA, IgM   Result Value Ref Range    IGA 96 66 - 433 mg/dL     635 - 1,741 mg/dL    IGM 26 (L) 45 - 281 mg/dL   TIBC Panel (incl. Iron, TIBC, % Iron Saturation)   Result Value Ref Range    Iron Saturation 31 15 - 50 %    TIBC 464.8 (H) 250 - 450 ug/dL    Iron 146 50 - 212 ug/dL    Transferrin 332 203 - 362 mg/dL    UIBC 319 155 - 355 ug/dL   Result Value Ref Range    Ferritin 57 11 - 307 ng/mL     Lab Results   Component Value Date/Time    WBC 8.07 03/12/2025 07:40 AM    RBC 4.35 03/12/2025 07:40 AM    Hemoglobin 12.6 03/12/2025 07:40 AM    Hematocrit 40.5 03/12/2025 07:40 AM    MCV 93 03/12/2025 07:40 AM    MCH 29.0 03/12/2025 07:40 AM    RDW 13.5 03/12/2025 07:40 AM    Platelets 340 03/12/2025 07:40 AM    Segmented % 59 03/12/2025 07:40 AM    Lymphocytes % 32 03/12/2025 07:40 AM    Monocytes % 8 03/12/2025 07:40 AM    Eosinophils Relative 1 03/12/2025 07:40 AM    Basophils Relative 0 03/12/2025 07:40 AM    Immature Grans % 0 03/12/2025 07:40 AM    Absolute Neutrophils 4.73 03/12/2025 07:40 AM      Lab Results   Component Value Date/Time    Sodium 142 03/12/2025 07:40 AM    Sodium 141 08/05/2024 09:59 AM    Sodium 142 08/05/2024 09:59 AM    Potassium 4.1 03/12/2025 07:40 AM    Potassium 3.9 08/05/2024 09:59 AM    Potassium 3.8 08/05/2024 09:59 AM    Chloride 105 03/12/2025 07:40 AM    Chloride 105 08/05/2024 09:59 AM    Chloride 104 08/05/2024 09:59 AM    Carbon Dioxide 24 08/05/2024 09:59 AM    Carbon Dioxide 24 08/05/2024 09:59 AM    CO2 27 03/12/2025 07:40 AM    ANION GAP 10 03/12/2025 07:40 AM    ANION GAP 12 (H) 08/05/2024 09:59 AM    ANION GAP 14 (H) 08/05/2024 09:59 AM    BUN 11 03/12/2025 07:40 AM    BUN 10 08/05/2024 09:59 AM    BUN 9 08/05/2024 09:59 AM    Creatinine 0.63 03/12/2025 07:40 AM    Creatinine 0.6 08/05/2024 09:59 AM    Creatinine 0.61 08/05/2024 09:59 AM    Glucose 78 08/05/2024 09:59 AM     Glucose 77 08/05/2024 09:59 AM    Glucose, Fasting 79 03/12/2025 07:40 AM    Calcium 9.3 03/12/2025 07:40 AM    Calcium 9.2 08/05/2024 09:59 AM    Calcium 9.5 08/05/2024 09:59 AM    AST 19 03/12/2025 07:40 AM    AST 13 08/05/2024 09:59 AM    ALT 21 03/12/2025 07:40 AM    ALT 8 08/05/2024 09:59 AM    Alkaline Phosphatase 78 03/12/2025 07:40 AM    Alkaline Phosphatase 73 08/05/2024 09:59 AM    Total Protein 6.0 (L) 03/12/2025 07:40 AM    Protein, Total 6.9 08/05/2024 09:59 AM    Albumin 3.9 03/12/2025 07:40 AM    ALBUMIN 4.0 08/05/2024 09:59 AM    ALBUMIN 4.1 08/05/2024 09:59 AM    Total Bilirubin 0.67 03/12/2025 07:40 AM    Total Bilirubin 0.5 08/05/2024 09:59 AM    eGFRcr 107 08/05/2024 09:59 AM    eGFRcr 106 08/05/2024 09:59 AM    eGFR 102 03/12/2025 07:40 AM      Lab Results   Component Value Date/Time    Iron 146 03/12/2025 07:40 AM    Iron Saturation 31 03/12/2025 07:40 AM    FERRITIN 88 08/05/2024 10:00 AM    Ferritin 57 03/12/2025 07:40 AM    Vitamin B-12 1,479 (H) 03/12/2025 07:40 AM    Sed Rate 11 03/12/2025 07:40 AM    C-Reactive Protein 5.4 09/04/2024 01:08 PM    CRP 1.2 03/12/2025 07:40 AM      Results for orders placed or performed in visit on 03/12/25   CBC and differential   Result Value Ref Range    WBC 8.07 4.31 - 10.16 Thousand/uL    RBC 4.35 3.81 - 5.12 Million/uL    Hemoglobin 12.6 11.5 - 15.4 g/dL    Hematocrit 40.5 34.8 - 46.1 %    MCV 93 82 - 98 fL    MCH 29.0 26.8 - 34.3 pg    MCHC 31.1 (L) 31.4 - 37.4 g/dL    RDW 13.5 11.6 - 15.1 %    MPV 9.0 8.9 - 12.7 fL    Platelets 340 149 - 390 Thousands/uL    nRBC 0 /100 WBCs    Segmented % 59 43 - 75 %    Immature Grans % 0 0 - 2 %    Lymphocytes % 32 14 - 44 %    Monocytes % 8 4 - 12 %    Eosinophils Relative 1 0 - 6 %    Basophils Relative 0 0 - 1 %    Absolute Neutrophils 4.73 1.85 - 7.62 Thousands/µL    Absolute Immature Grans 0.03 0.00 - 0.20 Thousand/uL    Absolute Lymphocytes 2.57 0.60 - 4.47 Thousands/µL    Absolute Monocytes 0.61 0.17 - 1.22  Thousand/µL    Eosinophils Absolute 0.10 0.00 - 0.61 Thousand/µL    Basophils Absolute 0.03 0.00 - 0.10 Thousands/µL   Comprehensive metabolic panel   Result Value Ref Range    Sodium 142 135 - 147 mmol/L    Potassium 4.1 3.5 - 5.3 mmol/L    Chloride 105 96 - 108 mmol/L    CO2 27 21 - 32 mmol/L    ANION GAP 10 4 - 13 mmol/L    BUN 11 5 - 25 mg/dL    Creatinine 0.63 0.60 - 1.30 mg/dL    Glucose, Fasting 79 65 - 99 mg/dL    Calcium 9.3 8.4 - 10.2 mg/dL    AST 19 13 - 39 U/L    ALT 21 7 - 52 U/L    Alkaline Phosphatase 78 34 - 104 U/L    Total Protein 6.0 (L) 6.4 - 8.4 g/dL    Albumin 3.9 3.5 - 5.0 g/dL    Total Bilirubin 0.67 0.20 - 1.00 mg/dL    eGFR 102 ml/min/1.73sq m   Result Value Ref Range    CRP 1.2 <3.0 mg/L   Result Value Ref Range    Magnesium 2.2 1.9 - 2.7 mg/dL   LD,Blood   Result Value Ref Range     (L) 140 - 271 U/L   Vitamin B12   Result Value Ref Range    Vitamin B-12 1,479 (H) 180 - 914 pg/mL   Sedimentation rate, automated   Result Value Ref Range    Sed Rate 11 0 - 29 mm/hour   IgG, IgA, IgM   Result Value Ref Range    IGA 96 66 - 433 mg/dL     635 - 1,741 mg/dL    IGM 26 (L) 45 - 281 mg/dL   TIBC Panel (incl. Iron, TIBC, % Iron Saturation)   Result Value Ref Range    Iron Saturation 31 15 - 50 %    TIBC 464.8 (H) 250 - 450 ug/dL    Iron 146 50 - 212 ug/dL    Transferrin 332 203 - 362 mg/dL    UIBC 319 155 - 355 ug/dL   Result Value Ref Range    Ferritin 57 11 - 307 ng/mL

## 2025-03-26 NOTE — ASSESSMENT & PLAN NOTE
The patient will be continued on the IVIG on every 3-week basis which is preventing upper airway infections.   Orders:    IgG, IgA, IgM; Future

## 2025-03-26 NOTE — ASSESSMENT & PLAN NOTE
The patient is not anemic.  Her ferritin level is trending down.  With the history of gastric bypass she is most likely going to need iron IV sooner later.  She did complain about hair fall.  We decided to pursue 2 doses of Venofer IV and repeat her iron panel prior to her next visit in 6 months.  Orders:    CBC and differential; Future    Comprehensive metabolic panel; Future    Magnesium; Future    Iron Panel (Includes Ferritin, Iron Sat%, Iron, and TIBC); Future    Ferritin; Future

## 2025-04-03 ENCOUNTER — HOSPITAL ENCOUNTER (OUTPATIENT)
Dept: INFUSION CENTER | Facility: HOSPITAL | Age: 55
End: 2025-04-03
Attending: INTERNAL MEDICINE
Payer: COMMERCIAL

## 2025-04-03 VITALS
WEIGHT: 148.59 LBS | DIASTOLIC BLOOD PRESSURE: 90 MMHG | RESPIRATION RATE: 18 BRPM | SYSTOLIC BLOOD PRESSURE: 150 MMHG | TEMPERATURE: 97.6 F | OXYGEN SATURATION: 99 % | HEART RATE: 90 BPM | BODY MASS INDEX: 27.18 KG/M2

## 2025-04-03 DIAGNOSIS — D50.9 IRON DEFICIENCY ANEMIA, UNSPECIFIED IRON DEFICIENCY ANEMIA TYPE: ICD-10-CM

## 2025-04-03 DIAGNOSIS — D80.1 HYPOGAMMAGLOBULINEMIA (HCC): Primary | ICD-10-CM

## 2025-04-03 PROCEDURE — 96367 TX/PROPH/DG ADDL SEQ IV INF: CPT

## 2025-04-03 PROCEDURE — 96365 THER/PROPH/DIAG IV INF INIT: CPT

## 2025-04-03 PROCEDURE — 96366 THER/PROPH/DIAG IV INF ADDON: CPT

## 2025-04-03 PROCEDURE — 96375 TX/PRO/DX INJ NEW DRUG ADDON: CPT

## 2025-04-03 RX ORDER — SODIUM CHLORIDE 9 MG/ML
20 INJECTION, SOLUTION INTRAVENOUS ONCE
Status: DISCONTINUED | OUTPATIENT
Start: 2025-04-03 | End: 2025-04-07 | Stop reason: HOSPADM

## 2025-04-03 RX ORDER — ACETAMINOPHEN 325 MG/1
650 TABLET ORAL ONCE
OUTPATIENT
Start: 2025-04-24

## 2025-04-03 RX ORDER — ACETAMINOPHEN 325 MG/1
650 TABLET ORAL ONCE
Status: COMPLETED | OUTPATIENT
Start: 2025-04-03 | End: 2025-04-03

## 2025-04-03 RX ORDER — SODIUM CHLORIDE 9 MG/ML
20 INJECTION, SOLUTION INTRAVENOUS ONCE
OUTPATIENT
Start: 2025-04-24

## 2025-04-03 RX ORDER — SODIUM CHLORIDE 9 MG/ML
20 INJECTION, SOLUTION INTRAVENOUS ONCE
Status: CANCELLED | OUTPATIENT
Start: 2025-04-10

## 2025-04-03 RX ORDER — SODIUM CHLORIDE 9 MG/ML
20 INJECTION, SOLUTION INTRAVENOUS ONCE
Status: COMPLETED | OUTPATIENT
Start: 2025-04-03 | End: 2025-04-03

## 2025-04-03 RX ADMIN — DIPHENHYDRAMINE HYDROCHLORIDE 25 MG: 50 INJECTION INTRAMUSCULAR; INTRAVENOUS at 09:36

## 2025-04-03 RX ADMIN — ACETAMINOPHEN 650 MG: 325 TABLET ORAL at 09:07

## 2025-04-03 RX ADMIN — FAMOTIDINE 20 MG: 10 INJECTION, SOLUTION INTRAVENOUS at 09:59

## 2025-04-03 RX ADMIN — DEXAMETHASONE SODIUM PHOSPHATE 10 MG: 10 INJECTION, SOLUTION INTRAMUSCULAR; INTRAVENOUS at 09:05

## 2025-04-03 RX ADMIN — IRON SUCROSE 200 MG: 20 INJECTION, SOLUTION INTRAVENOUS at 12:31

## 2025-04-03 RX ADMIN — SODIUM CHLORIDE 500 ML: 0.9 INJECTION, SOLUTION INTRAVENOUS at 09:04

## 2025-04-03 RX ADMIN — Medication 40 G: at 10:34

## 2025-04-03 RX ADMIN — SODIUM CHLORIDE 20 ML/HR: 0.9 INJECTION, SOLUTION INTRAVENOUS at 09:03

## 2025-04-03 NOTE — PLAN OF CARE
Problem: Potential for Falls  Goal: Patient will remain free of falls  Description: INTERVENTIONS:- Educate patient/family on patient safety including physical limitations-  Outcome: Progressing

## 2025-04-03 NOTE — PROGRESS NOTES
Ester Velazquez  tolerated treatment well with no complications.      Ester Velazquez is aware of future appt on 4/10 at 1230.     AVS printed and given to Ester Velazquez:   No (Declined by Ester Velazquez)

## 2025-04-10 ENCOUNTER — HOSPITAL ENCOUNTER (OUTPATIENT)
Dept: INFUSION CENTER | Facility: HOSPITAL | Age: 55
End: 2025-04-10
Attending: INTERNAL MEDICINE
Payer: COMMERCIAL

## 2025-04-10 VITALS
TEMPERATURE: 98.5 F | SYSTOLIC BLOOD PRESSURE: 137 MMHG | OXYGEN SATURATION: 99 % | RESPIRATION RATE: 16 BRPM | HEART RATE: 77 BPM | DIASTOLIC BLOOD PRESSURE: 80 MMHG

## 2025-04-10 DIAGNOSIS — D50.9 IRON DEFICIENCY ANEMIA, UNSPECIFIED IRON DEFICIENCY ANEMIA TYPE: Primary | ICD-10-CM

## 2025-04-10 PROCEDURE — 96365 THER/PROPH/DIAG IV INF INIT: CPT

## 2025-04-10 RX ORDER — SODIUM CHLORIDE 9 MG/ML
20 INJECTION, SOLUTION INTRAVENOUS ONCE
Status: CANCELLED | OUTPATIENT
Start: 2025-04-17

## 2025-04-10 RX ORDER — SODIUM CHLORIDE 9 MG/ML
20 INJECTION, SOLUTION INTRAVENOUS ONCE
Status: COMPLETED | OUTPATIENT
Start: 2025-04-10 | End: 2025-04-10

## 2025-04-10 RX ADMIN — IRON SUCROSE 200 MG: 20 INJECTION, SOLUTION INTRAVENOUS at 12:23

## 2025-04-10 RX ADMIN — SODIUM CHLORIDE 20 ML/HR: 0.9 INJECTION, SOLUTION INTRAVENOUS at 12:22

## 2025-04-10 NOTE — PROGRESS NOTES
Ester Velazquez  tolerated treatment well with no complications.      Ester Velazquez is aware of future appt on 4/23 at 9 am.     AVS declined by Ester Velazquez.

## 2025-04-14 ENCOUNTER — TELEPHONE (OUTPATIENT)
Age: 55
End: 2025-04-14

## 2025-04-14 NOTE — TELEPHONE ENCOUNTER
"FYI:  Pt calling stating that she has been on Skyrezi now for a while  received her Onboard dose in the office on Friday(Dr Cohn/GI) and the Friday evening noted her vulvar area to be \"itchy\"    She has hx Lichen sclerosis and was wondering if that was acting up so she used the cream took a Claritin no help then took benadryl with some help.    On Sat the labia became swollen burning itchy so she took Benadrly in the AM then Allegra then cream then Benadryl at HS and Sunday was better swelling went down.    She called to GI office this AM and they would like Dr Benedict to weigh in on the mgt of this response from her immune system going forward,  ?cont use Skyrezi ?steroid use.  Can call Dr Maynard [facility] [phone] to further discuss.  Pt is due for next Skyrezi Tx in 8 weeks.Pt using Hx Crohns    Pls advise  "

## 2025-04-16 DIAGNOSIS — K91.2 POSTSURGICAL MALABSORPTION: ICD-10-CM

## 2025-04-16 DIAGNOSIS — Z98.84 STATUS POST BARIATRIC SURGERY: ICD-10-CM

## 2025-04-16 DIAGNOSIS — E66.3 OVERWEIGHT: ICD-10-CM

## 2025-04-16 DIAGNOSIS — Z98.84 H/O GASTRIC BYPASS: ICD-10-CM

## 2025-04-16 DIAGNOSIS — E53.8 B12 DEFICIENCY: ICD-10-CM

## 2025-04-16 RX ORDER — LIRAGLUTIDE 6 MG/ML
3 INJECTION, SOLUTION SUBCUTANEOUS DAILY
Qty: 45 ML | OUTPATIENT
Start: 2025-04-16

## 2025-04-16 RX ORDER — PEN NEEDLE, DIABETIC 32GX 5/32"
NEEDLE, DISPOSABLE MISCELLANEOUS
Qty: 100 EACH | Refills: 0 | OUTPATIENT
Start: 2025-04-16

## 2025-04-16 RX ORDER — LIRAGLUTIDE 6 MG/ML
INJECTION, SOLUTION SUBCUTANEOUS
Qty: 15 ML | Refills: 0 | OUTPATIENT
Start: 2025-04-16

## 2025-04-17 RX ORDER — SYRINGE WITH NEEDLE, 1 ML 25GX5/8"
SYRINGE, EMPTY DISPOSABLE MISCELLANEOUS
Qty: 2 EACH | Refills: 6 | Status: SHIPPED | OUTPATIENT
Start: 2025-04-17

## 2025-04-17 RX ORDER — CYANOCOBALAMIN 1000 UG/ML
1000 INJECTION, SOLUTION INTRAMUSCULAR; SUBCUTANEOUS
Qty: 6 ML | Refills: 3 | Status: SHIPPED | OUTPATIENT
Start: 2025-04-17

## 2025-04-18 DIAGNOSIS — Z98.84 STATUS POST BARIATRIC SURGERY: ICD-10-CM

## 2025-04-18 DIAGNOSIS — K91.2 POSTSURGICAL MALABSORPTION: ICD-10-CM

## 2025-04-18 DIAGNOSIS — Z98.84 H/O GASTRIC BYPASS: ICD-10-CM

## 2025-04-18 DIAGNOSIS — E66.3 OVERWEIGHT: ICD-10-CM

## 2025-04-18 RX ORDER — PEN NEEDLE, DIABETIC 32GX 5/32"
NEEDLE, DISPOSABLE MISCELLANEOUS
Qty: 100 EACH | Refills: 1 | Status: SHIPPED | OUTPATIENT
Start: 2025-04-18

## 2025-04-23 ENCOUNTER — HOSPITAL ENCOUNTER (OUTPATIENT)
Dept: INFUSION CENTER | Facility: HOSPITAL | Age: 55
Discharge: HOME | End: 2025-04-23
Attending: INTERNAL MEDICINE
Payer: COMMERCIAL

## 2025-04-23 VITALS
SYSTOLIC BLOOD PRESSURE: 146 MMHG | TEMPERATURE: 96.9 F | HEART RATE: 92 BPM | RESPIRATION RATE: 16 BRPM | OXYGEN SATURATION: 99 % | DIASTOLIC BLOOD PRESSURE: 83 MMHG

## 2025-04-23 DIAGNOSIS — D80.1 HYPOGAMMAGLOBULINEMIA (HCC): Primary | ICD-10-CM

## 2025-04-23 PROCEDURE — 96365 THER/PROPH/DIAG IV INF INIT: CPT

## 2025-04-23 PROCEDURE — 96366 THER/PROPH/DIAG IV INF ADDON: CPT

## 2025-04-23 PROCEDURE — 96375 TX/PRO/DX INJ NEW DRUG ADDON: CPT

## 2025-04-23 PROCEDURE — 96367 TX/PROPH/DG ADDL SEQ IV INF: CPT

## 2025-04-23 RX ORDER — SODIUM CHLORIDE 9 MG/ML
20 INJECTION, SOLUTION INTRAVENOUS ONCE
OUTPATIENT
Start: 2025-04-24

## 2025-04-23 RX ORDER — ACETAMINOPHEN 325 MG/1
650 TABLET ORAL ONCE
OUTPATIENT
Start: 2025-04-24

## 2025-04-23 RX ORDER — ACETAMINOPHEN 325 MG/1
650 TABLET ORAL ONCE
Status: COMPLETED | OUTPATIENT
Start: 2025-04-23 | End: 2025-04-23

## 2025-04-23 RX ORDER — SODIUM CHLORIDE 9 MG/ML
20 INJECTION, SOLUTION INTRAVENOUS ONCE
Status: COMPLETED | OUTPATIENT
Start: 2025-04-23 | End: 2025-04-23

## 2025-04-23 RX ADMIN — ACETAMINOPHEN 650 MG: 325 TABLET ORAL at 09:28

## 2025-04-23 RX ADMIN — SODIUM CHLORIDE 20 ML/HR: 0.9 INJECTION, SOLUTION INTRAVENOUS at 09:23

## 2025-04-23 RX ADMIN — DIPHENHYDRAMINE HYDROCHLORIDE 25 MG: 50 INJECTION INTRAMUSCULAR; INTRAVENOUS at 10:13

## 2025-04-23 RX ADMIN — DEXAMETHASONE SODIUM PHOSPHATE 10 MG: 10 INJECTION, SOLUTION INTRAMUSCULAR; INTRAVENOUS at 09:27

## 2025-04-23 RX ADMIN — SODIUM CHLORIDE 500 ML: 0.9 INJECTION, SOLUTION INTRAVENOUS at 09:24

## 2025-04-23 RX ADMIN — FAMOTIDINE 20 MG: 10 INJECTION, SOLUTION INTRAVENOUS at 10:37

## 2025-04-23 RX ADMIN — Medication 40 G: at 11:10

## 2025-04-23 NOTE — PROGRESS NOTES
Patient denies any recent infection or being on antibiotics.  Patient tolerated IVIG without reaction or issues.       Ester Velazquez is aware of future appt on 5/15/25 at 0900.     AVS -  No (Declined by Ester Velazquez) Patient has Mychart.    Patient ambulated off unit without incident.  All personal belongings taken with patient.

## 2025-05-15 ENCOUNTER — HOSPITAL ENCOUNTER (OUTPATIENT)
Dept: INFUSION CENTER | Facility: HOSPITAL | Age: 55
Discharge: HOME | End: 2025-05-15
Attending: INTERNAL MEDICINE

## 2025-05-20 ENCOUNTER — HOSPITAL ENCOUNTER (OUTPATIENT)
Dept: INFUSION CENTER | Facility: HOSPITAL | Age: 55
Discharge: HOME | End: 2025-05-20
Attending: INTERNAL MEDICINE

## 2025-05-21 ENCOUNTER — HOSPITAL ENCOUNTER (OUTPATIENT)
Dept: INFUSION CENTER | Facility: HOSPITAL | Age: 55
Discharge: HOME | End: 2025-05-21
Attending: INTERNAL MEDICINE
Payer: COMMERCIAL

## 2025-05-21 VITALS — HEART RATE: 90 BPM | DIASTOLIC BLOOD PRESSURE: 75 MMHG | SYSTOLIC BLOOD PRESSURE: 114 MMHG | TEMPERATURE: 96 F

## 2025-05-21 DIAGNOSIS — D80.1 HYPOGAMMAGLOBULINEMIA (HCC): Primary | ICD-10-CM

## 2025-05-21 PROCEDURE — 96366 THER/PROPH/DIAG IV INF ADDON: CPT

## 2025-05-21 PROCEDURE — 96365 THER/PROPH/DIAG IV INF INIT: CPT

## 2025-05-21 PROCEDURE — 96361 HYDRATE IV INFUSION ADD-ON: CPT

## 2025-05-21 PROCEDURE — 96367 TX/PROPH/DG ADDL SEQ IV INF: CPT

## 2025-05-21 RX ORDER — SODIUM CHLORIDE 9 MG/ML
20 INJECTION, SOLUTION INTRAVENOUS ONCE
OUTPATIENT
Start: 2025-06-04

## 2025-05-21 RX ORDER — ACETAMINOPHEN 325 MG/1
650 TABLET ORAL ONCE
Status: COMPLETED | OUTPATIENT
Start: 2025-05-21 | End: 2025-05-21

## 2025-05-21 RX ORDER — SODIUM CHLORIDE 9 MG/ML
20 INJECTION, SOLUTION INTRAVENOUS ONCE
Status: COMPLETED | OUTPATIENT
Start: 2025-05-21 | End: 2025-05-21

## 2025-05-21 RX ORDER — ACETAMINOPHEN 325 MG/1
650 TABLET ORAL ONCE
OUTPATIENT
Start: 2025-06-04

## 2025-05-21 RX ADMIN — Medication 40 G: at 10:05

## 2025-05-21 RX ADMIN — SODIUM CHLORIDE 20 ML/HR: 0.9 INJECTION, SOLUTION INTRAVENOUS at 08:15

## 2025-05-21 RX ADMIN — DIPHENHYDRAMINE HYDROCHLORIDE 25 MG: 50 INJECTION INTRAMUSCULAR; INTRAVENOUS at 09:23

## 2025-05-21 RX ADMIN — SODIUM CHLORIDE 500 ML: 0.9 INJECTION, SOLUTION INTRAVENOUS at 08:16

## 2025-05-21 RX ADMIN — DEXAMETHASONE SODIUM PHOSPHATE 10 MG: 10 INJECTION, SOLUTION INTRAMUSCULAR; INTRAVENOUS at 08:52

## 2025-05-21 RX ADMIN — FAMOTIDINE 20 MG: 10 INJECTION, SOLUTION INTRAVENOUS at 09:46

## 2025-05-21 RX ADMIN — ACETAMINOPHEN 650 MG: 325 TABLET ORAL at 08:17

## 2025-05-21 NOTE — PROGRESS NOTES
Ester Velazquez  tolerated IVIG well with no complications.      Ester Velazquez is aware of future appt on 6-22-25 0800    AVS declined

## 2025-06-02 DIAGNOSIS — Z98.84 H/O GASTRIC BYPASS: ICD-10-CM

## 2025-06-02 DIAGNOSIS — E53.8 B12 DEFICIENCY: ICD-10-CM

## 2025-06-02 DIAGNOSIS — E66.3 OVERWEIGHT: ICD-10-CM

## 2025-06-02 DIAGNOSIS — K91.2 POSTSURGICAL MALABSORPTION: ICD-10-CM

## 2025-06-02 DIAGNOSIS — Z98.84 STATUS POST BARIATRIC SURGERY: ICD-10-CM

## 2025-06-02 RX ORDER — PEN NEEDLE, DIABETIC 32GX 5/32"
NEEDLE, DISPOSABLE MISCELLANEOUS
Qty: 100 EACH | Refills: 0 | Status: SHIPPED | OUTPATIENT
Start: 2025-06-02 | End: 2025-06-13 | Stop reason: SDUPTHER

## 2025-06-02 RX ORDER — CYANOCOBALAMIN 1000 UG/ML
1000 INJECTION, SOLUTION INTRAMUSCULAR; SUBCUTANEOUS
Qty: 6 ML | Refills: 3 | Status: SHIPPED | OUTPATIENT
Start: 2025-06-02

## 2025-06-02 RX ORDER — LIRAGLUTIDE 6 MG/ML
INJECTION, SOLUTION SUBCUTANEOUS
Qty: 15 ML | Refills: 0 | Status: SHIPPED | OUTPATIENT
Start: 2025-06-02 | End: 2025-06-13 | Stop reason: SDUPTHER

## 2025-06-02 RX ORDER — SYRINGE WITH NEEDLE, 1 ML 25GX5/8"
SYRINGE, EMPTY DISPOSABLE MISCELLANEOUS
Qty: 2 EACH | Refills: 3 | Status: SHIPPED | OUTPATIENT
Start: 2025-06-02

## 2025-06-09 ENCOUNTER — APPOINTMENT (OUTPATIENT)
Dept: LAB | Facility: CLINIC | Age: 55
End: 2025-06-09
Attending: PHYSICIAN ASSISTANT
Payer: COMMERCIAL

## 2025-06-09 DIAGNOSIS — K91.2 POSTSURGICAL MALABSORPTION: ICD-10-CM

## 2025-06-09 DIAGNOSIS — Z98.84 H/O GASTRIC BYPASS: ICD-10-CM

## 2025-06-09 DIAGNOSIS — Z98.84 STATUS POST BARIATRIC SURGERY: ICD-10-CM

## 2025-06-09 DIAGNOSIS — E66.3 OVERWEIGHT: ICD-10-CM

## 2025-06-09 LAB
25(OH)D3 SERPL-MCNC: 79.5 NG/ML (ref 30–100)
CHOLEST SERPL-MCNC: 179 MG/DL (ref ?–200)
HDLC SERPL-MCNC: 91 MG/DL
LDLC SERPL CALC-MCNC: 72 MG/DL (ref 0–100)
PTH-INTACT SERPL-MCNC: 67.2 PG/ML (ref 12–88)
TRIGL SERPL-MCNC: 81 MG/DL (ref ?–150)

## 2025-06-09 PROCEDURE — 82306 VITAMIN D 25 HYDROXY: CPT

## 2025-06-09 PROCEDURE — 84425 ASSAY OF VITAMIN B-1: CPT

## 2025-06-09 PROCEDURE — 80061 LIPID PANEL: CPT

## 2025-06-09 PROCEDURE — 83970 ASSAY OF PARATHORMONE: CPT

## 2025-06-09 PROCEDURE — 84590 ASSAY OF VITAMIN A: CPT

## 2025-06-09 PROCEDURE — 36415 COLL VENOUS BLD VENIPUNCTURE: CPT

## 2025-06-09 PROCEDURE — 84630 ASSAY OF ZINC: CPT

## 2025-06-11 LAB — ZINC SERPL-MCNC: 44 UG/DL (ref 44–115)

## 2025-06-12 ENCOUNTER — HOSPITAL ENCOUNTER (OUTPATIENT)
Dept: INFUSION CENTER | Facility: HOSPITAL | Age: 55
End: 2025-06-12
Attending: INTERNAL MEDICINE
Payer: COMMERCIAL

## 2025-06-12 VITALS
SYSTOLIC BLOOD PRESSURE: 132 MMHG | HEART RATE: 80 BPM | RESPIRATION RATE: 16 BRPM | DIASTOLIC BLOOD PRESSURE: 83 MMHG | OXYGEN SATURATION: 98 % | TEMPERATURE: 96.9 F

## 2025-06-12 DIAGNOSIS — D80.1 HYPOGAMMAGLOBULINEMIA (HCC): Primary | ICD-10-CM

## 2025-06-12 PROCEDURE — 96375 TX/PRO/DX INJ NEW DRUG ADDON: CPT

## 2025-06-12 PROCEDURE — 96366 THER/PROPH/DIAG IV INF ADDON: CPT

## 2025-06-12 PROCEDURE — 96367 TX/PROPH/DG ADDL SEQ IV INF: CPT

## 2025-06-12 PROCEDURE — 96365 THER/PROPH/DIAG IV INF INIT: CPT

## 2025-06-12 RX ORDER — SODIUM CHLORIDE 9 MG/ML
20 INJECTION, SOLUTION INTRAVENOUS ONCE
OUTPATIENT
Start: 2025-07-02

## 2025-06-12 RX ORDER — ACETAMINOPHEN 325 MG/1
650 TABLET ORAL ONCE
OUTPATIENT
Start: 2025-07-02

## 2025-06-12 RX ORDER — SODIUM CHLORIDE 9 MG/ML
20 INJECTION, SOLUTION INTRAVENOUS ONCE
Status: COMPLETED | OUTPATIENT
Start: 2025-06-12 | End: 2025-06-12

## 2025-06-12 RX ORDER — ACETAMINOPHEN 325 MG/1
650 TABLET ORAL ONCE
Status: COMPLETED | OUTPATIENT
Start: 2025-06-12 | End: 2025-06-12

## 2025-06-12 RX ADMIN — SODIUM CHLORIDE 20 ML/HR: 0.9 INJECTION, SOLUTION INTRAVENOUS at 08:40

## 2025-06-12 RX ADMIN — DEXAMETHASONE SODIUM PHOSPHATE 10 MG: 10 INJECTION, SOLUTION INTRAMUSCULAR; INTRAVENOUS at 08:48

## 2025-06-12 RX ADMIN — FAMOTIDINE 20 MG: 10 INJECTION, SOLUTION INTRAVENOUS at 09:40

## 2025-06-12 RX ADMIN — Medication 40 G: at 09:59

## 2025-06-12 RX ADMIN — DIPHENHYDRAMINE HYDROCHLORIDE 25 MG: 50 INJECTION INTRAMUSCULAR; INTRAVENOUS at 09:20

## 2025-06-12 RX ADMIN — SODIUM CHLORIDE 500 ML: 0.9 INJECTION, SOLUTION INTRAVENOUS at 08:40

## 2025-06-12 RX ADMIN — ACETAMINOPHEN 650 MG: 325 TABLET ORAL at 08:49

## 2025-06-12 NOTE — PROGRESS NOTES
Ester Velazquez  tolerated IVIG well with no complications.      Ester Velazquez is aware of future appt on 7-3-25 at 8am     AVS declined by Ester Velazquez

## 2025-06-13 ENCOUNTER — OFFICE VISIT (OUTPATIENT)
Dept: BARIATRICS | Facility: CLINIC | Age: 55
End: 2025-06-13
Payer: COMMERCIAL

## 2025-06-13 VITALS
WEIGHT: 146.8 LBS | OXYGEN SATURATION: 99 % | DIASTOLIC BLOOD PRESSURE: 82 MMHG | BODY MASS INDEX: 27.02 KG/M2 | HEART RATE: 62 BPM | HEIGHT: 62 IN | TEMPERATURE: 98.2 F | SYSTOLIC BLOOD PRESSURE: 136 MMHG

## 2025-06-13 DIAGNOSIS — E66.3 OVERWEIGHT: Primary | ICD-10-CM

## 2025-06-13 DIAGNOSIS — Z98.84 STATUS POST BARIATRIC SURGERY: ICD-10-CM

## 2025-06-13 DIAGNOSIS — K91.2 POSTSURGICAL MALABSORPTION: ICD-10-CM

## 2025-06-13 DIAGNOSIS — Z98.84 H/O GASTRIC BYPASS: ICD-10-CM

## 2025-06-13 LAB — VIT B1 BLD-SCNC: 155.6 NMOL/L (ref 66.5–200)

## 2025-06-13 PROCEDURE — 99214 OFFICE O/P EST MOD 30 MIN: CPT | Performed by: PHYSICIAN ASSISTANT

## 2025-06-13 RX ORDER — LIRAGLUTIDE 6 MG/ML
INJECTION, SOLUTION SUBCUTANEOUS
Qty: 15 ML | Refills: 5 | Status: SHIPPED | OUTPATIENT
Start: 2025-06-13

## 2025-06-13 RX ORDER — PEN NEEDLE, DIABETIC 32GX 5/32"
NEEDLE, DISPOSABLE MISCELLANEOUS
Qty: 100 EACH | Refills: 5 | Status: SHIPPED | OUTPATIENT
Start: 2025-06-13

## 2025-06-13 RX ORDER — NYSTATIN AND TRIAMCINOLONE ACETONIDE 100000; 1 [USP'U]/G; MG/G
CREAM TOPICAL 2 TIMES DAILY
COMMUNITY
Start: 2025-01-06 | End: 2026-01-06

## 2025-06-13 NOTE — ASSESSMENT & PLAN NOTE
-s/p Eveline-En-Y Gastric Bypass  -Patient is pursuing Conservative Program  -Initial weight loss goal of 5-10% weight loss for improved health - met  -Screening labs: up to date  -Already on Wellbutrin  -Not a candidate for Topamax due to kidney stones. Likely avoid Phentermine due to hx of palpitations  -Dietary recall reviewed. Encouraged adequate protein intake, not skipping meals, avoiding refined carbohydrates which she is doing well with  -Currently on Saxenda 3mg and tolerating well with no further episodes of hypogylcemia since eating a larger dinner meal  -dietary recall reviewed     Initial: 177.2 lbs  Current: 146 lbs  Goal: 140 lbs    - Follow up in 6 months

## 2025-06-13 NOTE — ASSESSMENT & PLAN NOTE
-s/p Eveline-En-Y Gastric Bypass with Dr. Rubin in [place] done in 2008 and have a revision of RNYGB with PEHR with Dr. Luis Miguel Adler on 02/11/2020      Initial 2008: 237 lbs prior to bypass  Jay 135 lbs after the first year  Initial: 178 lbs (prior to revision)  Jay: 162 lbs

## 2025-06-13 NOTE — PROGRESS NOTES
Assessment/Plan:    Overweight  -s/p Eveline-En-Y Gastric Bypass  -Patient is pursuing Conservative Program  -Initial weight loss goal of 5-10% weight loss for improved health - met  -Screening labs: up to date  -Already on Wellbutrin  -Not a candidate for Topamax due to kidney stones. Likely avoid Phentermine due to hx of palpitations  -Dietary recall reviewed. Encouraged adequate protein intake, not skipping meals, avoiding refined carbohydrates which she is doing well with  -Currently on Saxenda 3mg and tolerating well with no further episodes of hypogylcemia since eating a larger dinner meal  -dietary recall reviewed     Initial: 177.2 lbs  Current: 146 lbs  Goal: 140 lbs    - Follow up in 6 months    H/O gastric bypass  -s/p Eveline-En-Y Gastric Bypass with Dr. Rubin in [place] done in 2008 and have a revision of RNYGB with PEHR with Dr. Luis Miguel Adler on 02/11/2020      Initial 2008: 237 lbs prior to bypass  Jay 135 lbs after the first year  Initial: 178 lbs (prior to revision)  Jay: 162 lbs        Goals:    Food log (ie.) [email or web address],etc.   No sugary beverages. At least 64oz of water daily.  Increase physical activity by 10 minutes daily. Gradually increase physical activity to a goal of 5 days per week for 30 minutes of MODERATE intensity PLUS 2 days per week of FULL BODY resistance training    Calorie goal: 4526-9226  Food log (ie.) [email or web address],etc.   No sugary beverages. At least 64oz of water daily.  Goal protein intake of 60-80 grams per day  25-35 grams of dietary fiber per day    Follow up in approximately 6 months with Surgical Advanced Practitioner.     Diagnoses and all orders for this visit:    Overweight  -     Insulin Pen Needle (Sure Comfort Pen Needles) 32G X 4 MM MISC; Use daily as directed  -     liraglutide (Saxenda) injection; Inject 3 mg subcutaneously once daily    H/O gastric bypass  -      Insulin Pen Needle (Sure Comfort Pen Needles) 32G X 4 MM MISC; Use daily as directed  -     liraglutide (Saxenda) injection; Inject 3 mg subcutaneously once daily    Status post bariatric surgery  -     Insulin Pen Needle (Sure Comfort Pen Needles) 32G X 4 MM MISC; Use daily as directed  -     liraglutide (Saxenda) injection; Inject 3 mg subcutaneously once daily    Postsurgical malabsorption  -     Insulin Pen Needle (Sure Comfort Pen Needles) 32G X 4 MM MISC; Use daily as directed  -     liraglutide (Saxenda) injection; Inject 3 mg subcutaneously once daily    Other orders  -     nystatin-triamcinolone (MYCOLOG-II) cream; Apply topically 2 (two) times a day          Subjective:   Chief Complaint   Patient presents with    Follow-up        Patient ID: Ester Velazquez  is a 54 y.o. female with excess weight/obesity and history of Laparoscopic Eveline-En-Y Gastric Bypass with Dr. Rubin in [place] done in 2008 and have a revision of RNYGB with PEHR with Dr. Luis Miguel Adler on 02/11/2020  here to pursue weight managment.  Patient is pursuing Conservative Program.     HPI    Weight at last visit: 148  Current: 146  Change: -2 lbs    On max dose saxenda 3mg daily. Weight stable. No significant issues. Notes some increased bruising on legs. Following with hematology. Got labs done recently.     Wt Readings from Last 10 Encounters:   06/13/25 66.6 kg (146 lb 12.8 oz)   04/03/25 67.4 kg (148 lb 9.4 oz)   03/26/25 66.2 kg (146 lb)   03/14/25 67.4 kg (148 lb 9.6 oz)   03/13/25 67.4 kg (148 lb 9.4 oz)   12/10/24 65.3 kg (144 lb)   09/25/24 67.1 kg (148 lb)   07/23/24 70.4 kg (155 lb 3.2 oz)   06/06/24 73.8 kg (162 lb 11.2 oz)   05/16/24 74.8 kg (164 lb 14.5 oz)      No further hypoglycemic episodes since increasing portion of dinner meal. Has noticed a great reduction in appetite and cravings     Hydration: crystal light 60-90oz, 4 cup of coffee + splenda + half and half  ETOH: denies  Exercise: denies, swims 2-3x per week, always  "active. Reports rarely resting  SLeeP: 9 hours     B: 2 slices Multigrain toast + PB   L: Cottage cheese + tomato OR Salad with egg or chicken or tuna + light italian  S: cheese + MG crackers OR berries  D: Meat + veggie + starch OR chicken fajitas  S: popcorn Or fruit      The following portions of the patient's history were reviewed and updated as appropriate: allergies, current medications, past family history, past medical history, past social history, past surgical history, and problem list.    Review of Systems   Cardiovascular:  Negative for chest pain.   Gastrointestinal:  Positive for abdominal pain, constipation and diarrhea (hx IBS). Negative for nausea and vomiting.       Objective:    /82   Pulse 62   Temp 98.2 °F (36.8 °C)   Ht 5' 2\" (1.575 m)   Wt 66.6 kg (146 lb 12.8 oz)   SpO2 99%   BMI 26.85 kg/m²      Physical Exam  Constitutional:       Appearance: Normal appearance.   HENT:      Head: Normocephalic and atraumatic.      Nose: Nose normal.      Mouth/Throat:      Mouth: Mucous membranes are moist.     Eyes:      Extraocular Movements: Extraocular movements intact.      Pupils: Pupils are equal, round, and reactive to light.       Cardiovascular:      Rate and Rhythm: Normal rate and regular rhythm.      Pulses: Normal pulses.      Heart sounds: Normal heart sounds.   Pulmonary:      Effort: Pulmonary effort is normal.      Breath sounds: Normal breath sounds.   Abdominal:      Palpations: Abdomen is soft.     Musculoskeletal:      Cervical back: Normal range of motion.     Skin:     General: Skin is warm.     Neurological:      General: No focal deficit present.      Mental Status: She is alert and oriented to person, place, and time.     Psychiatric:         Mood and Affect: Mood normal.         Behavior: Behavior normal.        "

## 2025-06-14 LAB — VIT A SERPL-MCNC: 35 UG/DL (ref 20.1–62)

## 2025-06-17 ENCOUNTER — RESULTS FOLLOW-UP (OUTPATIENT)
Dept: BARIATRICS | Facility: CLINIC | Age: 55
End: 2025-06-17

## 2025-07-03 ENCOUNTER — HOSPITAL ENCOUNTER (OUTPATIENT)
Dept: INFUSION CENTER | Facility: HOSPITAL | Age: 55
End: 2025-07-03
Attending: INTERNAL MEDICINE
Payer: COMMERCIAL

## 2025-07-03 VITALS
DIASTOLIC BLOOD PRESSURE: 71 MMHG | OXYGEN SATURATION: 98 % | RESPIRATION RATE: 16 BRPM | TEMPERATURE: 97.5 F | HEART RATE: 80 BPM | SYSTOLIC BLOOD PRESSURE: 132 MMHG

## 2025-07-03 DIAGNOSIS — D80.1 HYPOGAMMAGLOBULINEMIA (HCC): Primary | ICD-10-CM

## 2025-07-03 PROCEDURE — 96365 THER/PROPH/DIAG IV INF INIT: CPT

## 2025-07-03 PROCEDURE — 96366 THER/PROPH/DIAG IV INF ADDON: CPT

## 2025-07-03 PROCEDURE — 96375 TX/PRO/DX INJ NEW DRUG ADDON: CPT

## 2025-07-03 PROCEDURE — 96367 TX/PROPH/DG ADDL SEQ IV INF: CPT

## 2025-07-03 RX ORDER — ACETAMINOPHEN 325 MG/1
650 TABLET ORAL ONCE
OUTPATIENT
Start: 2025-07-24

## 2025-07-03 RX ORDER — SODIUM CHLORIDE 9 MG/ML
20 INJECTION, SOLUTION INTRAVENOUS ONCE
OUTPATIENT
Start: 2025-07-24

## 2025-07-03 RX ORDER — SODIUM CHLORIDE 9 MG/ML
20 INJECTION, SOLUTION INTRAVENOUS ONCE
Status: COMPLETED | OUTPATIENT
Start: 2025-07-03 | End: 2025-07-03

## 2025-07-03 RX ORDER — ACETAMINOPHEN 325 MG/1
650 TABLET ORAL ONCE
Status: DISCONTINUED | OUTPATIENT
Start: 2025-07-03 | End: 2025-07-07 | Stop reason: HOSPADM

## 2025-07-03 RX ADMIN — DIPHENHYDRAMINE HYDROCHLORIDE 25 MG: 50 INJECTION INTRAMUSCULAR; INTRAVENOUS at 08:57

## 2025-07-03 RX ADMIN — SODIUM CHLORIDE 20 ML/HR: 0.9 INJECTION, SOLUTION INTRAVENOUS at 08:18

## 2025-07-03 RX ADMIN — SODIUM CHLORIDE 500 ML: 0.9 INJECTION, SOLUTION INTRAVENOUS at 08:24

## 2025-07-03 RX ADMIN — DEXAMETHASONE SODIUM PHOSPHATE 10 MG: 10 INJECTION, SOLUTION INTRAMUSCULAR; INTRAVENOUS at 08:27

## 2025-07-03 RX ADMIN — Medication 40 G: at 09:45

## 2025-07-03 RX ADMIN — FAMOTIDINE 20 MG: 10 INJECTION, SOLUTION INTRAVENOUS at 09:11

## 2025-07-03 NOTE — PROGRESS NOTES
Ester Velazquez  tolerated IV IG treatment well with no complications.      Ester Velazquez is aware of future appt on 7/24 at 8AM.     AVS printed and given to Ester Velazquez: No (Declined by Ester Velazquez).

## 2025-07-24 ENCOUNTER — HOSPITAL ENCOUNTER (OUTPATIENT)
Dept: INFUSION CENTER | Facility: HOSPITAL | Age: 55
End: 2025-07-24
Attending: INTERNAL MEDICINE
Payer: COMMERCIAL

## 2025-07-24 VITALS
TEMPERATURE: 97.1 F | HEART RATE: 91 BPM | RESPIRATION RATE: 18 BRPM | DIASTOLIC BLOOD PRESSURE: 80 MMHG | OXYGEN SATURATION: 98 % | SYSTOLIC BLOOD PRESSURE: 129 MMHG

## 2025-07-24 DIAGNOSIS — D80.1 HYPOGAMMAGLOBULINEMIA (HCC): Primary | ICD-10-CM

## 2025-07-24 PROCEDURE — 96365 THER/PROPH/DIAG IV INF INIT: CPT

## 2025-07-24 PROCEDURE — 96366 THER/PROPH/DIAG IV INF ADDON: CPT

## 2025-07-24 PROCEDURE — 96367 TX/PROPH/DG ADDL SEQ IV INF: CPT

## 2025-07-24 RX ORDER — ACETAMINOPHEN 325 MG/1
650 TABLET ORAL ONCE
OUTPATIENT
Start: 2025-08-14

## 2025-07-24 RX ORDER — SODIUM CHLORIDE 9 MG/ML
20 INJECTION, SOLUTION INTRAVENOUS ONCE
OUTPATIENT
Start: 2025-08-14

## 2025-07-24 RX ORDER — ACETAMINOPHEN 325 MG/1
650 TABLET ORAL ONCE
Status: ACTIVE | OUTPATIENT
Start: 2025-07-24

## 2025-07-24 RX ORDER — SODIUM CHLORIDE 9 MG/ML
20 INJECTION, SOLUTION INTRAVENOUS ONCE
Status: COMPLETED | OUTPATIENT
Start: 2025-07-24 | End: 2025-07-24

## 2025-07-24 RX ADMIN — DIPHENHYDRAMINE HYDROCHLORIDE 25 MG: 50 INJECTION INTRAMUSCULAR; INTRAVENOUS at 08:51

## 2025-07-24 RX ADMIN — FAMOTIDINE 20 MG: 10 INJECTION, SOLUTION INTRAVENOUS at 09:12

## 2025-07-24 RX ADMIN — SODIUM CHLORIDE 500 ML: 0.9 INJECTION, SOLUTION INTRAVENOUS at 08:09

## 2025-07-24 RX ADMIN — Medication 40 G: at 09:43

## 2025-07-24 RX ADMIN — DEXAMETHASONE SODIUM PHOSPHATE 10 MG: 10 INJECTION, SOLUTION INTRAMUSCULAR; INTRAVENOUS at 08:20

## 2025-07-24 RX ADMIN — SODIUM CHLORIDE 20 ML/HR: 0.9 INJECTION, SOLUTION INTRAVENOUS at 08:07

## 2025-07-24 NOTE — PROGRESS NOTES
Ester Velazquez  tolerated IVIG infusion well with no complications.      Ester Velazquez is aware of future appt on 8/14 at 8AM.     AVS declined by Ester Velazquez.

## 2025-08-14 ENCOUNTER — HOSPITAL ENCOUNTER (OUTPATIENT)
Dept: INFUSION CENTER | Facility: HOSPITAL | Age: 55
End: 2025-08-14
Attending: INTERNAL MEDICINE
Payer: COMMERCIAL

## 2025-08-16 DIAGNOSIS — K91.2 POSTSURGICAL MALABSORPTION: ICD-10-CM

## 2025-08-16 DIAGNOSIS — Z98.84 STATUS POST BARIATRIC SURGERY: ICD-10-CM

## 2025-08-16 DIAGNOSIS — Z98.84 H/O GASTRIC BYPASS: ICD-10-CM

## 2025-08-16 DIAGNOSIS — E66.3 OVERWEIGHT: ICD-10-CM

## 2025-08-19 ENCOUNTER — PATIENT MESSAGE (OUTPATIENT)
Dept: BARIATRICS | Facility: CLINIC | Age: 55
End: 2025-08-19

## 2025-08-19 ENCOUNTER — TELEPHONE (OUTPATIENT)
Dept: HEMATOLOGY ONCOLOGY | Facility: CLINIC | Age: 55
End: 2025-08-19

## 2025-08-19 RX ORDER — LIRAGLUTIDE 6 MG/ML
3 INJECTION, SOLUTION SUBCUTANEOUS DAILY
Qty: 45 ML | OUTPATIENT
Start: 2025-08-19

## 2025-08-21 ENCOUNTER — TELEPHONE (OUTPATIENT)
Dept: BARIATRICS | Facility: CLINIC | Age: 55
End: 2025-08-21

## (undated) DEVICE — [HIGH FLOW INSUFFLATOR,  DO NOT USE IF PACKAGE IS DAMAGED,  KEEP DRY,  KEEP AWAY FROM SUNLIGHT,  PROTECT FROM HEAT AND RADIOACTIVE SOURCES.]: Brand: PNEUMOSURE

## (undated) DEVICE — BAG DECANTER

## (undated) DEVICE — SUT ETHIBOND 0 CT-1 30 IN X424H

## (undated) DEVICE — IRRIG ENDO FLO TUBING

## (undated) DEVICE — CADIERE FORCEPS: Brand: ENDOWRIST

## (undated) DEVICE — 3000CC GUARDIAN II: Brand: GUARDIAN

## (undated) DEVICE — GLOVE SRG BIOGEL 7.5

## (undated) DEVICE — ARM DRAPE

## (undated) DEVICE — TIBURON LAPAROSCOPIC ABDOMINAL DRAPE: Brand: CONVERTORS

## (undated) DEVICE — MONOPOLAR CURVED SCISSORS: Brand: ENDOWRIST

## (undated) DEVICE — TISSEEL FIBRIN 4ML FROZEN

## (undated) DEVICE — SCD SEQUENTIAL COMPRESSION COMFORT SLEEVE MEDIUM KNEE LENGTH: Brand: KENDALL SCD

## (undated) DEVICE — ADHESIVE SKIN CLSR DERMABOND NX

## (undated) DEVICE — MEGA SUTURECUT ND: Brand: ENDOWRIST

## (undated) DEVICE — PBT NON ABSORBABLE WOUND CLOSURE DEVICE: Brand: V-LOC

## (undated) DEVICE — BLUE HEAT SCOPE WARMER

## (undated) DEVICE — TISSUE RETRIEVAL SYSTEM: Brand: INZII RETRIEVAL SYSTEM

## (undated) DEVICE — TROCAR: Brand: KII FIOS FIRST ENTRY

## (undated) DEVICE — COTTON TIP APPLICTOR 2 PK

## (undated) DEVICE — VIOLET BRAIDED (POLYGLACTIN 910), SYNTHETIC ABSORBABLE SUTURE: Brand: COATED VICRYL

## (undated) DEVICE — TIP COVER ACCESSORY

## (undated) DEVICE — ENDOPATH PNEUMONEEDLE INSUFFLATION NEEDLES WITH LUER LOCK CONNECTORS 120MM: Brand: ENDOPATH

## (undated) DEVICE — BLADELESS OBTURATOR: Brand: WECK VISTA

## (undated) DEVICE — PENCIL ELECTROSURG E-Z CLEAN -0035H

## (undated) DEVICE — ANTI-FOG SOLUTION WITH FOAM PAD: Brand: DEVON

## (undated) DEVICE — BETHLEHEM UNIVERSAL MINOR GEN: Brand: CARDINAL HEALTH

## (undated) DEVICE — CHLORAPREP HI-LITE 26ML ORANGE

## (undated) DEVICE — STAPLER CANNULA SEAL: Brand: ENDOWRIST

## (undated) DEVICE — REDUCER: Brand: ENDOWRIST

## (undated) DEVICE — DRAPE EQUIPMENT RF WAND

## (undated) DEVICE — SUT MONOCRYL 4-0 PS-2 27 IN Y426H

## (undated) DEVICE — INTENDED FOR TISSUE SEPARATION, AND OTHER PROCEDURES THAT REQUIRE A SHARP SURGICAL BLADE TO PUNCTURE OR CUT.: Brand: BARD-PARKER SAFETY BLADES SIZE 15, STERILE

## (undated) DEVICE — STAPLER 60: Brand: SUREFORM

## (undated) DEVICE — SYRINGE 30ML LL

## (undated) DEVICE — COLUMN DRAPE

## (undated) DEVICE — LAPAROSCOPIC DUAL RIGID APPLICATOR: Brand: ETHICON

## (undated) DEVICE — SEAL

## (undated) DEVICE — CANNULA SEAL

## (undated) DEVICE — DRAPE TOWEL: Brand: CONVERTORS

## (undated) DEVICE — TUBING SMOKE EVAC W/FILTRATION DEVICE PLUMEPORT ACTIV

## (undated) DEVICE — URETERAL CATHETER ADAPTOR TIP

## (undated) DEVICE — VESSEL SEALER EXTEND: Brand: ENDOWRIST

## (undated) DEVICE — TUBING SUCTION 5MM X 12 FT

## (undated) DEVICE — 10 MM BABCOCKS WITH RATCHET HANDLES: Brand: ENDOPATH

## (undated) DEVICE — SYRINGE 20ML LL

## (undated) DEVICE — TRAVELKIT CONTAINS FIRST STEP KIT (200ML EP-4 KIT) AND SOILED SCOPE BAG - 1 KIT: Brand: TRAVELKIT CONTAINS FIRST STEP KIT AND SOILED SCOPE BAG

## (undated) DEVICE — STAPLER 60 RELOAD BLUE: Brand: SUREFORM

## (undated) DEVICE — ABSORBABLE WOUND CLOSURE DEVICE: Brand: V-LOC 90

## (undated) DEVICE — SEALANT FIBRIN VISTASEAL 10ML

## (undated) DEVICE — 2000CC GUARDIAN II: Brand: GUARDIAN

## (undated) DEVICE — WEBRIL 6 IN UNSTERILE

## (undated) DEVICE — STAPLER 60 RELOAD WHITE: Brand: SUREFORM

## (undated) DEVICE — Device: Brand: DEFENDO AIR/WATER/SUCTION AND BIOPSY VALVE

## (undated) DEVICE — NEEDLE SPINAL18G X 3.5 IN QUINCKE

## (undated) DEVICE — VISIGI 3D®  CALIBRATION SYSTEM  SIZE 36FR BYPASS/SHORT: Brand: BOEHRINGER® VISIGI 3D®  CALIBRATION SYSTEM  SIZE 36FR BYPASS/SHORT

## (undated) DEVICE — ASTOUND STANDARD SURGICAL GOWN, XL: Brand: CONVERTORS